# Patient Record
Sex: FEMALE | Race: WHITE | NOT HISPANIC OR LATINO | Employment: PART TIME | URBAN - METROPOLITAN AREA
[De-identification: names, ages, dates, MRNs, and addresses within clinical notes are randomized per-mention and may not be internally consistent; named-entity substitution may affect disease eponyms.]

---

## 2017-02-17 ENCOUNTER — OFFICE VISIT (OUTPATIENT)
Dept: FAMILY MEDICINE | Facility: CLINIC | Age: 57
End: 2017-02-17
Payer: COMMERCIAL

## 2017-02-17 VITALS
BODY MASS INDEX: 25.15 KG/M2 | DIASTOLIC BLOOD PRESSURE: 70 MMHG | SYSTOLIC BLOOD PRESSURE: 100 MMHG | TEMPERATURE: 98 F | WEIGHT: 136.69 LBS | HEART RATE: 92 BPM | HEIGHT: 62 IN

## 2017-02-17 DIAGNOSIS — K29.70 GASTRITIS, PRESENCE OF BLEEDING UNSPECIFIED, UNSPECIFIED CHRONICITY, UNSPECIFIED GASTRITIS TYPE: ICD-10-CM

## 2017-02-17 DIAGNOSIS — R09.81 NASAL CONGESTION: Primary | ICD-10-CM

## 2017-02-17 PROCEDURE — 99999 PR PBB SHADOW E&M-EST. PATIENT-LVL III: CPT | Mod: PBBFAC,,, | Performed by: INTERNAL MEDICINE

## 2017-02-17 PROCEDURE — 99214 OFFICE O/P EST MOD 30 MIN: CPT | Mod: S$GLB,,, | Performed by: INTERNAL MEDICINE

## 2017-02-17 RX ORDER — ONDANSETRON 4 MG/1
4 TABLET, ORALLY DISINTEGRATING ORAL EVERY 8 HOURS PRN
Qty: 30 TABLET | Refills: 6 | Status: SHIPPED | OUTPATIENT
Start: 2017-02-17 | End: 2018-03-28 | Stop reason: SDUPTHER

## 2017-02-17 NOTE — MR AVS SNAPSHOT
[facility]  [address] [facility], [address]  [facility] [address]  Phone: [phone]  Fax: [phone]                  Cristal Gonzalez   2017 10:20 AM   Office Visit    Description:  Female : 1960   Provider:  Ashanti Scruggs MD   Department:  [facility]           Reason for Visit     GI Problem     Fatigue     Chest Congestion     Nasal Congestion           Diagnoses this Visit        Comments    Nasal congestion    -  Primary     Gastritis, presence of bleeding unspecified, unspecified chronicity, unspecified gastritis type                To Do List           Goals (5 Years of Data)     None       These Medications        Disp Refills Start End    ondansetron (ZOFRAN-ODT) 4 MG TbDL 30 tablet 6 2017     Take 1 tablet (4 mg total) by mouth every 8 (eight) hours as needed (nausea). - Oral    Pharmacy: [facility] [address] [place] [address] [address] [facility] AT [facility] [address] Ph #: [phone]         [facility] On Call     [facility] On Call Nurse Care Line -  Assistance  Registered nurses in the [facility] On Call Center provide clinical advisement, health education, appointment booking, and other advisory services.  Call for this free service at [phone].             Medications           Message regarding Medications     Verify the changes and/or additions to your medication regime listed below are the same as discussed with your clinician today.  If any of these changes or additions are incorrect, please notify your healthcare provider.        CHANGE how you are taking these medications     Start Taking Instead of    ondansetron (ZOFRAN-ODT) 4 MG TbDL ondansetron (ZOFRAN-ODT) 4 MG TbDL    Dosage:  Take 1 tablet (4 mg total) by mouth every 8 (eight) hours as needed (nausea). Dosage:  Take 2 tablets (8 mg total) by mouth every 8 (eight) hours as needed.    Reason for Change:  Reorder            Verify that  "the below list of medications is an accurate representation of the medications you are currently taking.  If none reported, the list may be blank. If incorrect, please contact your healthcare provider. Carry this list with you in case of emergency.           Current Medications     acetaminophen (TYLENOL) 500 MG tablet Take 500 mg by mouth every 6 (six) hours as needed for Pain.    cetirizine (ZYRTEC) 10 MG tablet Take 1 tablet (10 mg total) by mouth once daily.    clotrimazole-betamethasone 1-0.05% (LOTRISONE) cream Apply topically 2 (two) times daily.    cyclobenzaprine (FLEXERIL) 5 MG tablet Take 1 tablet (5 mg total) by mouth 2 (two) times daily as needed for Muscle spasms.    docusate sodium (COLACE) 100 MG capsule Take 1 capsule (100 mg total) by mouth 2 (two) times daily as needed for Constipation.    fluticasone (FLONASE) 50 mcg/actuation nasal spray 1 spray by Each Nare route once daily.    GLUCOSAM SUL NA/CHONDR THURSTON A NA (GLUCOSAMINE & CHONDROIT SUL.NA ORAL) Take by mouth.    MV, MIN #36/IRON,CARBONYL/FA (GERITOL COMPLETE ORAL) Take by mouth.    naproxen (NAPROSYN) 500 MG tablet Take 1 tablet (500 mg total) by mouth 2 (two) times daily.    ondansetron (ZOFRAN-ODT) 4 MG TbDL Take 1 tablet (4 mg total) by mouth every 8 (eight) hours as needed (nausea).    SALVAX 6 % Foam            Clinical Reference Information           Your Vitals Were     BP Pulse Temp Height Weight BMI    100/70 (BP Location: Right arm) 92 98.3 °F (36.8 °C) 5' 2" (1.575 m) 62 kg (136 lb 11 oz) 25 kg/m2      Blood Pressure          Most Recent Value    BP  100/70      Allergies as of 2/17/2017     Sulfa (Sulfonamide Antibiotics)    Bactrim [Sulfamethoxazole-trimethoprim]      Immunizations Administered on Date of Encounter - 2/17/2017     None      Instructions    Vitamins: B complex, Vitamin C  "USP approved" ([place] Pharmacopeia)       Smoking Cessation     If you would like to quit smoking:   You may be eligible for free " services if you are a [place] resident and started smoking cigarettes before September 1, 1988.  Call the Smoking [employer]essation [employer] (SCT) toll free at (539) 744-0916 or (425) 687-4754.   Call 1-800-QUIT-NOW if you do not meet the above criteria.            Language Assistance Services     ATTENTION: Language assistance services are available, free of charge. Please call [phone].      ATENCIÓN: Si habla samantaañol, tiene a early disposición servicios gratuitos de asistencia lingüística. Llame al [phone].     CHÚ Ý: N?u b?n nói Ti?ng Vi?t, có các d?ch v? h? tr? ngôn ng? mi?n phí dành cho b?n. G?i s? [phone].         [facility] complies with applicable Federal civil rights laws and does not discriminate on the basis of race, color, national origin, age, disability, or sex.

## 2017-02-17 NOTE — PROGRESS NOTES
Subjective:        Patient ID: Cristal Gonzalez is a 56 y.o. female.    Chief Complaint: GI Problem (stomach swollen); Fatigue; Chest Congestion (yellow mucus with blood); and Nasal Congestion    HPI   Cristal Gonzalez presents for the followin. Fatigue and congestion: Pt reports feeling really tired for the past 3 weeks.  She has head and chest congestion.  No fever, SOB.  Pt's elderly parents live in [place] and pt helps care for them.  They also have sitters but pt takes care of paperwork, finances, etc.  Both parents are WC-bound; father has Alzheimer's x 12 years and Parkinson's.  Pt has visitors coming from out of town and she would like something to boost her energy.  She has been prescribed Medrol packs in the past by  and says these have made her feel better.    2. Bloating: Pt reports bloating during the daytime, associated with unsettled feeling/discomfort in the upper abdomen.  She also reports low back pain that gradually resolves after a BM.  No vomiting, blood in stool, melena, abdominal pain.  Pt usually eats fruit and drinks coffee in the AM, doesn't eat lunch until ~2pm.  She had a c-scope for screening and was told to eat more fiber.  She has been trying to increase fiber intake by eating cereals in the morning, salad for lunch.  Stools are soft and formed.  Pt drinks ~2 EtOH drinks/night, usually vodka with carbonated water.  She has not taken anything for the sx.    Review of Systems  as per HPI      Objective:        Vitals:    17 1026   BP: 100/70   Pulse: 92   Temp: 98.3 °F (36.8 °C)     Physical Exam   Constitutional: She is oriented to person, place, and time. She appears well-developed and well-nourished. No distress.   HENT:   Head: Normocephalic and atraumatic.   Right Ear: External ear normal.   Left Ear: External ear normal.   Nose: Nose normal.   Mouth/Throat: Oropharynx is clear and moist. No oropharyngeal exudate.   - bilateral ear canals clear, tympanic  "membranes visualized - normal color and light reflex  - no middle ear effusions   Eyes: Conjunctivae and EOM are normal.   Neck: Normal range of motion. Neck supple.   Cardiovascular: Normal rate.    Pulmonary/Chest: Effort normal. No respiratory distress.   Abdominal: Soft. Bowel sounds are normal. She exhibits no distension and no mass. There is no tenderness. There is no guarding.   Neurological: She is alert and oriented to person, place, and time.   Skin: Skin is warm and dry.   Vitals reviewed.          Assessment:         1. Nasal congestion    2. Gastritis, presence of bleeding unspecified, unspecified chronicity, unspecified gastritis type              Plan:         Cristal was seen today for gi problem, fatigue, chest congestion and nasal congestion.    Diagnoses and all orders for this visit:    Nasal congestion: No signs of acute infection on exam.  Recommend OTC sx tx with antihistamines, nasal sprays, decongestants, Tylenol/NSAIDs for pain.  - Counseled pt I do not recommend systemic steroids given risks of adverse effects outweigh short term benefits of energy boost.  - Also do not recommend abx at this time as there are no signs of acute infection and most sinus and respiratory infections are viral.  - Can try OTC B complex and C vitamins to see if this helps boost energy, continue daily MVI.    Gastritis, presence of bleeding unspecified, unspecified chronicity, unspecified gastritis type: Reviewed common foods and drinks that trigger acid reflux, gastritis sx, like citrus and acidic foods, alcohol and carbonated drinks.  Try to decrease intake of these foods.  Try Tums or OTC H2 blocker daily in the AM for 2 weeks.  Zofran PRN nausea.  -     ondansetron (ZOFRAN-ODT) 4 MG TbDL; Take 1 tablet (4 mg total) by mouth every 8 (eight) hours as needed (nausea).        Follow up if above sx worse or not gradually improving.    Patient Instructions   Vitamins: B complex, Vitamin C  "USP approved" ([place] " Pharmacopeia)

## 2017-05-23 DIAGNOSIS — M62.838 MUSCLE SPASM: ICD-10-CM

## 2017-05-23 RX ORDER — NAPROXEN 500 MG/1
500 TABLET ORAL 2 TIMES DAILY
Qty: 30 TABLET | Refills: 0 | OUTPATIENT
Start: 2017-05-23

## 2017-05-23 RX ORDER — CYCLOBENZAPRINE HCL 5 MG
5 TABLET ORAL 2 TIMES DAILY PRN
Qty: 30 TABLET | Refills: 0 | OUTPATIENT
Start: 2017-05-23

## 2017-05-23 NOTE — TELEPHONE ENCOUNTER
----- Message from Lilian Joan sent at 5/23/2017  8:32 AM CDT -----  Contact: call [phone]  RX request - refill or new RX.  Is this a refill or new RX:  refill  RX name and strength: cyclobenzaprine (FLEXERIL) 5 MG tablet  Directions:   Is this a 30 day or 90 day RX:  30 day   Pharmacy name and phone #: [facility] [employer] [address] [place] [address] [address] AT [facility] & [address] [phone] (Phone)   Comments:        RX request - refill or new RX.  Is this a refill or new RX:  refill  RX name and strength: naproxen (NAPROSYN) 500 MG tablet  Directions:   Is this a 30 day or 90 day RX:  30 day   Pharmacy name and phone #: [facility] [employer] [address] [place] [address], [place] - [facility] [address] AT [facility] & [address] [phone] (Phone)   Comments:

## 2017-05-24 ENCOUNTER — OFFICE VISIT (OUTPATIENT)
Dept: FAMILY MEDICINE | Facility: CLINIC | Age: 57
End: 2017-05-24
Payer: COMMERCIAL

## 2017-05-24 VITALS
HEIGHT: 62 IN | TEMPERATURE: 98 F | SYSTOLIC BLOOD PRESSURE: 118 MMHG | DIASTOLIC BLOOD PRESSURE: 78 MMHG | BODY MASS INDEX: 24.75 KG/M2 | WEIGHT: 134.5 LBS | HEART RATE: 78 BPM

## 2017-05-24 DIAGNOSIS — M54.9 BILATERAL BACK PAIN, UNSPECIFIED BACK LOCATION, UNSPECIFIED CHRONICITY: ICD-10-CM

## 2017-05-24 DIAGNOSIS — R53.83 FATIGUE, UNSPECIFIED TYPE: Primary | ICD-10-CM

## 2017-05-24 LAB
BILIRUB SERPL-MCNC: NORMAL MG/DL
BILIRUB UR QL STRIP: NEGATIVE
BLOOD URINE, POC: NORMAL
CLARITY UR REFRACT.AUTO: CLEAR
COLOR UR AUTO: YELLOW
COLOR, POC UA: YELLOW
GLUCOSE UR QL STRIP: NEGATIVE
GLUCOSE UR QL STRIP: NORMAL
HGB UR QL STRIP: NEGATIVE
KETONES UR QL STRIP: ABNORMAL
KETONES UR QL STRIP: NORMAL
LEUKOCYTE ESTERASE UR QL STRIP: NEGATIVE
LEUKOCYTE ESTERASE URINE, POC: NORMAL
MICROSCOPIC COMMENT: NORMAL
NITRITE UR QL STRIP: NEGATIVE
NITRITE, POC UA: NORMAL
PH UR STRIP: 6 [PH] (ref 5–8)
PH, POC UA: 6
PROT UR QL STRIP: NEGATIVE
PROTEIN, POC: NORMAL
RBC #/AREA URNS AUTO: 2 /HPF (ref 0–4)
SP GR UR STRIP: 1.02 (ref 1–1.03)
SPECIFIC GRAVITY, POC UA: 1.01
SQUAMOUS #/AREA URNS AUTO: 0 /HPF
URN SPEC COLLECT METH UR: ABNORMAL
UROBILINOGEN UR STRIP-ACNC: NEGATIVE EU/DL
UROBILINOGEN, POC UA: NORMAL
WBC #/AREA URNS AUTO: 0 /HPF (ref 0–5)

## 2017-05-24 PROCEDURE — 99999 PR PBB SHADOW E&M-EST. PATIENT-LVL V: CPT | Mod: PBBFAC,,, | Performed by: NURSE PRACTITIONER

## 2017-05-24 PROCEDURE — 81002 URINALYSIS NONAUTO W/O SCOPE: CPT | Mod: S$GLB,,, | Performed by: NURSE PRACTITIONER

## 2017-05-24 PROCEDURE — 1160F RVW MEDS BY RX/DR IN RCRD: CPT | Mod: S$GLB,,, | Performed by: NURSE PRACTITIONER

## 2017-05-24 PROCEDURE — 87086 URINE CULTURE/COLONY COUNT: CPT

## 2017-05-24 PROCEDURE — 99214 OFFICE O/P EST MOD 30 MIN: CPT | Mod: 25,S$GLB,, | Performed by: NURSE PRACTITIONER

## 2017-05-24 PROCEDURE — 81001 URINALYSIS AUTO W/SCOPE: CPT

## 2017-05-24 RX ORDER — NAPROXEN 500 MG/1
500 TABLET ORAL 2 TIMES DAILY
Qty: 60 TABLET | Refills: 1 | Status: SHIPPED | OUTPATIENT
Start: 2017-05-24 | End: 2018-06-19 | Stop reason: SDUPTHER

## 2017-05-24 RX ORDER — CYCLOBENZAPRINE HCL 5 MG
5 TABLET ORAL 3 TIMES DAILY PRN
Qty: 30 TABLET | Refills: 1 | Status: SHIPPED | OUTPATIENT
Start: 2017-05-24 | End: 2017-06-03

## 2017-05-24 NOTE — PROGRESS NOTES
"Subjective:       Patient ID: Cristal Gonzalez is a 56 y.o. female.    Chief Complaint: Medication Refill; Back Pain (lower back); and Fatigue    Pt here c/o low back pain, has a hx of LBP usually takes Naproxen and Flexeril 5 mg and it usually takes care of her pain but she is out of Flexeril and her original script is over a year old so she cannot use any of the remaining refills.    Pt also c/o fatigue for the past few weeks.  States that she has been taking care of her parents and not sure if that is why she is tired.  No n/v/d.  Wakes up some mornings and does not feel refreshed        Past Medical History:   Diagnosis Date    Hot flash, menopausal      Past Surgical History:   Procedure Laterality Date    APPENDECTOMY  2011    BREAST BIOPSY      benign     Social History     Social History Narrative        Helps care for elderly parents who live in [place] (father in , has alzheimer and Parkinson; mother in )     Family History   Problem Relation Age of Onset    Diabetes Mother     Alzheimer's disease Father     Cancer Maternal Aunt      breast cancer     Vitals:    05/24/17 1046   BP: 118/78   Pulse: 78   Temp: 98.1 °F (36.7 °C)   Weight: 61 kg (134 lb 7.7 oz)   Height: 5' 1.5" (1.562 m)   PainSc:   3     Outpatient Encounter Prescriptions as of 5/24/2017   Medication Sig Dispense Refill    GLUCOSAM SUL NA/CHONDR THURSTON A NA (GLUCOSAMINE & CHONDROIT SUL.NA ORAL) Take by mouth.      MV, MIN #36/IRON,CARBONYL/FA (GERITOL COMPLETE ORAL) Take by mouth.      ondansetron (ZOFRAN-ODT) 4 MG TbDL Take 1 tablet (4 mg total) by mouth every 8 (eight) hours as needed (nausea). 30 tablet 6    acetaminophen (TYLENOL) 500 MG tablet Take 500 mg by mouth every 6 (six) hours as needed for Pain.      cetirizine (ZYRTEC) 10 MG tablet Take 1 tablet (10 mg total) by mouth once daily. 30 tablet 5    cyclobenzaprine (FLEXERIL) 5 MG tablet Take 1 tablet (5 mg total) by mouth 2 (two) times daily as needed for " "Muscle spasms. 30 tablet 9    cyclobenzaprine (FLEXERIL) 5 MG tablet Take 1 tablet (5 mg total) by mouth 3 (three) times daily as needed for Muscle spasms. 30 tablet 1    docusate sodium (COLACE) 100 MG capsule Take 1 capsule (100 mg total) by mouth 2 (two) times daily as needed for Constipation. 60 capsule 0    fluticasone (FLONASE) 50 mcg/actuation nasal spray 1 spray by Each Nare route once daily. 16 g 2    naproxen (EC NAPROSYN) 500 MG EC tablet Take 1 tablet (500 mg total) by mouth 2 (two) times daily. If needed For back pain 60 tablet 1    naproxen (NAPROSYN) 500 MG tablet Take 1 tablet (500 mg total) by mouth 2 (two) times daily. 30 tablet 6    SALVAX 6 % Foam   3    [DISCONTINUED] clotrimazole-betamethasone 1-0.05% (LOTRISONE) cream Apply topically 2 (two) times daily. 45 g 1     No facility-administered encounter medications on file as of 5/24/2017.      Wt Readings from Last 3 Encounters:   05/24/17 61 kg (134 lb 7.7 oz)   02/17/17 62 kg (136 lb 11 oz)   10/07/16 62.8 kg (138 lb 7.2 oz)     Last 3 sets of Vitals    Vitals - 1 value per visit 10/7/2016 2/17/2017 5/24/2017   SYSTOLIC 128 100 118   DIASTOLIC 89 70 78   PULSE 64 92 78   TEMPERATURE 98 98.3 98.1   RESPIRATIONS - - -   Weight (lb) 138.45 136.69 134.48   Weight (kg) 62.8 62 61   HEIGHT 5' 2" 5' 2" 5' 1.5"   BODY MASS INDEX 25.32 25 25   VISIT REPORT - - -   Pain Score  1 1 3     No results found for: CBC  Review of Systems   Constitutional: Positive for fatigue. Negative for chills, diaphoresis, fever and unexpected weight change.   HENT: Negative for congestion and trouble swallowing.    Respiratory: Negative for cough and shortness of breath.    Cardiovascular: Negative for chest pain.   Gastrointestinal: Negative for abdominal pain, diarrhea, nausea and vomiting.   Genitourinary: Negative for dysuria.   Musculoskeletal: Positive for back pain.   Skin: Negative for rash.   Psychiatric/Behavioral: Negative for sleep disturbance.     "   Objective:      Physical Exam   Constitutional: She is oriented to person, place, and time. She appears well-developed and well-nourished. No distress.   Non toxic appearing      HENT:   Head: Normocephalic and atraumatic.   Right Ear: External ear normal.   Left Ear: External ear normal.   Nose: Nose normal.   Mouth/Throat: Oropharynx is clear and moist.   Eyes: Conjunctivae are normal. Pupils are equal, round, and reactive to light.   Neck: Normal range of motion. Neck supple.   Cardiovascular: Normal rate, regular rhythm and normal heart sounds.    Pulmonary/Chest: Effort normal and breath sounds normal. No stridor. No respiratory distress.   Abdominal: Soft.   Musculoskeletal:        Lumbar back: She exhibits normal range of motion, no tenderness, no pain and no spasm.   Lymphadenopathy:     She has no cervical adenopathy.   Neurological: She is alert and oriented to person, place, and time. She has normal reflexes.   Skin: Skin is warm and dry. Capillary refill takes less than 2 seconds. No rash noted. She is not diaphoretic. No erythema. No pallor.   Psychiatric: She has a normal mood and affect. Her behavior is normal. Judgment and thought content normal.   Nursing note and vitals reviewed.      Assessment:       1. Fatigue, unspecified type    2. Bilateral back pain, unspecified back location, unspecified chronicity        Plan:       Cristal was seen today for medication refill, back pain and fatigue.    Diagnoses and all orders for this visit:    Fatigue, unspecified type  -     CBC auto differential; Future  -     Comprehensive metabolic panel; Future  -     TSH; Future  -     Urinalysis  -     Urine culture  -     Ferritin; Future  -     Iron and TIBC; Future  -     DAVEY; Future    Bilateral back pain, unspecified back location, unspecified chronicity  -     naproxen (EC NAPROSYN) 500 MG EC tablet; Take 1 tablet (500 mg total) by mouth 2 (two) times daily. If needed For back pain  -     cyclobenzaprine  (FLEXERIL) 5 MG tablet; Take 1 tablet (5 mg total) by mouth 3 (three) times daily as needed for Muscle spasms.  -     Urinalysis  -     Urine culture  -     POCT URINE DIPSTICK WITHOUT MICROSCOPE

## 2017-05-24 NOTE — PATIENT INSTRUCTIONS
YOUR SYMPTOMS MAY BE DUE TO DEHYRATION or INADEQUATE HYDRATION    Your body systems need EIGHT more glasses of water daily to adequately flush out your Kidneys & Liver & Intestinal tract of its daily toxins.     EIGHT GLASSES OF WATER DAILY - add one extra cup per cup of caffeine (coffee, tea, coke, soda).     (for example - If you drink 2 cups of coffee & 1 diet coke daily, then you need 3 +8 = ELEVEN glasses of water DAILY)    See how you feel after doing this for a week & give me an update by email    Labs today    Will call or send you a message with results

## 2017-05-25 ENCOUNTER — LAB VISIT (OUTPATIENT)
Dept: LAB | Facility: HOSPITAL | Age: 57
End: 2017-05-25
Attending: NURSE PRACTITIONER
Payer: COMMERCIAL

## 2017-05-25 DIAGNOSIS — R53.83 FATIGUE, UNSPECIFIED TYPE: ICD-10-CM

## 2017-05-25 LAB
ALBUMIN SERPL BCP-MCNC: 4 G/DL
ALP SERPL-CCNC: 51 U/L
ALT SERPL W/O P-5'-P-CCNC: 20 U/L
ANION GAP SERPL CALC-SCNC: 9 MMOL/L
AST SERPL-CCNC: 23 U/L
BACTERIA UR CULT: NORMAL
BASOPHILS # BLD AUTO: 0.06 K/UL
BASOPHILS NFR BLD: 0.9 %
BILIRUB SERPL-MCNC: 0.4 MG/DL
BUN SERPL-MCNC: 22 MG/DL
CALCIUM SERPL-MCNC: 9.5 MG/DL
CHLORIDE SERPL-SCNC: 105 MMOL/L
CO2 SERPL-SCNC: 25 MMOL/L
CREAT SERPL-MCNC: 0.8 MG/DL
DIFFERENTIAL METHOD: ABNORMAL
EOSINOPHIL # BLD AUTO: 0.3 K/UL
EOSINOPHIL NFR BLD: 3.8 %
ERYTHROCYTE [DISTWIDTH] IN BLOOD BY AUTOMATED COUNT: 13.3 %
EST. GFR  (AFRICAN AMERICAN): >60 ML/MIN/1.73 M^2
EST. GFR  (NON AFRICAN AMERICAN): >60 ML/MIN/1.73 M^2
FERRITIN SERPL-MCNC: 125 NG/ML
GLUCOSE SERPL-MCNC: 85 MG/DL
HCT VFR BLD AUTO: 43.7 %
HGB BLD-MCNC: 14.4 G/DL
IRON SERPL-MCNC: 64 UG/DL
LYMPHOCYTES # BLD AUTO: 2.7 K/UL
LYMPHOCYTES NFR BLD: 38.2 %
MCH RBC QN AUTO: 32.2 PG
MCHC RBC AUTO-ENTMCNC: 33 %
MCV RBC AUTO: 98 FL
MONOCYTES # BLD AUTO: 0.6 K/UL
MONOCYTES NFR BLD: 7.9 %
NEUTROPHILS # BLD AUTO: 3.4 K/UL
NEUTROPHILS NFR BLD: 49.1 %
PLATELET # BLD AUTO: 410 K/UL
PMV BLD AUTO: 10.1 FL
POTASSIUM SERPL-SCNC: 4.7 MMOL/L
PROT SERPL-MCNC: 7.3 G/DL
RBC # BLD AUTO: 4.47 M/UL
SATURATED IRON: 18 %
SODIUM SERPL-SCNC: 139 MMOL/L
TOTAL IRON BINDING CAPACITY: 346 UG/DL
TRANSFERRIN SERPL-MCNC: 234 MG/DL
TSH SERPL DL<=0.005 MIU/L-ACNC: 1.48 UIU/ML
WBC # BLD AUTO: 6.93 K/UL

## 2017-05-25 PROCEDURE — 86038 ANTINUCLEAR ANTIBODIES: CPT

## 2017-05-25 PROCEDURE — 82728 ASSAY OF FERRITIN: CPT

## 2017-05-25 PROCEDURE — 84466 ASSAY OF TRANSFERRIN: CPT

## 2017-05-25 PROCEDURE — 36415 COLL VENOUS BLD VENIPUNCTURE: CPT | Mod: PO

## 2017-05-25 PROCEDURE — 80053 COMPREHEN METABOLIC PANEL: CPT

## 2017-05-25 PROCEDURE — 85025 COMPLETE CBC W/AUTO DIFF WBC: CPT

## 2017-05-25 PROCEDURE — 83540 ASSAY OF IRON: CPT

## 2017-05-25 PROCEDURE — 84443 ASSAY THYROID STIM HORMONE: CPT

## 2017-05-26 LAB — ANA SER QL IF: NORMAL

## 2017-08-22 ENCOUNTER — OFFICE VISIT (OUTPATIENT)
Dept: URGENT CARE | Facility: CLINIC | Age: 57
End: 2017-08-22
Payer: COMMERCIAL

## 2017-08-22 VITALS
DIASTOLIC BLOOD PRESSURE: 84 MMHG | TEMPERATURE: 98 F | HEART RATE: 77 BPM | BODY MASS INDEX: 25.28 KG/M2 | OXYGEN SATURATION: 99 % | SYSTOLIC BLOOD PRESSURE: 122 MMHG | WEIGHT: 136 LBS | RESPIRATION RATE: 18 BRPM

## 2017-08-22 DIAGNOSIS — R11.10 VOMITING, INTRACTABILITY OF VOMITING NOT SPECIFIED, PRESENCE OF NAUSEA NOT SPECIFIED, UNSPECIFIED VOMITING TYPE: ICD-10-CM

## 2017-08-22 LAB
BILIRUB UR QL STRIP: NEGATIVE
GLUCOSE UR QL STRIP: NEGATIVE
KETONES UR QL STRIP: NEGATIVE
LEUKOCYTE ESTERASE UR QL STRIP: POSITIVE
PH, POC UA: 6
POC BLOOD, URINE: NEGATIVE
POC NITRATES, URINE: NEGATIVE
PROT UR QL STRIP: POSITIVE
SP GR UR STRIP: 1.02 (ref 1–1.03)
UROBILINOGEN UR STRIP-ACNC: NORMAL (ref 0.1–1.1)

## 2017-08-22 PROCEDURE — 99214 OFFICE O/P EST MOD 30 MIN: CPT | Mod: 25,S$GLB,, | Performed by: FAMILY MEDICINE

## 2017-08-22 PROCEDURE — 3008F BODY MASS INDEX DOCD: CPT | Mod: S$GLB,,, | Performed by: FAMILY MEDICINE

## 2017-08-22 PROCEDURE — 81003 URINALYSIS AUTO W/O SCOPE: CPT | Mod: QW,S$GLB,, | Performed by: FAMILY MEDICINE

## 2017-08-22 RX ORDER — ONDANSETRON 2 MG/ML
4 INJECTION INTRAMUSCULAR; INTRAVENOUS
Status: DISCONTINUED | OUTPATIENT
Start: 2017-08-22 | End: 2017-08-22

## 2017-08-22 RX ORDER — ONDANSETRON 4 MG/1
4 TABLET, FILM COATED ORAL DAILY PRN
Qty: 15 TABLET | Refills: 1 | Status: SHIPPED | OUTPATIENT
Start: 2017-08-22 | End: 2018-08-22

## 2017-08-22 RX ORDER — ONDANSETRON 2 MG/ML
4 INJECTION INTRAMUSCULAR; INTRAVENOUS
Status: COMPLETED | OUTPATIENT
Start: 2017-08-22 | End: 2017-08-22

## 2017-08-22 RX ADMIN — ONDANSETRON 4 MG: 2 INJECTION INTRAMUSCULAR; INTRAVENOUS at 05:08

## 2017-08-22 NOTE — PROGRESS NOTES
Subjective:       Patient ID: Cristal Gonzalez is a 57 y.o. female.    Chief Complaint: Emesis (3 hours ) and Diarrhea (3 hours )    PT STARTED AFTER EATING LUNCH TODAY 3 HOURS AGO SHE STARTED TO FEEL SICK. Patient reports approx. 6 episodes of vomiting in the last 2 hrs and has noted the onset of diarrhea. Reports was well prior to lunch without symptoms. Reports dry heaves. No hematemesis. No known ill exposures.       Emesis    This is a new (6 times today ) problem. The current episode started today. The problem occurs 2 to 4 times per day. The problem has been gradually worsening. There has been no fever. Associated symptoms include chills and diarrhea. Pertinent negatives include no headaches, sweats or weight loss. Treatments tried: zofran 4mg  The treatment provided no relief.   Diarrhea    Associated symptoms include chills and vomiting. Pertinent negatives include no headaches, sweats or weight loss.     Review of Systems   Constitution: Positive for chills. Negative for weight loss.   HENT: Negative for headaches.    Gastrointestinal: Positive for diarrhea and vomiting.       Objective:      Physical Exam   Constitutional: She appears well-developed and well-nourished.   Cardiovascular: Normal rate, regular rhythm and normal heart sounds.    Pulmonary/Chest: Effort normal and breath sounds normal.   Abdominal: Bowel sounds are normal. She exhibits no distension and no mass. There is tenderness (mild generalized tenderness, no rebound or guarding. ). There is no rebound and no guarding. No hernia.   Nursing note reviewed.      Assessment:       1. Food poisoning, accidental or unintentional, initial encounter    2. Vomiting, intractability of vomiting not specified, presence of nausea not specified, unspecified vomiting type        Plan:       Cristal was seen today for emesis and diarrhea.    Diagnoses and all orders for this visit:    Food poisoning, accidental or unintentional, initial encounter  -      ondansetron (ZOFRAN, AS HYDROCHLORIDE,) 4 MG tablet; Take 1 tablet (4 mg total) by mouth daily as needed for Nausea.    Vomiting, intractability of vomiting not specified, presence of nausea not specified, unspecified vomiting type  -     POCT Urinalysis, Dipstick, Automated, W/O Scope  -     Discontinue: ondansetron injection 4 mg; Inject 4 mg into the vein one time.  -     ondansetron injection 4 mg; Inject 4 mg into the muscle one time.  -     ondansetron (ZOFRAN, AS HYDROCHLORIDE,) 4 MG tablet; Take 1 tablet (4 mg total) by mouth daily as needed for Nausea.    patient reports feeling better after Zofran IM in office and is now able to tolerate liquids. Discussed natural course of illness and advised to go the ER for intractable vomiting or other concerning symptoms.

## 2017-08-22 NOTE — PATIENT INSTRUCTIONS
Food Poisoning or Viral Gastroenteritis (Adult)  You have a stomach illness that is likely either food poisoning or viral gastroenteritis.  Food poisoning is illness that is passed along in food and affects the stomach and intestinal tract. It usually occurs from 1 to 24 hours after eating food that has spoiled. When it happens within a few hours of eating, it is often caused by toxins from bacteria in food that has not been cooked or refrigerated properly.  Viral gastroenteritis is also an illness from a virus that affects the stomach and intestinal tract. Many people call it the stomach flu, but it has nothing to do with influenza. In fact, it can happen from food poisoning, but it can also happen when germs are passed from person-to-person or contaminated surface (toothbrush, cutting board, toilet) to a person.  Either illness can cause these symptoms:  · Abdominal pain and cramping  · Nausea  · Vomiting  · Diarrhea  · Fever and chills  · Loss of bowel control  The symptoms of food poisoning usually last 1 to 2 days. The symptoms of viral gastroenteritis can sometimes last up to 7 days but usually end sooner.  Antibiotics are not effective for either illness.  Other causes of gastroenteritis include bacteria and parasites which are not discussed here.  Home care  Follow all instructions given by your healthcare provider. Rest at home for the next 24 hours, or until you feel better. Avoid caffeine, tobacco, and alcohol. These can make diarrhea, cramping, and pain worse.  If taking medicines:  · Dont take over-the-counter diarrhea or nausea medicines unless your healthcare provider tells you to.  · You may use acetaminophen or NSAID medicines like ibuprofen or naproxen to reduce pain and fever. Dont use these if you have chronic liver or kidney disease, or ever had a stomach ulcer or GI bleeding. Talk with your healthcare provider first. Don't use NSAID medicines if you are already taking one for another  condition (like arthritis) or are on daily aspirin therapy (such as for heart disease or after a stroke).  To prevent the spread of illness:  · Remember that washing with soap and water is the best way to prevent the spread of infection. Wash your hands before and after caring for a sick person.  · Clean the toilet after each use.  · Wash your hands or use alcohol-based hand  before eating.  · Wash your hands or use alcohol-based hand  before and after preparing food. Keep in mind that people with diarrhea or vomiting should not prepare food for others.  · Wash your hands or use alcohol-based hand  after using cutting boards, counter-tops, and knives (and other utensils) that have been in contact with raw foods.  · Wash and then peel fruits and vegetables.  · Keep uncooked meats away from cooked and ready-to-eat foods.  · Use a food thermometer when cooking. Cook poultry to at least 165°F (74°C). Cook ground meat (beef, veal, pork, lamb) to at least 160°F (71°C). Cook fresh beef, veal, lamb, and pork to at least 145°F (63°C).  · Dont eat raw or undercooked eggs (poached or suresh side up), poultry, meat or unpasteurized milk and juices.  Food and drinks  The main goal while treating vomiting or diarrhea is to prevent dehydration. This is done by taking small amounts of liquids often.  · Keep in mind that liquids are more important than food right now.  · Drink only small amounts of liquids at a time.  · Dont force yourself to eat, especially if you are having cramping, vomiting, or diarrhea. Dont eat large amounts at a time, even if you are hungry.  · If you eat, avoid fatty, greasy, spicy, or fried foods.  · Dont eat dairy foods or drink milk if you have diarrhea. These can make diarrhea worse.  The first 24 hours you can try:  · Oral rehydration solutions, available at grocery stores and pharmacies. Sports drinks are not a good choice if you are very dehydrated. They have too much  sugar and not enough electrolytes.  · Soft drinks without caffeine  · Ginger ale  · Water (plain or flavored)  · Decaf tea or coffee  · Clear broth, consommé, or bouillon  · Gelatin, popsicles, or frozen fruit juice bars   The second 24 hours, if you are feeling better, you can add:  · Hot cereal, plain toast, bread, rolls, or crackers  · Plain noodles, rice, mashed potatoes, chicken noodle soup, or rice soup  · Unsweetened canned fruit (no pineapple)  · Bananas  As you recover:  · Limit fat intake to less than 15 grams per day. Dont eat margarine, butter, oils, mayonnaise, sauces, gravies, fried foods, peanut butter, meat, poultry, or fish.  · Limit fiber. Dont eat raw or cooked vegetables, fresh fruits except bananas, and bran cereals.  · Limit caffeine and chocolate.  · Dont use spices or seasonings except salt.  · Resume a normal diet over time, as you feel better and your symptoms improve.  · If the symptoms come back, go back to a simple diet or clear liquids.  Follow-up care  Follow up with your healthcare provider, or as advised. If a stool sample was taken or cultures were done, call the healthcare provider for the results as instructed.  Call 911  Call 911 if you have any of these symptoms:  · Trouble breathing  · Confusion  · Extreme drowsiness or trouble walking  · Loss of consciousness  · Rapid heart rate  · Chest pain  · Stiff neck  · Seizure  When to seek medical advice  Call your healthcare provider right away if any of these occur:  · Abdominal pain that gets worse  · Constant lower right abdominal pain  · Continued vomiting and inability to keep liquids down  · Diarrhea more than 5 times a day  · Blood in vomit or stool  · Dark urine or no urine for 8 hours, dry mouth and tongue, tiredness, weakness, or dizziness  · New rash  · You dont get better in 2 to 3 days  · Fever of 100.4°F (38°C) or higher that doesnt get lower with medicine  · You have new symptoms of arthritis  Date Last Reviewed:  1/3/2016  © 6365-9076 [employer]. [address] [place] [address], [place] [address]. All rights reserved. This information is not intended as a substitute for professional medical care. Always follow your healthcare professional's instructions.

## 2017-08-25 DIAGNOSIS — Z12.11 COLON CANCER SCREENING: ICD-10-CM

## 2017-10-09 ENCOUNTER — OFFICE VISIT (OUTPATIENT)
Dept: URGENT CARE | Facility: CLINIC | Age: 57
End: 2017-10-09
Payer: COMMERCIAL

## 2017-10-09 VITALS
DIASTOLIC BLOOD PRESSURE: 89 MMHG | WEIGHT: 140 LBS | HEART RATE: 87 BPM | SYSTOLIC BLOOD PRESSURE: 118 MMHG | HEIGHT: 62 IN | RESPIRATION RATE: 16 BRPM | TEMPERATURE: 97 F | BODY MASS INDEX: 25.76 KG/M2 | OXYGEN SATURATION: 98 %

## 2017-10-09 DIAGNOSIS — J40 BRONCHITIS: ICD-10-CM

## 2017-10-09 DIAGNOSIS — J01.20 ACUTE ETHMOIDAL SINUSITIS, RECURRENCE NOT SPECIFIED: Primary | ICD-10-CM

## 2017-10-09 PROCEDURE — 99214 OFFICE O/P EST MOD 30 MIN: CPT | Mod: S$GLB,,, | Performed by: EMERGENCY MEDICINE

## 2017-10-09 RX ORDER — AMOXICILLIN 875 MG/1
875 TABLET, FILM COATED ORAL 2 TIMES DAILY
Qty: 20 TABLET | Refills: 0 | Status: SHIPPED | OUTPATIENT
Start: 2017-10-09 | End: 2017-10-19

## 2017-10-09 RX ORDER — METHYLPREDNISOLONE 4 MG/1
4 TABLET ORAL DAILY
Qty: 1 PACKAGE | Refills: 0 | Status: SHIPPED | OUTPATIENT
Start: 2017-10-09 | End: 2017-10-15

## 2017-10-09 RX ORDER — CODEINE PHOSPHATE AND GUAIFENESIN 10; 100 MG/5ML; MG/5ML
5 SOLUTION ORAL EVERY 6 HOURS PRN
Qty: 150 ML | Refills: 0 | Status: SHIPPED | OUTPATIENT
Start: 2017-10-09 | End: 2017-10-19

## 2017-10-09 NOTE — PROGRESS NOTES
Subjective:       Patient ID: Cristal Gonzalez is a 57 y.o. female.    Chief Complaint: Cough    Patient states she has had non productive cough,watery eyes, nasal congestion, and scratchy throat that started today.      Cough   This is a new problem. The current episode started today. The problem has been gradually worsening. The problem occurs constantly. The cough is non-productive. Associated symptoms include headaches, nasal congestion and a sore throat (scratchy). Pertinent negatives include no chest pain, chills, ear pain, eye redness, fever, myalgias, postnasal drip, shortness of breath or wheezing. Associated symptoms comments: Watery eyes. The symptoms are aggravated by lying down. Treatments tried: tea. The treatment provided no relief. There is no history of asthma or bronchitis.     Review of Systems   Constitution: Positive for malaise/fatigue. Negative for chills and fever.   HENT: Positive for congestion (nasal) and sore throat (scratchy). Negative for ear pain, hoarse voice and postnasal drip.    Eyes: Negative for discharge and redness.   Cardiovascular: Negative for chest pain, dyspnea on exertion and leg swelling.   Respiratory: Positive for cough. Negative for shortness of breath, sputum production and wheezing.    Musculoskeletal: Negative for myalgias.   Gastrointestinal: Negative for abdominal pain and nausea.   Neurological: Positive for headaches.       Objective:      Physical Exam   HENT:   Nose: Mucosal edema and rhinorrhea present.   Pulmonary/Chest:   Occasional cough       Assessment:       1. Acute ethmoidal sinusitis, recurrence not specified    2. Bronchitis        Plan:       Cristal was seen today for cough.    Diagnoses and all orders for this visit:    Acute ethmoidal sinusitis, recurrence not specified    Bronchitis    Other orders  -     amoxicillin (AMOXIL) 875 MG tablet; Take 1 tablet (875 mg total) by mouth 2 (two) times daily.  -     guaifenesin-codeine 100-10 mg/5 ml  (GUAIFENESIN AC)  mg/5 mL syrup; Take 5 mLs by mouth every 6 (six) hours as needed for Cough.  -     methylPREDNISolone (MEDROL DOSEPACK) 4 mg tablet; Take 1 tablet (4 mg total) by mouth once daily. use as directed    Please return here or go to the Emergency Department for any concerns or worsening of condition      Start taking antibiotics if not improved in 6 days    Zyrtec, Claritin, or Allegra OTC as directed for the next 7 days  Flonase OTC as directed for the next 7 days  Salt Water Nasal Spray OTC 3x/day for the next 7 days      Follow up with Primary Care or ENT if not improved in 7-10 Days:  [id number]      Sinusitis (No Antibiotics)    The sinuses are air-filled spaces within the bones of the face. They connect to the inside of the nose. Sinusitis is an inflammation of the tissue lining the sinus cavity. Sinus inflammation can occur during a cold. It can also be due to allergies to pollens and other particles in the air. It can cause symptoms such as sinus congestion, headache, sore throat, facial swelling and fullness. It may also cause a low-grade fever. No infection is present, and no antibiotic treatment is needed.  Home care  · Drink plenty of water, hot tea, and other liquids. This may help thin mucus. It also may promote sinus drainage.  · Heat may help soothe painful areas of the face. Use a towel soaked in hot water. Or,  the shower and direct the hot spray onto your face. Using a vaporizer along with a menthol rub at night may also help.   · An expectorant containing guaifenesin may help thin the mucus and promote drainage from the sinuses.  · Over-the-counter decongestants may be used unless a similar medicine was prescribed. Nasal sprays work the fastest. Use one that contains phenylephrine or oxymetazoline. First blow the nose gently. Then use the spray. Do not use these medicines more often than directed on the label or symptoms may get worse. You may also use tablets containing  pseudoephedrine. Avoid products that combine ingredients, because side effects may be increased. Read labels. You can also ask the pharmacist for help. (NOTE: Persons with high blood pressure should not use decongestants. They can raise blood pressure.)  · Over-the-counter antihistamines may help if allergies contributed to your sinusitis.    · Use acetaminophen or ibuprofen to control pain, unless another pain medicine was prescribed. (If you have chronic liver or kidney disease or ever had a stomach ulcer, talk with your doctor before using these medicines. Aspirin should never be used in anyone under 18 years of age who is ill with a fever. It may cause severe liver damage.)  · Use nasal rinses or irrigation as instructed by your health care provider.  · Don't smoke. This can worsen symptoms.  Follow-up care  Follow up with your healthcare provider or our staff if you are not improving within the next week.  When to seek medical advice  Call your healthcare provider if any of these occur:  · Green or yellow discharge from the nose or into the throat  · Facial pain or headache becoming more severe  · Stiff neck  · Unusual drowsiness or confusion  · Swelling of the forehead or eyelids  · Vision problems, including blurred or double vision  · Fever of 100.4ºF (38ºC) or higher, or as directed by your healthcare provider  · Seizure  · Breathing problems  · Symptoms not resolving within 10 days  Date Last Reviewed: 4/13/2015  © 8830-4598 "[employer]. [address] [place] [address]. All rights reserved. This information is not intended as a substitute for professional medical care. Always follow your healthcare professional's instructions.      When to Use Antibiotics   Antibiotics are medicines used to treat infections caused by bacteria. They dont work for illnesses caused by viruses or an allergic reaction. In fact, taking antibiotics for reasons other than a bacterial infection can cause  problems. For example, you may have side effects from the medicine. And if you really need an antibiotic, it may not work well.                                                                                                                                              When antibiotics wont help  Your healthcare provider wont usually prescribe antibiotics for the following conditions. You can help by not asking for them if you have:   · A cold. This type of illness is caused by a virus. It can cause a runny nose, stuffed-up nose, sneezing, coughing, headache, mild body aches, and low fever. A cold gets better on its own in a few days to a week.  · The flu (influenza). This is a respiratory illness caused by a virus. The flu usually goes away on its own in a week or so. It can cause fever, body aches, sore throat, and fatigue.  · Bronchitis. This is an infection in the lungs most often caused by a virus. You may have coughing, phlegm, body aches, and a low fever. A common type of bronchitis is known as a chest cold (acute bronchitis). This often happens after you have a respiratory infection like a common cold. Bronchitis can take weeks to go away, but antibiotics usually dont help.  · Most sore throats. Sore throats are most often caused by viruses. Your throat may feel scratchy or achy, and it may hurt to swallow. You may also have a low fever and body aches. A sore throat usually gets better in a few days.  · Most ear infections. An ear infection may be caused by a virus or bacteria. It causes pain in the ear. Antibiotics usually dont help, and the infection goes away on its own.  · Most sinus infections (sinusitis). This kind of infection causes sinus pain and swelling, and a runny nose. In most cases, sinusitis goes away on its own, and antibiotics dont make recovery quicker.  · Allergic rhinitis. This is a set of symptoms caused by an allergic reaction. You may have sneezing, a runny nose, itchy or watery  eyes, or a sore throat. Allergies are not treated with antibiotics.  · Low fever. A mild fever thats less than 100.4°F (38°C) most likely doesnt need treatment with antibiotics.   When antibiotics can help   Antibiotics can be used to treat:                                                     · Strep throat. This is a throat infectioncaused by a certain type of bacteria. Symptoms of strep throat include a sore throat, white patches on the tonsils, red spots on the roof of the mouth, fever, body aches, and nausea and vomiting.  · Urinary tract infection (UTI). This is a bacterial infection of the bladder and the tube that takes urine out of the body. It can cause burning pain and urine thats cloudy or tinted with blood. UTIs are very common. Antibiotics usually help treat these infections.  · Some ear infections. In some cases, a healthcare provider may prescribe antibiotics for an ear infection. You may need a test to show whats causing the ear infection.  · Some sinus infections. In some cases, yourhealthcare provider may give you antibiotics. He or she may first need to make sure your symptoms arent caused by a virus, fungus, allergies, or air pollutants such as smoke.   Your doctor may also recommend antibiotics if you have a condition that can affect your immune system, such as diabetes or cancer.   Self-care at home   If your infection cant be treated with antibiotics, you can take other steps to feel better. Try the remedies below. In general:   · Rest and sleep as much as needed.  · Drink water and other clear fluids.  · Dont smoke, and avoid smoke from other people.  · Use over-the-counter medicine such as acetaminophen to ease pain or fever, as directed by your healthcare provider.   To treat sinus pain or nasal congestion:   · Put a warm, moist washcloth on your face where you feel sinus pain or pressure.  · Use a nasal spray with medicine or saline, as directed by your healthcare  "provider.  · Breathe in steam from a hot shower.  · Use a humidifier or cool mist vaporizer.   To quiet a cough:   · Use a humidifier or cool mist vaporizer.  · Breathe in steam from a hot shower.  · Use cough lozenges.   To sooth a sore throat:   · Suck on ice chips, popsicles, or lozenges.  · Use a sore throat spray.  · Use a humidifier or cool mist vaporizer.  · Gargle with saltwater.  · Drink warm liquids.   To ease ear pain:   · Hold a warm, moist washcloth on the ear for 10 minutes at a time.  Date Last Reviewed: 9/1/2016 © 2000-2016 [employer]. [address] [place] [address], [place] [address]. All rights reserved. This information is not intended as a substitute for professional medical care. Always follow your healthcare professional's instructions.           Understanding Nasal Anatomy: Inside View  A lot happens under the surface of the nose. The bone and cartilage under the skin give the nose most of its size and shape. Other structures inside and behind the nose help you breathe. Learning the anatomy of the nose can help you better understand how the nose works.       Bone supports the upper third (bridge) of the nose. The upper cartilage supports the side of the nose. The lower cartilage adds support, width, and height. It helps shape the nostrils and the tip of the nose. Skin also helps shape the nose.          The nasal cavity is a hollow space behind the nose that air flows through. The septum is a thin "wall" made of cartilage and bone. It divides the inside of the nose into two chambers. The mucous membrane is thin tissue that lines the nose, sinuses, and throat. It warms and moistens the air you breathe in. It also makes the sticky mucus that helps clean the air of dust and other small particles. The turbinates on each side of the nose are curved, bony ridges lined with mucous membrane. They warm and moisten the air you breathe in. The sinuses are hollow, air-filled chambers in the bone " around your nose. Mucus from the sinuses drains into the nasal cavity.  Date Last Reviewed: 11/1/2016 © 2000-2016 [employer]. [address] [place] [address], [place] [address]. All rights reserved. This information is not intended as a substitute for professional medical care. Always follow your healthcare professional's instructions.       Please return here or go to the Emergency Department for any concerns or worsening of condition        Start taking antibiotics if not improved in 3 days      Follow up with Primary Care if not improved in 7-10 Days:  [id number]      Bronchitis, Viral (Adult)    You have a viral bronchitis. Bronchitis is inflammation and swelling of the lining of the lungs. This is often caused by an infection. Symptoms include a dry, hacking cough that is worse at night. The cough may bring up yellow-green mucus. You may also feel short of breath or wheeze. Other symptoms may include tiredness, chest discomfort, and chills.  Bronchitis that is caused by a virus is not treated with antibiotics. Instead, medicines may be given to help relieve symptoms. Symptoms can last up to 2 weeks, although the cough may last much longer.  This illness is contagious during the first few days and is spread through the air by coughing and sneezing, or by direct contact (touching the sick person and then touching your own eyes, nose, or mouth).  Most viral illnesses resolve within 10 to 14 days with rest and simple home remedies, although they may sometimes last for several weeks.  Home care  · If symptoms are severe, rest at home for the first 2 to 3 days. When you go back to your usual activities, don't let yourself get too tired.  · Do not smoke. Also avoid being exposed to secondhand smoke.  · You may use over-the-counter medicine to control fever or pain, unless another pain medicine was prescribed. (Note: If you have chronic liver or kidney disease or have ever had a stomach ulcer or gastrointestinal  bleeding, talk with your healthcare provider before using these medicines. Also talk to your provider if you are taking medicine to prevent blood clots.) Aspirin should never be given to anyone younger than 18 years of age who is ill with a viral infection or fever. It may cause severe liver or brain damage.  · Your appetite may be poor, so a light diet is fine. Avoid dehydration by drinking 6 to 8 glasses of fluids per day (such as water, soft drinks, sports drinks, juices, tea, or soup). Extra fluids will help loosen secretions in the nose and lungs.  · Over-the-counter cough, cold, and sore-throat medicines will not shorten the length of the illness, but they may help to reduce symptoms. (Note: Do not use decongestants if you have high blood pressure.)  Follow-up care  Follow up with your healthcare provider, or as advised. If you had an X-ray or ECG (electrocardiogram), a specialist will review it. You will be notified of any new findings that may affect your care.  Note: If you are age 65 or older, or if you have a chronic lung disease or condition that affects your immune system, or you smoke, talk to your healthcare provider about having pneumococcal vaccinations and a yearly influenza vaccination (flu shot).  When to seek medical advice  Call your healthcare provider right away if any of these occur:  · Fever of 100.4°F (38°C) or higher  · Coughing up increased amounts of colored sputum  · Weakness, drowsiness, headache, facial pain, ear pain, or a stiff neck  Call 911, or get immediate medical care  Contact emergency services right away if any of these occur:  · Coughing up blood  · Worsening weakness, drowsiness, headache, or stiff neck  · Trouble breathing, wheezing, or pain with breathing  Date Last Reviewed: 9/13/2015 © 2000-2016 [employer]. [address] [place] [address], [place] [address]. All rights reserved. This information is not intended as a substitute for professional medical care.  Always follow your healthcare professional's instructions.        When to Use Antibiotics   Antibiotics are medicines used to treat infections caused by bacteria. They dont work for illnesses caused by viruses or an allergic reaction. In fact, taking antibiotics for reasons other than a bacterial infection can cause problems. For example, you may have side effects from the medicine. And if you really need an antibiotic, it may not work well.                                                                                                                                              When antibiotics wont help  Your healthcare provider wont usually prescribe antibiotics for the following conditions. You can help by not asking for them if you have:   · A cold. This type of illness is caused by a virus. It can cause a runny nose, stuffed-up nose, sneezing, coughing, headache, mild body aches, and low fever. A cold gets better on its own in a few days to a week.  · The flu (influenza). This is a respiratory illness caused by a virus. The flu usually goes away on its own in a week or so. It can cause fever, body aches, sore throat, and fatigue.  · Bronchitis. This is an infection in the lungs most often caused by a virus. You may have coughing, phlegm, body aches, and a low fever. A common type of bronchitis is known as a chest cold (acute bronchitis). This often happens after you have a respiratory infection like a common cold. Bronchitis can take weeks to go away, but antibiotics usually dont help.  · Most sore throats. Sore throats are most often caused by viruses. Your throat may feel scratchy or achy, and it may hurt to swallow. You may also have a low fever and body aches. A sore throat usually gets better in a few days.  · Most ear infections. An ear infection may be caused by a virus or bacteria. It causes pain in the ear. Antibiotics usually dont help, and the infection goes away on its own.  · Most sinus  infections (sinusitis). This kind of infection causes sinus pain and swelling, and a runny nose. In most cases, sinusitis goes away on its own, and antibiotics dont make recovery quicker.  · Allergic rhinitis. This is a set of symptoms caused by an allergic reaction. You may have sneezing, a runny nose, itchy or watery eyes, or a sore throat. Allergies are not treated with antibiotics.  · Low fever. A mild fever thats less than 100.4°F (38°C) most likely doesnt need treatment with antibiotics.   When antibiotics can help   Antibiotics can be used to treat:                                                     · Strep throat. This is a throat infectioncaused by a certain type of bacteria. Symptoms of strep throat include a sore throat, white patches on the tonsils, red spots on the roof of the mouth, fever, body aches, and nausea and vomiting.  · Urinary tract infection (UTI). This is a bacterial infection of the bladder and the tube that takes urine out of the body. It can cause burning pain and urine thats cloudy or tinted with blood. UTIs are very common. Antibiotics usually help treat these infections.  · Some ear infections. In some cases, a healthcare provider may prescribe antibiotics for an ear infection. You may need a test to show whats causing the ear infection.  · Some sinus infections. In some cases, yourhealthcare provider may give you antibiotics. He or she may first need to make sure your symptoms arent caused by a virus, fungus, allergies, or air pollutants such as smoke.   Your doctor may also recommend antibiotics if you have a condition that can affect your immune system, such as diabetes or cancer.   Self-care at home   If your infection cant be treated with antibiotics, you can take other steps to feel better. Try the remedies below. In general:   · Rest and sleep as much as needed.  · Drink water and other clear fluids.  · Dont smoke, and avoid smoke from other people.  · Use  over-the-counter medicine such as acetaminophen to ease pain or fever, as directed by your healthcare provider.   To treat sinus pain or nasal congestion:   · Put a warm, moist washcloth on your face where you feel sinus pain or pressure.  · Use a nasal spray with medicine or saline, as directed by your healthcare provider.  · Breathe in steam from a hot shower.  · Use a humidifier or cool mist vaporizer.   To quiet a cough:   · Use a humidifier or cool mist vaporizer.  · Breathe in steam from a hot shower.  · Use cough lozenges.   To sooth a sore throat:   · Suck on ice chips, popsicles, or lozenges.  · Use a sore throat spray.  · Use a humidifier or cool mist vaporizer.  · Gargle with saltwater.  · Drink warm liquids.   To ease ear pain:   · Hold a warm, moist washcloth on the ear for 10 minutes at a time.  Date Last Reviewed: 9/1/2016  © 7280-8742 [employer]. [address] [place] [address], [place] [address]. All rights reserved. This information is not intended as a substitute for professional medical care. Always follow your healthcare professional's instructions.

## 2017-10-10 NOTE — PATIENT INSTRUCTIONS
Please return here or go to the Emergency Department for any concerns or worsening of condition      Start taking antibiotics if not improved in 6 days    Zyrtec, Claritin, or Allegra OTC as directed for the next 7 days  Flonase OTC as directed for the next 7 days  Salt Water Nasal Spray OTC 3x/day for the next 7 days      Follow up with Primary Care or ENT if not improved in 7-10 Days:  [phone]      Sinusitis (No Antibiotics)    The sinuses are air-filled spaces within the bones of the face. They connect to the inside of the nose. Sinusitis is an inflammation of the tissue lining the sinus cavity. Sinus inflammation can occur during a cold. It can also be due to allergies to pollens and other particles in the air. It can cause symptoms such as sinus congestion, headache, sore throat, facial swelling and fullness. It may also cause a low-grade fever. No infection is present, and no antibiotic treatment is needed.  Home care  · Drink plenty of water, hot tea, and other liquids. This may help thin mucus. It also may promote sinus drainage.  · Heat may help soothe painful areas of the face. Use a towel soaked in hot water. Or,  the shower and direct the hot spray onto your face. Using a vaporizer along with a menthol rub at night may also help.   · An expectorant containing guaifenesin may help thin the mucus and promote drainage from the sinuses.  · Over-the-counter decongestants may be used unless a similar medicine was prescribed. Nasal sprays work the fastest. Use one that contains phenylephrine or oxymetazoline. First blow the nose gently. Then use the spray. Do not use these medicines more often than directed on the label or symptoms may get worse. You may also use tablets containing pseudoephedrine. Avoid products that combine ingredients, because side effects may be increased. Read labels. You can also ask the pharmacist for help. (NOTE: Persons with high blood pressure should not use decongestants.  They can raise blood pressure.)  · Over-the-counter antihistamines may help if allergies contributed to your sinusitis.    · Use acetaminophen or ibuprofen to control pain, unless another pain medicine was prescribed. (If you have chronic liver or kidney disease or ever had a stomach ulcer, talk with your doctor before using these medicines. Aspirin should never be used in anyone under 18 years of age who is ill with a fever. It may cause severe liver damage.)  · Use nasal rinses or irrigation as instructed by your health care provider.  · Don't smoke. This can worsen symptoms.  Follow-up care  Follow up with your healthcare provider or our staff if you are not improving within the next week.  When to seek medical advice  Call your healthcare provider if any of these occur:  · Green or yellow discharge from the nose or into the throat  · Facial pain or headache becoming more severe  · Stiff neck  · Unusual drowsiness or confusion  · Swelling of the forehead or eyelids  · Vision problems, including blurred or double vision  · Fever of 100.4ºF (38ºC) or higher, or as directed by your healthcare provider  · Seizure  · Breathing problems  · Symptoms not resolving within 10 days  Date Last Reviewed: 4/13/2015  © 2291-4150 [employer]. [address] [place] [address], [place] [address]. All rights reserved. This information is not intended as a substitute for professional medical care. Always follow your healthcare professional's instructions.      When to Use Antibiotics   Antibiotics are medicines used to treat infections caused by bacteria. They dont work for illnesses caused by viruses or an allergic reaction. In fact, taking antibiotics for reasons other than a bacterial infection can cause problems. For example, you may have side effects from the medicine. And if you really need an antibiotic, it may not work  well.                                                                                                                                              When antibiotics wont help  Your healthcare provider wont usually prescribe antibiotics for the following conditions. You can help by not asking for them if you have:   · A cold. This type of illness is caused by a virus. It can cause a runny nose, stuffed-up nose, sneezing, coughing, headache, mild body aches, and low fever. A cold gets better on its own in a few days to a week.  · The flu (influenza). This is a respiratory illness caused by a virus. The flu usually goes away on its own in a week or so. It can cause fever, body aches, sore throat, and fatigue.  · Bronchitis. This is an infection in the lungs most often caused by a virus. You may have coughing, phlegm, body aches, and a low fever. A common type of bronchitis is known as a chest cold (acute bronchitis). This often happens after you have a respiratory infection like a common cold. Bronchitis can take weeks to go away, but antibiotics usually dont help.  · Most sore throats. Sore throats are most often caused by viruses. Your throat may feel scratchy or achy, and it may hurt to swallow. You may also have a low fever and body aches. A sore throat usually gets better in a few days.  · Most ear infections. An ear infection may be caused by a virus or bacteria. It causes pain in the ear. Antibiotics usually dont help, and the infection goes away on its own.  · Most sinus infections (sinusitis). This kind of infection causes sinus pain and swelling, and a runny nose. In most cases, sinusitis goes away on its own, and antibiotics dont make recovery quicker.  · Allergic rhinitis. This is a set of symptoms caused by an allergic reaction. You may have sneezing, a runny nose, itchy or watery eyes, or a sore throat. Allergies are not treated with antibiotics.  · Low fever. A mild fever thats less than 100.4°F  (38°C) most likely doesnt need treatment with antibiotics.   When antibiotics can help   Antibiotics can be used to treat:                                                     · Strep throat. This is a throat infectioncaused by a certain type of bacteria. Symptoms of strep throat include a sore throat, white patches on the tonsils, red spots on the roof of the mouth, fever, body aches, and nausea and vomiting.  · Urinary tract infection (UTI). This is a bacterial infection of the bladder and the tube that takes urine out of the body. It can cause burning pain and urine thats cloudy or tinted with blood. UTIs are very common. Antibiotics usually help treat these infections.  · Some ear infections. In some cases, a healthcare provider may prescribe antibiotics for an ear infection. You may need a test to show whats causing the ear infection.  · Some sinus infections. In some cases, yourhealthcare provider may give you antibiotics. He or she may first need to make sure your symptoms arent caused by a virus, fungus, allergies, or air pollutants such as smoke.   Your doctor may also recommend antibiotics if you have a condition that can affect your immune system, such as diabetes or cancer.   Self-care at home   If your infection cant be treated with antibiotics, you can take other steps to feel better. Try the remedies below. In general:   · Rest and sleep as much as needed.  · Drink water and other clear fluids.  · Dont smoke, and avoid smoke from other people.  · Use over-the-counter medicine such as acetaminophen to ease pain or fever, as directed by your healthcare provider.   To treat sinus pain or nasal congestion:   · Put a warm, moist washcloth on your face where you feel sinus pain or pressure.  · Use a nasal spray with medicine or saline, as directed by your healthcare provider.  · Breathe in steam from a hot shower.  · Use a humidifier or cool mist vaporizer.   To quiet a cough:   · Use a humidifier or  "cool mist vaporizer.  · Breathe in steam from a hot shower.  · Use cough lozenges.   To sooth a sore throat:   · Suck on ice chips, popsicles, or lozenges.  · Use a sore throat spray.  · Use a humidifier or cool mist vaporizer.  · Gargle with saltwater.  · Drink warm liquids.   To ease ear pain:   · Hold a warm, moist washcloth on the ear for 10 minutes at a time.  Date Last Reviewed: 9/1/2016  © 6387-5853 [employer]. [address] [place] [address]. All rights reserved. This information is not intended as a substitute for professional medical care. Always follow your healthcare professional's instructions.           Understanding Nasal Anatomy: Inside View  A lot happens under the surface of the nose. The bone and cartilage under the skin give the nose most of its size and shape. Other structures inside and behind the nose help you breathe. Learning the anatomy of the nose can help you better understand how the nose works.       Bone supports the upper third (bridge) of the nose. The upper cartilage supports the side of the nose. The lower cartilage adds support, width, and height. It helps shape the nostrils and the tip of the nose. Skin also helps shape the nose.          The nasal cavity is a hollow space behind the nose that air flows through. The septum is a thin "wall" made of cartilage and bone. It divides the inside of the nose into two chambers. The mucous membrane is thin tissue that lines the nose, sinuses, and throat. It warms and moistens the air you breathe in. It also makes the sticky mucus that helps clean the air of dust and other small particles. The turbinates on each side of the nose are curved, bony ridges lined with mucous membrane. They warm and moisten the air you breathe in. The sinuses are hollow, air-filled chambers in the bone around your nose. Mucus from the sinuses drains into the nasal cavity.  Date Last Reviewed: 11/1/2016  © 7627-0906 [employer], " [employer]. [address] [place] [address]. All rights reserved. This information is not intended as a substitute for professional medical care. Always follow your healthcare professional's instructions.       Please return here or go to the Emergency Department for any concerns or worsening of condition        Start taking antibiotics if not improved in 3 days      Follow up with Primary Care if not improved in 7-10 Days:  [phone]      Bronchitis, Viral (Adult)    You have a viral bronchitis. Bronchitis is inflammation and swelling of the lining of the lungs. This is often caused by an infection. Symptoms include a dry, hacking cough that is worse at night. The cough may bring up yellow-green mucus. You may also feel short of breath or wheeze. Other symptoms may include tiredness, chest discomfort, and chills.  Bronchitis that is caused by a virus is not treated with antibiotics. Instead, medicines may be given to help relieve symptoms. Symptoms can last up to 2 weeks, although the cough may last much longer.  This illness is contagious during the first few days and is spread through the air by coughing and sneezing, or by direct contact (touching the sick person and then touching your own eyes, nose, or mouth).  Most viral illnesses resolve within 10 to 14 days with rest and simple home remedies, although they may sometimes last for several weeks.  Home care  · If symptoms are severe, rest at home for the first 2 to 3 days. When you go back to your usual activities, don't let yourself get too tired.  · Do not smoke. Also avoid being exposed to secondhand smoke.  · You may use over-the-counter medicine to control fever or pain, unless another pain medicine was prescribed. (Note: If you have chronic liver or kidney disease or have ever had a stomach ulcer or gastrointestinal bleeding, talk with your healthcare provider before using these medicines. Also talk to your provider if you are taking medicine to  prevent blood clots.) Aspirin should never be given to anyone younger than 18 years of age who is ill with a viral infection or fever. It may cause severe liver or brain damage.  · Your appetite may be poor, so a light diet is fine. Avoid dehydration by drinking 6 to 8 glasses of fluids per day (such as water, soft drinks, sports drinks, juices, tea, or soup). Extra fluids will help loosen secretions in the nose and lungs.  · Over-the-counter cough, cold, and sore-throat medicines will not shorten the length of the illness, but they may help to reduce symptoms. (Note: Do not use decongestants if you have high blood pressure.)  Follow-up care  Follow up with your healthcare provider, or as advised. If you had an X-ray or ECG (electrocardiogram), a specialist will review it. You will be notified of any new findings that may affect your care.  Note: If you are age 65 or older, or if you have a chronic lung disease or condition that affects your immune system, or you smoke, talk to your healthcare provider about having pneumococcal vaccinations and a yearly influenza vaccination (flu shot).  When to seek medical advice  Call your healthcare provider right away if any of these occur:  · Fever of 100.4°F (38°C) or higher  · Coughing up increased amounts of colored sputum  · Weakness, drowsiness, headache, facial pain, ear pain, or a stiff neck  Call 911, or get immediate medical care  Contact emergency services right away if any of these occur:  · Coughing up blood  · Worsening weakness, drowsiness, headache, or stiff neck  · Trouble breathing, wheezing, or pain with breathing  Date Last Reviewed: 9/13/2015 © 2000-2016 [employer]. [address] [place] [address]. All rights reserved. This information is not intended as a substitute for professional medical care. Always follow your healthcare professional's instructions.        When to Use Antibiotics   Antibiotics are medicines used to treat  infections caused by bacteria. They dont work for illnesses caused by viruses or an allergic reaction. In fact, taking antibiotics for reasons other than a bacterial infection can cause problems. For example, you may have side effects from the medicine. And if you really need an antibiotic, it may not work well.                                                                                                                                              When antibiotics wont help  Your healthcare provider wont usually prescribe antibiotics for the following conditions. You can help by not asking for them if you have:   · A cold. This type of illness is caused by a virus. It can cause a runny nose, stuffed-up nose, sneezing, coughing, headache, mild body aches, and low fever. A cold gets better on its own in a few days to a week.  · The flu (influenza). This is a respiratory illness caused by a virus. The flu usually goes away on its own in a week or so. It can cause fever, body aches, sore throat, and fatigue.  · Bronchitis. This is an infection in the lungs most often caused by a virus. You may have coughing, phlegm, body aches, and a low fever. A common type of bronchitis is known as a chest cold (acute bronchitis). This often happens after you have a respiratory infection like a common cold. Bronchitis can take weeks to go away, but antibiotics usually dont help.  · Most sore throats. Sore throats are most often caused by viruses. Your throat may feel scratchy or achy, and it may hurt to swallow. You may also have a low fever and body aches. A sore throat usually gets better in a few days.  · Most ear infections. An ear infection may be caused by a virus or bacteria. It causes pain in the ear. Antibiotics usually dont help, and the infection goes away on its own.  · Most sinus infections (sinusitis). This kind of infection causes sinus pain and swelling, and a runny nose. In most cases, sinusitis goes away on  its own, and antibiotics dont make recovery quicker.  · Allergic rhinitis. This is a set of symptoms caused by an allergic reaction. You may have sneezing, a runny nose, itchy or watery eyes, or a sore throat. Allergies are not treated with antibiotics.  · Low fever. A mild fever thats less than 100.4°F (38°C) most likely doesnt need treatment with antibiotics.   When antibiotics can help   Antibiotics can be used to treat:                                                     · Strep throat. This is a throat infectioncaused by a certain type of bacteria. Symptoms of strep throat include a sore throat, white patches on the tonsils, red spots on the roof of the mouth, fever, body aches, and nausea and vomiting.  · Urinary tract infection (UTI). This is a bacterial infection of the bladder and the tube that takes urine out of the body. It can cause burning pain and urine thats cloudy or tinted with blood. UTIs are very common. Antibiotics usually help treat these infections.  · Some ear infections. In some cases, a healthcare provider may prescribe antibiotics for an ear infection. You may need a test to show whats causing the ear infection.  · Some sinus infections. In some cases, yourhealthcare provider may give you antibiotics. He or she may first need to make sure your symptoms arent caused by a virus, fungus, allergies, or air pollutants such as smoke.   Your doctor may also recommend antibiotics if you have a condition that can affect your immune system, such as diabetes or cancer.   Self-care at home   If your infection cant be treated with antibiotics, you can take other steps to feel better. Try the remedies below. In general:   · Rest and sleep as much as needed.  · Drink water and other clear fluids.  · Dont smoke, and avoid smoke from other people.  · Use over-the-counter medicine such as acetaminophen to ease pain or fever, as directed by your healthcare provider.   To treat sinus pain or nasal  congestion:   · Put a warm, moist washcloth on your face where you feel sinus pain or pressure.  · Use a nasal spray with medicine or saline, as directed by your healthcare provider.  · Breathe in steam from a hot shower.  · Use a humidifier or cool mist vaporizer.   To quiet a cough:   · Use a humidifier or cool mist vaporizer.  · Breathe in steam from a hot shower.  · Use cough lozenges.   To sooth a sore throat:   · Suck on ice chips, popsicles, or lozenges.  · Use a sore throat spray.  · Use a humidifier or cool mist vaporizer.  · Gargle with saltwater.  · Drink warm liquids.   To ease ear pain:   · Hold a warm, moist washcloth on the ear for 10 minutes at a time.  Date Last Reviewed: 9/1/2016  © 2867-0408 [employer]. [address] [place] [address], [place] [address]. All rights reserved. This information is not intended as a substitute for professional medical care. Always follow your healthcare professional's instructions.

## 2017-11-16 ENCOUNTER — OFFICE VISIT (OUTPATIENT)
Dept: URGENT CARE | Facility: CLINIC | Age: 57
End: 2017-11-16
Payer: COMMERCIAL

## 2017-11-16 VITALS
WEIGHT: 136 LBS | RESPIRATION RATE: 18 BRPM | OXYGEN SATURATION: 98 % | HEART RATE: 81 BPM | BODY MASS INDEX: 25.03 KG/M2 | DIASTOLIC BLOOD PRESSURE: 72 MMHG | TEMPERATURE: 100 F | HEIGHT: 62 IN | SYSTOLIC BLOOD PRESSURE: 110 MMHG

## 2017-11-16 DIAGNOSIS — J40 BRONCHITIS: Primary | ICD-10-CM

## 2017-11-16 DIAGNOSIS — R50.9 FEVER, UNSPECIFIED FEVER CAUSE: ICD-10-CM

## 2017-11-16 LAB
CTP QC/QA: YES
FLUAV AG NPH QL: NEGATIVE
FLUBV AG NPH QL: NEGATIVE

## 2017-11-16 PROCEDURE — 87804 INFLUENZA ASSAY W/OPTIC: CPT | Mod: 59,QW,S$GLB, | Performed by: EMERGENCY MEDICINE

## 2017-11-16 PROCEDURE — 99214 OFFICE O/P EST MOD 30 MIN: CPT | Mod: S$GLB,,, | Performed by: EMERGENCY MEDICINE

## 2017-11-16 RX ORDER — METHYLPREDNISOLONE 4 MG/1
4 TABLET ORAL DAILY
Qty: 1 PACKAGE | Refills: 0 | Status: SHIPPED | OUTPATIENT
Start: 2017-11-16 | End: 2017-11-22

## 2017-11-16 RX ORDER — AMOXICILLIN 875 MG/1
875 TABLET, FILM COATED ORAL 2 TIMES DAILY
Qty: 20 TABLET | Refills: 0 | Status: SHIPPED | OUTPATIENT
Start: 2017-11-16 | End: 2017-11-26

## 2017-11-16 NOTE — PROGRESS NOTES
Subjective:       Patient ID: Cristal Gonzalez is a 57 y.o. female.        Chief Complaint: Nasal Congestion (3 days )    Cough   This is a new problem. The current episode started in the past 7 days (3 days ). The problem has been gradually worsening. The cough is productive of sputum (yellow ). Associated symptoms include chills, nasal congestion and postnasal drip. Pertinent negatives include no chest pain, ear pain, eye redness, fever, headaches, myalgias, sore throat, shortness of breath or wheezing. The symptoms are aggravated by lying down and fumes. Treatments tried: allergy meds  The treatment provided mild relief. There is no history of asthma, bronchitis, COPD or pneumonia.     Review of Systems   Constitution: Positive for chills, weakness (body aches) and malaise/fatigue. Negative for fever.   HENT: Positive for congestion and postnasal drip. Negative for ear pain, hoarse voice and sore throat.    Eyes: Negative for discharge and redness.   Cardiovascular: Negative for chest pain, dyspnea on exertion and leg swelling.   Respiratory: Positive for cough and sputum production (yellow ). Negative for shortness of breath and wheezing.    Musculoskeletal: Negative for myalgias.   Gastrointestinal: Negative for abdominal pain and nausea.   Neurological: Negative for headaches.       Objective:      Physical Exam   Constitutional: She is oriented to person, place, and time. She appears well-developed and well-nourished. She is cooperative.  Non-toxic appearance. She does not appear ill. No distress.   HENT:   Head: Normocephalic and atraumatic.   Right Ear: Hearing, tympanic membrane, external ear and ear canal normal.   Left Ear: Hearing, tympanic membrane, external ear and ear canal normal.   Nose: Mucosal edema and rhinorrhea present. No nasal deformity. No epistaxis. Right sinus exhibits frontal sinus tenderness. Right sinus exhibits no maxillary sinus tenderness. Left sinus exhibits frontal sinus  tenderness. Left sinus exhibits no maxillary sinus tenderness.   Mouth/Throat: Uvula is midline and mucous membranes are normal. No trismus in the jaw. Normal dentition. No uvula swelling. Posterior oropharyngeal erythema present.   Eyes: Conjunctivae and lids are normal. No scleral icterus.   Sclera clear bilat   Neck: Trachea normal, full passive range of motion without pain and phonation normal. Neck supple.   Cardiovascular: Normal rate, regular rhythm, normal heart sounds, intact distal pulses and normal pulses.    Pulmonary/Chest: Effort normal and breath sounds normal. No respiratory distress.   Abdominal: Soft. Normal appearance and bowel sounds are normal. She exhibits no distension. There is no tenderness.   Musculoskeletal: Normal range of motion. She exhibits no edema or deformity.   Neurological: She is alert and oriented to person, place, and time. She exhibits normal muscle tone. Coordination normal.   Skin: Skin is warm, dry and intact. She is not diaphoretic. No pallor.   Psychiatric: She has a normal mood and affect. Her speech is normal and behavior is normal. Judgment and thought content normal. Cognition and memory are normal.   Nursing note and vitals reviewed.      Assessment:       1. Fever, unspecified fever cause        Plan:       Cristal was seen today for nasal congestion.    Diagnoses and all orders for this visit:    Fever, unspecified fever cause  -     POCT Influenza A/B    Other orders  -     methylPREDNISolone (MEDROL DOSEPACK) 4 mg tablet; Take 1 tablet (4 mg total) by mouth once daily. use as directed  -     amoxicillin (AMOXIL) 875 MG tablet; Take 1 tablet (875 mg total) by mouth 2 (two) times daily.      Patient Instructions   Please return here or go to the Emergency Department for any concerns or worsening of condition        Start taking antibiotics if not improved in 3 days      Follow up with Primary Care if not improved in 7-10 Days:  [phone]      Bronchitis, Viral  (Adult)    You have a viral bronchitis. Bronchitis is inflammation and swelling of the lining of the lungs. This is often caused by an infection. Symptoms include a dry, hacking cough that is worse at night. The cough may bring up yellow-green mucus. You may also feel short of breath or wheeze. Other symptoms may include tiredness, chest discomfort, and chills.  Bronchitis that is caused by a virus is not treated with antibiotics. Instead, medicines may be given to help relieve symptoms. Symptoms can last up to 2 weeks, although the cough may last much longer.  This illness is contagious during the first few days and is spread through the air by coughing and sneezing, or by direct contact (touching the sick person and then touching your own eyes, nose, or mouth).  Most viral illnesses resolve within 10 to 14 days with rest and simple home remedies, although they may sometimes last for several weeks.  Home care  · If symptoms are severe, rest at home for the first 2 to 3 days. When you go back to your usual activities, don't let yourself get too tired.  · Do not smoke. Also avoid being exposed to secondhand smoke.  · You may use over-the-counter medicine to control fever or pain, unless another pain medicine was prescribed. (Note: If you have chronic liver or kidney disease or have ever had a stomach ulcer or gastrointestinal bleeding, talk with your healthcare provider before using these medicines. Also talk to your provider if you are taking medicine to prevent blood clots.) Aspirin should never be given to anyone younger than 18 years of age who is ill with a viral infection or fever. It may cause severe liver or brain damage.  · Your appetite may be poor, so a light diet is fine. Avoid dehydration by drinking 6 to 8 glasses of fluids per day (such as water, soft drinks, sports drinks, juices, tea, or soup). Extra fluids will help loosen secretions in the nose and lungs.  · Over-the-counter cough, cold, and  sore-throat medicines will not shorten the length of the illness, but they may help to reduce symptoms. (Note: Do not use decongestants if you have high blood pressure.)  Follow-up care  Follow up with your healthcare provider, or as advised. If you had an X-ray or ECG (electrocardiogram), a specialist will review it. You will be notified of any new findings that may affect your care.  Note: If you are age 65 or older, or if you have a chronic lung disease or condition that affects your immune system, or you smoke, talk to your healthcare provider about having pneumococcal vaccinations and a yearly influenza vaccination (flu shot).  When to seek medical advice  Call your healthcare provider right away if any of these occur:  · Fever of 100.4°F (38°C) or higher  · Coughing up increased amounts of colored sputum  · Weakness, drowsiness, headache, facial pain, ear pain, or a stiff neck  Call 911, or get immediate medical care  Contact emergency services right away if any of these occur:  · Coughing up blood  · Worsening weakness, drowsiness, headache, or stiff neck  · Trouble breathing, wheezing, or pain with breathing  Date Last Reviewed: 9/13/2015  © 2837-3780 [employer]. [address] [place] [address], [place] [address]. All rights reserved. This information is not intended as a substitute for professional medical care. Always follow your healthcare professional's instructions.        When to Use Antibiotics   Antibiotics are medicines used to treat infections caused by bacteria. They dont work for illnesses caused by viruses or an allergic reaction. In fact, taking antibiotics for reasons other than a bacterial infection can cause problems. For example, you may have side effects from the medicine. And if you really need an antibiotic, it may not work well.                                                                                                                                              When  antibiotics wont help  Your healthcare provider wont usually prescribe antibiotics for the following conditions. You can help by not asking for them if you have:   · A cold. This type of illness is caused by a virus. It can cause a runny nose, stuffed-up nose, sneezing, coughing, headache, mild body aches, and low fever. A cold gets better on its own in a few days to a week.  · The flu (influenza). This is a respiratory illness caused by a virus. The flu usually goes away on its own in a week or so. It can cause fever, body aches, sore throat, and fatigue.  · Bronchitis. This is an infection in the lungs most often caused by a virus. You may have coughing, phlegm, body aches, and a low fever. A common type of bronchitis is known as a chest cold (acute bronchitis). This often happens after you have a respiratory infection like a common cold. Bronchitis can take weeks to go away, but antibiotics usually dont help.  · Most sore throats. Sore throats are most often caused by viruses. Your throat may feel scratchy or achy, and it may hurt to swallow. You may also have a low fever and body aches. A sore throat usually gets better in a few days.  · Most ear infections. An ear infection may be caused by a virus or bacteria. It causes pain in the ear. Antibiotics usually dont help, and the infection goes away on its own.  · Most sinus infections (sinusitis). This kind of infection causes sinus pain and swelling, and a runny nose. In most cases, sinusitis goes away on its own, and antibiotics dont make recovery quicker.  · Allergic rhinitis. This is a set of symptoms caused by an allergic reaction. You may have sneezing, a runny nose, itchy or watery eyes, or a sore throat. Allergies are not treated with antibiotics.  · Low fever. A mild fever thats less than 100.4°F (38°C) most likely doesnt need treatment with antibiotics.   When antibiotics can help   Antibiotics can be used to  treat:                                                     · Strep throat. This is a throat infectioncaused by a certain type of bacteria. Symptoms of strep throat include a sore throat, white patches on the tonsils, red spots on the roof of the mouth, fever, body aches, and nausea and vomiting.  · Urinary tract infection (UTI). This is a bacterial infection of the bladder and the tube that takes urine out of the body. It can cause burning pain and urine thats cloudy or tinted with blood. UTIs are very common. Antibiotics usually help treat these infections.  · Some ear infections. In some cases, a healthcare provider may prescribe antibiotics for an ear infection. You may need a test to show whats causing the ear infection.  · Some sinus infections. In some cases, yourhealthcare provider may give you antibiotics. He or she may first need to make sure your symptoms arent caused by a virus, fungus, allergies, or air pollutants such as smoke.   Your doctor may also recommend antibiotics if you have a condition that can affect your immune system, such as diabetes or cancer.   Self-care at home   If your infection cant be treated with antibiotics, you can take other steps to feel better. Try the remedies below. In general:   · Rest and sleep as much as needed.  · Drink water and other clear fluids.  · Dont smoke, and avoid smoke from other people.  · Use over-the-counter medicine such as acetaminophen to ease pain or fever, as directed by your healthcare provider.   To treat sinus pain or nasal congestion:   · Put a warm, moist washcloth on your face where you feel sinus pain or pressure.  · Use a nasal spray with medicine or saline, as directed by your healthcare provider.  · Breathe in steam from a hot shower.  · Use a humidifier or cool mist vaporizer.   To quiet a cough:   · Use a humidifier or cool mist vaporizer.  · Breathe in steam from a hot shower.  · Use cough lozenges.   To sooth a sore throat:   · Suck  on ice chips, popsicles, or lozenges.  · Use a sore throat spray.  · Use a humidifier or cool mist vaporizer.  · Gargle with saltwater.  · Drink warm liquids.   To ease ear pain:   · Hold a warm, moist washcloth on the ear for 10 minutes at a time.  Date Last Reviewed: 9/1/2016 © 2000-2016 [employer]. [address] [place] [address], [place] [address]. All rights reserved. This information is not intended as a substitute for professional medical care. Always follow your healthcare professional's instructions.    Please return here or go to the Emergency Department for any concerns or worsening of condition      Start taking antibiotics if not improved in 3 days    Zyrtec, Claritin, or Allegra OTC as directed for the next 7 days  Flonase OTC as directed for the next 7 days  Salt Water Nasal Spray OTC 3x/day for the next 7 days      Follow up with Primary Care or ENT if not improved in 7-10 Days:  [phone]      Sinusitis (No Antibiotics)    The sinuses are air-filled spaces within the bones of the face. They connect to the inside of the nose. Sinusitis is an inflammation of the tissue lining the sinus cavity. Sinus inflammation can occur during a cold. It can also be due to allergies to pollens and other particles in the air. It can cause symptoms such as sinus congestion, headache, sore throat, facial swelling and fullness. It may also cause a low-grade fever. No infection is present, and no antibiotic treatment is needed.  Home care  · Drink plenty of water, hot tea, and other liquids. This may help thin mucus. It also may promote sinus drainage.  · Heat may help soothe painful areas of the face. Use a towel soaked in hot water. Or,  the shower and direct the hot spray onto your face. Using a vaporizer along with a menthol rub at night may also help.   · An expectorant containing guaifenesin may help thin the mucus and promote drainage from the sinuses.  · Over-the-counter decongestants may be used  unless a similar medicine was prescribed. Nasal sprays work the fastest. Use one that contains phenylephrine or oxymetazoline. First blow the nose gently. Then use the spray. Do not use these medicines more often than directed on the label or symptoms may get worse. You may also use tablets containing pseudoephedrine. Avoid products that combine ingredients, because side effects may be increased. Read labels. You can also ask the pharmacist for help. (NOTE: Persons with high blood pressure should not use decongestants. They can raise blood pressure.)  · Over-the-counter antihistamines may help if allergies contributed to your sinusitis.    · Use acetaminophen or ibuprofen to control pain, unless another pain medicine was prescribed. (If you have chronic liver or kidney disease or ever had a stomach ulcer, talk with your doctor before using these medicines. Aspirin should never be used in anyone under 18 years of age who is ill with a fever. It may cause severe liver damage.)  · Use nasal rinses or irrigation as instructed by your health care provider.  · Don't smoke. This can worsen symptoms.  Follow-up care  Follow up with your healthcare provider or our staff if you are not improving within the next week.  When to seek medical advice  Call your healthcare provider if any of these occur:  · Green or yellow discharge from the nose or into the throat  · Facial pain or headache becoming more severe  · Stiff neck  · Unusual drowsiness or confusion  · Swelling of the forehead or eyelids  · Vision problems, including blurred or double vision  · Fever of 100.4ºF (38ºC) or higher, or as directed by your healthcare provider  · Seizure  · Breathing problems  · Symptoms not resolving within 10 days  Date Last Reviewed: 4/13/2015 © 2000-2016 [employer]. [address] [place] [address]. All rights reserved. This information is not intended as a substitute for professional medical care. Always follow  your healthcare professional's instructions.      When to Use Antibiotics   Antibiotics are medicines used to treat infections caused by bacteria. They dont work for illnesses caused by viruses or an allergic reaction. In fact, taking antibiotics for reasons other than a bacterial infection can cause problems. For example, you may have side effects from the medicine. And if you really need an antibiotic, it may not work well.                                                                                                                                              When antibiotics wont help  Your healthcare provider wont usually prescribe antibiotics for the following conditions. You can help by not asking for them if you have:   · A cold. This type of illness is caused by a virus. It can cause a runny nose, stuffed-up nose, sneezing, coughing, headache, mild body aches, and low fever. A cold gets better on its own in a few days to a week.  · The flu (influenza). This is a respiratory illness caused by a virus. The flu usually goes away on its own in a week or so. It can cause fever, body aches, sore throat, and fatigue.  · Bronchitis. This is an infection in the lungs most often caused by a virus. You may have coughing, phlegm, body aches, and a low fever. A common type of bronchitis is known as a chest cold (acute bronchitis). This often happens after you have a respiratory infection like a common cold. Bronchitis can take weeks to go away, but antibiotics usually dont help.  · Most sore throats. Sore throats are most often caused by viruses. Your throat may feel scratchy or achy, and it may hurt to swallow. You may also have a low fever and body aches. A sore throat usually gets better in a few days.  · Most ear infections. An ear infection may be caused by a virus or bacteria. It causes pain in the ear. Antibiotics usually dont help, and the infection goes away on its own.  · Most sinus infections  (sinusitis). This kind of infection causes sinus pain and swelling, and a runny nose. In most cases, sinusitis goes away on its own, and antibiotics dont make recovery quicker.  · Allergic rhinitis. This is a set of symptoms caused by an allergic reaction. You may have sneezing, a runny nose, itchy or watery eyes, or a sore throat. Allergies are not treated with antibiotics.  · Low fever. A mild fever thats less than 100.4°F (38°C) most likely doesnt need treatment with antibiotics.   When antibiotics can help   Antibiotics can be used to treat:                                                     · Strep throat. This is a throat infectioncaused by a certain type of bacteria. Symptoms of strep throat include a sore throat, white patches on the tonsils, red spots on the roof of the mouth, fever, body aches, and nausea and vomiting.  · Urinary tract infection (UTI). This is a bacterial infection of the bladder and the tube that takes urine out of the body. It can cause burning pain and urine thats cloudy or tinted with blood. UTIs are very common. Antibiotics usually help treat these infections.  · Some ear infections. In some cases, a healthcare provider may prescribe antibiotics for an ear infection. You may need a test to show whats causing the ear infection.  · Some sinus infections. In some cases, yourhealthcare provider may give you antibiotics. He or she may first need to make sure your symptoms arent caused by a virus, fungus, allergies, or air pollutants such as smoke.   Your doctor may also recommend antibiotics if you have a condition that can affect your immune system, such as diabetes or cancer.   Self-care at home   If your infection cant be treated with antibiotics, you can take other steps to feel better. Try the remedies below. In general:   · Rest and sleep as much as needed.  · Drink water and other clear fluids.  · Dont smoke, and avoid smoke from other people.  · Use over-the-counter  "medicine such as acetaminophen to ease pain or fever, as directed by your healthcare provider.   To treat sinus pain or nasal congestion:   · Put a warm, moist washcloth on your face where you feel sinus pain or pressure.  · Use a nasal spray with medicine or saline, as directed by your healthcare provider.  · Breathe in steam from a hot shower.  · Use a humidifier or cool mist vaporizer.   To quiet a cough:   · Use a humidifier or cool mist vaporizer.  · Breathe in steam from a hot shower.  · Use cough lozenges.   To sooth a sore throat:   · Suck on ice chips, popsicles, or lozenges.  · Use a sore throat spray.  · Use a humidifier or cool mist vaporizer.  · Gargle with saltwater.  · Drink warm liquids.   To ease ear pain:   · Hold a warm, moist washcloth on the ear for 10 minutes at a time.  Date Last Reviewed: 9/1/2016  © 5363-2987 [employer]. [address] [place] [address]. All rights reserved. This information is not intended as a substitute for professional medical care. Always follow your healthcare professional's instructions.           Understanding Nasal Anatomy: Inside View  A lot happens under the surface of the nose. The bone and cartilage under the skin give the nose most of its size and shape. Other structures inside and behind the nose help you breathe. Learning the anatomy of the nose can help you better understand how the nose works.       Bone supports the upper third (bridge) of the nose. The upper cartilage supports the side of the nose. The lower cartilage adds support, width, and height. It helps shape the nostrils and the tip of the nose. Skin also helps shape the nose.          The nasal cavity is a hollow space behind the nose that air flows through. The septum is a thin "wall" made of cartilage and bone. It divides the inside of the nose into two chambers. The mucous membrane is thin tissue that lines the nose, sinuses, and throat. It warms and moistens the air you " breathe in. It also makes the sticky mucus that helps clean the air of dust and other small particles. The turbinates on each side of the nose are curved, bony ridges lined with mucous membrane. They warm and moisten the air you breathe in. The sinuses are hollow, air-filled chambers in the bone around your nose. Mucus from the sinuses drains into the nasal cavity.  Date Last Reviewed: 11/1/2016 © 2000-2016 [employer]. [address] [place] [address]. All rights reserved. This information is not intended as a substitute for professional medical care. Always follow your healthcare professional's instructions.

## 2017-11-16 NOTE — PATIENT INSTRUCTIONS
Please return here or go to the Emergency Department for any concerns or worsening of condition        Start taking antibiotics if not improved in 3 days      Follow up with Primary Care if not improved in 7-10 Days:  [phone]      Bronchitis, Viral (Adult)    You have a viral bronchitis. Bronchitis is inflammation and swelling of the lining of the lungs. This is often caused by an infection. Symptoms include a dry, hacking cough that is worse at night. The cough may bring up yellow-green mucus. You may also feel short of breath or wheeze. Other symptoms may include tiredness, chest discomfort, and chills.  Bronchitis that is caused by a virus is not treated with antibiotics. Instead, medicines may be given to help relieve symptoms. Symptoms can last up to 2 weeks, although the cough may last much longer.  This illness is contagious during the first few days and is spread through the air by coughing and sneezing, or by direct contact (touching the sick person and then touching your own eyes, nose, or mouth).  Most viral illnesses resolve within 10 to 14 days with rest and simple home remedies, although they may sometimes last for several weeks.  Home care  · If symptoms are severe, rest at home for the first 2 to 3 days. When you go back to your usual activities, don't let yourself get too tired.  · Do not smoke. Also avoid being exposed to secondhand smoke.  · You may use over-the-counter medicine to control fever or pain, unless another pain medicine was prescribed. (Note: If you have chronic liver or kidney disease or have ever had a stomach ulcer or gastrointestinal bleeding, talk with your healthcare provider before using these medicines. Also talk to your provider if you are taking medicine to prevent blood clots.) Aspirin should never be given to anyone younger than 18 years of age who is ill with a viral infection or fever. It may cause severe liver or brain damage.  · Your appetite may be poor, so a light  diet is fine. Avoid dehydration by drinking 6 to 8 glasses of fluids per day (such as water, soft drinks, sports drinks, juices, tea, or soup). Extra fluids will help loosen secretions in the nose and lungs.  · Over-the-counter cough, cold, and sore-throat medicines will not shorten the length of the illness, but they may help to reduce symptoms. (Note: Do not use decongestants if you have high blood pressure.)  Follow-up care  Follow up with your healthcare provider, or as advised. If you had an X-ray or ECG (electrocardiogram), a specialist will review it. You will be notified of any new findings that may affect your care.  Note: If you are age 65 or older, or if you have a chronic lung disease or condition that affects your immune system, or you smoke, talk to your healthcare provider about having pneumococcal vaccinations and a yearly influenza vaccination (flu shot).  When to seek medical advice  Call your healthcare provider right away if any of these occur:  · Fever of 100.4°F (38°C) or higher  · Coughing up increased amounts of colored sputum  · Weakness, drowsiness, headache, facial pain, ear pain, or a stiff neck  Call 911, or get immediate medical care  Contact emergency services right away if any of these occur:  · Coughing up blood  · Worsening weakness, drowsiness, headache, or stiff neck  · Trouble breathing, wheezing, or pain with breathing  Date Last Reviewed: 9/13/2015  © 2353-9814 [employer]. [address] [place] [address]. All rights reserved. This information is not intended as a substitute for professional medical care. Always follow your healthcare professional's instructions.        When to Use Antibiotics   Antibiotics are medicines used to treat infections caused by bacteria. They dont work for illnesses caused by viruses or an allergic reaction. In fact, taking antibiotics for reasons other than a bacterial infection can cause problems. For example, you may have  side effects from the medicine. And if you really need an antibiotic, it may not work well.                                                                                                                                              When antibiotics wont help  Your healthcare provider wont usually prescribe antibiotics for the following conditions. You can help by not asking for them if you have:   · A cold. This type of illness is caused by a virus. It can cause a runny nose, stuffed-up nose, sneezing, coughing, headache, mild body aches, and low fever. A cold gets better on its own in a few days to a week.  · The flu (influenza). This is a respiratory illness caused by a virus. The flu usually goes away on its own in a week or so. It can cause fever, body aches, sore throat, and fatigue.  · Bronchitis. This is an infection in the lungs most often caused by a virus. You may have coughing, phlegm, body aches, and a low fever. A common type of bronchitis is known as a chest cold (acute bronchitis). This often happens after you have a respiratory infection like a common cold. Bronchitis can take weeks to go away, but antibiotics usually dont help.  · Most sore throats. Sore throats are most often caused by viruses. Your throat may feel scratchy or achy, and it may hurt to swallow. You may also have a low fever and body aches. A sore throat usually gets better in a few days.  · Most ear infections. An ear infection may be caused by a virus or bacteria. It causes pain in the ear. Antibiotics usually dont help, and the infection goes away on its own.  · Most sinus infections (sinusitis). This kind of infection causes sinus pain and swelling, and a runny nose. In most cases, sinusitis goes away on its own, and antibiotics dont make recovery quicker.  · Allergic rhinitis. This is a set of symptoms caused by an allergic reaction. You may have sneezing, a runny nose, itchy or watery eyes, or a sore throat. Allergies are  not treated with antibiotics.  · Low fever. A mild fever thats less than 100.4°F (38°C) most likely doesnt need treatment with antibiotics.   When antibiotics can help   Antibiotics can be used to treat:                                                     · Strep throat. This is a throat infectioncaused by a certain type of bacteria. Symptoms of strep throat include a sore throat, white patches on the tonsils, red spots on the roof of the mouth, fever, body aches, and nausea and vomiting.  · Urinary tract infection (UTI). This is a bacterial infection of the bladder and the tube that takes urine out of the body. It can cause burning pain and urine thats cloudy or tinted with blood. UTIs are very common. Antibiotics usually help treat these infections.  · Some ear infections. In some cases, a healthcare provider may prescribe antibiotics for an ear infection. You may need a test to show whats causing the ear infection.  · Some sinus infections. In some cases, yourhealthcare provider may give you antibiotics. He or she may first need to make sure your symptoms arent caused by a virus, fungus, allergies, or air pollutants such as smoke.   Your doctor may also recommend antibiotics if you have a condition that can affect your immune system, such as diabetes or cancer.   Self-care at home   If your infection cant be treated with antibiotics, you can take other steps to feel better. Try the remedies below. In general:   · Rest and sleep as much as needed.  · Drink water and other clear fluids.  · Dont smoke, and avoid smoke from other people.  · Use over-the-counter medicine such as acetaminophen to ease pain or fever, as directed by your healthcare provider.   To treat sinus pain or nasal congestion:   · Put a warm, moist washcloth on your face where you feel sinus pain or pressure.  · Use a nasal spray with medicine or saline, as directed by your healthcare provider.  · Breathe in steam from a hot shower.  · Use  a humidifier or cool mist vaporizer.   To quiet a cough:   · Use a humidifier or cool mist vaporizer.  · Breathe in steam from a hot shower.  · Use cough lozenges.   To sooth a sore throat:   · Suck on ice chips, popsicles, or lozenges.  · Use a sore throat spray.  · Use a humidifier or cool mist vaporizer.  · Gargle with saltwater.  · Drink warm liquids.   To ease ear pain:   · Hold a warm, moist washcloth on the ear for 10 minutes at a time.  Date Last Reviewed: 9/1/2016 © 2000-2016 [employer]. [address] [place] [address], [place] [address]. All rights reserved. This information is not intended as a substitute for professional medical care. Always follow your healthcare professional's instructions.    Please return here or go to the Emergency Department for any concerns or worsening of condition      Start taking antibiotics if not improved in 3 days    Zyrtec, Claritin, or Allegra OTC as directed for the next 7 days  Flonase OTC as directed for the next 7 days  Salt Water Nasal Spray OTC 3x/day for the next 7 days      Follow up with Primary Care or ENT if not improved in 7-10 Days:  [phone]      Sinusitis (No Antibiotics)    The sinuses are air-filled spaces within the bones of the face. They connect to the inside of the nose. Sinusitis is an inflammation of the tissue lining the sinus cavity. Sinus inflammation can occur during a cold. It can also be due to allergies to pollens and other particles in the air. It can cause symptoms such as sinus congestion, headache, sore throat, facial swelling and fullness. It may also cause a low-grade fever. No infection is present, and no antibiotic treatment is needed.  Home care  · Drink plenty of water, hot tea, and other liquids. This may help thin mucus. It also may promote sinus drainage.  · Heat may help soothe painful areas of the face. Use a towel soaked in hot water. Or,  the shower and direct the hot spray onto your face. Using a vaporizer  along with a menthol rub at night may also help.   · An expectorant containing guaifenesin may help thin the mucus and promote drainage from the sinuses.  · Over-the-counter decongestants may be used unless a similar medicine was prescribed. Nasal sprays work the fastest. Use one that contains phenylephrine or oxymetazoline. First blow the nose gently. Then use the spray. Do not use these medicines more often than directed on the label or symptoms may get worse. You may also use tablets containing pseudoephedrine. Avoid products that combine ingredients, because side effects may be increased. Read labels. You can also ask the pharmacist for help. (NOTE: Persons with high blood pressure should not use decongestants. They can raise blood pressure.)  · Over-the-counter antihistamines may help if allergies contributed to your sinusitis.    · Use acetaminophen or ibuprofen to control pain, unless another pain medicine was prescribed. (If you have chronic liver or kidney disease or ever had a stomach ulcer, talk with your doctor before using these medicines. Aspirin should never be used in anyone under 18 years of age who is ill with a fever. It may cause severe liver damage.)  · Use nasal rinses or irrigation as instructed by your health care provider.  · Don't smoke. This can worsen symptoms.  Follow-up care  Follow up with your healthcare provider or our staff if you are not improving within the next week.  When to seek medical advice  Call your healthcare provider if any of these occur:  · Green or yellow discharge from the nose or into the throat  · Facial pain or headache becoming more severe  · Stiff neck  · Unusual drowsiness or confusion  · Swelling of the forehead or eyelids  · Vision problems, including blurred or double vision  · Fever of 100.4ºF (38ºC) or higher, or as directed by your healthcare provider  · Seizure  · Breathing problems  · Symptoms not resolving within 10 days  Date Last Reviewed:  4/13/2015 © 2000-2016 [employer]. [address] [place] [address], [place] [address]. All rights reserved. This information is not intended as a substitute for professional medical care. Always follow your healthcare professional's instructions.      When to Use Antibiotics   Antibiotics are medicines used to treat infections caused by bacteria. They dont work for illnesses caused by viruses or an allergic reaction. In fact, taking antibiotics for reasons other than a bacterial infection can cause problems. For example, you may have side effects from the medicine. And if you really need an antibiotic, it may not work well.                                                                                                                                              When antibiotics wont help  Your healthcare provider wont usually prescribe antibiotics for the following conditions. You can help by not asking for them if you have:   · A cold. This type of illness is caused by a virus. It can cause a runny nose, stuffed-up nose, sneezing, coughing, headache, mild body aches, and low fever. A cold gets better on its own in a few days to a week.  · The flu (influenza). This is a respiratory illness caused by a virus. The flu usually goes away on its own in a week or so. It can cause fever, body aches, sore throat, and fatigue.  · Bronchitis. This is an infection in the lungs most often caused by a virus. You may have coughing, phlegm, body aches, and a low fever. A common type of bronchitis is known as a chest cold (acute bronchitis). This often happens after you have a respiratory infection like a common cold. Bronchitis can take weeks to go away, but antibiotics usually dont help.  · Most sore throats. Sore throats are most often caused by viruses. Your throat may feel scratchy or achy, and it may hurt to swallow. You may also have a low fever and body aches. A sore throat usually gets better in a few  days.  · Most ear infections. An ear infection may be caused by a virus or bacteria. It causes pain in the ear. Antibiotics usually dont help, and the infection goes away on its own.  · Most sinus infections (sinusitis). This kind of infection causes sinus pain and swelling, and a runny nose. In most cases, sinusitis goes away on its own, and antibiotics dont make recovery quicker.  · Allergic rhinitis. This is a set of symptoms caused by an allergic reaction. You may have sneezing, a runny nose, itchy or watery eyes, or a sore throat. Allergies are not treated with antibiotics.  · Low fever. A mild fever thats less than 100.4°F (38°C) most likely doesnt need treatment with antibiotics.   When antibiotics can help   Antibiotics can be used to treat:                                                     · Strep throat. This is a throat infectioncaused by a certain type of bacteria. Symptoms of strep throat include a sore throat, white patches on the tonsils, red spots on the roof of the mouth, fever, body aches, and nausea and vomiting.  · Urinary tract infection (UTI). This is a bacterial infection of the bladder and the tube that takes urine out of the body. It can cause burning pain and urine thats cloudy or tinted with blood. UTIs are very common. Antibiotics usually help treat these infections.  · Some ear infections. In some cases, a healthcare provider may prescribe antibiotics for an ear infection. You may need a test to show whats causing the ear infection.  · Some sinus infections. In some cases, yourhealthcare provider may give you antibiotics. He or she may first need to make sure your symptoms arent caused by a virus, fungus, allergies, or air pollutants such as smoke.   Your doctor may also recommend antibiotics if you have a condition that can affect your immune system, such as diabetes or cancer.   Self-care at home   If your infection cant be treated with antibiotics, you can take other steps  to feel better. Try the remedies below. In general:   · Rest and sleep as much as needed.  · Drink water and other clear fluids.  · Dont smoke, and avoid smoke from other people.  · Use over-the-counter medicine such as acetaminophen to ease pain or fever, as directed by your healthcare provider.   To treat sinus pain or nasal congestion:   · Put a warm, moist washcloth on your face where you feel sinus pain or pressure.  · Use a nasal spray with medicine or saline, as directed by your healthcare provider.  · Breathe in steam from a hot shower.  · Use a humidifier or cool mist vaporizer.   To quiet a cough:   · Use a humidifier or cool mist vaporizer.  · Breathe in steam from a hot shower.  · Use cough lozenges.   To sooth a sore throat:   · Suck on ice chips, popsicles, or lozenges.  · Use a sore throat spray.  · Use a humidifier or cool mist vaporizer.  · Gargle with saltwater.  · Drink warm liquids.   To ease ear pain:   · Hold a warm, moist washcloth on the ear for 10 minutes at a time.  Date Last Reviewed: 9/1/2016  © 5144-5400 [employer]. [address] [place] [address]. All rights reserved. This information is not intended as a substitute for professional medical care. Always follow your healthcare professional's instructions.           Understanding Nasal Anatomy: Inside View  A lot happens under the surface of the nose. The bone and cartilage under the skin give the nose most of its size and shape. Other structures inside and behind the nose help you breathe. Learning the anatomy of the nose can help you better understand how the nose works.       Bone supports the upper third (bridge) of the nose. The upper cartilage supports the side of the nose. The lower cartilage adds support, width, and height. It helps shape the nostrils and the tip of the nose. Skin also helps shape the nose.          The nasal cavity is a hollow space behind the nose that air flows through. The septum is a  "thin "wall" made of cartilage and bone. It divides the inside of the nose into two chambers. The mucous membrane is thin tissue that lines the nose, sinuses, and throat. It warms and moistens the air you breathe in. It also makes the sticky mucus that helps clean the air of dust and other small particles. The turbinates on each side of the nose are curved, bony ridges lined with mucous membrane. They warm and moisten the air you breathe in. The sinuses are hollow, air-filled chambers in the bone around your nose. Mucus from the sinuses drains into the nasal cavity.  Date Last Reviewed: 11/1/2016  © 7169-0748 [employer]. [address] [place] [address], [place] [address]. All rights reserved. This information is not intended as a substitute for professional medical care. Always follow your healthcare professional's instructions.           "

## 2018-01-30 ENCOUNTER — OFFICE VISIT (OUTPATIENT)
Dept: URGENT CARE | Facility: CLINIC | Age: 58
End: 2018-01-30
Payer: COMMERCIAL

## 2018-01-30 VITALS
TEMPERATURE: 98 F | DIASTOLIC BLOOD PRESSURE: 74 MMHG | HEART RATE: 71 BPM | SYSTOLIC BLOOD PRESSURE: 114 MMHG | RESPIRATION RATE: 18 BRPM | BODY MASS INDEX: 25.03 KG/M2 | HEIGHT: 62 IN | OXYGEN SATURATION: 96 % | WEIGHT: 136 LBS

## 2018-01-30 DIAGNOSIS — N39.0 URINARY TRACT INFECTION WITHOUT HEMATURIA, SITE UNSPECIFIED: Primary | ICD-10-CM

## 2018-01-30 LAB
BILIRUB UR QL STRIP: NEGATIVE
GLUCOSE UR QL STRIP: NEGATIVE
KETONES UR QL STRIP: NEGATIVE
LEUKOCYTE ESTERASE UR QL STRIP: NEGATIVE
PH, POC UA: 6 (ref 5–8)
POC BLOOD, URINE: NEGATIVE
POC NITRATES, URINE: NEGATIVE
PROT UR QL STRIP: NEGATIVE
SP GR UR STRIP: 1.02 (ref 1–1.03)
UROBILINOGEN UR STRIP-ACNC: NORMAL (ref 0.1–1.1)

## 2018-01-30 PROCEDURE — 81003 URINALYSIS AUTO W/O SCOPE: CPT | Mod: QW,S$GLB,, | Performed by: FAMILY MEDICINE

## 2018-01-30 PROCEDURE — 99214 OFFICE O/P EST MOD 30 MIN: CPT | Mod: 25,S$GLB,, | Performed by: FAMILY MEDICINE

## 2018-01-30 PROCEDURE — 3008F BODY MASS INDEX DOCD: CPT | Mod: S$GLB,,, | Performed by: FAMILY MEDICINE

## 2018-01-30 RX ORDER — AMOXICILLIN 875 MG/1
TABLET, FILM COATED ORAL
Refills: 0 | COMMUNITY
Start: 2017-11-18 | End: 2018-06-19 | Stop reason: ALTCHOICE

## 2018-01-30 RX ORDER — METHYLPREDNISOLONE 4 MG/1
TABLET ORAL
Refills: 0 | COMMUNITY
Start: 2017-11-16 | End: 2018-06-19 | Stop reason: ALTCHOICE

## 2018-01-30 RX ORDER — CIPROFLOXACIN 500 MG/1
500 TABLET ORAL 2 TIMES DAILY
Qty: 20 TABLET | Refills: 0 | Status: SHIPPED | OUTPATIENT
Start: 2018-01-30 | End: 2018-02-09

## 2018-01-30 RX ORDER — PHENAZOPYRIDINE HYDROCHLORIDE 200 MG/1
200 TABLET, FILM COATED ORAL 3 TIMES DAILY PRN
Qty: 9 TABLET | Refills: 0 | Status: SHIPPED | OUTPATIENT
Start: 2018-01-30 | End: 2018-06-19 | Stop reason: ALTCHOICE

## 2018-01-31 NOTE — PATIENT INSTRUCTIONS

## 2018-01-31 NOTE — PROGRESS NOTES
"Subjective:       Patient ID: Cristal Gonzalez is a 57 y.o. female.    Vitals:  height is 5' 2" (1.575 m) and weight is 61.7 kg (136 lb). Her oral temperature is 97.8 °F (36.6 °C). Her blood pressure is 114/74 and her pulse is 71. Her respiration is 18 and oxygen saturation is 96%.     Chief Complaint: Urinary Tract Infection    Pt noticed she's had burning and frequency for 3 days. Pt also c/o lower back pain. Pt was given nitrofuradantin last time and it made her nauseated and was given cipro and that seemed to work better.      Other   This is a new problem. The current episode started in the past 7 days. The problem occurs constantly. The problem has been unchanged. Pertinent negatives include no abdominal pain, chills, fever, nausea or vomiting. Nothing aggravates the symptoms. She has tried nothing for the symptoms. The treatment provided no relief.     Review of Systems   Constitution: Negative for chills and fever.   Skin: Negative for itching.   Musculoskeletal: Positive for back pain.   Gastrointestinal: Negative for abdominal pain, nausea and vomiting.   Genitourinary: Positive for dysuria, frequency, pelvic pain and urgency. Negative for genital sores, hematuria, missed menses and non-menstrual bleeding.        Pelvic pain.       Objective:      Physical Exam   Constitutional: She appears well-developed and well-nourished.   HENT:   Head: Normocephalic and atraumatic.   Eyes: EOM are normal. Pupils are equal, round, and reactive to light.   Neck: Normal range of motion. Neck supple.   Cardiovascular: Normal rate and regular rhythm.    Pulmonary/Chest: Effort normal and breath sounds normal.   Abdominal: Soft. There is no tenderness (No CVAT).   Nursing note and vitals reviewed.      Assessment:       1. Urinary tract infection without hematuria, site unspecified        Plan:         Urinary tract infection without hematuria, site unspecified  -     POCT Urinalysis, Dipstick, Automated, W/O Scope  -  "    ciprofloxacin HCl (CIPRO) 500 MG tablet; Take 1 tablet (500 mg total) by mouth 2 (two) times daily.  Dispense: 20 tablet; Refill: 0  -     phenazopyridine (PYRIDIUM) 200 MG tablet; Take 1 tablet (200 mg total) by mouth 3 (three) times daily as needed for Pain.  Dispense: 9 tablet; Refill: 0

## 2018-03-28 DIAGNOSIS — K29.70 GASTRITIS, PRESENCE OF BLEEDING UNSPECIFIED, UNSPECIFIED CHRONICITY, UNSPECIFIED GASTRITIS TYPE: ICD-10-CM

## 2018-03-29 RX ORDER — ONDANSETRON 4 MG/1
TABLET, ORALLY DISINTEGRATING ORAL
Qty: 30 TABLET | Refills: 0 | Status: SHIPPED | OUTPATIENT
Start: 2018-03-29 | End: 2018-06-19

## 2018-04-02 ENCOUNTER — TELEPHONE (OUTPATIENT)
Dept: FAMILY MEDICINE | Facility: CLINIC | Age: 58
End: 2018-04-02

## 2018-06-19 ENCOUNTER — LAB VISIT (OUTPATIENT)
Dept: LAB | Facility: HOSPITAL | Age: 58
End: 2018-06-19
Attending: FAMILY MEDICINE
Payer: COMMERCIAL

## 2018-06-19 ENCOUNTER — OFFICE VISIT (OUTPATIENT)
Dept: FAMILY MEDICINE | Facility: CLINIC | Age: 58
End: 2018-06-19
Payer: COMMERCIAL

## 2018-06-19 VITALS
BODY MASS INDEX: 25.68 KG/M2 | DIASTOLIC BLOOD PRESSURE: 65 MMHG | HEIGHT: 62 IN | RESPIRATION RATE: 20 BRPM | SYSTOLIC BLOOD PRESSURE: 110 MMHG | TEMPERATURE: 98 F | WEIGHT: 139.56 LBS

## 2018-06-19 DIAGNOSIS — Z83.3 FAMILY HISTORY OF DIABETES MELLITUS (DM): ICD-10-CM

## 2018-06-19 DIAGNOSIS — K64.9 HEMORRHOIDS, UNSPECIFIED HEMORRHOID TYPE: ICD-10-CM

## 2018-06-19 DIAGNOSIS — R53.83 FATIGUE, UNSPECIFIED TYPE: Primary | ICD-10-CM

## 2018-06-19 DIAGNOSIS — K29.70 GASTRITIS, PRESENCE OF BLEEDING UNSPECIFIED, UNSPECIFIED CHRONICITY, UNSPECIFIED GASTRITIS TYPE: ICD-10-CM

## 2018-06-19 DIAGNOSIS — R11.0 NAUSEA: ICD-10-CM

## 2018-06-19 DIAGNOSIS — R53.83 FATIGUE, UNSPECIFIED TYPE: ICD-10-CM

## 2018-06-19 LAB
ALBUMIN SERPL BCP-MCNC: 3.9 G/DL
ALP SERPL-CCNC: 46 U/L
ALT SERPL W/O P-5'-P-CCNC: 16 U/L
ANION GAP SERPL CALC-SCNC: 10 MMOL/L
AST SERPL-CCNC: 19 U/L
BASOPHILS # BLD AUTO: 0.04 K/UL
BASOPHILS NFR BLD: 0.6 %
BILIRUB SERPL-MCNC: 0.6 MG/DL
BUN SERPL-MCNC: 16 MG/DL
CALCIUM SERPL-MCNC: 9.4 MG/DL
CHLORIDE SERPL-SCNC: 106 MMOL/L
CHOLEST SERPL-MCNC: 148 MG/DL
CHOLEST/HDLC SERPL: 1.6 {RATIO}
CO2 SERPL-SCNC: 21 MMOL/L
CREAT SERPL-MCNC: 0.8 MG/DL
DIFFERENTIAL METHOD: ABNORMAL
EOSINOPHIL # BLD AUTO: 0.2 K/UL
EOSINOPHIL NFR BLD: 2.3 %
ERYTHROCYTE [DISTWIDTH] IN BLOOD BY AUTOMATED COUNT: 12.8 %
EST. GFR  (AFRICAN AMERICAN): >60 ML/MIN/1.73 M^2
EST. GFR  (NON AFRICAN AMERICAN): >60 ML/MIN/1.73 M^2
ESTIMATED AVG GLUCOSE: 111 MG/DL
GLUCOSE SERPL-MCNC: 105 MG/DL
HBA1C MFR BLD HPLC: 5.5 %
HCT VFR BLD AUTO: 42.5 %
HDLC SERPL-MCNC: 95 MG/DL
HDLC SERPL: 64.2 %
HGB BLD-MCNC: 14 G/DL
IMM GRANULOCYTES # BLD AUTO: 0.02 K/UL
IMM GRANULOCYTES NFR BLD AUTO: 0.3 %
LDLC SERPL CALC-MCNC: 49.2 MG/DL
LYMPHOCYTES # BLD AUTO: 2.1 K/UL
LYMPHOCYTES NFR BLD: 30.1 %
MCH RBC QN AUTO: 32.7 PG
MCHC RBC AUTO-ENTMCNC: 32.9 G/DL
MCV RBC AUTO: 99 FL
MONOCYTES # BLD AUTO: 0.6 K/UL
MONOCYTES NFR BLD: 8.5 %
NEUTROPHILS # BLD AUTO: 4 K/UL
NEUTROPHILS NFR BLD: 58.2 %
NONHDLC SERPL-MCNC: 53 MG/DL
NRBC BLD-RTO: 0 /100 WBC
PLATELET # BLD AUTO: 390 K/UL
PMV BLD AUTO: 10.4 FL
POTASSIUM SERPL-SCNC: 4.4 MMOL/L
PROT SERPL-MCNC: 6.6 G/DL
RBC # BLD AUTO: 4.28 M/UL
SODIUM SERPL-SCNC: 137 MMOL/L
TRIGL SERPL-MCNC: 19 MG/DL
TSH SERPL DL<=0.005 MIU/L-ACNC: 1.12 UIU/ML
WBC # BLD AUTO: 6.82 K/UL

## 2018-06-19 PROCEDURE — 84443 ASSAY THYROID STIM HORMONE: CPT

## 2018-06-19 PROCEDURE — 99214 OFFICE O/P EST MOD 30 MIN: CPT | Mod: S$GLB,,, | Performed by: FAMILY MEDICINE

## 2018-06-19 PROCEDURE — 82652 VIT D 1 25-DIHYDROXY: CPT

## 2018-06-19 PROCEDURE — 36415 COLL VENOUS BLD VENIPUNCTURE: CPT | Mod: PO

## 2018-06-19 PROCEDURE — 85025 COMPLETE CBC W/AUTO DIFF WBC: CPT

## 2018-06-19 PROCEDURE — 3008F BODY MASS INDEX DOCD: CPT | Mod: CPTII,S$GLB,, | Performed by: FAMILY MEDICINE

## 2018-06-19 PROCEDURE — 80053 COMPREHEN METABOLIC PANEL: CPT

## 2018-06-19 PROCEDURE — 80061 LIPID PANEL: CPT

## 2018-06-19 PROCEDURE — 99999 PR PBB SHADOW E&M-EST. PATIENT-LVL III: CPT | Mod: PBBFAC,,, | Performed by: FAMILY MEDICINE

## 2018-06-19 PROCEDURE — 83036 HEMOGLOBIN GLYCOSYLATED A1C: CPT

## 2018-06-19 RX ORDER — OMEPRAZOLE 40 MG/1
40 CAPSULE, DELAYED RELEASE ORAL DAILY
Qty: 30 CAPSULE | Refills: 11 | Status: SHIPPED | OUTPATIENT
Start: 2018-06-19 | End: 2019-04-24 | Stop reason: SDUPTHER

## 2018-06-19 RX ORDER — HYDROCORTISONE ACETATE PRAMOXINE HCL 2.5; 1 G/100G; G/100G
CREAM TOPICAL 3 TIMES DAILY
Qty: 28.35 G | Refills: 3 | Status: SHIPPED | OUTPATIENT
Start: 2018-06-19 | End: 2019-10-31 | Stop reason: SDUPTHER

## 2018-06-19 RX ORDER — ONDANSETRON HYDROCHLORIDE 8 MG/1
8 TABLET, FILM COATED ORAL EVERY 12 HOURS PRN
Qty: 12 TABLET | Refills: 2 | Status: SHIPPED | OUTPATIENT
Start: 2018-06-19 | End: 2019-04-24 | Stop reason: SDUPTHER

## 2018-06-19 NOTE — PATIENT INSTRUCTIONS
Hemorrhoids    Hemorrhoids are swollen and inflamed veins inside the rectum and near the anus. The rectum is the last several inches of the colon. The anus is the passage between the rectum and the outside of the body.  Causes  The veins can become swollen due to increased pressure in them. This is most often caused by:  · Chronic constipation or diarrhea  · Straining when having a bowel movement  · Sitting too long on the toilet  · A low-fiber diet  · Pregnancy  Symptoms  · Bleeding from the rectum (this may be noticeable after bowel movements)  · Lump near the anus  · Itching around the anus  · Pain around the anus  There are different types of hemorrhoids. Depending on the type you have and the severity, you may be able to treat yourself at home. In some cases, a procedure may be the best treatment option. Your healthcare provider can tell you more about this, if needed.  Home care  General care  · To get relief from pain or itching, try:  ¨ Topical products. Your healthcare provider may prescribe or recommend creams, ointments, or pads that can be applied to the hemorrhoid. Use these exactly as directed.  ¨ Medicines. Your healthcare provider may recommend stool softeners, suppositories, or laxatives to help manage constipation. Use these exactly as directed.  ¨ Sitz baths. A sitz bath involves sitting in a few inches of warm bath water. Be careful not to make the water so hot that you burn yourself--test it before sitting in it. Soak for about 10 to 15 minutes a few times a day. This may help relieve pain.  Tips to help prevent hemorrhoids  · Eat more fiber. Fiber adds bulk to stool and absorbs water as it moves through your colon. This makes stool softer and easier to pass.  ¨ Increase the fiber in your diet with more fiber-rich foods. These include fresh fruit, vegetables, and whole grains.  ¨ Take a fiber supplement or bulking agent, if advised to by your provider. These include products such as psyllium  or methylcellulose.  · Drink plenty of water, if directed to by your provider. This can help keep stool soft.  · Be more active. Frequent exercise aids digestion and helps prevent constipation. It may also help make bowel movements more regular.  · Dont strain during bowel movements. This can make hemorrhoids more likely. Also, dont sit on the toilet for long periods of time.  Follow-up care  Follow up with your healthcare provider, or as advised. If a culture or imaging tests were done, you will be notified of the results when they are ready. This may take a few days or longer.  When to seek medical advice  Call your healthcare provider right away if any of these occur:  · Increased bleeding from the rectum  · Increased pain around the rectum or anus  · Weakness or dizziness  Call 911  Call 911 or return to the emergency department right away if any of these occur:  · Trouble breathing or swallowing  · Fainting or loss of consciousness  · Unusually fast heart rate  · Vomiting blood  · Large amounts of blood in stool  Date Last Reviewed: 6/22/2015 © 2000-2017 [employer]. [address] [place] [address], [place] [address]. All rights reserved. This information is not intended as a substitute for professional medical care. Always follow your healthcare professional's instructions.

## 2018-06-20 NOTE — PROGRESS NOTES
Subjective:       Patient ID: Cristal Gonzalez is a 58 y.o. female.    Chief Complaint: GI Problem (stomach discomfort); Low-back Pain; Fatigue; and Nausea   Patient having lower abdominal burning and discomfort occasional blood in stools fatigue and nausea  Patient denies weight loss.  He has not had blood work or a physical in over a year with .  Patient has been feeling fatigued over the last couple of weeks.  Has not tried any medications over-the-counter  HPI see above  Review of Systems   Constitutional: Positive for fatigue.   HENT: Negative.    Eyes: Negative.    Respiratory: Negative.    Cardiovascular: Negative.    Gastrointestinal: Positive for abdominal pain, blood in stool, nausea and rectal pain.   Endocrine: Negative.    Genitourinary: Negative.    Musculoskeletal: Negative.    Skin: Negative.    Allergic/Immunologic: Negative.    Neurological: Negative.    Hematological: Negative.    Psychiatric/Behavioral: Negative.        Objective:      Physical Exam   Constitutional: She is oriented to person, place, and time. She appears well-developed and well-nourished. No distress.   HENT:   Head: Normocephalic and atraumatic.   Right Ear: External ear normal.   Left Ear: External ear normal.   Nose: Nose normal.   Mouth/Throat: Oropharynx is clear and moist.   Eyes: Conjunctivae and EOM are normal. Pupils are equal, round, and reactive to light.   Neck: Normal range of motion. Neck supple. No JVD present.   Cardiovascular: Normal rate, regular rhythm, normal heart sounds and intact distal pulses.    No murmur heard.  Pulmonary/Chest: Effort normal and breath sounds normal. No respiratory distress. She has no wheezes. She has no rales.   Abdominal: Soft. Bowel sounds are normal. She exhibits distension. She exhibits no mass. There is tenderness. There is no rebound and no guarding. No hernia.   Genitourinary:       Pelvic exam was performed with patient in the knee-chest position.   Genitourinary  Comments: Fissure with recent bleeding noted at 6 o'clock  Nonbleeding hemorrhoids noted just inside the anal verge.   Neurological: She is alert and oriented to person, place, and time. She displays normal reflexes. No cranial nerve deficit or sensory deficit. She exhibits normal muscle tone. Coordination normal.   Skin: Skin is warm and dry. No rash noted. She is not diaphoretic. No erythema. No pallor.   Psychiatric: She has a normal mood and affect. Her behavior is normal. Judgment and thought content normal.   Nursing note and vitals reviewed.      Assessment:       1. Fatigue, unspecified type    2. Hemorrhoids, unspecified hemorrhoid type    3. Family history of diabetes mellitus (DM)    4. Nausea    5. Gastritis, presence of bleeding unspecified, unspecified chronicity, unspecified gastritis type        Plan:     will contact patient results of these tests are available.  Calcitriol          CBC auto differential         Comprehensive metabolic panel         Hemoglobin A1c         Lipid panel         TSH          Refer to the med card dated 2018   acetaminophen (TYLENOL) 500 MG tablet 500 mg, Every 6 hours PRN        cetirizine (ZYRTEC) 10 MG tablet () 10 mg, Daily        cyclobenzaprine (FLEXERIL) 5 MG tablet 5 mg, 2 times daily PRN        docusate sodium (COLACE) 100 MG capsule 100 mg, 2 times daily PRN        fluticasone (FLONASE) 50 mcg/actuation nasal spray 1 spray, Daily        GLUCOSAM SUL NA/CHONDR THURSTON A NA (GLUCOSAMINE & CHONDROIT SUL.NA ORAL)         hydrocortisone-pramoxine (ANALPRAM-HC) 2.5-1 % Crea 3 times daily        MV, MIN #36/IRON,CARBONYL/FA (GERITOL COMPLETE ORAL)         naproxen (NAPROSYN) 500 MG tablet 500 mg, 2 times daily        omeprazole (PRILOSEC) 40 MG capsule 40 mg, Daily        ondansetron (ZOFRAN) 8 MG tablet 8 mg, Every 12 hours PRN        ondansetron (ZOFRAN, AS HYDROCHLORIDE,) 4 MG tablet 4 mg, Daily PRN

## 2018-06-21 LAB — 1,25(OH)2D3 SERPL-MCNC: 58 PG/ML

## 2018-08-28 ENCOUNTER — OFFICE VISIT (OUTPATIENT)
Dept: URGENT CARE | Facility: CLINIC | Age: 58
End: 2018-08-28
Payer: COMMERCIAL

## 2018-08-28 VITALS
OXYGEN SATURATION: 99 % | WEIGHT: 139 LBS | SYSTOLIC BLOOD PRESSURE: 117 MMHG | HEART RATE: 75 BPM | RESPIRATION RATE: 16 BRPM | DIASTOLIC BLOOD PRESSURE: 75 MMHG | HEIGHT: 62 IN | BODY MASS INDEX: 25.58 KG/M2 | TEMPERATURE: 97 F

## 2018-08-28 DIAGNOSIS — N30.00 ACUTE CYSTITIS WITHOUT HEMATURIA: Primary | ICD-10-CM

## 2018-08-28 LAB
BILIRUB UR QL STRIP: NEGATIVE
GLUCOSE UR QL STRIP: POSITIVE
KETONES UR QL STRIP: NEGATIVE
LEUKOCYTE ESTERASE UR QL STRIP: NEGATIVE
PH, POC UA: 5 (ref 5–8)
POC BLOOD, URINE: POSITIVE
POC NITRATES, URINE: POSITIVE
PROT UR QL STRIP: POSITIVE
SP GR UR STRIP: 1.01 (ref 1–1.03)
UROBILINOGEN UR STRIP-ACNC: NORMAL (ref 0.1–1.1)

## 2018-08-28 PROCEDURE — 99000 SPECIMEN HANDLING OFFICE-LAB: CPT | Mod: S$GLB,,, | Performed by: NURSE PRACTITIONER

## 2018-08-28 PROCEDURE — 81003 URINALYSIS AUTO W/O SCOPE: CPT | Mod: QW,S$GLB,, | Performed by: NURSE PRACTITIONER

## 2018-08-28 PROCEDURE — 99214 OFFICE O/P EST MOD 30 MIN: CPT | Mod: 25,S$GLB,, | Performed by: NURSE PRACTITIONER

## 2018-08-28 RX ORDER — HYDROCORTISONE ACETATE, PRAMOXINE HCL 2.5; 1 G/100G; G/100G
CREAM TOPICAL
Refills: 3 | COMMUNITY
Start: 2018-06-19 | End: 2019-04-24

## 2018-08-28 RX ORDER — CIPROFLOXACIN 500 MG/1
500 TABLET ORAL 2 TIMES DAILY
Qty: 14 TABLET | Refills: 0 | Status: SHIPPED | OUTPATIENT
Start: 2018-08-28 | End: 2018-09-03

## 2018-08-28 NOTE — PROGRESS NOTES
"Subjective:       Patient ID: Cristal Gonzalez is a 58 y.o. female.    Vitals:    08/28/18 1809   BP: 117/75   Pulse: 75   Resp: 16   Temp: 97 °F (36.1 °C)   TempSrc: Oral   SpO2: 99%   Weight: 63 kg (139 lb)   Height: 5' 2" (1.575 m)       Chief Complaint: Urinary Tract Infection    Pt c/o dysuria and took pyridium this afternoon. Pt states this started this morning.  Denies fever, chills, N/V, pelvic pain, vaginal discharge, STI exposure, back/flank pain.   This is her 2nd UTI this year.       Urinary Tract Infection    This is a new problem. The current episode started acute onset. The problem occurs every urination. The problem has been waxing and waning. The quality of the pain is described as burning. The pain is at a severity of 4/10. The pain is mild. There has been no fever. The fever has been present for less than 1 day. She is sexually active. There is no history of pyelonephritis. Associated symptoms include a discharge, frequency and urgency. Pertinent negatives include no chills, hematuria, nausea, vomiting or bubble bath use. Treatments tried: pyridium. The treatment provided mild relief. Her past medical history is significant for kidney stones and recurrent UTIs. There is no history of catheterization, diabetes insipidus, diabetes mellitus, genitourinary reflux, hypertension, a single kidney, STD, urinary stasis or a urological procedure. 1 kidney stone, 3 uti's a year     Review of Systems   Constitution: Positive for malaise/fatigue. Negative for chills and fever.   Skin: Negative for itching.   Musculoskeletal: Negative for back pain.   Gastrointestinal: Negative for abdominal pain, nausea and vomiting.   Genitourinary: Positive for dysuria, frequency and urgency. Negative for bladder incontinence, genital sores, hematuria, incomplete emptying, missed menses, nocturia and non-menstrual bleeding.       Objective:      Physical Exam   Constitutional: She is oriented to person, place, and time. " She appears well-developed and well-nourished.  Non-toxic appearance. She does not have a sickly appearance. She does not appear ill. No distress.   HENT:   Head: Normocephalic and atraumatic.   Right Ear: External ear normal.   Left Ear: External ear normal.   Nose: Nose normal. No nasal deformity. No epistaxis.   Mouth/Throat: Oropharynx is clear and moist and mucous membranes are normal.   Eyes: Conjunctivae, EOM and lids are normal. Pupils are equal, round, and reactive to light.   Neck: Trachea normal, normal range of motion and phonation normal. Neck supple.   Cardiovascular: Normal rate, regular rhythm, normal heart sounds and normal pulses.   Pulmonary/Chest: Effort normal and breath sounds normal.   Abdominal: Soft. Normal appearance and bowel sounds are normal. She exhibits no distension and no mass. There is tenderness (pressure) in the suprapubic area. There is no rigidity, no rebound, no guarding, no CVA tenderness, no tenderness at McBurney's point and negative Tai's sign. No hernia.   Neurological: She is alert and oriented to person, place, and time.   Skin: Skin is warm, dry and intact. She is not diaphoretic.   Psychiatric: She has a normal mood and affect. Her speech is normal and behavior is normal. Cognition and memory are normal.   Nursing note and vitals reviewed.        Results for orders placed or performed in visit on 08/28/18   POCT Urinalysis, Dipstick, Automated, W/O Scope   Result Value Ref Range    POC Blood, Urine Positive (A) Negative    POC Bilirubin, Urine Negative Negative    POC Urobilinogen, Urine normal 0.1 - 1.1    POC Ketones, Urine Negative Negative    POC Protein, Urine Positive (A) Negative    POC Nitrates, Urine Positive (A) Negative    POC Glucose, Urine Positive (A) Negative    pH, UA 5.0 5 - 8    POC Specific Gravity, Urine 1.015 1.003 - 1.029    POC Leukocytes, Urine Negative Negative       Assessment:       1. Acute cystitis without hematuria        Plan:        Cristal was seen today for urinary tract infection.    Diagnoses and all orders for this visit:    Acute cystitis without hematuria  -     POCT Urinalysis, Dipstick, Automated, W/O Scope  -     Urine culture  -     ciprofloxacin HCl (CIPRO) 500 MG tablet; Take 1 tablet (500 mg total) by mouth 2 (two) times daily. for 7 days    will treat for a presumed UTI. She reports Cipro works well for her.   She already took Azo today- skews the UA results.         Patient Instructions   Urine culture sent .  If you've had labs sent out, it will generally take 3-7 days for results to return. We will call you with the results.    Please return here or go to the Emergency Department for any concerns or worsening of condition.  If you were prescribed antibiotics, please take them to completion.  If you were prescribed a narcotic medication, do not drive or operate heavy equipment or machinery while taking these medications.  Please follow up with your primary care doctor or specialist as needed.  Please drink plenty of fluids.  Please get plenty of rest.  If you were prescribed Pyridium (phenazopyridine), please be aware that if you wear contact lens that this medication may stain your contacts.  While taking this medication it is recommended that you do not wear your contacts until 24 hours after your last dose.  Please follow up with your primary care doctor or specialist as needed.    If you  smoke, please stop smoking.      Bladder Infection, Female (Adult)    Urine is normally doesn't have any bacteria in it. But bacteria can get into the urinary tract from the skin around the rectum. Or they can travel in the blood from elsewhere in the body. Once they are in your urinary tract, they can cause infection in the urethra (urethritis), the bladder (cystitis), or the kidneys (pyelonephritis).  The most common place for an infection is in the bladder. This is called a bladder infection. This is one of the most common infections in  "women. Most bladder infections are easily treated. They are not serious unless the infection spreads to the kidney.  The phrases "bladder infection," "UTI," and "cystitis" are often used to describe the same thing. But they are not always the same. Cystitis is an inflammation of the bladder. The most common cause of cystitis is an infection.  Symptoms  The infection causes inflammation in the urethra and bladder. This causes many of the symptoms. The most common symptoms of a bladder infection are:  · Pain or burning when urinating  · Having to urinate more often than usual  · Urgent need to urinate  · Only a small amount of urine comes out  · Blood in urine  · Abdominal discomfort. This is usually in the lower abdomen above the pubic bone.  · Cloudy urine  · Strong- or bad-smelling urine  · Unable to urinate (urinary retention)  · Unable to hold urine in (urinary incontinence)  · Fever  · Loss of appetite  · Confusion (in older adults)  Causes  Bladder infections are not contagious. You can't get one from someone else, from a toilet seat, or from sharing a bath.  The most common cause of bladder infections is bacteria from the bowels. The bacteria get onto the skin around the opening of the urethra. From there, they can get into the urine and travel up to the bladder, causing inflammation and infection. This usually happens because of:  · Wiping improperly after urinating. Always wipe from front to back.  · Bowel incontinence  · Pregnancy  · Procedures such as having a catheter inserted  · Older age  · Not emptying your bladder. This can allow bacteria a chance to grow in your urine.  · Dehydration  · Constipation  · Sex  · Use of a diaphragm for birth control   Treatment  Bladder infections are diagnosed by a urine test. They are treated with antibiotics and usually clear up quickly without complications. Treatment helps prevent a more serious kidney infection.  Medicines  Medicines can help in the treatment of a " bladder infection:  · Take antibiotics until they are used up, even if you feel better. It is important to finish them to make sure the infection has cleared.  · You can use acetaminophen or ibuprofen for pain, fever, or discomfort, unless another medicine was prescribed. If you have chronic liver or kidney disease, talk with your healthcare provider before using these medicines. Also talk with your provider if you've ever had a stomach ulcer or gastrointestinal bleeding, or are taking blood-thinner medicines.  · If you are given phenazopydridine to reduce burning with urination, it will cause your urine to become a bright orange color. This can stain clothing.  Care and prevention  These self-care steps can help prevent future infections:  · Drink plenty of fluids to prevent dehydration and flush out your bladder. Do this unless you must restrict fluids for other health reasons, or your doctor told you not to.  · Proper cleaning after going to the bathroom is important. Wipe from front to back after using the toilet to prevent the spread of bacteria.  · Urinate more often. Don't try to hold urine in for a long time.  · Wear loose-fitting clothes and cotton underwear. Avoid tight-fitting pants.  · Improve your diet and prevent constipation. Eat more fresh fruit and vegetables, and fiber, and less junk and fatty foods.  · Avoid sex until your symptoms are gone.  · Avoid caffeine, alcohol, and spicy foods. These can irritate your bladder.  · Urinate right after intercourse to flush out your bladder.  · If you use birth control pills and have frequent bladder infections, discuss it with your doctor.  Follow-up care  Call your healthcare provider if all symptoms are not gone after 3 days of treatment. This is especially important if you have repeat infections.  If a culture was done, you will be told if your treatment needs to be changed. If directed, you can call to find out the results.  If X-rays were done, you will  be told if the results will affect your treatment.  Call 911  Call 911 if any of the following occur:  · Trouble breathing  · Hard to wake up or confusion  · Fainting or loss of consciousness  · Rapid heart rate  When to seek medical advice  Call your healthcare provider right away if any of these occur:  · Fever of 100.4ºF (38.0ºC) or higher, or as directed by your healthcare provider  · Symptoms are not better by the third day of treatment  · Back or belly (abdominal) pain that gets worse  · Repeated vomiting, or unable to keep medicine down  · Weakness or dizziness  · Vaginal discharge  · Pain, redness, or swelling in the outer vaginal area (labia)  Date Last Reviewed: 10/1/2016  © 4477-1307 [employer]. [address] [place] [address], [place] [address]. All rights reserved. This information is not intended as a substitute for professional medical care. Always follow your healthcare professional's instructions.

## 2018-08-28 NOTE — PATIENT INSTRUCTIONS
"Urine culture sent .  If you've had labs sent out, it will generally take 3-7 days for results to return. We will call you with the results.    Please return here or go to the Emergency Department for any concerns or worsening of condition.  If you were prescribed antibiotics, please take them to completion.  If you were prescribed a narcotic medication, do not drive or operate heavy equipment or machinery while taking these medications.  Please follow up with your primary care doctor or specialist as needed.  Please drink plenty of fluids.  Please get plenty of rest.  If you were prescribed Pyridium (phenazopyridine), please be aware that if you wear contact lens that this medication may stain your contacts.  While taking this medication it is recommended that you do not wear your contacts until 24 hours after your last dose.  Please follow up with your primary care doctor or specialist as needed.    If you  smoke, please stop smoking.      Bladder Infection, Female (Adult)    Urine is normally doesn't have any bacteria in it. But bacteria can get into the urinary tract from the skin around the rectum. Or they can travel in the blood from elsewhere in the body. Once they are in your urinary tract, they can cause infection in the urethra (urethritis), the bladder (cystitis), or the kidneys (pyelonephritis).  The most common place for an infection is in the bladder. This is called a bladder infection. This is one of the most common infections in women. Most bladder infections are easily treated. They are not serious unless the infection spreads to the kidney.  The phrases "bladder infection," "UTI," and "cystitis" are often used to describe the same thing. But they are not always the same. Cystitis is an inflammation of the bladder. The most common cause of cystitis is an infection.  Symptoms  The infection causes inflammation in the urethra and bladder. This causes many of the symptoms. The most common symptoms of a " bladder infection are:  · Pain or burning when urinating  · Having to urinate more often than usual  · Urgent need to urinate  · Only a small amount of urine comes out  · Blood in urine  · Abdominal discomfort. This is usually in the lower abdomen above the pubic bone.  · Cloudy urine  · Strong- or bad-smelling urine  · Unable to urinate (urinary retention)  · Unable to hold urine in (urinary incontinence)  · Fever  · Loss of appetite  · Confusion (in older adults)  Causes  Bladder infections are not contagious. You can't get one from someone else, from a toilet seat, or from sharing a bath.  The most common cause of bladder infections is bacteria from the bowels. The bacteria get onto the skin around the opening of the urethra. From there, they can get into the urine and travel up to the bladder, causing inflammation and infection. This usually happens because of:  · Wiping improperly after urinating. Always wipe from front to back.  · Bowel incontinence  · Pregnancy  · Procedures such as having a catheter inserted  · Older age  · Not emptying your bladder. This can allow bacteria a chance to grow in your urine.  · Dehydration  · Constipation  · Sex  · Use of a diaphragm for birth control   Treatment  Bladder infections are diagnosed by a urine test. They are treated with antibiotics and usually clear up quickly without complications. Treatment helps prevent a more serious kidney infection.  Medicines  Medicines can help in the treatment of a bladder infection:  · Take antibiotics until they are used up, even if you feel better. It is important to finish them to make sure the infection has cleared.  · You can use acetaminophen or ibuprofen for pain, fever, or discomfort, unless another medicine was prescribed. If you have chronic liver or kidney disease, talk with your healthcare provider before using these medicines. Also talk with your provider if you've ever had a stomach ulcer or gastrointestinal bleeding, or  are taking blood-thinner medicines.  · If you are given phenazopydridine to reduce burning with urination, it will cause your urine to become a bright orange color. This can stain clothing.  Care and prevention  These self-care steps can help prevent future infections:  · Drink plenty of fluids to prevent dehydration and flush out your bladder. Do this unless you must restrict fluids for other health reasons, or your doctor told you not to.  · Proper cleaning after going to the bathroom is important. Wipe from front to back after using the toilet to prevent the spread of bacteria.  · Urinate more often. Don't try to hold urine in for a long time.  · Wear loose-fitting clothes and cotton underwear. Avoid tight-fitting pants.  · Improve your diet and prevent constipation. Eat more fresh fruit and vegetables, and fiber, and less junk and fatty foods.  · Avoid sex until your symptoms are gone.  · Avoid caffeine, alcohol, and spicy foods. These can irritate your bladder.  · Urinate right after intercourse to flush out your bladder.  · If you use birth control pills and have frequent bladder infections, discuss it with your doctor.  Follow-up care  Call your healthcare provider if all symptoms are not gone after 3 days of treatment. This is especially important if you have repeat infections.  If a culture was done, you will be told if your treatment needs to be changed. If directed, you can call to find out the results.  If X-rays were done, you will be told if the results will affect your treatment.  Call 911  Call 911 if any of the following occur:  · Trouble breathing  · Hard to wake up or confusion  · Fainting or loss of consciousness  · Rapid heart rate  When to seek medical advice  Call your healthcare provider right away if any of these occur:  · Fever of 100.4ºF (38.0ºC) or higher, or as directed by your healthcare provider  · Symptoms are not better by the third day of treatment  · Back or belly (abdominal) pain  that gets worse  · Repeated vomiting, or unable to keep medicine down  · Weakness or dizziness  · Vaginal discharge  · Pain, redness, or swelling in the outer vaginal area (labia)  Date Last Reviewed: 10/1/2016  © 8241-3785 "[employer]. [address] [place] [address]. All rights reserved. This information is not intended as a substitute for professional medical care. Always follow your healthcare professional's instructions.

## 2018-09-02 LAB
BACTERIA UR CULT: ABNORMAL
BACTERIA UR CULT: ABNORMAL
OTHER ANTIBIOTIC SUSC ISLT: ABNORMAL

## 2018-09-03 ENCOUNTER — TELEPHONE (OUTPATIENT)
Dept: URGENT CARE | Facility: CLINIC | Age: 58
End: 2018-09-03

## 2018-09-03 RX ORDER — NITROFURANTOIN 25; 75 MG/1; MG/1
100 CAPSULE ORAL 2 TIMES DAILY
Qty: 10 CAPSULE | Refills: 0 | Status: SHIPPED | OUTPATIENT
Start: 2018-09-03 | End: 2018-09-08

## 2018-09-03 NOTE — TELEPHONE ENCOUNTER
Called patient. Still having burning sensation. Explained resistance to cipro. Will change to macrobid.

## 2018-10-16 ENCOUNTER — OFFICE VISIT (OUTPATIENT)
Dept: URGENT CARE | Facility: CLINIC | Age: 58
End: 2018-10-16
Payer: COMMERCIAL

## 2018-10-16 VITALS
RESPIRATION RATE: 16 BRPM | OXYGEN SATURATION: 98 % | DIASTOLIC BLOOD PRESSURE: 81 MMHG | SYSTOLIC BLOOD PRESSURE: 124 MMHG | HEART RATE: 70 BPM | WEIGHT: 139 LBS | HEIGHT: 62 IN | TEMPERATURE: 98 F | BODY MASS INDEX: 25.58 KG/M2

## 2018-10-16 DIAGNOSIS — J01.00 ACUTE MAXILLARY SINUSITIS, RECURRENCE NOT SPECIFIED: Primary | ICD-10-CM

## 2018-10-16 PROCEDURE — 3008F BODY MASS INDEX DOCD: CPT | Mod: CPTII,S$GLB,, | Performed by: EMERGENCY MEDICINE

## 2018-10-16 PROCEDURE — 99214 OFFICE O/P EST MOD 30 MIN: CPT | Mod: S$GLB,,, | Performed by: EMERGENCY MEDICINE

## 2018-10-16 RX ORDER — METHYLPREDNISOLONE 4 MG/1
4 TABLET ORAL DAILY
Qty: 1 PACKAGE | Refills: 0 | Status: SHIPPED | OUTPATIENT
Start: 2018-10-16 | End: 2018-10-22

## 2018-10-16 NOTE — PROGRESS NOTES
"Subjective:       Patient ID: Cristal Gonzalez is a 58 y.o. female.    Vitals:    10/16/18 1846   BP: 124/81   Pulse: 70   Resp: 16   Temp: 97.9 °F (36.6 °C)   TempSrc: Oral   SpO2: 98%   Weight: 63 kg (139 lb)   Height: 5' 2" (1.575 m)       Chief Complaint: Cough and Sinus Problem    Patient reports productive cough for 5 days      Cough   Episode onset: 5 days. The problem has been gradually worsening. The cough is productive of sputum. Associated symptoms include nasal congestion, postnasal drip and rhinorrhea. Pertinent negatives include no chest pain, chills, ear pain, eye redness, fever, headaches, myalgias, sore throat, shortness of breath or wheezing. Nothing aggravates the symptoms. Treatments tried: Tylenol last dose this morning. The treatment provided no relief. Her past medical history is significant for bronchitis. There is no history of asthma.   Sinus Problem   This is a new problem. Episode onset: 5 days. The problem has been gradually worsening since onset. There has been no fever. Her pain is at a severity of 4/10. The pain is mild. Associated symptoms include congestion (nasal), coughing, sinus pressure and sneezing. Pertinent negatives include no chills, ear pain, headaches, hoarse voice, shortness of breath or sore throat. Treatments tried: Zyrtec. The treatment provided no relief.     Review of Systems   Constitution: Positive for malaise/fatigue. Negative for chills and fever.        Patient felt warm but didn't take temp at home   HENT: Positive for congestion (nasal), postnasal drip, rhinorrhea, sinus pressure and sneezing. Negative for ear pain, hoarse voice and sore throat.         Sinus drip   Sinus pressure  Runny nose   Eyes: Negative for discharge and redness.        Watery eyes   Cardiovascular: Negative for chest pain, dyspnea on exertion and leg swelling.   Respiratory: Positive for cough and sputum production (yellow). Negative for shortness of breath and wheezing.  "   Musculoskeletal: Negative for myalgias.   Gastrointestinal: Negative for abdominal pain and nausea.   Neurological: Negative for headaches.       Objective:      Physical Exam   Constitutional: She is oriented to person, place, and time. She appears well-developed and well-nourished. She is cooperative.  Non-toxic appearance. She does not appear ill. No distress.   HENT:   Head: Normocephalic and atraumatic.   Right Ear: Hearing, tympanic membrane, external ear and ear canal normal.   Left Ear: Hearing, tympanic membrane, external ear and ear canal normal.   Nose: Mucosal edema and rhinorrhea present. No nasal deformity. No epistaxis. Right sinus exhibits maxillary sinus tenderness. Right sinus exhibits no frontal sinus tenderness. Left sinus exhibits maxillary sinus tenderness. Left sinus exhibits no frontal sinus tenderness.   Mouth/Throat: Uvula is midline, oropharynx is clear and moist and mucous membranes are normal. No trismus in the jaw. Normal dentition. No uvula swelling. No posterior oropharyngeal erythema.   Eyes: Conjunctivae and lids are normal. No scleral icterus.   Sclera clear bilat   Neck: Trachea normal, full passive range of motion without pain and phonation normal. Neck supple.   Cardiovascular: Normal rate, regular rhythm, normal heart sounds, intact distal pulses and normal pulses.   Pulmonary/Chest: Effort normal and breath sounds normal. No respiratory distress.   Abdominal: Soft. Normal appearance and bowel sounds are normal. She exhibits no distension. There is no tenderness.   Musculoskeletal: Normal range of motion. She exhibits no edema or deformity.   Neurological: She is alert and oriented to person, place, and time. She exhibits normal muscle tone. Coordination normal.   Skin: Skin is warm, dry and intact. She is not diaphoretic. No pallor.   Psychiatric: She has a normal mood and affect. Her speech is normal and behavior is normal. Judgment and thought content normal. Cognition and  memory are normal.   Nursing note and vitals reviewed.      Assessment:       1. Acute maxillary sinusitis, recurrence not specified        Plan:       Cristal was seen today for cough and sinus problem.    Diagnoses and all orders for this visit:    Acute maxillary sinusitis, recurrence not specified    Other orders  -     methylPREDNISolone (MEDROL DOSEPACK) 4 mg tablet; Take 1 tablet (4 mg total) by mouth once daily. use as directed for 6 days          Patient Instructions   Please return here or go to the Emergency Department for any concerns or worsening of condition      Start taking antibiotics if not improved in 3 days    Zyrtec, Claritin, or Allegra OTC as directed for the next 7 days    Flonase OTC as directed for the next 7 days    Salt Water Nasal Spray OTC 3x/day for the next 7 days      Follow up with Primary Care or ENT if not improved in 7-10 Days:  [id number]      Sinusitis (No Antibiotics)    The sinuses are air-filled spaces within the bones of the face. They connect to the inside of the nose. Sinusitis is an inflammation of the tissue lining the sinus cavity. Sinus inflammation can occur during a cold. It can also be due to allergies to pollens and other particles in the air. It can cause symptoms such as sinus congestion, headache, sore throat, facial swelling and fullness. It may also cause a low-grade fever. No infection is present, and no antibiotic treatment is needed.  Home care  · Drink plenty of water, hot tea, and other liquids. This may help thin mucus. It also may promote sinus drainage.  · Heat may help soothe painful areas of the face. Use a towel soaked in hot water. Or,  the shower and direct the hot spray onto your face. Using a vaporizer along with a menthol rub at night may also help.   · An expectorant containing guaifenesin may help thin the mucus and promote drainage from the sinuses.  · Over-the-counter decongestants may be used unless a similar medicine was prescribed.  Nasal sprays work the fastest. Use one that contains phenylephrine or oxymetazoline. First blow the nose gently. Then use the spray. Do not use these medicines more often than directed on the label or symptoms may get worse. You may also use tablets containing pseudoephedrine. Avoid products that combine ingredients, because side effects may be increased. Read labels. You can also ask the pharmacist for help. (NOTE: Persons with high blood pressure should not use decongestants. They can raise blood pressure.)  · Over-the-counter antihistamines may help if allergies contributed to your sinusitis.    · Use acetaminophen or ibuprofen to control pain, unless another pain medicine was prescribed. (If you have chronic liver or kidney disease or ever had a stomach ulcer, talk with your doctor before using these medicines. Aspirin should never be used in anyone under 18 years of age who is ill with a fever. It may cause severe liver damage.)  · Use nasal rinses or irrigation as instructed by your health care provider.  · Don't smoke. This can worsen symptoms.  Follow-up care  Follow up with your healthcare provider or our staff if you are not improving within the next week.  When to seek medical advice  Call your healthcare provider if any of these occur:  · Green or yellow discharge from the nose or into the throat  · Facial pain or headache becoming more severe  · Stiff neck  · Unusual drowsiness or confusion  · Swelling of the forehead or eyelids  · Vision problems, including blurred or double vision  · Fever of 100.4ºF (38ºC) or higher, or as directed by your healthcare provider  · Seizure  · Breathing problems  · Symptoms not resolving within 10 days  Date Last Reviewed: 4/13/2015 © 2000-2016 [employer]. [address] [place] [address], [place] [address]. All rights reserved. This information is not intended as a substitute for professional medical care. Always follow your healthcare professional's  instructions.      When to Use Antibiotics   Antibiotics are medicines used to treat infections caused by bacteria. They dont work for illnesses caused by viruses or an allergic reaction. In fact, taking antibiotics for reasons other than a bacterial infection can cause problems. For example, you may have side effects from the medicine. And if you really need an antibiotic, it may not work well.                                                                                                                                              When antibiotics wont help  Your healthcare provider wont usually prescribe antibiotics for the following conditions. You can help by not asking for them if you have:   · A cold. This type of illness is caused by a virus. It can cause a runny nose, stuffed-up nose, sneezing, coughing, headache, mild body aches, and low fever. A cold gets better on its own in a few days to a week.  · The flu (influenza). This is a respiratory illness caused by a virus. The flu usually goes away on its own in a week or so. It can cause fever, body aches, sore throat, and fatigue.  · Bronchitis. This is an infection in the lungs most often caused by a virus. You may have coughing, phlegm, body aches, and a low fever. A common type of bronchitis is known as a chest cold (acute bronchitis). This often happens after you have a respiratory infection like a common cold. Bronchitis can take weeks to go away, but antibiotics usually dont help.  · Most sore throats. Sore throats are most often caused by viruses. Your throat may feel scratchy or achy, and it may hurt to swallow. You may also have a low fever and body aches. A sore throat usually gets better in a few days.  · Most ear infections. An ear infection may be caused by a virus or bacteria. It causes pain in the ear. Antibiotics usually dont help, and the infection goes away on its own.  · Most sinus infections (sinusitis). This kind of infection  causes sinus pain and swelling, and a runny nose. In most cases, sinusitis goes away on its own, and antibiotics dont make recovery quicker.  · Allergic rhinitis. This is a set of symptoms caused by an allergic reaction. You may have sneezing, a runny nose, itchy or watery eyes, or a sore throat. Allergies are not treated with antibiotics.  · Low fever. A mild fever thats less than 100.4°F (38°C) most likely doesnt need treatment with antibiotics.   When antibiotics can help   Antibiotics can be used to treat:                                                     · Strep throat. This is a throat infectioncaused by a certain type of bacteria. Symptoms of strep throat include a sore throat, white patches on the tonsils, red spots on the roof of the mouth, fever, body aches, and nausea and vomiting.  · Urinary tract infection (UTI). This is a bacterial infection of the bladder and the tube that takes urine out of the body. It can cause burning pain and urine thats cloudy or tinted with blood. UTIs are very common. Antibiotics usually help treat these infections.  · Some ear infections. In some cases, a healthcare provider may prescribe antibiotics for an ear infection. You may need a test to show whats causing the ear infection.  · Some sinus infections. In some cases, yourhealthcare provider may give you antibiotics. He or she may first need to make sure your symptoms arent caused by a virus, fungus, allergies, or air pollutants such as smoke.   Your doctor may also recommend antibiotics if you have a condition that can affect your immune system, such as diabetes or cancer.   Self-care at home   If your infection cant be treated with antibiotics, you can take other steps to feel better. Try the remedies below. In general:   · Rest and sleep as much as needed.  · Drink water and other clear fluids.  · Dont smoke, and avoid smoke from other people.  · Use over-the-counter medicine such as acetaminophen to ease  "pain or fever, as directed by your healthcare provider.   To treat sinus pain or nasal congestion:   · Put a warm, moist washcloth on your face where you feel sinus pain or pressure.  · Use a nasal spray with medicine or saline, as directed by your healthcare provider.  · Breathe in steam from a hot shower.  · Use a humidifier or cool mist vaporizer.   To quiet a cough:   · Use a humidifier or cool mist vaporizer.  · Breathe in steam from a hot shower.  · Use cough lozenges.   To sooth a sore throat:   · Suck on ice chips, popsicles, or lozenges.  · Use a sore throat spray.  · Use a humidifier or cool mist vaporizer.  · Gargle with saltwater.  · Drink warm liquids.   To ease ear pain:   · Hold a warm, moist washcloth on the ear for 10 minutes at a time.  Date Last Reviewed: 9/1/2016  © 0677-6673 [employer]. [address] [place] [address], [place] [address]. All rights reserved. This information is not intended as a substitute for professional medical care. Always follow your healthcare professional's instructions.           Understanding Nasal Anatomy: Inside View  A lot happens under the surface of the nose. The bone and cartilage under the skin give the nose most of its size and shape. Other structures inside and behind the nose help you breathe. Learning the anatomy of the nose can help you better understand how the nose works.       Bone supports the upper third (bridge) of the nose. The upper cartilage supports the side of the nose. The lower cartilage adds support, width, and height. It helps shape the nostrils and the tip of the nose. Skin also helps shape the nose.          The nasal cavity is a hollow space behind the nose that air flows through. The septum is a thin "wall" made of cartilage and bone. It divides the inside of the nose into two chambers. The mucous membrane is thin tissue that lines the nose, sinuses, and throat. It warms and moistens the air you breathe in. It also makes the sticky " mucus that helps clean the air of dust and other small particles. The turbinates on each side of the nose are curved, bony ridges lined with mucous membrane. They warm and moisten the air you breathe in. The sinuses are hollow, air-filled chambers in the bone around your nose. Mucus from the sinuses drains into the nasal cavity.  Date Last Reviewed: 11/1/2016  © 7841-0944 [employer]. [address] [place] [address], [place] [id number]. All rights reserved. This information is not intended as a substitute for professional medical care. Always follow your healthcare professional's instructions.

## 2018-10-17 NOTE — PATIENT INSTRUCTIONS
Please return here or go to the Emergency Department for any concerns or worsening of condition      Start taking antibiotics if not improved in 3 days    Zyrtec, Claritin, or Allegra OTC as directed for the next 7 days    Flonase OTC as directed for the next 7 days    Salt Water Nasal Spray OTC 3x/day for the next 7 days      Follow up with Primary Care or ENT if not improved in 7-10 Days:  [id number]      Sinusitis (No Antibiotics)    The sinuses are air-filled spaces within the bones of the face. They connect to the inside of the nose. Sinusitis is an inflammation of the tissue lining the sinus cavity. Sinus inflammation can occur during a cold. It can also be due to allergies to pollens and other particles in the air. It can cause symptoms such as sinus congestion, headache, sore throat, facial swelling and fullness. It may also cause a low-grade fever. No infection is present, and no antibiotic treatment is needed.  Home care  · Drink plenty of water, hot tea, and other liquids. This may help thin mucus. It also may promote sinus drainage.  · Heat may help soothe painful areas of the face. Use a towel soaked in hot water. Or,  the shower and direct the hot spray onto your face. Using a vaporizer along with a menthol rub at night may also help.   · An expectorant containing guaifenesin may help thin the mucus and promote drainage from the sinuses.  · Over-the-counter decongestants may be used unless a similar medicine was prescribed. Nasal sprays work the fastest. Use one that contains phenylephrine or oxymetazoline. First blow the nose gently. Then use the spray. Do not use these medicines more often than directed on the label or symptoms may get worse. You may also use tablets containing pseudoephedrine. Avoid products that combine ingredients, because side effects may be increased. Read labels. You can also ask the pharmacist for help. (NOTE: Persons with high blood pressure should not use  decongestants. They can raise blood pressure.)  · Over-the-counter antihistamines may help if allergies contributed to your sinusitis.    · Use acetaminophen or ibuprofen to control pain, unless another pain medicine was prescribed. (If you have chronic liver or kidney disease or ever had a stomach ulcer, talk with your doctor before using these medicines. Aspirin should never be used in anyone under 18 years of age who is ill with a fever. It may cause severe liver damage.)  · Use nasal rinses or irrigation as instructed by your health care provider.  · Don't smoke. This can worsen symptoms.  Follow-up care  Follow up with your healthcare provider or our staff if you are not improving within the next week.  When to seek medical advice  Call your healthcare provider if any of these occur:  · Green or yellow discharge from the nose or into the throat  · Facial pain or headache becoming more severe  · Stiff neck  · Unusual drowsiness or confusion  · Swelling of the forehead or eyelids  · Vision problems, including blurred or double vision  · Fever of 100.4ºF (38ºC) or higher, or as directed by your healthcare provider  · Seizure  · Breathing problems  · Symptoms not resolving within 10 days  Date Last Reviewed: 4/13/2015  © 7086-5914 [employer]. [address] [place] [address], [place] [address]. All rights reserved. This information is not intended as a substitute for professional medical care. Always follow your healthcare professional's instructions.      When to Use Antibiotics   Antibiotics are medicines used to treat infections caused by bacteria. They dont work for illnesses caused by viruses or an allergic reaction. In fact, taking antibiotics for reasons other than a bacterial infection can cause problems. For example, you may have side effects from the medicine. And if you really need an antibiotic, it may not work  well.                                                                                                                                              When antibiotics wont help  Your healthcare provider wont usually prescribe antibiotics for the following conditions. You can help by not asking for them if you have:   · A cold. This type of illness is caused by a virus. It can cause a runny nose, stuffed-up nose, sneezing, coughing, headache, mild body aches, and low fever. A cold gets better on its own in a few days to a week.  · The flu (influenza). This is a respiratory illness caused by a virus. The flu usually goes away on its own in a week or so. It can cause fever, body aches, sore throat, and fatigue.  · Bronchitis. This is an infection in the lungs most often caused by a virus. You may have coughing, phlegm, body aches, and a low fever. A common type of bronchitis is known as a chest cold (acute bronchitis). This often happens after you have a respiratory infection like a common cold. Bronchitis can take weeks to go away, but antibiotics usually dont help.  · Most sore throats. Sore throats are most often caused by viruses. Your throat may feel scratchy or achy, and it may hurt to swallow. You may also have a low fever and body aches. A sore throat usually gets better in a few days.  · Most ear infections. An ear infection may be caused by a virus or bacteria. It causes pain in the ear. Antibiotics usually dont help, and the infection goes away on its own.  · Most sinus infections (sinusitis). This kind of infection causes sinus pain and swelling, and a runny nose. In most cases, sinusitis goes away on its own, and antibiotics dont make recovery quicker.  · Allergic rhinitis. This is a set of symptoms caused by an allergic reaction. You may have sneezing, a runny nose, itchy or watery eyes, or a sore throat. Allergies are not treated with antibiotics.  · Low fever. A mild fever thats less than 100.4°F  (38°C) most likely doesnt need treatment with antibiotics.   When antibiotics can help   Antibiotics can be used to treat:                                                     · Strep throat. This is a throat infectioncaused by a certain type of bacteria. Symptoms of strep throat include a sore throat, white patches on the tonsils, red spots on the roof of the mouth, fever, body aches, and nausea and vomiting.  · Urinary tract infection (UTI). This is a bacterial infection of the bladder and the tube that takes urine out of the body. It can cause burning pain and urine thats cloudy or tinted with blood. UTIs are very common. Antibiotics usually help treat these infections.  · Some ear infections. In some cases, a healthcare provider may prescribe antibiotics for an ear infection. You may need a test to show whats causing the ear infection.  · Some sinus infections. In some cases, yourhealthcare provider may give you antibiotics. He or she may first need to make sure your symptoms arent caused by a virus, fungus, allergies, or air pollutants such as smoke.   Your doctor may also recommend antibiotics if you have a condition that can affect your immune system, such as diabetes or cancer.   Self-care at home   If your infection cant be treated with antibiotics, you can take other steps to feel better. Try the remedies below. In general:   · Rest and sleep as much as needed.  · Drink water and other clear fluids.  · Dont smoke, and avoid smoke from other people.  · Use over-the-counter medicine such as acetaminophen to ease pain or fever, as directed by your healthcare provider.   To treat sinus pain or nasal congestion:   · Put a warm, moist washcloth on your face where you feel sinus pain or pressure.  · Use a nasal spray with medicine or saline, as directed by your healthcare provider.  · Breathe in steam from a hot shower.  · Use a humidifier or cool mist vaporizer.   To quiet a cough:   · Use a humidifier or  "cool mist vaporizer.  · Breathe in steam from a hot shower.  · Use cough lozenges.   To sooth a sore throat:   · Suck on ice chips, popsicles, or lozenges.  · Use a sore throat spray.  · Use a humidifier or cool mist vaporizer.  · Gargle with saltwater.  · Drink warm liquids.   To ease ear pain:   · Hold a warm, moist washcloth on the ear for 10 minutes at a time.  Date Last Reviewed: 9/1/2016  © 0653-5470 [employer]. [address] [place] [id number]. All rights reserved. This information is not intended as a substitute for professional medical care. Always follow your healthcare professional's instructions.           Understanding Nasal Anatomy: Inside View  A lot happens under the surface of the nose. The bone and cartilage under the skin give the nose most of its size and shape. Other structures inside and behind the nose help you breathe. Learning the anatomy of the nose can help you better understand how the nose works.       Bone supports the upper third (bridge) of the nose. The upper cartilage supports the side of the nose. The lower cartilage adds support, width, and height. It helps shape the nostrils and the tip of the nose. Skin also helps shape the nose.          The nasal cavity is a hollow space behind the nose that air flows through. The septum is a thin "wall" made of cartilage and bone. It divides the inside of the nose into two chambers. The mucous membrane is thin tissue that lines the nose, sinuses, and throat. It warms and moistens the air you breathe in. It also makes the sticky mucus that helps clean the air of dust and other small particles. The turbinates on each side of the nose are curved, bony ridges lined with mucous membrane. They warm and moisten the air you breathe in. The sinuses are hollow, air-filled chambers in the bone around your nose. Mucus from the sinuses drains into the nasal cavity.  Date Last Reviewed: 11/1/2016  © 5102-7508 [employer]. [address] [place] [id number]. All rights reserved. This information is not intended as a substitute for professional medical care. Always follow your healthcare professional's instructions.

## 2018-10-26 DIAGNOSIS — Z12.11 COLON CANCER SCREENING: ICD-10-CM

## 2019-03-11 ENCOUNTER — OFFICE VISIT (OUTPATIENT)
Dept: URGENT CARE | Facility: CLINIC | Age: 59
End: 2019-03-11
Payer: COMMERCIAL

## 2019-03-11 VITALS
RESPIRATION RATE: 18 BRPM | OXYGEN SATURATION: 98 % | BODY MASS INDEX: 25.58 KG/M2 | DIASTOLIC BLOOD PRESSURE: 75 MMHG | SYSTOLIC BLOOD PRESSURE: 114 MMHG | HEIGHT: 62 IN | WEIGHT: 139 LBS | HEART RATE: 70 BPM | TEMPERATURE: 97 F

## 2019-03-11 DIAGNOSIS — J06.9 ACUTE URI: ICD-10-CM

## 2019-03-11 DIAGNOSIS — J45.901 BRONCHITIS, ALLERGIC, UNSPECIFIED ASTHMA SEVERITY, WITH ACUTE EXACERBATION: Primary | ICD-10-CM

## 2019-03-11 PROCEDURE — 99214 PR OFFICE/OUTPT VISIT, EST, LEVL IV, 30-39 MIN: ICD-10-PCS | Mod: S$GLB,,, | Performed by: EMERGENCY MEDICINE

## 2019-03-11 PROCEDURE — 99214 OFFICE O/P EST MOD 30 MIN: CPT | Mod: S$GLB,,, | Performed by: EMERGENCY MEDICINE

## 2019-03-11 PROCEDURE — 3008F PR BODY MASS INDEX (BMI) DOCUMENTED: ICD-10-PCS | Mod: CPTII,S$GLB,, | Performed by: EMERGENCY MEDICINE

## 2019-03-11 PROCEDURE — 3008F BODY MASS INDEX DOCD: CPT | Mod: CPTII,S$GLB,, | Performed by: EMERGENCY MEDICINE

## 2019-03-11 RX ORDER — METHYLPREDNISOLONE 4 MG/1
TABLET ORAL
Qty: 1 PACKAGE | Refills: 0 | Status: SHIPPED | OUTPATIENT
Start: 2019-03-11 | End: 2019-04-24

## 2019-03-11 NOTE — PROGRESS NOTES
"Subjective:       Patient ID: Cristal Gonzalez is a 58 y.o. female.    Vitals:  height is 5' 2" (1.575 m) and weight is 63 kg (139 lb). Her temperature is 97 °F (36.1 °C). Her blood pressure is 114/75 and her pulse is 70. Her respiration is 18 and oxygen saturation is 98%.     Chief Complaint: Cough and Fatigue    Patient states that every now and then this happens maybe once per year and Medrol Dosepak is very effective for her.  She states she has had a cough productive of clear phlegm and feeling dry in her sinuses and throat area.  No fevers no chills no body aches.  Cough is persistent.  She denies nasal congestion      Cough   This is a new problem. The current episode started in the past 7 days. The problem has been gradually worsening. The problem occurs every few minutes. The cough is productive of sputum. Pertinent negatives include no chills, ear pain, eye redness, fever, hemoptysis, myalgias, rash, sore throat, shortness of breath or wheezing. Associated symptoms comments: Mild chills  . Nothing aggravates the symptoms. Treatments tried: cough drops.   Fatigue   Associated symptoms include coughing and fatigue. Pertinent negatives include no chills, congestion, diaphoresis, fever, myalgias, nausea, rash, sore throat or vomiting.       Constitution: Positive for fatigue. Negative for chills, sweating and fever.   HENT: Negative for ear pain, congestion, sinus pain, sinus pressure, sore throat and voice change.    Neck: Negative for painful lymph nodes.   Eyes: Negative for eye redness.   Respiratory: Positive for cough and sputum production. Negative for chest tightness, bloody sputum, COPD, shortness of breath, stridor, wheezing and asthma.    Gastrointestinal: Negative for nausea and vomiting.   Musculoskeletal: Negative for muscle ache.   Skin: Negative for rash.   Allergic/Immunologic: Negative for seasonal allergies and asthma.   Hematologic/Lymphatic: Negative for swollen lymph nodes.     "   Objective:      Physical Exam   Constitutional: She is oriented to person, place, and time. She appears well-developed and well-nourished. She is cooperative.  Non-toxic appearance. She does not appear ill. No distress.   HENT:   Head: Normocephalic and atraumatic.   Right Ear: Hearing, tympanic membrane, external ear and ear canal normal.   Left Ear: Hearing, tympanic membrane and ear canal normal.   Nose: No mucosal edema, rhinorrhea or nasal deformity. No epistaxis. Right sinus exhibits no maxillary sinus tenderness and no frontal sinus tenderness. Left sinus exhibits no maxillary sinus tenderness and no frontal sinus tenderness.   Mouth/Throat: Uvula is midline and mucous membranes are normal. No trismus in the jaw. Normal dentition. No uvula swelling. No posterior oropharyngeal erythema.   Eyes: Conjunctivae and lids are normal. No scleral icterus.   Sclera clear bilat   Neck: Trachea normal, full passive range of motion without pain and phonation normal. Neck supple.   Cardiovascular: Normal rate, regular rhythm, normal heart sounds, intact distal pulses and normal pulses.   Pulmonary/Chest: Effort normal and breath sounds normal. No respiratory distress.   Intermittent coughing dry nonproductive   Abdominal: Soft. Normal appearance and bowel sounds are normal. There is no tenderness.   Musculoskeletal: Normal range of motion. She exhibits no edema or deformity.   Neurological: She is alert and oriented to person, place, and time. She exhibits normal muscle tone. Coordination normal.   Skin: Skin is warm, dry and intact. She is not diaphoretic. No pallor.   Psychiatric: She has a normal mood and affect. Her speech is normal and behavior is normal. Cognition and memory are normal.   Nursing note and vitals reviewed.      Assessment:       1. Bronchitis, allergic, unspecified asthma severity, with acute exacerbation    2. Acute URI        Plan:         Bronchitis, allergic, unspecified asthma severity, with  acute exacerbation    Acute URI          Patient Instructions     mucinex dm twice daily  Medrol dose pack rx  Rest and hydrate  Only fill antibiotic rx if not much improved with above regimen in 3-4 days      Viral Upper Respiratory Illness (Adult)  You have a viral upper respiratory illness (URI), which is another term for the common cold. This illness is contagious during the first few days. It is spread through the air by coughing and sneezing. It may also be spread by direct contact (touching the sick person and then touching your own eyes, nose, or mouth). Frequent handwashing will decrease risk of spread. Most viral illnesses go away within 7 to 10 days with rest and simple home remedies. Sometimes the illness may last for several weeks. Antibiotics will not kill a virus, and they are generally not prescribed for this condition.    Home care  · If symptoms are severe, rest at home for the first 2 to 3 days. When you resume activity, don't let yourself get too tired.  · Avoid being exposed to cigarette smoke (yours or others).  · You may use acetaminophen or ibuprofen to control pain and fever, unless another medicine was prescribed. (Note: If you have chronic liver or kidney disease, have ever had a stomach ulcer or gastrointestinal bleeding, or are taking blood-thinning medicines, talk with your healthcare provider before using these medicines.) Aspirin should never be given to anyone under 18 years of age who is ill with a viral infection or fever. It may cause severe liver or brain damage.  · Your appetite may be poor, so a light diet is fine. Avoid dehydration by drinking 6 to 8 glasses of fluids per day (water, soft drinks, juices, tea, or soup). Extra fluids will help loosen secretions in the nose and lungs.  · Over-the-counter cold medicines will not shorten the length of time youre sick, but they may be helpful for the following symptoms: cough, sore throat, and nasal and sinus congestion. (Note: Do  not use decongestants if you have high blood pressure.)  Follow-up care  Follow up with your healthcare provider, or as advised.  When to seek medical advice  Call your healthcare provider right away if any of these occur:  · Cough with lots of colored sputum (mucus)  · Severe headache; face, neck, or ear pain  · Difficulty swallowing due to throat pain  · Fever of 100.4°F (38°C)  Call 911, or get immediate medical care  Call emergency services right away if any of these occur:  · Chest pain, shortness of breath, wheezing, or difficulty breathing  · Coughing up blood  · Inability to swallow due to throat pain  Date Last Reviewed: 9/13/2015  © 8100-9259 [employer]. [address] [place] [address], [place] [address]. All rights reserved. This information is not intended as a substitute for professional medical care. Always follow your healthcare professional's instructions.        Acute Bronchitis  Your healthcare provider has told you that you have acute bronchitis. Bronchitis is infection or inflammation of the bronchial tubes (airways in the lungs). Normally, air moves easily in and out of the airways. Bronchitis narrows the airways, making it harder for air to flow in and out of the lungs. This causes symptoms such as shortness of breath, coughing up yellow or green mucus, and wheezing. Bronchitis can be acute or chronic. Acute means the condition comes on quickly and goes away in a short time, usually within 3 to 10 days. Chronic means a condition lasts a long time and often comes back.    What causes acute bronchitis?  Acute bronchitis almost always starts as a viral respiratory infection, such as a cold or the flu. Certain factors make it more likely for a cold or flu to turn into bronchitis. These include being very young, being elderly, having a heart or lung problem, or having a weak immune system. Cigarette smoking also makes bronchitis more likely.  When bronchitis develops, the airways become  swollen. The airways may also become infected with bacteria. This is known as a secondary infection.  Diagnosing acute bronchitis  Your healthcare provider will examine you and ask about your symptoms and health history. You may also have a sputum culture to test the fluid in your lungs. Chest X-rays may be done to look for infection in the lungs.  Treating acute bronchitis  Bronchitis usually clears up as the cold or flu goes away. You can help feel better faster by doing the following:  · Take medicine as directed. You may be told to take ibuprofen or other over-the-counter medicines. These help relieve inflammation in your bronchial tubes. Your healthcare provider may prescribe an inhaler to help open up the bronchial tubes. Most of the time, acute bronchitis is caused by a viral infection. Antibiotics are usually not prescribed for viral infections.  · Drink plenty of fluids, such as water, juice, or warm soup. Fluids loosen mucus so that you can cough it up. This helps you breathe more easily. Fluids also prevent dehydration.  · Make sure you get plenty of rest.  · Do not smoke. Do not allow anyone else to smoke in your home.  Recovery and follow-up  Follow up with your doctor as you are told. You will likely feel better in a week or two. But a dry cough can linger beyond that time. Let your doctor know if you still have symptoms (other than a dry cough) after 2 weeks, or if youre prone to getting bronchial infections. Take steps to protect yourself from future infections. These steps include stopping smoking and avoiding tobacco smoke, washing your hands often, and getting a yearly flu shot.  When to call your healthcare provider  Call the healthcare provider if you have any of the following:  · Fever of 100.4°F (38.0°C) or higher, or as advised  · Symptoms that get worse, or new symptoms  · Trouble breathing  · Symptoms that dont start to improve within a week, or within 3 days of taking antibiotics   Date  Last Reviewed: 12/1/2016  © 8097-4629 [employer]. [address] [place] [address], [place] [address]. All rights reserved. This information is not intended as a substitute for professional medical care. Always follow your healthcare professional's instructions.

## 2019-03-12 NOTE — PATIENT INSTRUCTIONS
mucinex dm twice daily  Medrol dose pack rx  Rest and hydrate  Only fill antibiotic rx if not much improved with above regimen in 3-4 days      Viral Upper Respiratory Illness (Adult)  You have a viral upper respiratory illness (URI), which is another term for the common cold. This illness is contagious during the first few days. It is spread through the air by coughing and sneezing. It may also be spread by direct contact (touching the sick person and then touching your own eyes, nose, or mouth). Frequent handwashing will decrease risk of spread. Most viral illnesses go away within 7 to 10 days with rest and simple home remedies. Sometimes the illness may last for several weeks. Antibiotics will not kill a virus, and they are generally not prescribed for this condition.    Home care  · If symptoms are severe, rest at home for the first 2 to 3 days. When you resume activity, don't let yourself get too tired.  · Avoid being exposed to cigarette smoke (yours or others).  · You may use acetaminophen or ibuprofen to control pain and fever, unless another medicine was prescribed. (Note: If you have chronic liver or kidney disease, have ever had a stomach ulcer or gastrointestinal bleeding, or are taking blood-thinning medicines, talk with your healthcare provider before using these medicines.) Aspirin should never be given to anyone under 18 years of age who is ill with a viral infection or fever. It may cause severe liver or brain damage.  · Your appetite may be poor, so a light diet is fine. Avoid dehydration by drinking 6 to 8 glasses of fluids per day (water, soft drinks, juices, tea, or soup). Extra fluids will help loosen secretions in the nose and lungs.  · Over-the-counter cold medicines will not shorten the length of time youre sick, but they may be helpful for the following symptoms: cough, sore throat, and nasal and sinus congestion. (Note: Do not use decongestants if you have high blood  pressure.)  Follow-up care  Follow up with your healthcare provider, or as advised.  When to seek medical advice  Call your healthcare provider right away if any of these occur:  · Cough with lots of colored sputum (mucus)  · Severe headache; face, neck, or ear pain  · Difficulty swallowing due to throat pain  · Fever of 100.4°F (38°C)  Call 911, or get immediate medical care  Call emergency services right away if any of these occur:  · Chest pain, shortness of breath, wheezing, or difficulty breathing  · Coughing up blood  · Inability to swallow due to throat pain  Date Last Reviewed: 9/13/2015 © 2000-2017 [facility]. [address] [place] [address], [place] [address]. All rights reserved. This information is not intended as a substitute for professional medical care. Always follow your healthcare professional's instructions.        Acute Bronchitis  Your healthcare provider has told you that you have acute bronchitis. Bronchitis is infection or inflammation of the bronchial tubes (airways in the lungs). Normally, air moves easily in and out of the airways. Bronchitis narrows the airways, making it harder for air to flow in and out of the lungs. This causes symptoms such as shortness of breath, coughing up yellow or green mucus, and wheezing. Bronchitis can be acute or chronic. Acute means the condition comes on quickly and goes away in a short time, usually within 3 to 10 days. Chronic means a condition lasts a long time and often comes back.    What causes acute bronchitis?  Acute bronchitis almost always starts as a viral respiratory infection, such as a cold or the flu. Certain factors make it more likely for a cold or flu to turn into bronchitis. These include being very young, being elderly, having a heart or lung problem, or having a weak immune system. Cigarette smoking also makes bronchitis more likely.  When bronchitis develops, the airways become swollen. The airways may also become infected with  bacteria. This is known as a secondary infection.  Diagnosing acute bronchitis  Your healthcare provider will examine you and ask about your symptoms and health history. You may also have a sputum culture to test the fluid in your lungs. Chest X-rays may be done to look for infection in the lungs.  Treating acute bronchitis  Bronchitis usually clears up as the cold or flu goes away. You can help feel better faster by doing the following:  · Take medicine as directed. You may be told to take ibuprofen or other over-the-counter medicines. These help relieve inflammation in your bronchial tubes. Your healthcare provider may prescribe an inhaler to help open up the bronchial tubes. Most of the time, acute bronchitis is caused by a viral infection. Antibiotics are usually not prescribed for viral infections.  · Drink plenty of fluids, such as water, juice, or warm soup. Fluids loosen mucus so that you can cough it up. This helps you breathe more easily. Fluids also prevent dehydration.  · Make sure you get plenty of rest.  · Do not smoke. Do not allow anyone else to smoke in your home.  Recovery and follow-up  Follow up with your doctor as you are told. You will likely feel better in a week or two. But a dry cough can linger beyond that time. Let your doctor know if you still have symptoms (other than a dry cough) after 2 weeks, or if youre prone to getting bronchial infections. Take steps to protect yourself from future infections. These steps include stopping smoking and avoiding tobacco smoke, washing your hands often, and getting a yearly flu shot.  When to call your healthcare provider  Call the healthcare provider if you have any of the following:  · Fever of 100.4°F (38.0°C) or higher, or as advised  · Symptoms that get worse, or new symptoms  · Trouble breathing  · Symptoms that dont start to improve within a week, or within 3 days of taking antibiotics   Date Last Reviewed: 12/1/2016  © 2171-8718 [employer]. [address] [place] [address], [place] [address]. All rights reserved. This information is not intended as a substitute for professional medical care. Always follow your healthcare professional's instructions.

## 2019-04-24 ENCOUNTER — OFFICE VISIT (OUTPATIENT)
Dept: FAMILY MEDICINE | Facility: CLINIC | Age: 59
End: 2019-04-24
Payer: COMMERCIAL

## 2019-04-24 VITALS
RESPIRATION RATE: 16 BRPM | BODY MASS INDEX: 26.57 KG/M2 | WEIGHT: 144.38 LBS | TEMPERATURE: 98 F | HEIGHT: 62 IN | SYSTOLIC BLOOD PRESSURE: 118 MMHG | DIASTOLIC BLOOD PRESSURE: 70 MMHG

## 2019-04-24 DIAGNOSIS — R11.0 NAUSEA: ICD-10-CM

## 2019-04-24 DIAGNOSIS — K29.70 GASTRITIS, PRESENCE OF BLEEDING UNSPECIFIED, UNSPECIFIED CHRONICITY, UNSPECIFIED GASTRITIS TYPE: ICD-10-CM

## 2019-04-24 DIAGNOSIS — R23.8 SKIN IRRITATION: Primary | ICD-10-CM

## 2019-04-24 DIAGNOSIS — Z00.00 ANNUAL PHYSICAL EXAM: ICD-10-CM

## 2019-04-24 DIAGNOSIS — M62.838 MUSCLE SPASM: ICD-10-CM

## 2019-04-24 PROCEDURE — 99214 PR OFFICE/OUTPT VISIT, EST, LEVL IV, 30-39 MIN: ICD-10-PCS | Mod: S$GLB,,, | Performed by: FAMILY MEDICINE

## 2019-04-24 PROCEDURE — 3008F BODY MASS INDEX DOCD: CPT | Mod: CPTII,S$GLB,, | Performed by: FAMILY MEDICINE

## 2019-04-24 PROCEDURE — 99214 OFFICE O/P EST MOD 30 MIN: CPT | Mod: S$GLB,,, | Performed by: FAMILY MEDICINE

## 2019-04-24 PROCEDURE — 99999 PR PBB SHADOW E&M-EST. PATIENT-LVL IV: CPT | Mod: PBBFAC,,, | Performed by: FAMILY MEDICINE

## 2019-04-24 PROCEDURE — 3008F PR BODY MASS INDEX (BMI) DOCUMENTED: ICD-10-PCS | Mod: CPTII,S$GLB,, | Performed by: FAMILY MEDICINE

## 2019-04-24 PROCEDURE — 99999 PR PBB SHADOW E&M-EST. PATIENT-LVL IV: ICD-10-PCS | Mod: PBBFAC,,, | Performed by: FAMILY MEDICINE

## 2019-04-24 RX ORDER — NAPROXEN 500 MG/1
500 TABLET ORAL 2 TIMES DAILY
Qty: 30 TABLET | Refills: 6 | Status: SHIPPED | OUTPATIENT
Start: 2019-04-24 | End: 2019-08-07

## 2019-04-24 RX ORDER — TRIAMCINOLONE ACETONIDE 1 MG/G
CREAM TOPICAL 2 TIMES DAILY
Qty: 15 G | Refills: 1 | Status: SHIPPED | OUTPATIENT
Start: 2019-04-24 | End: 2019-08-07

## 2019-04-24 RX ORDER — ONDANSETRON HYDROCHLORIDE 8 MG/1
8 TABLET, FILM COATED ORAL EVERY 12 HOURS PRN
Qty: 12 TABLET | Refills: 2 | Status: SHIPPED | OUTPATIENT
Start: 2019-04-24 | End: 2019-10-31 | Stop reason: SDUPTHER

## 2019-04-24 RX ORDER — OMEPRAZOLE 40 MG/1
40 CAPSULE, DELAYED RELEASE ORAL DAILY
Qty: 30 CAPSULE | Refills: 11 | Status: SHIPPED | OUTPATIENT
Start: 2019-04-24 | End: 2020-05-09

## 2019-04-24 NOTE — PROGRESS NOTES
Subjective:       Patient ID: Cristal Gonzalez is a 58 y.o. female.    Chief Complaint: Mole (neck region) and Medication Refill  pt has not had epp and labs in quite awhile. Would like to get  Refills and return at a later date for the above   HPIsee above  Review of Systems   Constitutional: Negative.    HENT: Negative.    Eyes: Negative.    Respiratory: Negative.    Cardiovascular: Negative.    Gastrointestinal: Positive for abdominal pain and nausea.   Endocrine: Negative.    Genitourinary: Negative.    Musculoskeletal: Negative.    Skin: Positive for color change and rash.   Allergic/Immunologic: Positive for environmental allergies.   Neurological: Negative.    Hematological: Negative.    Psychiatric/Behavioral: Negative.        Objective:      Physical Exam   Constitutional: She is oriented to person, place, and time. She appears well-developed and well-nourished. No distress.   HENT:   Head: Normocephalic and atraumatic.   Right Ear: External ear normal.   Left Ear: External ear normal.   Nose: Nose normal.   Mouth/Throat: Oropharynx is clear and moist.   Eyes: Pupils are equal, round, and reactive to light. Conjunctivae and EOM are normal.   Neck: Normal range of motion. Neck supple. No JVD present. No thyromegaly present.   Cardiovascular: Normal rate, regular rhythm and normal heart sounds.   No murmur heard.  Pulmonary/Chest: Effort normal and breath sounds normal. No respiratory distress.   Abdominal: Soft. Bowel sounds are normal. She exhibits no distension and no mass. There is no tenderness. There is no rebound and no guarding. No hernia.   Neurological: She is alert and oriented to person, place, and time. She displays normal reflexes. No cranial nerve deficit or sensory deficit. She exhibits normal muscle tone. Coordination normal.   Skin: Skin is warm, dry and intact. Capillary refill takes less than 2 seconds. Lesion and rash noted. Rash is maculopapular. She is not diaphoretic.         Nursing note and vitals reviewed.      Assessment:       1. Skin irritation    2. Muscle spasm    3. Nausea    4. Gastritis, unspecified gastritis type        Plan:     see orders dated 19   CBC auto differential         Comprehensive metabolic panel         Hepatitis C antibody         Lipid panel         TSH        Will contact pt with results when available.    See med card dated 19  GLUCOSAM SUL NA/CHONDR THURSTON A NA (GLUCOSAMINE & CHONDROIT SUL.NA ORAL)         Analgesic or Antipyretic Non-Opioid    acetaminophen (TYLENOL) 500 MG tablet 500 mg, Every 6 hours PRN       Anorectal - Hemorrhoidal Rectal Glucocorticoid-Local Anesthetic Comb    hydrocortisone-pramoxine (ANALPRAM-HC) 2.5-1 % Crea 3 times daily       Antiemetic - Selective Serotonin 5-HT3 Antagonists    ondansetron (ZOFRAN) 8 MG tablet 8 mg, Every 12 hours PRN       Antihistamines - 2nd Generation, Antihistamines - 2nd Generation - Piperazines    cetirizine (ZYRTEC) 10 MG tablet () 10 mg, Daily       Dermatological - Glucocorticoid    triamcinolone acetonide 0.1% (KENALOG) 0.1 % cream 2 times daily       Dermatological - Glucocorticoid, Dermatological - Glucocorticoid-Local Anesthetic Combinations        Gastric Acid Secretion Reducing Agents - Proton Pump Inhibitors (PPIs)    omeprazole (PRILOSEC) 40 MG capsule 40 mg, Daily       Glucocorticoids        Laxative - Surfactant    docusate sodium (COLACE) 100 MG capsule 100 mg, 2 times daily PRN       Multivitamin and Mineral Combinations    MV, MIN #36/IRON,CARBONYL/FA (GERITOL COMPLETE ORAL)        Nasal Corticosteroids    fluticasone (FLONASE) 50 mcg/actuation nasal spray 1 spray, Daily       NSAID Analgesics (CHAWLA Non-Specific) - Propionic Acid Derivatives    naproxen (NAPROSYN) 500 MG tablet 500 mg, 2 times daily       Skeletal Muscle Relaxant - Central Muscle Relaxants    cyclobenzaprine (FLEXERIL) 5 MG tablet 5 mg, 2 times daily PRN

## 2019-04-24 NOTE — PATIENT INSTRUCTIONS
Understanding Seborrheic Keratosis  Seborrheic keratoses are wart-like growths on the skin. These growths are not cancer. Many people get them, especially after age 30.  How to say it  [id number]   What causes seborrheic keratoses?  Doctors do not know what causes seborrheic keratoses. They may run in families. In addition, they become more common as people get older.  What are the symptoms of seborrheic keratoses?  Here is what seborrheic keratoses often look like:  · They tend to be round or oval in shape. They can be very small or about as big across as a quarter.  · They can appear singly or in clusters.  · They tend to be tan, brown, or black in color.  · The edges may be scalloped or uneven-looking.  · They can have a waxy or scaly look.  · They can be a bit raised, appearing to be stuck on the skin.  · They may become red and irritated if scratched or rubbed by clothing  Sebhorreic keratoses are not painful, but they may be itchy. They can become irritated if they are continually rubbed by skin or clothing. Seborrheic keratoses most often appear on the face, arms, chest, back, or belly.  How are seborrheic keratoses treated?  Seborrheic keratoses dont need to be treated unless they bother you. You may choose to have them removed because you find them unattractive. You may also want them removed because they get irritated and uncomfortable. A healthcare provider can remove them in an office visit. Ways that seborrheic keratoses can be removed include:  · Freezing them off with liquid nitrogen  · Cutting them off with a scalpel  · Burning them off with electricity  What are the complications of seborrheic keratoses?  Seborrheic keratoses are not cancer, but they can look like some types of skin cancer. So its a good idea to ask your healthcare provider to check any new skin growths. Ask your healthcare provider about any skin problem that concerns you.  When should I call my healthcare  provider?  Call your healthcare provider right away if any of these occur:  · You develop a lot of seborrheic keratoses very quickly  · You have a sore that does not heal within a few weeks, or heals and then comes back  · You have a mole or skin growth that is changing in size, shape, or color  · You have a mole or skin growth that looks different on one side from the other  · You have a mole or skin growth that is not the same color throughout   Date Last Reviewed: 5/1/2016  © 9600-1222 [employer]. [address] [place] [address]. All rights reserved. This information is not intended as a substitute for professional medical care. Always follow your healthcare professional's instructions.

## 2019-06-20 ENCOUNTER — OFFICE VISIT (OUTPATIENT)
Dept: PRIMARY CARE CLINIC | Facility: CLINIC | Age: 59
End: 2019-06-20
Payer: COMMERCIAL

## 2019-06-20 VITALS
WEIGHT: 144.81 LBS | DIASTOLIC BLOOD PRESSURE: 62 MMHG | BODY MASS INDEX: 26.65 KG/M2 | HEIGHT: 62 IN | SYSTOLIC BLOOD PRESSURE: 108 MMHG | TEMPERATURE: 100 F | OXYGEN SATURATION: 98 % | HEART RATE: 66 BPM

## 2019-06-20 DIAGNOSIS — B07.9 VIRAL WARTS, UNSPECIFIED TYPE: Primary | ICD-10-CM

## 2019-06-20 DIAGNOSIS — I10 HYPERTENSION, UNSPECIFIED TYPE: ICD-10-CM

## 2019-06-20 PROCEDURE — 3008F BODY MASS INDEX DOCD: CPT | Mod: CPTII,S$GLB,, | Performed by: FAMILY MEDICINE

## 2019-06-20 PROCEDURE — 99213 OFFICE O/P EST LOW 20 MIN: CPT | Mod: 25,S$GLB,, | Performed by: FAMILY MEDICINE

## 2019-06-20 PROCEDURE — 3074F PR MOST RECENT SYSTOLIC BLOOD PRESSURE < 130 MM HG: ICD-10-PCS | Mod: CPTII,S$GLB,, | Performed by: FAMILY MEDICINE

## 2019-06-20 PROCEDURE — 3074F SYST BP LT 130 MM HG: CPT | Mod: CPTII,S$GLB,, | Performed by: FAMILY MEDICINE

## 2019-06-20 PROCEDURE — 3008F PR BODY MASS INDEX (BMI) DOCUMENTED: ICD-10-PCS | Mod: CPTII,S$GLB,, | Performed by: FAMILY MEDICINE

## 2019-06-20 PROCEDURE — 3078F PR MOST RECENT DIASTOLIC BLOOD PRESSURE < 80 MM HG: ICD-10-PCS | Mod: CPTII,S$GLB,, | Performed by: FAMILY MEDICINE

## 2019-06-20 PROCEDURE — 17000 PR DESTRUCTION(LASER SURGERY,CRYOSURGERY,CHEMOSURGERY),PREMALIGNANT LESIONS,FIRST LESION: ICD-10-PCS | Mod: S$GLB,,, | Performed by: FAMILY MEDICINE

## 2019-06-20 PROCEDURE — 99999 PR PBB SHADOW E&M-EST. PATIENT-LVL III: ICD-10-PCS | Mod: PBBFAC,,, | Performed by: FAMILY MEDICINE

## 2019-06-20 PROCEDURE — 99213 PR OFFICE/OUTPT VISIT, EST, LEVL III, 20-29 MIN: ICD-10-PCS | Mod: 25,S$GLB,, | Performed by: FAMILY MEDICINE

## 2019-06-20 PROCEDURE — 3078F DIAST BP <80 MM HG: CPT | Mod: CPTII,S$GLB,, | Performed by: FAMILY MEDICINE

## 2019-06-20 PROCEDURE — 17000 DESTRUCT PREMALG LESION: CPT | Mod: S$GLB,,, | Performed by: FAMILY MEDICINE

## 2019-06-20 PROCEDURE — 99999 PR PBB SHADOW E&M-EST. PATIENT-LVL III: CPT | Mod: PBBFAC,,, | Performed by: FAMILY MEDICINE

## 2019-06-20 NOTE — PATIENT INSTRUCTIONS
Understanding Seborrheic Keratosis  Seborrheic keratoses are wart-like growths on the skin. These growths are not cancer. Many people get them, especially after age 30.  How to say it  [id number]   What causes seborrheic keratoses?  Doctors do not know what causes seborrheic keratoses. They may run in families. In addition, they become more common as people get older.  What are the symptoms of seborrheic keratoses?  Here is what seborrheic keratoses often look like:  · They tend to be round or oval in shape. They can be very small or about as big across as a quarter.  · They can appear singly or in clusters.  · They tend to be tan, brown, or black in color.  · The edges may be scalloped or uneven-looking.  · They can have a waxy or scaly look.  · They can be a bit raised, appearing to be stuck on the skin.  · They may become red and irritated if scratched or rubbed by clothing  Sebhorreic keratoses are not painful, but they may be itchy. They can become irritated if they are continually rubbed by skin or clothing. Seborrheic keratoses most often appear on the face, arms, chest, back, or belly.  H  What are the complications of seborrheic keratoses?  Seborrheic keratoses are not cancer, but they can look like some types of skin cancer. So its a good idea to ask your healthcare provider to check any new skin growths. Ask your healthcare provider about any skin problem that concerns you.  When should I call my healthcare provider?  Call your healthcare provider right away if any of these occur:  · You develop a lot of seborrheic keratoses very quickly  · You have a sore that does not heal within a few weeks, or heals and then comes back  · You have a mole or skin growth that is changing in size, shape, or color  · You have a mole or skin growth that looks different on one side from the other  · You have a mole or skin growth that is not the same color throughout   Date Last Reviewed: 5/1/2016  ©  3672-6196 [employer]. [address] [place] [address], [place] [address]. All rights reserved. This information is not intended as a substitute for professional medical care. Always follow your healthcare professional's instructions.
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
## 2019-07-10 ENCOUNTER — OFFICE VISIT (OUTPATIENT)
Dept: URGENT CARE | Facility: CLINIC | Age: 59
End: 2019-07-10
Payer: COMMERCIAL

## 2019-07-10 VITALS
RESPIRATION RATE: 16 BRPM | HEIGHT: 62 IN | WEIGHT: 144 LBS | TEMPERATURE: 99 F | BODY MASS INDEX: 26.5 KG/M2 | DIASTOLIC BLOOD PRESSURE: 75 MMHG | OXYGEN SATURATION: 97 % | SYSTOLIC BLOOD PRESSURE: 107 MMHG | HEART RATE: 80 BPM

## 2019-07-10 DIAGNOSIS — J01.90 ACUTE BACTERIAL SINUSITIS: Primary | ICD-10-CM

## 2019-07-10 DIAGNOSIS — B96.89 ACUTE BACTERIAL SINUSITIS: Primary | ICD-10-CM

## 2019-07-10 PROCEDURE — 99214 OFFICE O/P EST MOD 30 MIN: CPT | Mod: S$GLB,,, | Performed by: EMERGENCY MEDICINE

## 2019-07-10 PROCEDURE — 3008F BODY MASS INDEX DOCD: CPT | Mod: CPTII,S$GLB,, | Performed by: EMERGENCY MEDICINE

## 2019-07-10 PROCEDURE — 3078F DIAST BP <80 MM HG: CPT | Mod: CPTII,S$GLB,, | Performed by: EMERGENCY MEDICINE

## 2019-07-10 PROCEDURE — 99214 PR OFFICE/OUTPT VISIT, EST, LEVL IV, 30-39 MIN: ICD-10-PCS | Mod: S$GLB,,, | Performed by: EMERGENCY MEDICINE

## 2019-07-10 PROCEDURE — 3074F SYST BP LT 130 MM HG: CPT | Mod: CPTII,S$GLB,, | Performed by: EMERGENCY MEDICINE

## 2019-07-10 PROCEDURE — 3008F PR BODY MASS INDEX (BMI) DOCUMENTED: ICD-10-PCS | Mod: CPTII,S$GLB,, | Performed by: EMERGENCY MEDICINE

## 2019-07-10 PROCEDURE — 3078F PR MOST RECENT DIASTOLIC BLOOD PRESSURE < 80 MM HG: ICD-10-PCS | Mod: CPTII,S$GLB,, | Performed by: EMERGENCY MEDICINE

## 2019-07-10 PROCEDURE — 3074F PR MOST RECENT SYSTOLIC BLOOD PRESSURE < 130 MM HG: ICD-10-PCS | Mod: CPTII,S$GLB,, | Performed by: EMERGENCY MEDICINE

## 2019-07-10 RX ORDER — PRASTERONE 6.5 MG/1
INSERT VAGINAL
Refills: 12 | COMMUNITY
Start: 2019-05-16 | End: 2023-08-29

## 2019-07-10 RX ORDER — CEFDINIR 300 MG/1
300 CAPSULE ORAL 2 TIMES DAILY
Qty: 20 CAPSULE | Refills: 0 | Status: SHIPPED | OUTPATIENT
Start: 2019-07-10 | End: 2019-07-20

## 2019-07-10 RX ORDER — METHYLPREDNISOLONE 4 MG/1
TABLET ORAL
Qty: 1 PACKAGE | Refills: 0 | Status: SHIPPED | OUTPATIENT
Start: 2019-07-10 | End: 2019-08-07

## 2019-07-10 NOTE — PATIENT INSTRUCTIONS
And hydrate with plenty of fluids  Over-the-counter ibuprofen or Tylenol for sinus pain or low-grade temperature  Medrol Dosepak prescription steroid pack  Cefdinir antibiotic prescription  Over-the-counter Zyrtec or Claritin or Allegra.  Claritin is nondrowsy.  Take the Zyrtec or Allegra in the evening or night if it makes she sleepy  Over-the-counter Flonase or Nasacort nasal spray  Return for any concerns or problems.    Review sinusitis sheet.      Acute Bacterial Rhinosinusitis (ABRS)    Acute bacterial rhinosinusitis (ABRS) is an infection of your nasal cavity and sinuses. Its caused by bacteria. Acute means that youve had symptoms for less than 12 weeks.  Understanding your sinuses  The nasal cavity is the large air-filled space behind your nose. The sinuses are a group of spaces formed by the bones of your face. They connect with your nasal cavity. ABRS causes the tissue lining these spaces to become inflamed. Mucus may not drain normally. This leads to facial pain and other symptoms.  What causes ABRS?  ABRS most often follows an upper respiratory infection caused by a virus. Bacteria then infect the lining of your nasal cavity and sinuses. But you can also get ABRS if you have:  · Nasal allergies  · Long-term nasal swelling and congestion not caused by allergies  · Blockage in the nose  Symptoms of ABRS  The symptoms of ABRS may be different for each person, and can include:  · Nasal congestion  · Runny nose  · Fluid draining from the nose down the throat (postnasal drip)  · Headache  · Cough  · Pain in the sinuses  · Thick, colored fluid from the nose (mucus)  · Fever  Diagnosing ABRS  ABRS may be diagnosed if youve had an upper respiratory infection like a cold and cough for longer than 10 to 14 days. Your health care provider will ask about your symptoms and your medical history. The provider will check your vital signs, including your temperature. Youll have a physical exam. The health care  provider will check your ears, nose, and throat. You likely wont need any tests. If ABRS comes back, you may have a culture or other tests.  Treatment for ABRS  Treatment may include:  · Antibiotic medicine. This is for symptoms that last for at least 10 to 14 days.  · Nasal corticosteroid medicine. Drops or spray used in the nose can lessen swelling and congestion.  · Over-the-counter pain medicine. This is to lessen sinus pain and pressure.  · Nasal decongestant medicine. Spray or drops may help to lessen congestion. Do not use them for more than a few days.  · Salt wash (saline irrigation). This can help to loosen mucus.  Possible complications of ABRS  ABRS may come back or become long-term (chronic).  In rare cases, ABRS may cause complications such as:   · Inflamed tissue around the brain and spinal cord (meningitis)  · Inflamed tissue around the eyes (orbital cellulitis)  · Inflamed bones around the sinuses (osteitis)  These problems may need to be treated in a hospital with intravenous (IV) antibiotic medicine or surgery.  When to call the health care provider  Call your health care provider if you have any of the following:  · Symptoms that dont get better, or get worse  · Symptoms that dont get better after 3 to 5 days on antibiotics  · Trouble seeing  · Swelling around your eyes  · Confusion or trouble staying awake   Date Last Reviewed: 3/3/2015  © 3682-4615 [employer]. [address] [place] [address], [place] [address]. All rights reserved. This information is not intended as a substitute for professional medical care. Always follow your healthcare professional's instructions.

## 2019-07-10 NOTE — PROGRESS NOTES
"Subjective:       Patient ID: Cristal Gonzalez is a 59 y.o. female.    Vitals:    07/10/19 1742   BP: 107/75   Pulse: 80   Resp: 16   Temp: 98.5 °F (36.9 °C)   TempSrc: Oral   SpO2: 97%   Weight: 65.3 kg (144 lb)   Height: 5' 2" (1.575 m)       Chief Complaint: Sinus Problem    Patient reports 3 days of sinus symptoms. Feels fatigued, pressure maxillary and paranasal sinuses. Post nasal drip present    Sinus Problem   This is a new problem. Episode onset: 3 days. The problem has been gradually worsening since onset. There has been no fever. Her pain is at a severity of 7/10. Associated symptoms include congestion, headaches, sinus pressure and sneezing. Pertinent negatives include no chills, coughing, ear pain, hoarse voice, shortness of breath or sore throat. Past treatments include acetaminophen (last dose  at 10:00am today). The treatment provided no relief.     Review of Systems   Constitution: Positive for malaise/fatigue. Negative for chills and fever.        Patient felt warm but didn't take temp   HENT: Positive for congestion, sinus pressure and sneezing. Negative for ear pain, hoarse voice and sore throat.         Runny nose   Eyes: Negative for discharge and redness.        Eyes burning   Cardiovascular: Negative for chest pain, dyspnea on exertion and leg swelling.   Respiratory: Negative for cough, shortness of breath, sputum production and wheezing.    Musculoskeletal: Negative for myalgias.   Gastrointestinal: Negative for abdominal pain and nausea.   Neurological: Positive for headaches.       Objective:      Physical Exam   Constitutional: She is oriented to person, place, and time. She appears well-developed and well-nourished. She is cooperative.  Non-toxic appearance. She does not appear ill. No distress.   HENT:   Head: Normocephalic and atraumatic.   Right Ear: Hearing, tympanic membrane, external ear and ear canal normal.   Left Ear: Hearing, tympanic membrane, external ear and ear canal " normal.   Nose: No mucosal edema, rhinorrhea or nasal deformity. No epistaxis. Right sinus exhibits no maxillary sinus tenderness and no frontal sinus tenderness. Left sinus exhibits no maxillary sinus tenderness and no frontal sinus tenderness.   Mouth/Throat: Uvula is midline, oropharynx is clear and moist and mucous membranes are normal. No trismus in the jaw. Normal dentition. No uvula swelling. No posterior oropharyngeal erythema.   Nasal congestion, posterior op cobblestoning  ttp maxillary sinsues   Tm clear bilaterally   Eyes: Conjunctivae and lids are normal. No scleral icterus.   Sclera clear bilat   Neck: Trachea normal, full passive range of motion without pain and phonation normal. Neck supple.   Cardiovascular: Normal rate, regular rhythm, normal heart sounds, intact distal pulses and normal pulses.   Pulmonary/Chest: Effort normal and breath sounds normal. No respiratory distress.   Abdominal: Soft. Normal appearance and bowel sounds are normal. There is no tenderness.   Musculoskeletal: Normal range of motion. She exhibits no edema or deformity.   Neurological: She is alert and oriented to person, place, and time. She exhibits normal muscle tone. Coordination normal.   Skin: Skin is warm, dry and intact. She is not diaphoretic. No pallor.   Psychiatric: She has a normal mood and affect. Her speech is normal. Cognition and memory are normal.   Nursing note and vitals reviewed.      Assessment:       1. Acute bacterial sinusitis        Plan:       Cristal was seen today for sinus problem.    Diagnoses and all orders for this visit:    Acute bacterial sinusitis    Other orders  -     cefdinir (OMNICEF) 300 MG capsule; Take 1 capsule (300 mg total) by mouth 2 (two) times daily. for 10 days  -     methylPREDNISolone (MEDROL DOSEPACK) 4 mg tablet; use as directed on package until gone          Patient Instructions     And hydrate with plenty of fluids  Over-the-counter ibuprofen or Tylenol for sinus pain or  low-grade temperature  Medrol Dosepak prescription steroid pack  Cefdinir antibiotic prescription  Over-the-counter Zyrtec or Claritin or Allegra.  Claritin is nondrowsy.  Take the Zyrtec or Allegra in the evening or night if it makes she sleepy  Over-the-counter Flonase or Nasacort nasal spray  Return for any concerns or problems.    Review sinusitis sheet.      Acute Bacterial Rhinosinusitis (ABRS)    Acute bacterial rhinosinusitis (ABRS) is an infection of your nasal cavity and sinuses. Its caused by bacteria. Acute means that youve had symptoms for less than 12 weeks.  Understanding your sinuses  The nasal cavity is the large air-filled space behind your nose. The sinuses are a group of spaces formed by the bones of your face. They connect with your nasal cavity. ABRS causes the tissue lining these spaces to become inflamed. Mucus may not drain normally. This leads to facial pain and other symptoms.  What causes ABRS?  ABRS most often follows an upper respiratory infection caused by a virus. Bacteria then infect the lining of your nasal cavity and sinuses. But you can also get ABRS if you have:  · Nasal allergies  · Long-term nasal swelling and congestion not caused by allergies  · Blockage in the nose  Symptoms of ABRS  The symptoms of ABRS may be different for each person, and can include:  · Nasal congestion  · Runny nose  · Fluid draining from the nose down the throat (postnasal drip)  · Headache  · Cough  · Pain in the sinuses  · Thick, colored fluid from the nose (mucus)  · Fever  Diagnosing ABRS  ABRS may be diagnosed if youve had an upper respiratory infection like a cold and cough for longer than 10 to 14 days. Your health care provider will ask about your symptoms and your medical history. The provider will check your vital signs, including your temperature. Youll have a physical exam. The health care provider will check your ears, nose, and throat. You likely wont need any tests. If ABRS comes  back, you may have a culture or other tests.  Treatment for ABRS  Treatment may include:  · Antibiotic medicine. This is for symptoms that last for at least 10 to 14 days.  · Nasal corticosteroid medicine. Drops or spray used in the nose can lessen swelling and congestion.  · Over-the-counter pain medicine. This is to lessen sinus pain and pressure.  · Nasal decongestant medicine. Spray or drops may help to lessen congestion. Do not use them for more than a few days.  · Salt wash (saline irrigation). This can help to loosen mucus.  Possible complications of ABRS  ABRS may come back or become long-term (chronic).  In rare cases, ABRS may cause complications such as:   · Inflamed tissue around the brain and spinal cord (meningitis)  · Inflamed tissue around the eyes (orbital cellulitis)  · Inflamed bones around the sinuses (osteitis)  These problems may need to be treated in a hospital with intravenous (IV) antibiotic medicine or surgery.  When to call the health care provider  Call your health care provider if you have any of the following:  · Symptoms that dont get better, or get worse  · Symptoms that dont get better after 3 to 5 days on antibiotics  · Trouble seeing  · Swelling around your eyes  · Confusion or trouble staying awake   Date Last Reviewed: 3/3/2015  © 4910-5865 [employer]. [address] [place] [address], [place] [address]. All rights reserved. This information is not intended as a substitute for professional medical care. Always follow your healthcare professional's instructions.

## 2019-08-07 ENCOUNTER — OFFICE VISIT (OUTPATIENT)
Dept: ORTHOPEDICS | Facility: CLINIC | Age: 59
End: 2019-08-07
Payer: COMMERCIAL

## 2019-08-07 ENCOUNTER — HOSPITAL ENCOUNTER (OUTPATIENT)
Dept: RADIOLOGY | Facility: HOSPITAL | Age: 59
Discharge: HOME | End: 2019-08-07
Attending: PHYSICIAN ASSISTANT
Payer: COMMERCIAL

## 2019-08-07 ENCOUNTER — TELEPHONE (OUTPATIENT)
Dept: ORTHOPEDICS | Facility: CLINIC | Age: 59
End: 2019-08-07

## 2019-08-07 VITALS
HEART RATE: 62 BPM | HEIGHT: 62 IN | BODY MASS INDEX: 25.76 KG/M2 | TEMPERATURE: 98 F | WEIGHT: 140 LBS | SYSTOLIC BLOOD PRESSURE: 103 MMHG | RESPIRATION RATE: 18 BRPM | DIASTOLIC BLOOD PRESSURE: 65 MMHG

## 2019-08-07 DIAGNOSIS — S82.851D CLOSED TRIMALLEOLAR FRACTURE OF RIGHT ANKLE WITH ROUTINE HEALING, SUBSEQUENT ENCOUNTER: Primary | ICD-10-CM

## 2019-08-07 DIAGNOSIS — S82.851D CLOSED TRIMALLEOLAR FRACTURE OF RIGHT ANKLE WITH ROUTINE HEALING, SUBSEQUENT ENCOUNTER: ICD-10-CM

## 2019-08-07 PROCEDURE — 3078F PR MOST RECENT DIASTOLIC BLOOD PRESSURE < 80 MM HG: ICD-10-PCS | Mod: CPTII,S$GLB,, | Performed by: PHYSICIAN ASSISTANT

## 2019-08-07 PROCEDURE — 73610 X-RAY EXAM OF ANKLE: CPT | Mod: 26,RT,, | Performed by: RADIOLOGY

## 2019-08-07 PROCEDURE — 99203 OFFICE O/P NEW LOW 30 MIN: CPT | Mod: S$GLB,,, | Performed by: PHYSICIAN ASSISTANT

## 2019-08-07 PROCEDURE — 3078F DIAST BP <80 MM HG: CPT | Mod: CPTII,S$GLB,, | Performed by: PHYSICIAN ASSISTANT

## 2019-08-07 PROCEDURE — 3074F PR MOST RECENT SYSTOLIC BLOOD PRESSURE < 130 MM HG: ICD-10-PCS | Mod: CPTII,S$GLB,, | Performed by: PHYSICIAN ASSISTANT

## 2019-08-07 PROCEDURE — 3074F SYST BP LT 130 MM HG: CPT | Mod: CPTII,S$GLB,, | Performed by: PHYSICIAN ASSISTANT

## 2019-08-07 PROCEDURE — 73610 XR ANKLE COMPLETE 3 VIEW RIGHT: ICD-10-PCS | Mod: 26,RT,, | Performed by: RADIOLOGY

## 2019-08-07 PROCEDURE — 73610 X-RAY EXAM OF ANKLE: CPT | Mod: TC,RT

## 2019-08-07 PROCEDURE — 3008F PR BODY MASS INDEX (BMI) DOCUMENTED: ICD-10-PCS | Mod: CPTII,S$GLB,, | Performed by: PHYSICIAN ASSISTANT

## 2019-08-07 PROCEDURE — 99203 PR OFFICE/OUTPT VISIT, NEW, LEVL III, 30-44 MIN: ICD-10-PCS | Mod: S$GLB,,, | Performed by: PHYSICIAN ASSISTANT

## 2019-08-07 PROCEDURE — 3008F BODY MASS INDEX DOCD: CPT | Mod: CPTII,S$GLB,, | Performed by: PHYSICIAN ASSISTANT

## 2019-08-07 PROCEDURE — 99999 PR PBB SHADOW E&M-EST. PATIENT-LVL IV: ICD-10-PCS | Mod: PBBFAC,,, | Performed by: PHYSICIAN ASSISTANT

## 2019-08-07 PROCEDURE — 99999 PR PBB SHADOW E&M-EST. PATIENT-LVL IV: CPT | Mod: PBBFAC,,, | Performed by: PHYSICIAN ASSISTANT

## 2019-08-07 RX ORDER — NAPROXEN 500 MG/1
500 TABLET ORAL 2 TIMES DAILY WITH MEALS
Qty: 60 TABLET | Refills: 0 | Status: SHIPPED | OUTPATIENT
Start: 2019-08-07 | End: 2019-09-06

## 2019-08-07 NOTE — TELEPHONE ENCOUNTER
Spoke with pt.     Scheduled an appointment with Ema Michel PA-C today for right ankle pain.  Pt wants to be seen today to be evaluated for acute pain

## 2019-08-07 NOTE — TELEPHONE ENCOUNTER
----- Message from Faviola Ayala sent at 8/7/2019 12:02 PM CDT -----  Contact: self [phone]  .Needs Advice    Reason for call:        Communication Preference:phone     Additional Information:pt states she has pain under her ankle states she would like to make an apt states she is in pain she thinks one of her rods are loose please call back to discuss

## 2019-08-09 NOTE — PROGRESS NOTES
Subjective:      Patient ID: Cristal Gonzalez is a 59 y.o. female.    Chief Complaint: Follow-up (right ankle)    HPI    Patient is a 59 year old female who presents to clinic with chief complaint of a-traumatic right ankle medial ankle pain since this morning. Patient is s/p ORIF right trimalleolar ankle fracture with fixation med/lateral malleoli and syndesmosis on 6/17/15 by . She had an uncomplicated recovery and has done well. Patient reports that she usually has morning stiffness, but this time feels different. She report a soreness with palpation as well with ambulation. She denied fever chills increased warmth or recent trauma.     Review of Systems   Constitution: Negative for chills and fever.   Cardiovascular: Negative for chest pain.   Respiratory: Negative for cough and shortness of breath.    Skin: Negative for color change, dry skin, itching, nail changes, poor wound healing and rash.   Musculoskeletal:        Right ankle pain   Neurological: Negative for dizziness.   Psychiatric/Behavioral: Negative for altered mental status. The patient is not nervous/anxious.    All other systems reviewed and are negative.        Objective:            General    Constitutional: She is oriented to person, place, and time. She appears well-developed and well-nourished. No distress.   HENT:   Head: Atraumatic.   Eyes: Conjunctivae are normal.   Cardiovascular: Normal rate.    Pulmonary/Chest: Effort normal.   Neurological: She is alert and oriented to person, place, and time.   Psychiatric: She has a normal mood and affect. Her behavior is normal.         Right Ankle/Foot Exam     Tenderness   The patient is tender to palpation of the medial malleolus.    Range of Motion   The patient has normal right ankle ROM.    Muscle Strength   The patient has normal right ankle strength.    Other   Sensation: normal      RADS: The patient is status post plate and screw repair of a fibular fracture and cannulated  screw repair of a distal tibial fracture.  There is no evidence lucency surrounding the hardware.  The adjacent soft tissues are unremarkable      Assessment:       Encounter Diagnosis   Name Primary?    Closed trimalleolar fracture of right ankle with routine healing, subsequent encounter Yes          Plan:       Discussed plan with patient. At this time she is to  Rest ice and elevate. NSAID x 2 weeks will call of continued or worsening of symptoms.

## 2019-08-23 RX ORDER — DOCUSATE SODIUM 100 MG/1
100 CAPSULE, LIQUID FILLED ORAL 2 TIMES DAILY PRN
Qty: 60 CAPSULE | Refills: 0 | Status: SHIPPED | OUTPATIENT
Start: 2019-08-23 | End: 2019-10-31 | Stop reason: SDUPTHER

## 2019-08-23 NOTE — TELEPHONE ENCOUNTER
Pt is requesting refill     ----- Message from Janel Rashid sent at 8/23/2019  9:14 AM CDT -----  Contact: Pt self Mobile/Home [phone]   Patient is calling for an RX refill or new RX.  Is this a refill or new RX:  Refill  RX name and strength: docusate sodium (COLACE) 100 MG capsule  Directions (copy/paste from chart):  N/A  Is this a 30 day or 90 day RX:  30  Local pharmacy or mail order pharmacy:  [id number] [place] [address] [address] AT [facility] [address]  Pharmacy name and phone # [facility] Phone# [phone], fax# [phone]

## 2019-09-10 ENCOUNTER — OFFICE VISIT (OUTPATIENT)
Dept: URGENT CARE | Facility: CLINIC | Age: 59
End: 2019-09-10
Payer: COMMERCIAL

## 2019-09-10 VITALS
HEART RATE: 88 BPM | DIASTOLIC BLOOD PRESSURE: 77 MMHG | SYSTOLIC BLOOD PRESSURE: 112 MMHG | TEMPERATURE: 98 F | OXYGEN SATURATION: 99 % | WEIGHT: 142 LBS | RESPIRATION RATE: 18 BRPM | HEIGHT: 62 IN | BODY MASS INDEX: 26.13 KG/M2

## 2019-09-10 DIAGNOSIS — N30.00 ACUTE CYSTITIS WITHOUT HEMATURIA: Primary | ICD-10-CM

## 2019-09-10 LAB
BILIRUB UR QL STRIP: NEGATIVE
GLUCOSE UR QL STRIP: NEGATIVE
KETONES UR QL STRIP: NEGATIVE
LEUKOCYTE ESTERASE UR QL STRIP: POSITIVE
PH, POC UA: 5 (ref 5–8)
POC BLOOD, URINE: NEGATIVE
POC NITRATES, URINE: POSITIVE
PROT UR QL STRIP: NEGATIVE
SP GR UR STRIP: 1.03 (ref 1–1.03)
UROBILINOGEN UR STRIP-ACNC: ABNORMAL (ref 0.1–1.1)

## 2019-09-10 PROCEDURE — 99214 OFFICE O/P EST MOD 30 MIN: CPT | Mod: S$GLB,,, | Performed by: NURSE PRACTITIONER

## 2019-09-10 PROCEDURE — 3078F DIAST BP <80 MM HG: CPT | Mod: CPTII,S$GLB,, | Performed by: NURSE PRACTITIONER

## 2019-09-10 PROCEDURE — 81003 POCT URINALYSIS, DIPSTICK, MANUAL, W/O SCOPE: ICD-10-PCS | Mod: QW,S$GLB,, | Performed by: NURSE PRACTITIONER

## 2019-09-10 PROCEDURE — 3008F BODY MASS INDEX DOCD: CPT | Mod: CPTII,S$GLB,, | Performed by: NURSE PRACTITIONER

## 2019-09-10 PROCEDURE — 87086 URINE CULTURE/COLONY COUNT: CPT

## 2019-09-10 PROCEDURE — 99214 PR OFFICE/OUTPT VISIT, EST, LEVL IV, 30-39 MIN: ICD-10-PCS | Mod: S$GLB,,, | Performed by: NURSE PRACTITIONER

## 2019-09-10 PROCEDURE — 99000 SPECIMEN HANDLING OFFICE-LAB: CPT | Mod: S$GLB,,, | Performed by: NURSE PRACTITIONER

## 2019-09-10 PROCEDURE — 87186 SC STD MICRODIL/AGAR DIL: CPT

## 2019-09-10 PROCEDURE — 3008F PR BODY MASS INDEX (BMI) DOCUMENTED: ICD-10-PCS | Mod: CPTII,S$GLB,, | Performed by: NURSE PRACTITIONER

## 2019-09-10 PROCEDURE — 99000 PR SPECIMEN HANDLING,DR OFF->LAB: ICD-10-PCS | Mod: S$GLB,,, | Performed by: NURSE PRACTITIONER

## 2019-09-10 PROCEDURE — 87088 URINE BACTERIA CULTURE: CPT

## 2019-09-10 PROCEDURE — 81003 URINALYSIS AUTO W/O SCOPE: CPT | Mod: QW,S$GLB,, | Performed by: NURSE PRACTITIONER

## 2019-09-10 PROCEDURE — 3074F PR MOST RECENT SYSTOLIC BLOOD PRESSURE < 130 MM HG: ICD-10-PCS | Mod: CPTII,S$GLB,, | Performed by: NURSE PRACTITIONER

## 2019-09-10 PROCEDURE — 87077 CULTURE AEROBIC IDENTIFY: CPT

## 2019-09-10 PROCEDURE — 3074F SYST BP LT 130 MM HG: CPT | Mod: CPTII,S$GLB,, | Performed by: NURSE PRACTITIONER

## 2019-09-10 PROCEDURE — 3078F PR MOST RECENT DIASTOLIC BLOOD PRESSURE < 80 MM HG: ICD-10-PCS | Mod: CPTII,S$GLB,, | Performed by: NURSE PRACTITIONER

## 2019-09-10 RX ORDER — CIPROFLOXACIN 500 MG/1
500 TABLET ORAL 2 TIMES DAILY
Qty: 14 TABLET | Refills: 0 | Status: SHIPPED | OUTPATIENT
Start: 2019-09-10 | End: 2019-09-17

## 2019-09-10 NOTE — PATIENT INSTRUCTIONS
"Bladder Infection, Female (Adult)    Urine is normally doesn't have any bacteria in it. But bacteria can get into the urinary tract from the skin around the rectum. Or they can travel in the blood from elsewhere in the body. Once they are in your urinary tract, they can cause infection in the urethra (urethritis), the bladder (cystitis), or the kidneys (pyelonephritis).  The most common place for an infection is in the bladder. This is called a bladder infection. This is one of the most common infections in women. Most bladder infections are easily treated. They are not serious unless the infection spreads to the kidney.  The phrases "bladder infection," "UTI," and "cystitis" are often used to describe the same thing. But they are not always the same. Cystitis is an inflammation of the bladder. The most common cause of cystitis is an infection.  Symptoms  The infection causes inflammation in the urethra and bladder. This causes many of the symptoms. The most common symptoms of a bladder infection are:  · Pain or burning when urinating  · Having to urinate more often than usual  · Urgent need to urinate  · Only a small amount of urine comes out  · Blood in urine  · Abdominal discomfort. This is usually in the lower abdomen above the pubic bone.  · Cloudy urine  · Strong- or bad-smelling urine  · Unable to urinate (urinary retention)  · Unable to hold urine in (urinary incontinence)  · Fever  · Loss of appetite  · Confusion (in older adults)  Causes  Bladder infections are not contagious. You can't get one from someone else, from a toilet seat, or from sharing a bath.  The most common cause of bladder infections is bacteria from the bowels. The bacteria get onto the skin around the opening of the urethra. From there, they can get into the urine and travel up to the bladder, causing inflammation and infection. This usually happens because of:  · Wiping improperly after urinating. Always wipe from front to " back.  · Bowel incontinence  · Pregnancy  · Procedures such as having a catheter inserted  · Older age  · Not emptying your bladder. This can allow bacteria a chance to grow in your urine.  · Dehydration  · Constipation  · Sex  · Use of a diaphragm for birth control   Treatment  Bladder infections are diagnosed by a urine test. They are treated with antibiotics and usually clear up quickly without complications. Treatment helps prevent a more serious kidney infection.  Medicines  Medicines can help in the treatment of a bladder infection:  · Take antibiotics until they are used up, even if you feel better. It is important to finish them to make sure the infection has cleared.  · You can use acetaminophen or ibuprofen for pain, fever, or discomfort, unless another medicine was prescribed. If you have chronic liver or kidney disease, talk with your healthcare provider before using these medicines. Also talk with your provider if you've ever had a stomach ulcer or gastrointestinal bleeding, or are taking blood-thinner medicines.  · If you are given phenazopydridine to reduce burning with urination, it will cause your urine to become a bright orange color. This can stain clothing.  Care and prevention  These self-care steps can help prevent future infections:  · Drink plenty of fluids to prevent dehydration and flush out your bladder. Do this unless you must restrict fluids for other health reasons, or your doctor told you not to.  · Proper cleaning after going to the bathroom is important. Wipe from front to back after using the toilet to prevent the spread of bacteria.  · Urinate more often. Don't try to hold urine in for a long time.  · Wear loose-fitting clothes and cotton underwear. Avoid tight-fitting pants.  · Improve your diet and prevent constipation. Eat more fresh fruit and vegetables, and fiber, and less junk and fatty foods.  · Avoid sex until your symptoms are gone.  · Avoid caffeine, alcohol, and spicy  foods. These can irritate your bladder.  · Urinate right after intercourse to flush out your bladder.  · If you use birth control pills and have frequent bladder infections, discuss it with your doctor.  Follow-up care  Call your healthcare provider if all symptoms are not gone after 3 days of treatment. This is especially important if you have repeat infections.  If a culture was done, you will be told if your treatment needs to be changed. If directed, you can call to find out the results.  If X-rays were done, you will be told if the results will affect your treatment.  Call 911  Call 911 if any of the following occur:  · Trouble breathing  · Hard to wake up or confusion  · Fainting or loss of consciousness  · Rapid heart rate  When to seek medical advice  Call your healthcare provider right away if any of these occur:  · Fever of 100.4ºF (38.0ºC) or higher, or as directed by your healthcare provider  · Symptoms are not better by the third day of treatment  · Back or belly (abdominal) pain that gets worse  · Repeated vomiting, or unable to keep medicine down  · Weakness or dizziness  · Vaginal discharge  · Pain, redness, or swelling in the outer vaginal area (labia)  Date Last Reviewed: 10/1/2016  © 3888-2416 [employer]. [address] [place] [address], [place] [address]. All rights reserved. This information is not intended as a substitute for professional medical care. Always follow your healthcare professional's instructions.                 UTI   If your condition worsens or fails to improve we recommend that you receive another evaluation at the ER immediately or contact your PCP to discuss your concerns or return here. You must understand that you've received an urgent care treatment only and that you may be released before all your medical problems are known or treated. You the patient will arrange for followup care as instructed.   If you had cultures done it will take 3-5 days to result. We will  call you with the result.   If you are are female and on BCP use additional methods to prevent pregnancy while on the antibiotics and for one cycle after.   Cranberry juice may help. Get the 100% cranberry juice and mix 4 oz of juice with 4 oz of water and drink this 8 oz glass of liquid once a day    -urine culture since the lab be contacted to 3 days with results.  -7 days of antibiotics.  -Azo as needed for symptoms.  -Please follow up with your Primary care provider within 2-5 days if your signs and symptoms have not resolved or worsen.     If your condition worsens or fails to improve we recommend that you receive another evaluation at the emergency room immediately or contact your primary medical clinic to discuss your concerns.   You must understand that you have received an Urgent Care treatment only and that you may be released before all of your medical problems are known or treated. You, the patient, will arrange for follow up care as instructed.

## 2019-09-12 DIAGNOSIS — Z12.39 BREAST CANCER SCREENING: ICD-10-CM

## 2019-09-12 LAB — BACTERIA UR CULT: ABNORMAL

## 2019-09-13 ENCOUNTER — TELEPHONE (OUTPATIENT)
Dept: URGENT CARE | Facility: CLINIC | Age: 59
End: 2019-09-13

## 2019-10-02 ENCOUNTER — PATIENT OUTREACH (OUTPATIENT)
Dept: ADMINISTRATIVE | Facility: HOSPITAL | Age: 59
End: 2019-10-02

## 2019-10-02 ENCOUNTER — DOCUMENTATION ONLY (OUTPATIENT)
Dept: ADMINISTRATIVE | Facility: HOSPITAL | Age: 59
End: 2019-10-02

## 2019-10-02 ENCOUNTER — TELEPHONE (OUTPATIENT)
Dept: PRIMARY CARE CLINIC | Facility: CLINIC | Age: 59
End: 2019-10-02

## 2019-10-02 DIAGNOSIS — Z12.39 SCREENING BREAST EXAMINATION: Primary | ICD-10-CM

## 2019-10-02 DIAGNOSIS — Z00.00 ROUTINE ADULT HEALTH MAINTENANCE: Primary | ICD-10-CM

## 2019-10-02 DIAGNOSIS — Z11.59 NEED FOR HEPATITIS C SCREENING TEST: Primary | ICD-10-CM

## 2019-10-02 NOTE — LETTER
AUTHORIZATION FOR RELEASE OF   CONFIDENTIAL INFORMATION    TO: DIS    We are seeing Cristal Gonzalez, date of birth 1960, in the clinic at [facility]. Barbara Rowe MD is the patient's PCP. Cristal Gonzalez has an outstanding lab/procedure at the time we reviewed her chart. In order to help keep her health information updated, she has authorized us to request the following medical record(s):        ( X )  MAMMOGRAM                                    (  )  COLONOSCOPY      (  )  PAP SMEAR                                          (  )  OUTSIDE LAB RESULTS     (  )  DEXA SCAN                                          (  )  EYE EXAM            (  )  FOOT EXAM                                          (  )  ENTIRE RECORD     (  )  OUTSIDE IMMUNIZATIONS                 (  )  _______________         Please fax records to [facility], Sherise R Olivier-Wittmann, MD, [phone]     If you have any questions, please contact Bhavya at [phone].           Patient Name: Cristal Gonzalez  : 1960  Patient Phone #: [phone]

## 2019-10-02 NOTE — PROGRESS NOTES
Pre-visit chart review completed. Pt eligible/ due for   Hepatitis C Screening     Fecal Occult Blood Test (FOBT)/[employer]     TETANUS VACCINE     Shingles Vaccine (1 of 2)    Influenza Vaccine (1)    Mammogram      Hep C ordered.

## 2019-10-10 ENCOUNTER — LAB VISIT (OUTPATIENT)
Dept: LAB | Facility: HOSPITAL | Age: 59
End: 2019-10-10
Attending: FAMILY MEDICINE
Payer: COMMERCIAL

## 2019-10-10 DIAGNOSIS — Z11.59 NEED FOR HEPATITIS C SCREENING TEST: ICD-10-CM

## 2019-10-10 DIAGNOSIS — Z00.00 ROUTINE ADULT HEALTH MAINTENANCE: ICD-10-CM

## 2019-10-10 LAB
ALBUMIN SERPL BCP-MCNC: 4.1 G/DL (ref 3.5–5.2)
ALP SERPL-CCNC: 49 U/L (ref 55–135)
ALT SERPL W/O P-5'-P-CCNC: 19 U/L (ref 10–44)
ANION GAP SERPL CALC-SCNC: 9 MMOL/L (ref 8–16)
AST SERPL-CCNC: 21 U/L (ref 10–40)
BASOPHILS # BLD AUTO: 0.05 K/UL (ref 0–0.2)
BASOPHILS NFR BLD: 0.8 % (ref 0–1.9)
BILIRUB SERPL-MCNC: 0.5 MG/DL (ref 0.1–1)
BUN SERPL-MCNC: 14 MG/DL (ref 6–20)
CALCIUM SERPL-MCNC: 9.2 MG/DL (ref 8.7–10.5)
CHLORIDE SERPL-SCNC: 106 MMOL/L (ref 95–110)
CHOLEST SERPL-MCNC: 153 MG/DL (ref 120–199)
CHOLEST/HDLC SERPL: 1.6 {RATIO} (ref 2–5)
CO2 SERPL-SCNC: 24 MMOL/L (ref 23–29)
CREAT SERPL-MCNC: 0.7 MG/DL (ref 0.5–1.4)
DIFFERENTIAL METHOD: ABNORMAL
EOSINOPHIL # BLD AUTO: 0.2 K/UL (ref 0–0.5)
EOSINOPHIL NFR BLD: 3.7 % (ref 0–8)
ERYTHROCYTE [DISTWIDTH] IN BLOOD BY AUTOMATED COUNT: 12.9 % (ref 11.5–14.5)
EST. GFR  (AFRICAN AMERICAN): >60 ML/MIN/1.73 M^2
EST. GFR  (NON AFRICAN AMERICAN): >60 ML/MIN/1.73 M^2
GLUCOSE SERPL-MCNC: 99 MG/DL (ref 70–110)
HCT VFR BLD AUTO: 41.2 % (ref 37–48.5)
HCV AB SERPL QL IA: NEGATIVE
HDLC SERPL-MCNC: 93 MG/DL (ref 40–75)
HDLC SERPL: 60.8 % (ref 20–50)
HGB BLD-MCNC: 13.3 G/DL (ref 12–16)
IMM GRANULOCYTES # BLD AUTO: 0.01 K/UL (ref 0–0.04)
IMM GRANULOCYTES NFR BLD AUTO: 0.2 % (ref 0–0.5)
LDLC SERPL CALC-MCNC: 55.2 MG/DL (ref 63–159)
LYMPHOCYTES # BLD AUTO: 2 K/UL (ref 1–4.8)
LYMPHOCYTES NFR BLD: 32.7 % (ref 18–48)
MCH RBC QN AUTO: 32 PG (ref 27–31)
MCHC RBC AUTO-ENTMCNC: 32.3 G/DL (ref 32–36)
MCV RBC AUTO: 99 FL (ref 82–98)
MONOCYTES # BLD AUTO: 0.6 K/UL (ref 0.3–1)
MONOCYTES NFR BLD: 9.3 % (ref 4–15)
NEUTROPHILS # BLD AUTO: 3.2 K/UL (ref 1.8–7.7)
NEUTROPHILS NFR BLD: 53.3 % (ref 38–73)
NONHDLC SERPL-MCNC: 60 MG/DL
NRBC BLD-RTO: 0 /100 WBC
PLATELET # BLD AUTO: 389 K/UL (ref 150–350)
PMV BLD AUTO: 10.2 FL (ref 9.2–12.9)
POTASSIUM SERPL-SCNC: 4.5 MMOL/L (ref 3.5–5.1)
PROT SERPL-MCNC: 7.1 G/DL (ref 6–8.4)
RBC # BLD AUTO: 4.16 M/UL (ref 4–5.4)
SODIUM SERPL-SCNC: 139 MMOL/L (ref 136–145)
TRIGL SERPL-MCNC: 24 MG/DL (ref 30–150)
TSH SERPL DL<=0.005 MIU/L-ACNC: 0.89 UIU/ML (ref 0.4–4)
WBC # BLD AUTO: 6 K/UL (ref 3.9–12.7)

## 2019-10-10 PROCEDURE — 85025 COMPLETE CBC W/AUTO DIFF WBC: CPT

## 2019-10-10 PROCEDURE — 80061 LIPID PANEL: CPT

## 2019-10-10 PROCEDURE — 86803 HEPATITIS C AB TEST: CPT

## 2019-10-10 PROCEDURE — 80053 COMPREHEN METABOLIC PANEL: CPT

## 2019-10-10 PROCEDURE — 36415 COLL VENOUS BLD VENIPUNCTURE: CPT | Mod: PN

## 2019-10-10 PROCEDURE — 84443 ASSAY THYROID STIM HORMONE: CPT

## 2019-10-11 ENCOUNTER — PATIENT MESSAGE (OUTPATIENT)
Dept: ADMINISTRATIVE | Facility: HOSPITAL | Age: 59
End: 2019-10-11

## 2019-10-22 ENCOUNTER — PATIENT OUTREACH (OUTPATIENT)
Dept: ADMINISTRATIVE | Facility: HOSPITAL | Age: 59
End: 2019-10-22

## 2019-10-22 NOTE — LETTER
AUTHORIZATION FOR RELEASE OF   CONFIDENTIAL INFORMATION    TO: DIS    We are seeing Cristal Gonzalez, date of birth 1960, in the clinic at [facility]. Barbara Rowe MD is the patient's PCP. Cristal Gonzalez has an outstanding lab/procedure at the time we reviewed her chart. In order to help keep her health information updated, she has authorized us to request the following medical record(s):        ( X )  MAMMOGRAM                                      (  )  COLONOSCOPY      (  )  PAP SMEAR                                          (  )  OUTSIDE LAB RESULTS     (  )  DEXA SCAN                                          (  )  EYE EXAM            (  )  FOOT EXAM                                          (  )  ENTIRE RECORD     (  )  OUTSIDE IMMUNIZATIONS                 (  )  _______________         Please fax records to [facility], Sherise R Olivier-Wittmann, MD, [phone]     If you have any questions, please contact Bhavya at [phone].           Patient Name: Cristal Gonzalez  : 1960  Patient Phone #: [phone]

## 2019-10-23 ENCOUNTER — PATIENT MESSAGE (OUTPATIENT)
Dept: ADMINISTRATIVE | Facility: HOSPITAL | Age: 59
End: 2019-10-23

## 2019-10-23 NOTE — LETTER
AUTHORIZATION FOR RELEASE OF   CONFIDENTIAL INFORMATION    TO: [facility]    We are seeing Cristal Gonzalez, date of birth 1960, in the clinic at [facility]. Barbara Rowe MD is the patient's PCP. Cristal Gonzalez has an outstanding lab/procedure at the time we reviewed her chart. In order to help keep her health information updated, she has authorized us to request the following medical record(s):        (  )  MAMMOGRAM                                      ( X )  COLONOSCOPY      (  )  PAP SMEAR                                          (  )  OUTSIDE LAB RESULTS     (  )  DEXA SCAN                                          (  )  EYE EXAM            (  )  FOOT EXAM                                          (  )  ENTIRE RECORD     (  )  OUTSIDE IMMUNIZATIONS                 (  )  _______________         Please fax records to [facility], Sherise R Olivier-Wittmann, MD, [phone]     If you have any questions, please contact Bhavya at [phone].           Patient Name: Cristal Gonzalez  : 1960  Patient Phone #: [phone]

## 2019-10-29 ENCOUNTER — LAB VISIT (OUTPATIENT)
Dept: LAB | Facility: HOSPITAL | Age: 59
End: 2019-10-29
Attending: FAMILY MEDICINE
Payer: COMMERCIAL

## 2019-10-29 DIAGNOSIS — Z12.11 SCREENING FOR COLON CANCER: ICD-10-CM

## 2019-10-29 PROCEDURE — 82274 ASSAY TEST FOR BLOOD FECAL: CPT

## 2019-10-31 ENCOUNTER — OFFICE VISIT (OUTPATIENT)
Dept: PRIMARY CARE CLINIC | Facility: CLINIC | Age: 59
End: 2019-10-31
Payer: COMMERCIAL

## 2019-10-31 VITALS
HEART RATE: 72 BPM | DIASTOLIC BLOOD PRESSURE: 68 MMHG | OXYGEN SATURATION: 96 % | WEIGHT: 144.19 LBS | SYSTOLIC BLOOD PRESSURE: 108 MMHG | BODY MASS INDEX: 26.53 KG/M2 | TEMPERATURE: 99 F | HEIGHT: 62 IN

## 2019-10-31 DIAGNOSIS — K59.00 CONSTIPATION, UNSPECIFIED CONSTIPATION TYPE: ICD-10-CM

## 2019-10-31 DIAGNOSIS — R11.0 NAUSEA: ICD-10-CM

## 2019-10-31 DIAGNOSIS — Z12.11 COLON CANCER SCREENING: Primary | ICD-10-CM

## 2019-10-31 DIAGNOSIS — K64.9 HEMORRHOIDS, UNSPECIFIED HEMORRHOID TYPE: ICD-10-CM

## 2019-10-31 PROCEDURE — 99999 PR PBB SHADOW E&M-EST. PATIENT-LVL III: ICD-10-PCS | Mod: PBBFAC,,, | Performed by: FAMILY MEDICINE

## 2019-10-31 PROCEDURE — 3078F PR MOST RECENT DIASTOLIC BLOOD PRESSURE < 80 MM HG: ICD-10-PCS | Mod: CPTII,S$GLB,, | Performed by: FAMILY MEDICINE

## 2019-10-31 PROCEDURE — 3074F PR MOST RECENT SYSTOLIC BLOOD PRESSURE < 130 MM HG: ICD-10-PCS | Mod: CPTII,S$GLB,, | Performed by: FAMILY MEDICINE

## 2019-10-31 PROCEDURE — 3074F SYST BP LT 130 MM HG: CPT | Mod: CPTII,S$GLB,, | Performed by: FAMILY MEDICINE

## 2019-10-31 PROCEDURE — 3078F DIAST BP <80 MM HG: CPT | Mod: CPTII,S$GLB,, | Performed by: FAMILY MEDICINE

## 2019-10-31 PROCEDURE — 99396 PREV VISIT EST AGE 40-64: CPT | Mod: S$GLB,,, | Performed by: FAMILY MEDICINE

## 2019-10-31 PROCEDURE — 99999 PR PBB SHADOW E&M-EST. PATIENT-LVL III: CPT | Mod: PBBFAC,,, | Performed by: FAMILY MEDICINE

## 2019-10-31 PROCEDURE — 99396 PR PREVENTIVE VISIT,EST,40-64: ICD-10-PCS | Mod: S$GLB,,, | Performed by: FAMILY MEDICINE

## 2019-10-31 RX ORDER — DOCUSATE SODIUM 100 MG/1
100 CAPSULE, LIQUID FILLED ORAL 2 TIMES DAILY PRN
Qty: 60 CAPSULE | Refills: 6 | Status: SHIPPED | OUTPATIENT
Start: 2019-10-31 | End: 2019-10-31 | Stop reason: SDUPTHER

## 2019-10-31 RX ORDER — ONDANSETRON HYDROCHLORIDE 8 MG/1
8 TABLET, FILM COATED ORAL EVERY 12 HOURS PRN
Qty: 12 TABLET | Refills: 2 | Status: SHIPPED | OUTPATIENT
Start: 2019-10-31 | End: 2021-03-29

## 2019-10-31 RX ORDER — DOCUSATE SODIUM 100 MG/1
100 CAPSULE, LIQUID FILLED ORAL 2 TIMES DAILY PRN
Qty: 60 CAPSULE | Refills: 6 | Status: SHIPPED | OUTPATIENT
Start: 2019-10-31 | End: 2021-03-29

## 2019-10-31 RX ORDER — HYDROCORTISONE ACETATE PRAMOXINE HCL 2.5; 1 G/100G; G/100G
CREAM TOPICAL 3 TIMES DAILY
Qty: 28.35 G | Refills: 3 | Status: SHIPPED | OUTPATIENT
Start: 2019-10-31 | End: 2021-03-29

## 2019-10-31 NOTE — PROGRESS NOTES
"Cristal Gonzalez is a 59 y.o. female   Routine physical  Source of history: Patient  Past Medical History:   Diagnosis Date    Hot flash, menopausal      Patient  reports that she quit smoking about 7 months ago. Her smoking use included cigarettes. She smoked 0.20 packs per day. She has never used smokeless tobacco. She reports that she drinks about 6.0 - 7.0 standard drinks of alcohol per week. She reports that she does not use drugs.  Family History   Problem Relation Age of Onset    Diabetes Mother     Alzheimer's disease Father     Cancer Maternal Aunt         breast cancer     ROS:  GENERAL: No fever, chills, fatigability or weight loss.  SKIN: No rashes, itching or changes in color or texture of skin.  HEAD: No headaches or recent head trauma.  EYES: Visual acuity fine. No photophobia, ocular pain or diplopia.  EARS: Denies ear pain, discharge or vertigo.  NOSE: No loss of smell, no epistaxis or postnasal drip.  MOUTH & THROAT: No hoarseness or change in voice. No excessive gum bleeding.  NODES: Denies swollen glands.  CHEST: Denies TSE, cyanosis, wheezing, cough and sputum production.  CARDIOVASCULAR: Denies chest pain, PND, orthopnea or reduced exercise tolerance.  ABDOMEN: Appetite fine. No weight loss. Denies diarrhea, abdominal pain, hematemesis or blood in stool.  URINARY: No flank pain, dysuria or hematuria.  PERIPHERAL VASCULAR: No claudication or cyanosis.  MUSCULOSKELETAL: No joint stiffness or swelling. Denies back pain.  NEUROLOGIC: No history of seizures, paralysis, alteration of gait or coordination.    OBJECTIVE:  APPEARANCE: normal appearance  SESSMENT/PLAN:   BP  108/68      Pulse  72     Temp  99.1 F (37.3 C)     Temp src  Oral     SpO2  96%     Weight   65.4 kg (144 lb 2.9 oz)     Height  5' 1.5" (1.562 m)      SKIN: Normal skin turgor, no lesions.  HEENT: Both external auditory canals clear. Both tympanic membranes intact. PERRL. EOMI. Disk margins sharp.   No tonsillar " enlargement. No pharyngeal erythema or exudate. No stridor.  NECK: No bruits. No cervical spine tenderness. No cervical lymphadenopathy. No thyromegaly.  NODES: No cervical, axillary or inguinal lymph node enlargement.  CHEST: Breath sounds clear bilaterally. Lungs clear to auscultation & percussion. Good air movement. No rales. No retractions. No rhonchi. No stridor. No wheezes.  CARDIOVASCULAR: Normal S1, S2. No murmurs. No edema.  ABDOMEN: Bowel sounds normal. No palpable aortic enlargement. No CVA tenderness. No pulsatile mass. No rebound tenderness.  PERIPHERAL VASCULAR: Femoral pulses present and symmetrical. No edema.  MUSCULOSKELETAL: Degenerative changes of both ankles, foot, knee, wrist and hand.  BACK: No CVA tenderness. There is no spasm, tenderness or radiculopathy noted with palpation and there is full range of motion.   NEUROLOGIC:   Cranial Nerves: II-XII grossly intact.  Motor: 5/5 strength major flexors/extensors. No tremor.  DTR's: Knees, Ankles 2+ and equal bilaterally; downgoing toes.  Sensory: Intact to light touch distally.  Gait & Posture: Normal gait and fine motion. No cerebellar signs.  MENTAL STATUS: Alert. Oriented x 3. Language skills normal. Memory intact. No suicidal ideation.   Normal affect. Normal cognitive functions. Well kept appearance.    ASSESSMENT/PLAN:   Cristal was seen today for annual exam and medication refill.    Diagnoses and all orders for this visit:    Colon cancer screening  -     Cancel: Fecal Immunochemical Test (iFOBT); Future    Nausea  -     ondansetron (ZOFRAN) 8 MG tablet; Take 1 tablet (8 mg total) by mouth every 12 (twelve) hours as needed for Nausea.    Hemorrhoids, unspecified hemorrhoid type  -     hydrocortisone-pramoxine (ANALPRAM-HC) 2.5-1 % Crea; Place rectally 3 (three) times daily.    Constipation, unspecified constipation type  -     Discontinue: docusate sodium (COLACE) 100 MG capsule; Take 1 capsule (100 mg total) by mouth 2 (two) times daily as  needed for Constipation.  -     docusate sodium (COLACE) 100 MG capsule; Take 1 capsule (100 mg total) by mouth 2 (two) times daily as needed for Constipation.     labs reviewed no areas of concern.  Pt will have gyn records and mammogram report sent to our office  Recommend pt f/u with her GI MD for EGD to address epigastric pain   normal... Well appearing, well nourished, awake, alert, oriented to person, place, time/situation and in no apparent distress.

## 2019-11-06 DIAGNOSIS — Z12.11 SCREENING FOR COLON CANCER: Primary | ICD-10-CM

## 2019-11-12 LAB — HEMOCCULT STL QL IA: NEGATIVE

## 2019-12-13 ENCOUNTER — OFFICE VISIT (OUTPATIENT)
Dept: URGENT CARE | Facility: CLINIC | Age: 59
End: 2019-12-13
Payer: COMMERCIAL

## 2019-12-13 VITALS
WEIGHT: 144 LBS | OXYGEN SATURATION: 99 % | HEIGHT: 62 IN | SYSTOLIC BLOOD PRESSURE: 126 MMHG | DIASTOLIC BLOOD PRESSURE: 74 MMHG | RESPIRATION RATE: 16 BRPM | BODY MASS INDEX: 26.5 KG/M2 | HEART RATE: 72 BPM | TEMPERATURE: 98 F

## 2019-12-13 DIAGNOSIS — N39.0 BACTERIAL UTI: Primary | ICD-10-CM

## 2019-12-13 DIAGNOSIS — A49.9 BACTERIAL UTI: Primary | ICD-10-CM

## 2019-12-13 LAB
BILIRUB UR QL STRIP: NEGATIVE
GLUCOSE UR QL STRIP: NEGATIVE
KETONES UR QL STRIP: NEGATIVE
LEUKOCYTE ESTERASE UR QL STRIP: POSITIVE
PH, POC UA: 6 (ref 5–8)
POC BLOOD, URINE: NEGATIVE
POC NITRATES, URINE: NEGATIVE
PROT UR QL STRIP: NEGATIVE
SP GR UR STRIP: 1.02 (ref 1–1.03)
UROBILINOGEN UR STRIP-ACNC: ABNORMAL (ref 0.1–1.1)

## 2019-12-13 PROCEDURE — 99000 PR SPECIMEN HANDLING,DR OFF->LAB: ICD-10-PCS | Mod: S$GLB,,, | Performed by: PHYSICIAN ASSISTANT

## 2019-12-13 PROCEDURE — 87088 URINE BACTERIA CULTURE: CPT

## 2019-12-13 PROCEDURE — 81003 URINALYSIS AUTO W/O SCOPE: CPT | Mod: QW,S$GLB,, | Performed by: PHYSICIAN ASSISTANT

## 2019-12-13 PROCEDURE — 87086 URINE CULTURE/COLONY COUNT: CPT

## 2019-12-13 PROCEDURE — 81003 POCT URINALYSIS, DIPSTICK, AUTOMATED, W/O SCOPE: ICD-10-PCS | Mod: QW,S$GLB,, | Performed by: PHYSICIAN ASSISTANT

## 2019-12-13 PROCEDURE — 87186 SC STD MICRODIL/AGAR DIL: CPT

## 2019-12-13 PROCEDURE — 99214 PR OFFICE/OUTPT VISIT, EST, LEVL IV, 30-39 MIN: ICD-10-PCS | Mod: S$GLB,,, | Performed by: PHYSICIAN ASSISTANT

## 2019-12-13 PROCEDURE — 87077 CULTURE AEROBIC IDENTIFY: CPT

## 2019-12-13 PROCEDURE — 99214 OFFICE O/P EST MOD 30 MIN: CPT | Mod: S$GLB,,, | Performed by: PHYSICIAN ASSISTANT

## 2019-12-13 PROCEDURE — 99000 SPECIMEN HANDLING OFFICE-LAB: CPT | Mod: S$GLB,,, | Performed by: PHYSICIAN ASSISTANT

## 2019-12-13 RX ORDER — CIPROFLOXACIN 500 MG/1
500 TABLET ORAL EVERY 12 HOURS
Qty: 10 TABLET | Refills: 0 | Status: SHIPPED | OUTPATIENT
Start: 2019-12-13 | End: 2019-12-18

## 2019-12-13 NOTE — PATIENT INSTRUCTIONS
"-Please take antibiotic to completion.  -Rest and stay hydrated.  -We will call with culture results in the next few days.    Please follow up with your primary care provider within 2-5 days if your signs and symptoms have not resolved or worsen.     If your condition worsens or fails to improve we recommend that you receive another evaluation at the emergency room immediately or contact your primary medical clinic to discuss your concerns.   You must understand that you have received an Urgent Care treatment only and that you may be released before all of your medical problems are known or treated. You, the patient, will arrange for follow up care as instructed.         Bladder Infection, Female (Adult)    Urine is normally doesn't have any bacteria in it. But bacteria can get into the urinary tract from the skin around the rectum. Or they can travel in the blood from elsewhere in the body. Once they are in your urinary tract, they can cause infection in the urethra (urethritis), the bladder (cystitis), or the kidneys (pyelonephritis).  The most common place for an infection is in the bladder. This is called a bladder infection. This is one of the most common infections in women. Most bladder infections are easily treated. They are not serious unless the infection spreads to the kidney.  The phrases "bladder infection," "UTI," and "cystitis" are often used to describe the same thing. But they are not always the same. Cystitis is an inflammation of the bladder. The most common cause of cystitis is an infection.  Symptoms  The infection causes inflammation in the urethra and bladder. This causes many of the symptoms. The most common symptoms of a bladder infection are:  · Pain or burning when urinating  · Having to urinate more often than usual  · Urgent need to urinate  · Only a small amount of urine comes out  · Blood in urine  · Abdominal discomfort. This is usually in the lower abdomen above the pubic " bone.  · Cloudy urine  · Strong- or bad-smelling urine  · Unable to urinate (urinary retention)  · Unable to hold urine in (urinary incontinence)  · Fever  · Loss of appetite  · Confusion (in older adults)  Causes  Bladder infections are not contagious. You can't get one from someone else, from a toilet seat, or from sharing a bath.  The most common cause of bladder infections is bacteria from the bowels. The bacteria get onto the skin around the opening of the urethra. From there, they can get into the urine and travel up to the bladder, causing inflammation and infection. This usually happens because of:  · Wiping improperly after urinating. Always wipe from front to back.  · Bowel incontinence  · Pregnancy  · Procedures such as having a catheter inserted  · Older age  · Not emptying your bladder. This can allow bacteria a chance to grow in your urine.  · Dehydration  · Constipation  · Sex  · Use of a diaphragm for birth control   Treatment  Bladder infections are diagnosed by a urine test. They are treated with antibiotics and usually clear up quickly without complications. Treatment helps prevent a more serious kidney infection.  Medicines  Medicines can help in the treatment of a bladder infection:  · Take antibiotics until they are used up, even if you feel better. It is important to finish them to make sure the infection has cleared.  · You can use acetaminophen or ibuprofen for pain, fever, or discomfort, unless another medicine was prescribed. If you have chronic liver or kidney disease, talk with your healthcare provider before using these medicines. Also talk with your provider if you've ever had a stomach ulcer or gastrointestinal bleeding, or are taking blood-thinner medicines.  · If you are given phenazopydridine to reduce burning with urination, it will cause your urine to become a bright orange color. This can stain clothing.  Care and prevention  These self-care steps can help prevent future  infections:  · Drink plenty of fluids to prevent dehydration and flush out your bladder. Do this unless you must restrict fluids for other health reasons, or your doctor told you not to.  · Proper cleaning after going to the bathroom is important. Wipe from front to back after using the toilet to prevent the spread of bacteria.  · Urinate more often. Don't try to hold urine in for a long time.  · Wear loose-fitting clothes and cotton underwear. Avoid tight-fitting pants.  · Improve your diet and prevent constipation. Eat more fresh fruit and vegetables, and fiber, and less junk and fatty foods.  · Avoid sex until your symptoms are gone.  · Avoid caffeine, alcohol, and spicy foods. These can irritate your bladder.  · Urinate right after intercourse to flush out your bladder.  · If you use birth control pills and have frequent bladder infections, discuss it with your doctor.  Follow-up care  Call your healthcare provider if all symptoms are not gone after 3 days of treatment. This is especially important if you have repeat infections.  If a culture was done, you will be told if your treatment needs to be changed. If directed, you can call to find out the results.  If X-rays were done, you will be told if the results will affect your treatment.  Call 911  Call 911 if any of the following occur:  · Trouble breathing  · Hard to wake up or confusion  · Fainting or loss of consciousness  · Rapid heart rate  When to seek medical advice  Call your healthcare provider right away if any of these occur:  · Fever of 100.4ºF (38.0ºC) or higher, or as directed by your healthcare provider  · Symptoms are not better by the third day of treatment  · Back or belly (abdominal) pain that gets worse  · Repeated vomiting, or unable to keep medicine down  · Weakness or dizziness  · Vaginal discharge  · Pain, redness, or swelling in the outer vaginal area (labia)  Date Last Reviewed: 10/1/2016  © 1940-5656 [employer] [address]  [place] [address]. All rights reserved. This information is not intended as a substitute for professional medical care. Always follow your healthcare professional's instructions.

## 2019-12-15 LAB — BACTERIA UR CULT: ABNORMAL

## 2020-02-02 ENCOUNTER — OFFICE VISIT (OUTPATIENT)
Dept: URGENT CARE | Facility: CLINIC | Age: 60
End: 2020-02-02
Payer: COMMERCIAL

## 2020-02-02 VITALS
OXYGEN SATURATION: 97 % | DIASTOLIC BLOOD PRESSURE: 72 MMHG | RESPIRATION RATE: 16 BRPM | TEMPERATURE: 98 F | WEIGHT: 144 LBS | SYSTOLIC BLOOD PRESSURE: 108 MMHG | BODY MASS INDEX: 26.5 KG/M2 | HEIGHT: 62 IN | HEART RATE: 78 BPM

## 2020-02-02 DIAGNOSIS — L03.211 CELLULITIS, FACE: Primary | ICD-10-CM

## 2020-02-02 PROCEDURE — 99214 OFFICE O/P EST MOD 30 MIN: CPT | Mod: S$GLB,,, | Performed by: EMERGENCY MEDICINE

## 2020-02-02 PROCEDURE — 99214 PR OFFICE/OUTPT VISIT, EST, LEVL IV, 30-39 MIN: ICD-10-PCS | Mod: S$GLB,,, | Performed by: EMERGENCY MEDICINE

## 2020-02-02 RX ORDER — MUPIROCIN 20 MG/G
OINTMENT TOPICAL
Qty: 22 G | Refills: 1 | Status: SHIPPED | OUTPATIENT
Start: 2020-02-02 | End: 2021-03-29

## 2020-02-02 RX ORDER — CLINDAMYCIN HYDROCHLORIDE 150 MG/1
300 CAPSULE ORAL EVERY 6 HOURS
Qty: 56 CAPSULE | Refills: 0 | Status: SHIPPED | OUTPATIENT
Start: 2020-02-02 | End: 2020-02-09

## 2020-02-02 NOTE — PROGRESS NOTES
"Subjective:       Patient ID: Cristal Gonzalez is a 59 y.o. female.    Vitals:  height is 5' 1.5" (1.562 m) and weight is 65.3 kg (144 lb). Her oral temperature is 98.1 °F (36.7 °C). Her blood pressure is 108/72 and her pulse is 78. Her respiration is 16 and oxygen saturation is 97%.     Chief Complaint: Rash (on right cheek)    Patient reports being exposed to staph by moms nurse.Patient reports hard red spot on right cheek.    Rash   This is a new problem. Episode onset: 3 days. The problem has been gradually worsening since onset. The affected locations include the face (right cheek). The rash is characterized by redness (hard). Associated with: staph. Pertinent negatives include no cough, fever or sore throat. Treatments tried: neosporin. The treatment provided no relief.       Constitution: Negative for chills and fever.   HENT: Negative for facial swelling and sore throat.    Neck: Negative for painful lymph nodes.   Eyes: Negative for eye itching and eyelid swelling.   Respiratory: Negative for cough.    Musculoskeletal: Negative for joint pain and joint swelling.   Skin: Negative for color change, pale, rash, wound, abrasion, laceration, lesion, skin thickening/induration, puncture wound, erythema, bruising, abscess, avulsion and hives.        Hard, red spot on right cheek   Allergic/Immunologic: Negative for environmental allergies, immunocompromised state and hives.   Hematologic/Lymphatic: Negative for swollen lymph nodes.       Objective:      Physical Exam   Constitutional: She is oriented to person, place, and time. She appears well-developed and well-nourished.   HENT:   Head: Normocephalic and atraumatic. Head is without abrasion, without contusion and without laceration.       Right Ear: External ear normal.   Left Ear: External ear normal.   Nose: Nose normal.   Mouth/Throat: Oropharynx is clear and moist and mucous membranes are normal.   Erythema to right cheek  No abscess formation   Eyes: " Pupils are equal, round, and reactive to light. Conjunctivae, EOM and lids are normal.   Neck: Trachea normal, full passive range of motion without pain and phonation normal. Neck supple.   Cardiovascular: Normal rate, regular rhythm and normal heart sounds.   Pulmonary/Chest: Effort normal and breath sounds normal. No stridor. No respiratory distress.   Musculoskeletal: Normal range of motion.   Neurological: She is alert and oriented to person, place, and time.   Skin: Skin is warm, dry, intact and rash. Capillary refill takes less than 2 seconds. abrasion, burn, bruising, erythema and ecchymosis  Psychiatric: She has a normal mood and affect. Her speech is normal and behavior is normal. Judgment and thought content normal. Cognition and memory are normal.   Nursing note and vitals reviewed.        Assessment:       1. Cellulitis, face        Plan:         Cellulitis, face    Other orders  -     mupirocin (BACTROBAN) 2 % ointment; Apply to affected area 3 times daily  Dispense: 22 g; Refill: 1  -     clindamycin (CLEOCIN HCL) 150 MG capsule; Take 2 capsules (300 mg total) by mouth every 6 (six) hours. for 7 days  Dispense: 56 capsule; Refill: 0      Patient Instructions       Start taking antibiotics in 2 days if the redness is not improved    Return to Clinic if symptoms worsen      Cellulitis  Cellulitis is an infection of the deep layers of skin. A break in the skin, such as a cut or scratch, can let bacteria under the skin. If the bacteria get to deep layers of the skin, it can be serious. If not treated, cellulitis can get into the bloodstream and lymph nodes. The infection can then spread throughout the body. This causes serious illness.  Cellulitis causes the affected skin to become red, swollen, warm, and sore. The reddened areas have a visible border. An open sore may leak fluid (pus). You may have a fever, chills, and pain.  Cellulitis is treated with antibiotics taken for 7 to 10 days. An open sore may  be cleaned and covered with cool wet gauze. Symptoms should get better 1 to 2 days after treatment is started. Make sure to take all the antibiotics for the full number of days until they are gone. Keep taking the medicine even if your symptoms go away.  Home care   Follow these tips:  · Limit the use of the part of your body with cellulitis.   · If the infection is on your leg, keep your leg raised while sitting. This will help to reduce swelling.  · Take all of the antibiotic medicine exactly as directed until it is gone. Do not miss any doses, especially during the first 7 days. Dont stop taking the medicine when your symptoms get better.  · Keep the affected area clean and dry.  · Wash your hands with soap and warm water before and after touching your skin. Anyone else who touches your skin should also wash his or her hands. Don't share towels.  Follow-up care  Follow up with your healthcare provider, or as advised. If your infection does not go away on the first antibiotic, your healthcare provider will prescribe a different one.  When to seek medical advice  Call your healthcare provider right away if any of these occur:  · Red areas that spread  · Swelling or pain that gets worse  · Fluid leaking from the skin (pus)  · Fever higher of 100.4º F (38.0º C) or higher after 2 days on antibiotics  Date Last Reviewed: 9/1/2016  © 6671-7017 [employer]. [address] [place] [address], [place] [address]. All rights reserved. This information is not intended as a substitute for professional medical care. Always follow your healthcare professional's instructions.

## 2020-02-02 NOTE — PATIENT INSTRUCTIONS
Start taking antibiotics in 2 days if the redness is not improved    Return to Clinic if symptoms worsen      Cellulitis  Cellulitis is an infection of the deep layers of skin. A break in the skin, such as a cut or scratch, can let bacteria under the skin. If the bacteria get to deep layers of the skin, it can be serious. If not treated, cellulitis can get into the bloodstream and lymph nodes. The infection can then spread throughout the body. This causes serious illness.  Cellulitis causes the affected skin to become red, swollen, warm, and sore. The reddened areas have a visible border. An open sore may leak fluid (pus). You may have a fever, chills, and pain.  Cellulitis is treated with antibiotics taken for 7 to 10 days. An open sore may be cleaned and covered with cool wet gauze. Symptoms should get better 1 to 2 days after treatment is started. Make sure to take all the antibiotics for the full number of days until they are gone. Keep taking the medicine even if your symptoms go away.  Home care   Follow these tips:  · Limit the use of the part of your body with cellulitis.   · If the infection is on your leg, keep your leg raised while sitting. This will help to reduce swelling.  · Take all of the antibiotic medicine exactly as directed until it is gone. Do not miss any doses, especially during the first 7 days. Dont stop taking the medicine when your symptoms get better.  · Keep the affected area clean and dry.  · Wash your hands with soap and warm water before and after touching your skin. Anyone else who touches your skin should also wash his or her hands. Don't share towels.  Follow-up care  Follow up with your healthcare provider, or as advised. If your infection does not go away on the first antibiotic, your healthcare provider will prescribe a different one.  When to seek medical advice  Call your healthcare provider right away if any of these occur:  · Red areas that spread  · Swelling or pain  that gets worse  · Fluid leaking from the skin (pus)  · Fever higher of 100.4º F (38.0º C) or higher after 2 days on antibiotics  Date Last Reviewed: 9/1/2016  © 9246-2001 [employer]. [address] [place] [address], [place] [address]. All rights reserved. This information is not intended as a substitute for professional medical care. Always follow your healthcare professional's instructions.

## 2020-02-10 ENCOUNTER — OFFICE VISIT (OUTPATIENT)
Dept: URGENT CARE | Facility: CLINIC | Age: 60
End: 2020-02-10
Payer: COMMERCIAL

## 2020-02-10 VITALS
OXYGEN SATURATION: 98 % | DIASTOLIC BLOOD PRESSURE: 81 MMHG | RESPIRATION RATE: 16 BRPM | HEIGHT: 62 IN | WEIGHT: 144 LBS | HEART RATE: 59 BPM | SYSTOLIC BLOOD PRESSURE: 122 MMHG | BODY MASS INDEX: 26.5 KG/M2 | TEMPERATURE: 97 F

## 2020-02-10 DIAGNOSIS — R30.0 DYSURIA: Primary | ICD-10-CM

## 2020-02-10 DIAGNOSIS — N30.00 ACUTE CYSTITIS WITHOUT HEMATURIA: ICD-10-CM

## 2020-02-10 DIAGNOSIS — L01.00 IMPETIGO: ICD-10-CM

## 2020-02-10 LAB
BILIRUB UR QL STRIP: POSITIVE
GLUCOSE UR QL STRIP: POSITIVE
KETONES UR QL STRIP: NEGATIVE
LEUKOCYTE ESTERASE UR QL STRIP: NEGATIVE
PH, POC UA: 5.5 (ref 5–8)
POC BLOOD, URINE: NEGATIVE
POC NITRATES, URINE: POSITIVE
PROT UR QL STRIP: NEGATIVE
SP GR UR STRIP: 1.02 (ref 1–1.03)
UROBILINOGEN UR STRIP-ACNC: POSITIVE (ref 0.1–1.1)

## 2020-02-10 PROCEDURE — 87186 SC STD MICRODIL/AGAR DIL: CPT

## 2020-02-10 PROCEDURE — 99214 OFFICE O/P EST MOD 30 MIN: CPT | Mod: 25,S$GLB,, | Performed by: FAMILY MEDICINE

## 2020-02-10 PROCEDURE — 99214 PR OFFICE/OUTPT VISIT, EST, LEVL IV, 30-39 MIN: ICD-10-PCS | Mod: 25,S$GLB,, | Performed by: FAMILY MEDICINE

## 2020-02-10 PROCEDURE — 87088 URINE BACTERIA CULTURE: CPT

## 2020-02-10 PROCEDURE — 87086 URINE CULTURE/COLONY COUNT: CPT

## 2020-02-10 PROCEDURE — 87077 CULTURE AEROBIC IDENTIFY: CPT

## 2020-02-10 PROCEDURE — 81003 POCT URINALYSIS, DIPSTICK, AUTOMATED, W/O SCOPE: ICD-10-PCS | Mod: QW,S$GLB,, | Performed by: FAMILY MEDICINE

## 2020-02-10 PROCEDURE — 81003 URINALYSIS AUTO W/O SCOPE: CPT | Mod: QW,S$GLB,, | Performed by: FAMILY MEDICINE

## 2020-02-10 RX ORDER — CIPROFLOXACIN 500 MG/1
500 TABLET ORAL 2 TIMES DAILY
Qty: 14 TABLET | Refills: 0 | Status: SHIPPED | OUTPATIENT
Start: 2020-02-10 | End: 2020-02-16 | Stop reason: ALTCHOICE

## 2020-02-10 NOTE — PROGRESS NOTES
"Subjective:       Patient ID: Cristal Gonzalez is a 59 y.o. female.    Vitals:  height is 5' 1.5" (1.562 m) and weight is 65.3 kg (144 lb). Her oral temperature is 97.3 °F (36.3 °C). Her blood pressure is 122/81 and her pulse is 59 (abnormal). Her respiration is 16 and oxygen saturation is 98%.     Chief Complaint: Dysuria and Rash (Left shoulder)    Patient came with complaint of dysuria and increased frequency for 1 day.  Denies fever, chills, abdominal pain, back pain, nausea/vomiting, weakness/dizziness.  Also complain of rash over left shoulder for 3 days.  Denies itching.  Denies chest pain, shortness of breath, headache.    Dysuria    This is a new problem. The current episode started today. The problem has been gradually worsening. The quality of the pain is described as burning. The pain is at a severity of 2/10. There has been no fever. She is sexually active. There is no history of pyelonephritis. Associated symptoms include rash (left shoulder). Pertinent negatives include no chills, frequency, hematuria, nausea, urgency or vomiting. Treatments tried: I antibiotic she had at home and  Pyridium  The treatment provided no relief. There is no history of recurrent UTIs.   Rash   This is a new problem. Episode onset: 4 days. Location: Left shoulder. The rash is characterized by redness. She was exposed to nothing. Associated symptoms include fatigue. Pertinent negatives include no fever or vomiting. Treatments tried: cream she got last time she was here. The treatment provided no relief. There is no history of eczema.       Constitution: Positive for fatigue. Negative for chills and fever.   Neck: Negative for painful lymph nodes.   Gastrointestinal: Negative for abdominal pain, nausea and vomiting.   Genitourinary: Positive for dysuria. Negative for frequency, urgency, urine decreased, hematuria, history of kidney stones, painful menstruation, irregular menstruation, missed menses, heavy menstrual " bleeding, ovarian cysts, genital trauma, vaginal pain, vaginal discharge, vaginal bleeding, vaginal odor, painful intercourse, genital sore, painful ejaculation and pelvic pain.   Musculoskeletal: Negative for back pain.   Skin: Positive for rash (left shoulder). Negative for lesion.   Hematologic/Lymphatic: Negative for swollen lymph nodes.       Objective:      Physical Exam   Constitutional: She is oriented to person, place, and time. She appears well-developed and well-nourished.   HENT:   Head: Normocephalic and atraumatic.   Right Ear: External ear normal.   Left Ear: External ear normal.   Nose: Nose normal.   Mouth/Throat: Mucous membranes are normal.   Eyes: Conjunctivae and lids are normal.   Neck: Trachea normal and full passive range of motion without pain. Neck supple.   Cardiovascular: Normal rate, regular rhythm and normal heart sounds.   Pulmonary/Chest: Effort normal and breath sounds normal. No stridor. No respiratory distress. She has no wheezes. She has no rales.   Abdominal: Soft. Normal appearance and bowel sounds are normal. She exhibits no distension, no abdominal bruit, no pulsatile midline mass and no mass. There is no tenderness. There is no rebound and no guarding.   No suprapubic, flank or  CVA tenderness.   Musculoskeletal: Normal range of motion. She exhibits no edema.   Neurological: She is alert and oriented to person, place, and time. She has normal strength.   Skin: Skin is warm, dry, intact, not diaphoretic, not pale and rash ( Positive of 0.5 cm impetegnous lesion over left shoulder.  No vesicles or hives.  No tongue, throat or facial swelling.).   Psychiatric: She has a normal mood and affect. Her speech is normal and behavior is normal. Judgment and thought content normal. Cognition and memory are normal.   Nursing note and vitals reviewed.        Assessment:       1. Dysuria    2. Acute cystitis without hematuria    3. Impetigo        Plan:         Dysuria  -     POCT  Urinalysis, Dipstick, Automated, W/O Scope    Acute cystitis without hematuria  -     Urine culture    Impetigo    Other orders  -     ciprofloxacin HCl (CIPRO) 500 MG tablet; Take 1 tablet (500 mg total) by mouth 2 (two) times daily. for 7 days  Dispense: 14 tablet; Refill: 0        PLEASE READ YOUR DISCHARGE INSTRUCTIONS ENTIRELY AS IT CONTAINS IMPORTANT INFORMATION.    Please return here or go to the Emergency Department for any concerns or worsening of condition.    If you were prescribed antibiotics, please take them to completion.      If not allergic, please take over the counter Tylenol (Acetaminophen) and/or Motrin (Ibuprofen) as directed for control of pain and/or fever.  Please follow up with your primary care doctor or specialist as needed.  If you smoke, please stop smoking.    Please return or see your primary care doctor if you develop new or worsening symptoms.     Please arrange follow up with your primary medical clinic as soon as possible. You must understand that you've received an Urgent Care treatment only and that you may be released before all of your medical problems are known or treated. You, the patient, will arrange for follow up as instructed. If your symptoms worsen or fail to improve you should go to the Emergency Room.  Understanding Urinary Tract Infections (UTIs)  Most UTIs are caused by bacteria, although they may also be caused by viruses or fungi. Bacteria from the bowel are the most common source of infection. The infection may start because of any of the following:  · Sexual activity. During sex, bacteria can travel from the penis, vagina, or rectum into the urethra.   · Bacteria on the skin outside the rectum may travel into the urethra. This is more common in women since the rectum and urethra are closer to each other than in men. Wiping from front to back after using the toilet and keeping the area clean can help prevent germs from getting to the urethra.  · Blockage of  urine flow through the urinary tract. If urine sits too long, germs may start to grow out of control.      Parts of the urinary tract  The infection can occur in any part of the urinary tract.  · The kidneys collect and store urine.  · The ureters carry urine from the kidneys to the bladder.  · The bladder holds urine until you are ready to let it out.  · The urethra carries urine from the bladder out of the body. It is shorter in women, so bacteria can move through it more easily. The urethra is longer in men, so a UTI is less likely to reach the bladder or kidneys in men.  Date Last Reviewed: 1/1/2017  © 9630-9889 [employer]. [address] [place] [address]. All rights reserved. This information is not intended as a substitute for professional medical care. Always follow your healthcare professional's instructions.        Anatomy of the Female Urinary Tract  Your urinary tract helps get rid of urine (your bodys liquid waste). The kidneys collect chemicals and water your body doesnt need. This is turned into urine. Urine travels out of the kidneys through the ureters to the bladder. The bladder holds urine until youre ready to release it. The urethra carries urine from the bladder out of the body. The main sphincter muscle circles the mid-urethra.      Front view of female urinary tract.     Date Last Reviewed: 1/1/2017  © 7762-7902 [employer]. [address] [place] [address]. All rights reserved. This information is not intended as a substitute for professional medical care. Always follow your healthcare professional's instructions.

## 2020-02-11 NOTE — PATIENT INSTRUCTIONS
PLEASE READ YOUR DISCHARGE INSTRUCTIONS ENTIRELY AS IT CONTAINS IMPORTANT INFORMATION.    Please return here or go to the Emergency Department for any concerns or worsening of condition.    If you were prescribed antibiotics, please take them to completion.      If not allergic, please take over the counter Tylenol (Acetaminophen) and/or Motrin (Ibuprofen) as directed for control of pain and/or fever.  Please follow up with your primary care doctor or specialist as needed.  If you smoke, please stop smoking.    Please return or see your primary care doctor if you develop new or worsening symptoms.     Please arrange follow up with your primary medical clinic as soon as possible. You must understand that you've received an Urgent Care treatment only and that you may be released before all of your medical problems are known or treated. You, the patient, will arrange for follow up as instructed. If your symptoms worsen or fail to improve you should go to the Emergency Room.  Understanding Urinary Tract Infections (UTIs)  Most UTIs are caused by bacteria, although they may also be caused by viruses or fungi. Bacteria from the bowel are the most common source of infection. The infection may start because of any of the following:  · Sexual activity. During sex, bacteria can travel from the penis, vagina, or rectum into the urethra.   · Bacteria on the skin outside the rectum may travel into the urethra. This is more common in women since the rectum and urethra are closer to each other than in men. Wiping from front to back after using the toilet and keeping the area clean can help prevent germs from getting to the urethra.  · Blockage of urine flow through the urinary tract. If urine sits too long, germs may start to grow out of control.      Parts of the urinary tract  The infection can occur in any part of the urinary tract.  · The kidneys collect and store urine.  · The ureters carry urine from the kidneys to the  bladder.  · The bladder holds urine until you are ready to let it out.  · The urethra carries urine from the bladder out of the body. It is shorter in women, so bacteria can move through it more easily. The urethra is longer in men, so a UTI is less likely to reach the bladder or kidneys in men.  Date Last Reviewed: 1/1/2017  © 8819-7226 [employer]. [address] [place] [address]. All rights reserved. This information is not intended as a substitute for professional medical care. Always follow your healthcare professional's instructions.        Anatomy of the Female Urinary Tract  Your urinary tract helps get rid of urine (your bodys liquid waste). The kidneys collect chemicals and water your body doesnt need. This is turned into urine. Urine travels out of the kidneys through the ureters to the bladder. The bladder holds urine until youre ready to release it. The urethra carries urine from the bladder out of the body. The main sphincter muscle circles the mid-urethra.      Front view of female urinary tract.     Date Last Reviewed: 1/1/2017 © 2000-2017 [employer]. [address] [place] [id number]. All rights reserved. This information is not intended as a substitute for professional medical care. Always follow your healthcare professional's instructions.

## 2020-02-15 LAB — BACTERIA UR CULT: ABNORMAL

## 2020-02-16 ENCOUNTER — TELEPHONE (OUTPATIENT)
Dept: URGENT CARE | Facility: CLINIC | Age: 60
End: 2020-02-16

## 2020-02-16 DIAGNOSIS — A49.9 UTI (URINARY TRACT INFECTION), BACTERIAL: Primary | ICD-10-CM

## 2020-02-16 DIAGNOSIS — N39.0 UTI (URINARY TRACT INFECTION), BACTERIAL: Primary | ICD-10-CM

## 2020-02-16 RX ORDER — NITROFURANTOIN 25; 75 MG/1; MG/1
100 CAPSULE ORAL 2 TIMES DAILY
Qty: 14 CAPSULE | Refills: 0 | Status: SHIPPED | OUTPATIENT
Start: 2020-02-16 | End: 2020-02-23

## 2020-02-16 NOTE — TELEPHONE ENCOUNTER
I called the lab to see culture report on Cipro as it is not shown, it is resistant per lab.    I called the patient to discuss this and switch her antibiotic.  She did not answer.  I left  to return call to clinic.

## 2020-02-16 NOTE — TELEPHONE ENCOUNTER
I spoke with the patient and explained culture and new antibiotic in detail.  All questions answered.  Macrobid sent to pharmacy of choice.

## 2020-02-19 DIAGNOSIS — Z12.31 ENCOUNTER FOR SCREENING MAMMOGRAM FOR BREAST CANCER: Primary | ICD-10-CM

## 2020-02-27 ENCOUNTER — HOSPITAL ENCOUNTER (OUTPATIENT)
Dept: RADIOLOGY | Facility: HOSPITAL | Age: 60
Discharge: HOME | End: 2020-02-27
Attending: OBSTETRICS & GYNECOLOGY
Payer: COMMERCIAL

## 2020-02-27 VITALS — WEIGHT: 143.94 LBS | BODY MASS INDEX: 26.49 KG/M2 | HEIGHT: 62 IN

## 2020-02-27 DIAGNOSIS — Z12.31 ENCOUNTER FOR SCREENING MAMMOGRAM FOR BREAST CANCER: ICD-10-CM

## 2020-02-27 PROCEDURE — 77067 SCR MAMMO BI INCL CAD: CPT | Mod: TC,PO

## 2020-03-04 ENCOUNTER — OFFICE VISIT (OUTPATIENT)
Dept: URGENT CARE | Facility: CLINIC | Age: 60
End: 2020-03-04
Payer: COMMERCIAL

## 2020-03-04 VITALS
SYSTOLIC BLOOD PRESSURE: 122 MMHG | TEMPERATURE: 97 F | HEIGHT: 62 IN | OXYGEN SATURATION: 98 % | WEIGHT: 143 LBS | BODY MASS INDEX: 26.31 KG/M2 | DIASTOLIC BLOOD PRESSURE: 82 MMHG | RESPIRATION RATE: 18 BRPM | HEART RATE: 77 BPM

## 2020-03-04 DIAGNOSIS — J06.9 URI WITH COUGH AND CONGESTION: Primary | ICD-10-CM

## 2020-03-04 PROCEDURE — 99214 PR OFFICE/OUTPT VISIT, EST, LEVL IV, 30-39 MIN: ICD-10-PCS | Mod: S$GLB,,, | Performed by: NURSE PRACTITIONER

## 2020-03-04 PROCEDURE — 99214 OFFICE O/P EST MOD 30 MIN: CPT | Mod: S$GLB,,, | Performed by: NURSE PRACTITIONER

## 2020-03-04 RX ORDER — METHYLPREDNISOLONE 4 MG/1
TABLET ORAL
Qty: 1 PACKAGE | Refills: 0 | Status: SHIPPED | OUTPATIENT
Start: 2020-03-04 | End: 2021-03-29

## 2020-03-04 RX ORDER — FLUTICASONE PROPIONATE 50 MCG
2 SPRAY, SUSPENSION (ML) NASAL DAILY
Qty: 16 ML | Refills: 0 | Status: SHIPPED | OUTPATIENT
Start: 2020-03-04 | End: 2021-03-29

## 2020-03-05 NOTE — PROGRESS NOTES
"Subjective:       Patient ID: Cristal Gonzalez is a 59 y.o. female.    Vitals:  height is 5' 1.5" (1.562 m) and weight is 64.9 kg (143 lb). Her temperature is 97.4 °F (36.3 °C). Her blood pressure is 122/82 and her pulse is 77. Her respiration is 18 and oxygen saturation is 98%.     Chief Complaint: congestion    Patient presents with c/o sinus congestion that now she feels like is in her chest x5 days.  Denies fever, chills, or body aches.  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  States over the years she's taken a Medrol jocelin with relief.  She took Zyrtec once but thinks this actually made it worse.    Other   Chronicity:  chest congestion. The current episode started in the past 7 days (over the weekenf). The problem has been gradually worsening. Associated symptoms include congestion, fatigue and a sore throat (a few days ago, resolved). Pertinent negatives include no abdominal pain, arthralgias, chest pain, chills, coughing, diaphoresis, fever, headaches, myalgias, nausea, neck pain, rash, urinary symptoms or vomiting. Nothing aggravates the symptoms. Treatments tried: otc sinus meds  The treatment provided no relief.       Constitution: Positive for fatigue. Negative for chills, sweating and fever.   HENT: Positive for congestion, postnasal drip and sore throat (a few days ago, resolved). Negative for ear pain, sinus pain, sinus pressure and voice change.    Neck: Negative for neck pain and painful lymph nodes.   Cardiovascular: Negative for chest pain.   Eyes: Negative for eye redness.   Respiratory: Negative for chest tightness, cough, sputum production, bloody sputum, COPD, shortness of breath, stridor, wheezing and asthma.    Gastrointestinal: Negative for abdominal pain, nausea and vomiting.   Musculoskeletal: Negative for joint pain and muscle ache.   Skin: Negative for rash.   Allergic/Immunologic: Negative for seasonal allergies and " asthma.   Neurological: Negative for headaches.   Hematologic/Lymphatic: Negative for swollen lymph nodes.       Objective:      Physical Exam   Constitutional: She is oriented to person, place, and time. She appears well-developed and well-nourished. She is cooperative.  Non-toxic appearance. She does not have a sickly appearance. She does not appear ill. No distress.   HENT:   Head: Normocephalic and atraumatic.   Right Ear: Hearing, tympanic membrane, external ear and ear canal normal.   Left Ear: Hearing, tympanic membrane, external ear and ear canal normal.   Nose: Mucosal edema and rhinorrhea present. No purulent discharge or nasal deformity. No epistaxis. Right sinus exhibits no maxillary sinus tenderness and no frontal sinus tenderness. Left sinus exhibits no maxillary sinus tenderness and no frontal sinus tenderness.   Mouth/Throat: Uvula is midline, oropharynx is clear and moist and mucous membranes are normal. No trismus in the jaw. Normal dentition. No uvula swelling. Cobblestoning present. No oropharyngeal exudate, posterior oropharyngeal edema or posterior oropharyngeal erythema.   Eyes: Conjunctivae and lids are normal. No scleral icterus.   Neck: Trachea normal, full passive range of motion without pain and phonation normal. Neck supple. No neck rigidity. No edema and no erythema present.   Cardiovascular: Normal rate, regular rhythm, normal heart sounds, intact distal pulses and normal pulses.   Pulmonary/Chest: Effort normal and breath sounds normal. No respiratory distress. She has no decreased breath sounds. She has no wheezes. She has no rhonchi.   Abdominal: Normal appearance.   Musculoskeletal: Normal range of motion. She exhibits no edema or deformity.   Lymphadenopathy:     She has no cervical adenopathy.   Neurological: She is alert and oriented to person, place, and time. She exhibits normal muscle tone. Coordination normal.   Skin: Skin is warm, dry, intact, not diaphoretic and not pale.    Psychiatric: She has a normal mood and affect. Her speech is normal and behavior is normal. Judgment and thought content normal. Cognition and memory are normal.   Nursing note and vitals reviewed.        Assessment:       1. URI with cough and congestion        Plan:         URI with cough and congestion  -     methylPREDNISolone (MEDROL DOSEPACK) 4 mg tablet; Take all pills on one row at one time. 24 hours later take the second row etc till all meds taken  Dispense: 1 Package; Refill: 0  -     fluticasone propionate (FLONASE) 50 mcg/actuation nasal spray; 2 sprays (100 mcg total) by Each Nostril route once daily.  Dispense: 16 mL; Refill: 0      Patient Instructions   Please drink plenty of fluids.  Please get plenty of rest.  Please return here or go to the Emergency Department for any concerns or worsening of condition.  If you were given a steroid shot in the clinic and have also been given a prescription for a steroid such as Prednisone or a Medrol Dose Pack, please begin taking them tomorrow.  If you do not have Hypertension or any history of palpitations, it is ok to take over the counter Sudafed or Mucinex D or Allegra-D or Claritin-D or Zyrtec-D.  If you do take one of the above, it is ok to combine that with plain over the counter Mucinex or Allegra or Claritin or Zyrtec.  If for example you are taking Zyrtec -D, you can combine that with Mucinex, but not Mucinex-D.  If you are taking Mucinex-D, you can combine that with plain Allegra or Claritin or Zyrtec.   If you do have Hypertension or palpitations, it is safe to take Coricidin HBP for relief of sinus symptoms.  If not allergic, please take over the counter Tylenol (Acetaminophen) and/or Motrin (Ibuprofen) as directed for control of pain and/or fever.  Please follow up with your primary care doctor or specialist as needed.    If you  smoke, please stop smoking.  Viral Upper Respiratory Illness (Adult)  You have a viral upper respiratory illness  (URI), which is another term for the common cold. This illness is contagious during the first few days. It is spread through the air by coughing and sneezing. It may also be spread by direct contact (touching the sick person and then touching your own eyes, nose, or mouth). Frequent handwashing will decrease risk of spread. Most viral illnesses go away within 7 to 10 days with rest and simple home remedies. Sometimes the illness may last for several weeks. Antibiotics will not kill a virus, and they are generally not prescribed for this condition.    Home care  · If symptoms are severe, rest at home for the first 2 to 3 days. When you resume activity, don't let yourself get too tired.  · Avoid being exposed to cigarette smoke (yours or others).  · You may use acetaminophen or ibuprofen to control pain and fever, unless another medicine was prescribed. (Note: If you have chronic liver or kidney disease, have ever had a stomach ulcer or gastrointestinal bleeding, or are taking blood-thinning medicines, talk with your healthcare provider before using these medicines.) Aspirin should never be given to anyone under 18 years of age who is ill with a viral infection or fever. It may cause severe liver or brain damage.  · Your appetite may be poor, so a light diet is fine. Avoid dehydration by drinking 6 to 8 glasses of fluids per day (water, soft drinks, juices, tea, or soup). Extra fluids will help loosen secretions in the nose and lungs.  · Over-the-counter cold medicines will not shorten the length of time youre sick, but they may be helpful for the following symptoms: cough, sore throat, and nasal and sinus congestion. (Note: Do not use decongestants if you have high blood pressure.)  Follow-up care  Follow up with your healthcare provider, or as advised.  When to seek medical advice  Call your healthcare provider right away if any of these occur:  · Cough with lots of colored sputum (mucus)  · Severe headache; face,  neck, or ear pain  · Difficulty swallowing due to throat pain  · Fever of 100.4°F (38°C)  Call 911, or get immediate medical care  Call emergency services right away if any of these occur:  · Chest pain, shortness of breath, wheezing, or difficulty breathing  · Coughing up blood  · Inability to swallow due to throat pain  Date Last Reviewed: 9/13/2015  © 9737-5453 [employer]. [address] [place] [address], [place] [address]. All rights reserved. This information is not intended as a substitute for professional medical care. Always follow your healthcare professional's instructions.

## 2020-03-05 NOTE — PATIENT INSTRUCTIONS
Please drink plenty of fluids.  Please get plenty of rest.  Please return here or go to the Emergency Department for any concerns or worsening of condition.  If you were given a steroid shot in the clinic and have also been given a prescription for a steroid such as Prednisone or a Medrol Dose Pack, please begin taking them tomorrow.  If you do not have Hypertension or any history of palpitations, it is ok to take over the counter Sudafed or Mucinex D or Allegra-D or Claritin-D or Zyrtec-D.  If you do take one of the above, it is ok to combine that with plain over the counter Mucinex or Allegra or Claritin or Zyrtec.  If for example you are taking Zyrtec -D, you can combine that with Mucinex, but not Mucinex-D.  If you are taking Mucinex-D, you can combine that with plain Allegra or Claritin or Zyrtec.   If you do have Hypertension or palpitations, it is safe to take Coricidin HBP for relief of sinus symptoms.  If not allergic, please take over the counter Tylenol (Acetaminophen) and/or Motrin (Ibuprofen) as directed for control of pain and/or fever.  Please follow up with your primary care doctor or specialist as needed.    If you  smoke, please stop smoking.  Viral Upper Respiratory Illness (Adult)  You have a viral upper respiratory illness (URI), which is another term for the common cold. This illness is contagious during the first few days. It is spread through the air by coughing and sneezing. It may also be spread by direct contact (touching the sick person and then touching your own eyes, nose, or mouth). Frequent handwashing will decrease risk of spread. Most viral illnesses go away within 7 to 10 days with rest and simple home remedies. Sometimes the illness may last for several weeks. Antibiotics will not kill a virus, and they are generally not prescribed for this condition.    Home care  · If symptoms are severe, rest at home for the first 2 to 3 days. When you resume activity, don't let yourself get  too tired.  · Avoid being exposed to cigarette smoke (yours or others).  · You may use acetaminophen or ibuprofen to control pain and fever, unless another medicine was prescribed. (Note: If you have chronic liver or kidney disease, have ever had a stomach ulcer or gastrointestinal bleeding, or are taking blood-thinning medicines, talk with your healthcare provider before using these medicines.) Aspirin should never be given to anyone under 18 years of age who is ill with a viral infection or fever. It may cause severe liver or brain damage.  · Your appetite may be poor, so a light diet is fine. Avoid dehydration by drinking 6 to 8 glasses of fluids per day (water, soft drinks, juices, tea, or soup). Extra fluids will help loosen secretions in the nose and lungs.  · Over-the-counter cold medicines will not shorten the length of time youre sick, but they may be helpful for the following symptoms: cough, sore throat, and nasal and sinus congestion. (Note: Do not use decongestants if you have high blood pressure.)  Follow-up care  Follow up with your healthcare provider, or as advised.  When to seek medical advice  Call your healthcare provider right away if any of these occur:  · Cough with lots of colored sputum (mucus)  · Severe headache; face, neck, or ear pain  · Difficulty swallowing due to throat pain  · Fever of 100.4°F (38°C)  Call 911, or get immediate medical care  Call emergency services right away if any of these occur:  · Chest pain, shortness of breath, wheezing, or difficulty breathing  · Coughing up blood  · Inability to swallow due to throat pain  Date Last Reviewed: 9/13/2015 © 2000-2017 [employer]. [address] [place] [address], [place] [address]. All rights reserved. This information is not intended as a substitute for professional medical care. Always follow your healthcare professional's instructions.

## 2020-03-30 ENCOUNTER — TELEPHONE (OUTPATIENT)
Dept: PRIMARY CARE CLINIC | Facility: CLINIC | Age: 60
End: 2020-03-30

## 2020-03-30 NOTE — TELEPHONE ENCOUNTER
Please see message and advise     ----- Message from Kathy Edgar sent at 3/30/2020  8:19 AM CDT -----  Contact: [phone]  Patient only has a cough for 1 month and was told in [place] it was Allergies. She has not been in contact with anyone who was confirmed or suspected with the virus. She do no have a fever or Sob. She takes care of an elderly relative. She want to know if should can be tested for the virus.

## 2020-03-31 NOTE — TELEPHONE ENCOUNTER
Spoke with pt and advised per ;     Only if she has fever and shortness of breath.  Pt verbalized understanding.

## 2020-04-02 DIAGNOSIS — J06.9 URI WITH COUGH AND CONGESTION: ICD-10-CM

## 2020-04-04 RX ORDER — FLUTICASONE PROPIONATE 50 MCG
SPRAY, SUSPENSION (ML) NASAL
Qty: 16 G | OUTPATIENT
Start: 2020-04-04

## 2020-05-04 ENCOUNTER — TELEPHONE (OUTPATIENT)
Dept: ORTHOPEDICS | Facility: CLINIC | Age: 60
End: 2020-05-04

## 2020-05-04 ENCOUNTER — PATIENT MESSAGE (OUTPATIENT)
Dept: ORTHOPEDICS | Facility: CLINIC | Age: 60
End: 2020-05-04

## 2020-05-04 NOTE — TELEPHONE ENCOUNTER
----- Message from Ema Michel PA-C sent at 5/4/2020 11:01 AM CDT -----  Contact: Pt. [phone]  New problem, just needs appointment.     ----- Message -----  From: Amy Cloud MA  Sent: 5/4/2020   8:22 AM CDT  To: Ema Michel PA-C        ----- Message -----  From: Malathi Dixon  Sent: 5/4/2020   8:15 AM CDT  To: Yanira MERLOS Staff    The patient would like to speak to someone regarding the issues with her left knee. The patient would like to speak to someone regarding

## 2020-05-04 NOTE — TELEPHONE ENCOUNTER
Left message for patient to return call.  Regarding schedule appointment with AMARIS Michel PA-C on 5/5/20 at 2:00 pm.

## 2020-05-05 ENCOUNTER — HOSPITAL ENCOUNTER (OUTPATIENT)
Dept: RADIOLOGY | Facility: HOSPITAL | Age: 60
Discharge: HOME | End: 2020-05-05
Attending: PHYSICIAN ASSISTANT
Payer: COMMERCIAL

## 2020-05-05 ENCOUNTER — OFFICE VISIT (OUTPATIENT)
Dept: ORTHOPEDICS | Facility: CLINIC | Age: 60
End: 2020-05-05
Payer: COMMERCIAL

## 2020-05-05 VITALS
RESPIRATION RATE: 18 BRPM | TEMPERATURE: 98 F | SYSTOLIC BLOOD PRESSURE: 111 MMHG | HEIGHT: 62 IN | BODY MASS INDEX: 26.17 KG/M2 | DIASTOLIC BLOOD PRESSURE: 78 MMHG | WEIGHT: 142.19 LBS | HEART RATE: 67 BPM

## 2020-05-05 DIAGNOSIS — M22.2X2 PATELLOFEMORAL PAIN SYNDROME OF LEFT KNEE: ICD-10-CM

## 2020-05-05 DIAGNOSIS — M25.562 ACUTE PAIN OF LEFT KNEE: Primary | ICD-10-CM

## 2020-05-05 DIAGNOSIS — M25.562 ACUTE PAIN OF LEFT KNEE: ICD-10-CM

## 2020-05-05 PROCEDURE — 73562 X-RAY EXAM OF KNEE 3: CPT | Mod: 26,XS,RT, | Performed by: RADIOLOGY

## 2020-05-05 PROCEDURE — 3078F PR MOST RECENT DIASTOLIC BLOOD PRESSURE < 80 MM HG: ICD-10-PCS | Mod: CPTII,S$GLB,, | Performed by: PHYSICIAN ASSISTANT

## 2020-05-05 PROCEDURE — 73564 XR KNEE ORTHO LEFT WITH FLEXION: ICD-10-PCS | Mod: 26,LT,, | Performed by: RADIOLOGY

## 2020-05-05 PROCEDURE — 99213 OFFICE O/P EST LOW 20 MIN: CPT | Mod: S$GLB,,, | Performed by: PHYSICIAN ASSISTANT

## 2020-05-05 PROCEDURE — 99999 PR PBB SHADOW E&M-EST. PATIENT-LVL IV: CPT | Mod: PBBFAC,,, | Performed by: PHYSICIAN ASSISTANT

## 2020-05-05 PROCEDURE — 73562 XR KNEE ORTHO LEFT WITH FLEXION: ICD-10-PCS | Mod: 26,XS,RT, | Performed by: RADIOLOGY

## 2020-05-05 PROCEDURE — 99213 PR OFFICE/OUTPT VISIT, EST, LEVL III, 20-29 MIN: ICD-10-PCS | Mod: S$GLB,,, | Performed by: PHYSICIAN ASSISTANT

## 2020-05-05 PROCEDURE — 3074F SYST BP LT 130 MM HG: CPT | Mod: CPTII,S$GLB,, | Performed by: PHYSICIAN ASSISTANT

## 2020-05-05 PROCEDURE — 3008F PR BODY MASS INDEX (BMI) DOCUMENTED: ICD-10-PCS | Mod: CPTII,S$GLB,, | Performed by: PHYSICIAN ASSISTANT

## 2020-05-05 PROCEDURE — 73564 X-RAY EXAM KNEE 4 OR MORE: CPT | Mod: 26,LT,, | Performed by: RADIOLOGY

## 2020-05-05 PROCEDURE — 99999 PR PBB SHADOW E&M-EST. PATIENT-LVL IV: ICD-10-PCS | Mod: PBBFAC,,, | Performed by: PHYSICIAN ASSISTANT

## 2020-05-05 PROCEDURE — 3078F DIAST BP <80 MM HG: CPT | Mod: CPTII,S$GLB,, | Performed by: PHYSICIAN ASSISTANT

## 2020-05-05 PROCEDURE — 3074F PR MOST RECENT SYSTOLIC BLOOD PRESSURE < 130 MM HG: ICD-10-PCS | Mod: CPTII,S$GLB,, | Performed by: PHYSICIAN ASSISTANT

## 2020-05-05 PROCEDURE — 3008F BODY MASS INDEX DOCD: CPT | Mod: CPTII,S$GLB,, | Performed by: PHYSICIAN ASSISTANT

## 2020-05-05 PROCEDURE — 73562 X-RAY EXAM OF KNEE 3: CPT | Mod: TC,RT

## 2020-05-05 RX ORDER — IBUPROFEN 800 MG/1
800 TABLET ORAL EVERY 8 HOURS PRN
Qty: 90 TABLET | Refills: 0 | Status: SHIPPED | OUTPATIENT
Start: 2020-05-05 | End: 2020-06-01

## 2020-05-06 NOTE — PROGRESS NOTES
Subjective:      Patient ID: Cristal Gonzalez is a 59 y.o. female.    Chief Complaint: Pain of the Left Knee    HPI    Patient is a 59 year old female who presents to clinic with chief complaint of a-traumatic intermittent left knee pain. She is asymptomatic today. She was having some discomfort around the lateral side of her knee cap. Pain was increased with walking, stairs and uneven surfaces. She took some ibuprofen and iced which helped. She denied locking catching or feeling unstable.     Review of Systems   Constitution: Negative for chills and fever.   Cardiovascular: Negative for chest pain.   Respiratory: Negative for cough and shortness of breath.    Skin: Negative for color change, dry skin, itching, nail changes, poor wound healing and rash.   Musculoskeletal:        Left knee pain   Neurological: Negative for dizziness.   Psychiatric/Behavioral: Negative for altered mental status. The patient is not nervous/anxious.    All other systems reviewed and are negative.        Objective:            General    Constitutional: She is oriented to person, place, and time. She appears well-developed and well-nourished. No distress.   HENT:   Head: Atraumatic.   Eyes: Conjunctivae are normal.   Cardiovascular: Normal rate.    Pulmonary/Chest: Effort normal.   Neurological: She is alert and oriented to person, place, and time.   Psychiatric: She has a normal mood and affect. Her behavior is normal.             Left Knee Exam     Inspection   Swelling: absent  Bruising: absent    Tenderness   The patient is experiencing no tenderness.     Range of Motion   The patient has normal left knee ROM.    Tests   Stability Lachman: normal (-1 to 2mm) PCL-Posterior Drawer: normal (0 to 2mm)  MCL - Valgus: normal (0 to 2mm)  LCL - Varus: normal (0 to 2mm)    Other   Sensation: normal    Muscle Strength   Left Lower Extremity   Quadriceps:  5/5   Hamstrin/5         RADS: reviewed by myself:   No fracture or dislocation,  mild degenerative changes.       Assessment:       Encounter Diagnoses   Name Primary?    Acute pain of left knee Yes    Patellofemoral pain syndrome of left knee           Plan:       Dicussed plan with the patient. At this time she is a symptomatic. If symptoms return she will rest ice and use NSAID. She is to return to clinic as needed.

## 2020-05-09 DIAGNOSIS — K29.70 GASTRITIS, PRESENCE OF BLEEDING UNSPECIFIED, UNSPECIFIED CHRONICITY, UNSPECIFIED GASTRITIS TYPE: ICD-10-CM

## 2020-05-09 RX ORDER — OMEPRAZOLE 40 MG/1
CAPSULE, DELAYED RELEASE ORAL
Qty: 30 CAPSULE | Refills: 11 | Status: SHIPPED | OUTPATIENT
Start: 2020-05-09 | End: 2021-05-10

## 2020-06-01 RX ORDER — IBUPROFEN 800 MG/1
TABLET ORAL
Qty: 90 TABLET | Refills: 0 | Status: SHIPPED | OUTPATIENT
Start: 2020-06-01 | End: 2021-05-10

## 2020-06-11 ENCOUNTER — TELEPHONE (OUTPATIENT)
Dept: PRIMARY CARE CLINIC | Facility: CLINIC | Age: 60
End: 2020-06-11

## 2020-06-11 NOTE — TELEPHONE ENCOUNTER
----- Message from Mulu Patel sent at 6/11/2020 12:51 PM CDT -----  Contact: patient [phone]  Patient called to ask for advice about a rash that she has developed on her hand that comes and goes .It is little bumps that are red and itchy and then scabs over on the top of her hand. .  This has been happening over the last month. Patient has tried to eliminate different items that she uses but nothing helps. Hydrocortisone cream seems to make it worse and burns her hand.  Pt wondered if a steroid pack might help.Pt is willing to do a virtual visit if necessary but would not schedule because she doesn't have the rash today. Please call to let her know if you will be ordering [id number] [place], [place] - [facility] [address] AT [facility] [address] [phone] (Phone)

## 2020-06-11 NOTE — TELEPHONE ENCOUNTER
Message left for patient Dr. Cisneros has to see the rash before it can be evaluated please contact the office with further questions.

## 2020-06-12 ENCOUNTER — PATIENT OUTREACH (OUTPATIENT)
Dept: ADMINISTRATIVE | Facility: HOSPITAL | Age: 60
End: 2020-06-12

## 2020-06-22 ENCOUNTER — OFFICE VISIT (OUTPATIENT)
Dept: URGENT CARE | Facility: CLINIC | Age: 60
End: 2020-06-22
Payer: COMMERCIAL

## 2020-06-22 VITALS
BODY MASS INDEX: 25.76 KG/M2 | RESPIRATION RATE: 16 BRPM | SYSTOLIC BLOOD PRESSURE: 116 MMHG | HEIGHT: 62 IN | TEMPERATURE: 99 F | HEART RATE: 79 BPM | OXYGEN SATURATION: 97 % | WEIGHT: 140 LBS | DIASTOLIC BLOOD PRESSURE: 73 MMHG

## 2020-06-22 DIAGNOSIS — Z87.440 HISTORY OF RECURRENT UTI (URINARY TRACT INFECTION): Primary | ICD-10-CM

## 2020-06-22 DIAGNOSIS — R21 RASH OF HANDS: ICD-10-CM

## 2020-06-22 LAB
BILIRUB UR QL STRIP: NEGATIVE
GLUCOSE UR QL STRIP: NEGATIVE
KETONES UR QL STRIP: POSITIVE
LEUKOCYTE ESTERASE UR QL STRIP: NEGATIVE
PH, POC UA: 8 (ref 5–8)
POC BLOOD, URINE: NEGATIVE
POC NITRATES, URINE: NEGATIVE
PROT UR QL STRIP: NEGATIVE
SP GR UR STRIP: 1.01 (ref 1–1.03)
UROBILINOGEN UR STRIP-ACNC: ABNORMAL (ref 0.1–1.1)

## 2020-06-22 PROCEDURE — 99214 PR OFFICE/OUTPT VISIT, EST, LEVL IV, 30-39 MIN: ICD-10-PCS | Mod: 25,S$GLB,, | Performed by: NURSE PRACTITIONER

## 2020-06-22 PROCEDURE — 87086 URINE CULTURE/COLONY COUNT: CPT

## 2020-06-22 PROCEDURE — 81003 POCT URINALYSIS, DIPSTICK, AUTOMATED, W/O SCOPE: ICD-10-PCS | Mod: QW,S$GLB,, | Performed by: NURSE PRACTITIONER

## 2020-06-22 PROCEDURE — 81003 URINALYSIS AUTO W/O SCOPE: CPT | Mod: QW,S$GLB,, | Performed by: NURSE PRACTITIONER

## 2020-06-22 PROCEDURE — 99214 OFFICE O/P EST MOD 30 MIN: CPT | Mod: 25,S$GLB,, | Performed by: NURSE PRACTITIONER

## 2020-06-22 RX ORDER — TRIAMCINOLONE ACETONIDE 1 MG/G
CREAM TOPICAL 2 TIMES DAILY
Qty: 15 G | Refills: 0 | Status: SHIPPED | OUTPATIENT
Start: 2020-06-22 | End: 2020-07-02

## 2020-06-22 RX ORDER — PERMETHRIN 50 MG/G
CREAM TOPICAL ONCE
Qty: 60 G | Refills: 0 | Status: SHIPPED | OUTPATIENT
Start: 2020-06-22 | End: 2020-06-22

## 2020-06-22 RX ORDER — CIPROFLOXACIN 500 MG/1
500 TABLET ORAL 2 TIMES DAILY
Qty: 14 TABLET | Refills: 0 | Status: SHIPPED | OUTPATIENT
Start: 2020-06-22 | End: 2020-06-29

## 2020-06-22 NOTE — PATIENT INSTRUCTIONS
We will call in the next 3 days with your culture results  Take zyrtec, Claritin or benadryl for itching.      You must understand that you've received an Urgent Care treatment only and that you may be released before all your medical problems are known or treated. You, the patient, will arrange for follow up care as instructed.  If your condition worsens we recommend that you receive another evaluation at the emergency room immediately or contact your primary medical clinics after hours call service to discuss your concerns.  Please return here or go to the Emergency Department for any concerns or worsening of condition.              Scabies  Scabies is a skin infection. It is caused by a tiny parasitic insect, or mite, that is too small to see directly. It can be seen under a microscope, but it is usually recognized only by the rash and symptoms it causes. This can make it hard to diagnose since the signs and symptoms can be similar to other diseases.  The scabies mite tunnels under the skin. It creates a small burrow, where it leaves its eggs. These eggs salas and grow into adults. They then create new burrows over the next 1 to 2 weeks. The mites die in about 4 to 6 weeks. The rash and itching are caused by an allergic reaction to the scabies saliva or feces.  Scabies is highly contagious. It is spread by direct skin contact. It is easily spread by close personal contact, sexual contact, or by sharing bed linens or clothing used by an infected person.  It may take 4 to 6 weeks for symptoms to appear after being exposed. Everyone living in the house with you, as well as your sexual partners, should be treated at the same time. After the first treatment, you will no longer be contagious. You may return to work, school or .  Home care  · Machine wash in hot water all sheets, towels, pillowcases, underwear, pajamas, and any other clothing you have worn lately. Use the hot cycle of a dryer or use a hot iron to  sterilize.  · Seal anything that is hard to wash in a plastic trash bag for 4 days. This includes coats, jackets, blankets, and bedspreads. (The insects die after 3 days off the human body.)  Medicines  Scabicides  Medicines used to treat scabies are called scabicides. These are creams that kill the scabies mites. A prescription is needed. When using these medicines:  · Always follow instructions provided by your healthcare provider and pharmacist. Also follow the printed instructions that come with the medicine.  · Talk with your provider about precautions to take when using these medicines.  · Use the cream on your body when your skin is cool and dry. Dont use it after a hot shower or bath.  · Usually the cream is put on your whole body. This means from your chin all the way down to your toes. Scabies does not usually affect an adults head. So cream is not needed there. For children, discuss this with your childs provider.  · Leave the cream or lotion on for the recommended amount of time. This is usually 8 to 12 hours.  · Dont leave cream or lotion on your skin longer than directed. Dont use more than recommended.  · Clean clothes should be worn after the treatment.  · If you wash your hands after using the cream, you will need to reapply the cream to your hands.  · If you are breastfeeding, wash off your nipples before feeding. Then reapply the cream after breastfeeding.  · For babies or infants, put mittens on their hands. This will stop them from licking the cream or lotion. It will also stop them from scratching themselves because of the itching.  Other medicines  · An oral medicine called ivermectin may be prescribed for severe cases. It may also be used if you cant apply creams.  · Itching may cause the most discomfort. If large areas of your skin are affected, over-the-counter antihistamines may be used to reduce itching. Or you may be given a prescription antihistamine. Some of these medicines may  make you sleepy. They are best used at bedtime. Antihistamines that dont make you sleepy can be used during the day. Note: Dont use medicine that has diphenhydramine if you have glaucoma, or if you are a man who has trouble urinating due to an enlarged prostate.  · If you were given antibiotics due to a bacterial infection, take them until they are finished. It is important to finish the antibiotics even if the wound looks better. This is to make sure the infection has cleared.  Follow-up care  Follow up with your healthcare provider, or as advised. Call your provider if your symptoms dont improve after 1 week, or if new burrows or rashes appear.  When to seek medical advice  Call your healthcare provider right away if any of these occur:  · Yellow-brown crusts or drainage from the sores  · Other signs of infection, including increasing redness, swelling, pain, or pus  · Fever of 100.4°F (38ºC) or higher, or as directed by your provider  Date Last Reviewed: 8/1/2016 © 2000-2017 [employer]. [address] [place] [address]. All rights reserved. This information is not intended as a substitute for professional medical care. Always follow your healthcare professional's instructions.        Dysuria with Uncertain Cause (Adult)    The urethra is the tube that allows urine to pass out of the body. In a woman, the urethra is the opening above the vagina. In men, the urethra is the opening on the tip of the penis. Dysuria is the feeling of pain or burning in the urethra when passing urine.  Dysuria can be caused by anything that irritates or inflames the urethra. An infection or chemical irritation can cause this reaction. A bladder infection is the most common cause of dysuria in adults. A urine test can diagnose this. A bladder infection needs antibiotic treatment.  Soaps, lotions, colognes and feminine hygiene products can cause dysuria. So can birth control jellies, creams, and foams. It will  go away 1 to 3 days after using these irritants.  Sexually transmitted diseases (STDs) such as chlamydia or gonorrhea can cause dysuria. Your healthcare provider may take a culture sample. Your provider may start you on antibiotic medicine before the culture test returns.  In women who have gone through menopause, dysuria can be from dryness in the lining of the urethra. This can be treated with hormones. Dysuria becomes long-term (chronic) when it lasts for weeks or months. You may need to see a specialist (urologist) to diagnose and treat chronic dysuria.  Home care  These home care tips may help:  · Don't use any chemicals or products that you think may be causing your symptoms.  · If you were given a prescription medicine, take as directed. Be sure to take it until it is all used up.  · If a culture was taken, don't have sex until you have been told that it is negative. This means you don't have an infection. Then follow your healthcare provider's advice to treat your condition.  If a culture was done and it is positive:  · Both you and your sexual partner may need to be treated. This is true even if your partner has no symptoms.  · Contact your healthcare provider or go to an urgent care clinic or the public health department to be looked at and treated.  · Don't have sex until both you and your partner(s) have finished all antibiotics and your healthcare provider says you are no longer contagious.  · Learn about and use safe sex practices. The safest sex is with a partner who has tested negative and only has sex with you. Condoms can prevent STDs from spreading, but they aren't a guarantee.  Follow-up care  Follow up with your healthcare provider, or as advised. If a culture was taken, you may call as directed for the results. If you have an STD, follow up with your provider or the public health department for a complete STD screening, including HIV testing. For more information, contact CDC-INFO at  319.887.4790.  When to seek medical advice  Call your healthcare provider right away if any of these occur:  · You aren't better after 3 days of treatment  · Fever of 100.4ºF (38ºC) or higher, or as directed by your healthcare provider  · Back or belly pain that gets worse  · You can't urinate because of pain  · New discharge from the urethra, vagina, or penis  · Painful sores on the penis  · Rash or joint pain  · Painful lumps (lymph nodes) in the groin  · Testicle pain or swelling of the scrotum  Date Last Reviewed: 11/1/2016  © 4725-6543 [employer]. [address] [place] [address], [place] [address]. All rights reserved. This information is not intended as a substitute for professional medical care. Always follow your healthcare professional's instructions.

## 2020-06-22 NOTE — PROGRESS NOTES
"Subjective:       Patient ID: Cristal Gonzalez is a 60 y.o. female.    Vitals:  height is 5' 2" (1.575 m) and weight is 63.5 kg (140 lb). Her temperature is 99 °F (37.2 °C). Her blood pressure is 116/73 and her pulse is 79. Her respiration is 16 and oxygen saturation is 97%.     Chief Complaint: Dysuria    This is a 60 year old female who presents today with multiple chief complaints. She reports that she has a very itch rash of her hands that began three weeks ago. She states that she did use tylenol and shortly after her hands started burning and itching, she has taken tylenol in the past without any reactions. She states now the rash is in the webs of her fingers.  does not have rash at this time. States she checked her bed and did not notice anything unusual.   She also states she has odorous urine, suprapubic discomfort, and discomfort with urination that began 5 days ago. No discharge. No concerns for STD's. Reports hx of frequent UTI's.       Dysuria   This is a new problem. The current episode started in the past 7 days. The problem occurs every urination. The problem has been unchanged. The quality of the pain is described as burning. The patient is experiencing no pain. There has been no fever. Pertinent negatives include no chills, frequency, hematuria, nausea, urgency, vomiting or rash. She has tried nothing for the symptoms.       Constitution: Negative for chills and fever.   Neck: Negative for painful lymph nodes.   Gastrointestinal: Negative for abdominal pain, nausea and vomiting.   Genitourinary: Positive for dysuria, vaginal odor and pelvic pain. Negative for frequency, urgency, urine decreased, hematuria, history of kidney stones, painful menstruation, irregular menstruation, missed menses, heavy menstrual bleeding, ovarian cysts, genital trauma, vaginal pain, vaginal discharge, vaginal bleeding, painful intercourse, genital sore and painful ejaculation.   Musculoskeletal: Negative " for back pain.   Skin: Negative for rash and lesion.   Hematologic/Lymphatic: Negative for swollen lymph nodes.       Objective:      Physical Exam   Constitutional: She is oriented to person, place, and time. She appears well-developed. She is cooperative.  Non-toxic appearance. She does not appear ill. No distress.   HENT:   Head: Normocephalic and atraumatic.   Right Ear: Hearing, tympanic membrane, external ear and ear canal normal.   Left Ear: Hearing, tympanic membrane, external ear and ear canal normal.   Nose: Nose normal. No mucosal edema, rhinorrhea or nasal deformity. No epistaxis. Right sinus exhibits no maxillary sinus tenderness and no frontal sinus tenderness. Left sinus exhibits no maxillary sinus tenderness and no frontal sinus tenderness.   Mouth/Throat: Uvula is midline, oropharynx is clear and moist and mucous membranes are normal. No trismus in the jaw. Normal dentition. No uvula swelling. No posterior oropharyngeal erythema.   Eyes: Conjunctivae and lids are normal. Right eye exhibits no discharge. Left eye exhibits no discharge. No scleral icterus.   Neck: Trachea normal, normal range of motion, full passive range of motion without pain and phonation normal. Neck supple.   Cardiovascular: Normal rate, regular rhythm, normal heart sounds and normal pulses.   Pulmonary/Chest: Effort normal and breath sounds normal. No respiratory distress.   Abdominal: Soft. Normal appearance and bowel sounds are normal. She exhibits no distension, no pulsatile midline mass and no mass. There is no abdominal tenderness.   Musculoskeletal: Normal range of motion.         General: No deformity.   Neurological: She is alert and oriented to person, place, and time. She exhibits normal muscle tone. Coordination normal.   Skin: Skin is warm, dry, intact, not diaphoretic, not pale and rash.        Psychiatric: Her speech is normal and behavior is normal. Judgment and thought content normal.   Nursing note and vitals  "reviewed.        Results for orders placed or performed in visit on 06/22/20   POCT Urinalysis, Dipstick, Automated, W/O Scope   Result Value Ref Range    POC Blood, Urine Negative Negative    POC Bilirubin, Urine Negative Negative    POC Urobilinogen, Urine n 0.1 - 1.1    POC Ketones, Urine Positive (A) Negative    POC Protein, Urine Negative Negative    POC Nitrates, Urine Negative Negative    POC Glucose, Urine Negative Negative    pH, UA 8.0 5 - 8    POC Specific Gravity, Urine 1.015 1.003 - 1.029    POC Leukocytes, Urine Negative Negative       Assessment:       1. History of recurrent UTI (urinary tract infection)    2. Rash of hands        Plan:         Scabies versus allergic dermatitis from hand . Wait and see provided for UTI with hx of recurrence and complaints of back pain yesterday and feeling "feverish". No CVA tenderness on exam today, UA unremarkable. Culture sent.     History of recurrent UTI (urinary tract infection)  -     POCT Urinalysis, Dipstick, Automated, W/O Scope  -     Urine culture  -     ciprofloxacin HCl (CIPRO) 500 MG tablet; Take 1 tablet (500 mg total) by mouth 2 (two) times daily. for 7 days  Dispense: 14 tablet; Refill: 0    Rash of hands  -     triamcinolone acetonide 0.1% (KENALOG) 0.1 % cream; Apply topically 2 (two) times daily. for 10 days  Dispense: 15 g; Refill: 0  -     permethrin (ELIMITE) 5 % cream; Apply topically once. Apply throughout body and leave it on for 8 - 12  Hours then rinse off. for 1 dose  Dispense: 60 g; Refill: 0            Patient Instructions   We will call in the next 3 days with your culture results  Take zyrtec, Claritin or benadryl for itching.      You must understand that you've received an Urgent Care treatment only and that you may be released before all your medical problems are known or treated. You, the patient, will arrange for follow up care as instructed.  If your condition worsens we recommend that you receive another evaluation " at the emergency room immediately or contact your primary medical clinics after hours call service to discuss your concerns.  Please return here or go to the Emergency Department for any concerns or worsening of condition.              Scabies  Scabies is a skin infection. It is caused by a tiny parasitic insect, or mite, that is too small to see directly. It can be seen under a microscope, but it is usually recognized only by the rash and symptoms it causes. This can make it hard to diagnose since the signs and symptoms can be similar to other diseases.  The scabies mite tunnels under the skin. It creates a small burrow, where it leaves its eggs. These eggs salas and grow into adults. They then create new burrows over the next 1 to 2 weeks. The mites die in about 4 to 6 weeks. The rash and itching are caused by an allergic reaction to the scabies saliva or feces.  Scabies is highly contagious. It is spread by direct skin contact. It is easily spread by close personal contact, sexual contact, or by sharing bed linens or clothing used by an infected person.  It may take 4 to 6 weeks for symptoms to appear after being exposed. Everyone living in the house with you, as well as your sexual partners, should be treated at the same time. After the first treatment, you will no longer be contagious. You may return to work, school or .  Home care  · Machine wash in hot water all sheets, towels, pillowcases, underwear, pajamas, and any other clothing you have worn lately. Use the hot cycle of a dryer or use a hot iron to sterilize.  · Seal anything that is hard to wash in a plastic trash bag for 4 days. This includes coats, jackets, blankets, and bedspreads. (The insects die after 3 days off the human body.)  Medicines  Scabicides  Medicines used to treat scabies are called scabicides. These are creams that kill the scabies mites. A prescription is needed. When using these medicines:  · Always follow instructions  provided by your healthcare provider and pharmacist. Also follow the printed instructions that come with the medicine.  · Talk with your provider about precautions to take when using these medicines.  · Use the cream on your body when your skin is cool and dry. Dont use it after a hot shower or bath.  · Usually the cream is put on your whole body. This means from your chin all the way down to your toes. Scabies does not usually affect an adults head. So cream is not needed there. For children, discuss this with your childs provider.  · Leave the cream or lotion on for the recommended amount of time. This is usually 8 to 12 hours.  · Dont leave cream or lotion on your skin longer than directed. Dont use more than recommended.  · Clean clothes should be worn after the treatment.  · If you wash your hands after using the cream, you will need to reapply the cream to your hands.  · If you are breastfeeding, wash off your nipples before feeding. Then reapply the cream after breastfeeding.  · For babies or infants, put mittens on their hands. This will stop them from licking the cream or lotion. It will also stop them from scratching themselves because of the itching.  Other medicines  · An oral medicine called ivermectin may be prescribed for severe cases. It may also be used if you cant apply creams.  · Itching may cause the most discomfort. If large areas of your skin are affected, over-the-counter antihistamines may be used to reduce itching. Or you may be given a prescription antihistamine. Some of these medicines may make you sleepy. They are best used at bedtime. Antihistamines that dont make you sleepy can be used during the day. Note: Dont use medicine that has diphenhydramine if you have glaucoma, or if you are a man who has trouble urinating due to an enlarged prostate.  · If you were given antibiotics due to a bacterial infection, take them until they are finished. It is important to finish the  antibiotics even if the wound looks better. This is to make sure the infection has cleared.  Follow-up care  Follow up with your healthcare provider, or as advised. Call your provider if your symptoms dont improve after 1 week, or if new burrows or rashes appear.  When to seek medical advice  Call your healthcare provider right away if any of these occur:  · Yellow-brown crusts or drainage from the sores  · Other signs of infection, including increasing redness, swelling, pain, or pus  · Fever of 100.4°F (38ºC) or higher, or as directed by your provider  Date Last Reviewed: 8/1/2016 © 2000-2017 [employer] / ?????.??. [address] [place] [address], [place] [address]. All rights reserved. This information is not intended as a substitute for professional medical care. Always follow your healthcare professional's instructions.        Dysuria with Uncertain Cause (Adult)    The urethra is the tube that allows urine to pass out of the body. In a woman, the urethra is the opening above the vagina. In men, the urethra is the opening on the tip of the penis. Dysuria is the feeling of pain or burning in the urethra when passing urine.  Dysuria can be caused by anything that irritates or inflames the urethra. An infection or chemical irritation can cause this reaction. A bladder infection is the most common cause of dysuria in adults. A urine test can diagnose this. A bladder infection needs antibiotic treatment.  Soaps, lotions, colognes and feminine hygiene products can cause dysuria. So can birth control jellies, creams, and foams. It will go away 1 to 3 days after using these irritants.  Sexually transmitted diseases (STDs) such as chlamydia or gonorrhea can cause dysuria. Your healthcare provider may take a culture sample. Your provider may start you on antibiotic medicine before the culture test returns.  In women who have gone through menopause, dysuria can be from dryness in the lining of the urethra. This can be  treated with hormones. Dysuria becomes long-term (chronic) when it lasts for weeks or months. You may need to see a specialist (urologist) to diagnose and treat chronic dysuria.  Home care  These home care tips may help:  · Don't use any chemicals or products that you think may be causing your symptoms.  · If you were given a prescription medicine, take as directed. Be sure to take it until it is all used up.  · If a culture was taken, don't have sex until you have been told that it is negative. This means you don't have an infection. Then follow your healthcare provider's advice to treat your condition.  If a culture was done and it is positive:  · Both you and your sexual partner may need to be treated. This is true even if your partner has no symptoms.  · Contact your healthcare provider or go to an urgent care clinic or the public health department to be looked at and treated.  · Don't have sex until both you and your partner(s) have finished all antibiotics and your healthcare provider says you are no longer contagious.  · Learn about and use safe sex practices. The safest sex is with a partner who has tested negative and only has sex with you. Condoms can prevent STDs from spreading, but they aren't a guarantee.  Follow-up care  Follow up with your healthcare provider, or as advised. If a culture was taken, you may call as directed for the results. If you have an STD, follow up with your provider or the public health department for a complete STD screening, including HIV testing. For more information, contact CDC-INFO at 899-915-6087.  When to seek medical advice  Call your healthcare provider right away if any of these occur:  · You aren't better after 3 days of treatment  · Fever of 100.4ºF (38ºC) or higher, or as directed by your healthcare provider  · Back or belly pain that gets worse  · You can't urinate because of pain  · New discharge from the urethra, vagina, or penis  · Painful sores on the  penis  · Rash or joint pain  · Painful lumps (lymph nodes) in the groin  · Testicle pain or swelling of the scrotum  Date Last Reviewed: 11/1/2016  © 7195-6169 [employer]. [address] [place] [address], [place] [address]. All rights reserved. This information is not intended as a substitute for professional medical care. Always follow your healthcare professional's instructions.

## 2020-06-24 ENCOUNTER — TELEPHONE (OUTPATIENT)
Dept: URGENT CARE | Facility: CLINIC | Age: 60
End: 2020-06-24

## 2020-06-24 DIAGNOSIS — L30.9 CHRONIC DERMATITIS OF HANDS: Primary | ICD-10-CM

## 2020-06-24 LAB
BACTERIA UR CULT: NORMAL
BACTERIA UR CULT: NORMAL

## 2020-06-24 RX ORDER — TRIAMCINOLONE ACETONIDE 1 MG/G
CREAM TOPICAL 2 TIMES DAILY
Qty: 30 G | Refills: 0 | Status: SHIPPED | OUTPATIENT
Start: 2020-06-24 | End: 2021-03-08

## 2020-06-24 NOTE — TELEPHONE ENCOUNTER
Culture with no bacteria isolated. Patient did start to take cipro and reports feeling better. She states that she no longer has suprapubic discomfort. She will follow up with UROGYN if she continues to have symptoms. She reports that rash to hands resolved within a couple of hours after use of kenalog cream. All questions answered throughout call.

## 2020-08-17 LAB — HPV HIGH RISK INTERP: NEGATIVE

## 2020-09-18 ENCOUNTER — OFFICE VISIT (OUTPATIENT)
Dept: URGENT CARE | Facility: CLINIC | Age: 60
End: 2020-09-18
Payer: COMMERCIAL

## 2020-09-18 VITALS
DIASTOLIC BLOOD PRESSURE: 76 MMHG | SYSTOLIC BLOOD PRESSURE: 119 MMHG | OXYGEN SATURATION: 99 % | BODY MASS INDEX: 25.76 KG/M2 | TEMPERATURE: 97 F | HEART RATE: 67 BPM | WEIGHT: 140 LBS | HEIGHT: 62 IN | RESPIRATION RATE: 19 BRPM

## 2020-09-18 DIAGNOSIS — J06.9 VIRAL URI WITH COUGH: Primary | ICD-10-CM

## 2020-09-18 DIAGNOSIS — J02.9 SORE THROAT: ICD-10-CM

## 2020-09-18 LAB
CTP QC/QA: YES
SARS-COV-2 RDRP RESP QL NAA+PROBE: NEGATIVE

## 2020-09-18 PROCEDURE — 99214 PR OFFICE/OUTPT VISIT, EST, LEVL IV, 30-39 MIN: ICD-10-PCS | Mod: S$GLB,,, | Performed by: NURSE PRACTITIONER

## 2020-09-18 PROCEDURE — U0002: ICD-10-PCS | Mod: S$GLB,,, | Performed by: NURSE PRACTITIONER

## 2020-09-18 PROCEDURE — U0002 COVID-19 LAB TEST NON-CDC: HCPCS | Mod: S$GLB,,, | Performed by: NURSE PRACTITIONER

## 2020-09-18 PROCEDURE — 99214 OFFICE O/P EST MOD 30 MIN: CPT | Mod: S$GLB,,, | Performed by: NURSE PRACTITIONER

## 2020-09-18 RX ORDER — FLUTICASONE PROPIONATE 50 MCG
2 SPRAY, SUSPENSION (ML) NASAL DAILY
Qty: 16 ML | Refills: 0 | Status: SHIPPED | OUTPATIENT
Start: 2020-09-18 | End: 2021-03-29

## 2020-09-18 NOTE — PATIENT INSTRUCTIONS
"                                                         URI   If your condition worsens or fails to improve we recommend that you receive another evaluation at the ER immediately or contact your PCP to discuss your concerns or return here. You must understand that you've received an urgent care treatment only and that you may be released before all your medical problems are known or treated. You the patient will arrange for follouwp care as instructed.   If we discussed that I think your illness is viral, it will not respond to antibiotics and will last 10-14 days.   Flonase (fluticasone) is a nasal spray which is available over the counter and may help with your symptoms.   Zyrtec D, Claritin D or Allegra D can also help with symptoms of congestion and drainage.   If you have hypertension avoid using the "D" which is the decongestant   If you just have drainage you can take plain zyrtec, claritin or allegra   If you just have a congested feeling you can take pseudoephedrine (unless you have high blood pressure) which you have to sign for behind the counter. Do not buy the phenylephrine which is on the shelf as it is not effective   Rest and fluids are also important.   Tylenol or ibuprofen can also be used as directed for pain unless you have an allergy to them or medical condition such as stomach ulcers, kidney or liver disease or blood thinners etc for which you should not be taking these type of medications.       Viral Upper Respiratory Illness (Adult)  You have a viral upper respiratory illness (URI), which is another term for the common cold. This illness is contagious during the first few days. It is spread through the air by coughing and sneezing. It may also be spread by direct contact (touching the sick person and then touching your own eyes, nose, or mouth). Frequent handwashing will decrease risk of spread. Most viral illnesses go away within 7 to 10 days with rest and simple home remedies. Sometimes " the illness may last for several weeks. Antibiotics will not kill a virus, and they are generally not prescribed for this condition.    Home care  · If symptoms are severe, rest at home for the first 2 to 3 days. When you resume activity, don't let yourself get too tired.  · Avoid being exposed to cigarette smoke (yours or others).  · You may use acetaminophen or ibuprofen to control pain and fever, unless another medicine was prescribed. (Note: If you have chronic liver or kidney disease, have ever had a stomach ulcer or gastrointestinal bleeding, or are taking blood-thinning medicines, talk with your healthcare provider before using these medicines.) Aspirin should never be given to anyone under 18 years of age who is ill with a viral infection or fever. It may cause severe liver or brain damage.  · Your appetite may be poor, so a light diet is fine. Avoid dehydration by drinking 6 to 8 glasses of fluids per day (water, soft drinks, juices, tea, or soup). Extra fluids will help loosen secretions in the nose and lungs.  · Over-the-counter cold medicines will not shorten the length of time youre sick, but they may be helpful for the following symptoms: cough, sore throat, and nasal and sinus congestion. (Note: Do not use decongestants if you have high blood pressure.)  Follow-up care  Follow up with your healthcare provider, or as advised.  When to seek medical advice  Call your healthcare provider right away if any of these occur:  · Cough with lots of colored sputum (mucus)  · Severe headache; face, neck, or ear pain  · Difficulty swallowing due to throat pain  · Fever of 100.4°F (38°C)  Call 911, or get immediate medical care  Call emergency services right away if any of these occur:  · Chest pain, shortness of breath, wheezing, or difficulty breathing  · Coughing up blood  · Inability to swallow due to throat pain  Date Last Reviewed: 9/13/2015  © 7389-6151 [employer]. [address] [place] [address],  [place] [address]. All rights reserved. This information is not intended as a substitute for professional medical care. Always follow your healthcare professional's instructions.

## 2020-09-18 NOTE — PROGRESS NOTES
"Subjective:       Patient ID: Cristal Gonzalez is a 60 y.o. female.    Vitals:  height is 5' 2" (1.575 m) and weight is 63.5 kg (140 lb). Her temperature is 97.2 °F (36.2 °C). Her blood pressure is 119/76 and her pulse is 67. Her respiration is 19 and oxygen saturation is 99%.     Chief Complaint: Sinus Problem    Pt states sinus congestion and pressure with chills, cough, fatigue, slight sore throat x 1-5 days.  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    Sinus Problem  This is a new problem. The current episode started in the past 7 days. The problem is unchanged. There has been no fever. Associated symptoms include chills, congestion, coughing, headaches, sinus pressure and a sore throat. Pertinent negatives include no shortness of breath. Treatments tried: ibuprofen. The treatment provided moderate relief.       Constitution: Positive for chills and fatigue. Negative for fever.   HENT: Positive for congestion, sinus pressure and sore throat.    Neck: Negative for painful lymph nodes.   Cardiovascular: Negative for chest pain and leg swelling.   Eyes: Negative for double vision and blurred vision.   Respiratory: Positive for cough. Negative for shortness of breath.    Gastrointestinal: Negative for nausea, vomiting and diarrhea.   Genitourinary: Negative for dysuria, frequency, urgency and history of kidney stones.   Musculoskeletal: Negative for joint pain, joint swelling, muscle cramps and muscle ache.   Skin: Negative for color change, pale, rash and bruising.   Allergic/Immunologic: Negative for seasonal allergies.   Neurological: Positive for headaches. Negative for dizziness, history of vertigo, light-headedness and passing out.   Hematologic/Lymphatic: Negative for swollen lymph nodes.   Psychiatric/Behavioral: Negative for nervous/anxious, sleep disturbance and depression. The patient is not nervous/anxious.        Objective:      Physical Exam   Constitutional: She is oriented to person, place, and " time. She appears well-developed. She is cooperative.  Non-toxic appearance. She does not appear ill. No distress.   HENT:   Head: Normocephalic and atraumatic.   Ears:   Right Ear: Hearing, tympanic membrane, external ear and ear canal normal.   Left Ear: Hearing, tympanic membrane, external ear and ear canal normal.   Nose: Nose normal. No mucosal edema, rhinorrhea or nasal deformity. No epistaxis. Right sinus exhibits no maxillary sinus tenderness and no frontal sinus tenderness. Left sinus exhibits no maxillary sinus tenderness and no frontal sinus tenderness.   Mouth/Throat: Uvula is midline, oropharynx is clear and moist and mucous membranes are normal. No trismus in the jaw. Normal dentition. No uvula swelling. No oropharyngeal exudate, posterior oropharyngeal edema or posterior oropharyngeal erythema.   Eyes: Conjunctivae and lids are normal. No scleral icterus.   Neck: Trachea normal, full passive range of motion without pain and phonation normal. Neck supple. No neck rigidity. No edema and no erythema present.   Cardiovascular: Normal rate, regular rhythm, normal heart sounds and normal pulses.   Pulmonary/Chest: Effort normal and breath sounds normal. No tachypnea. No respiratory distress. She has no decreased breath sounds. She has no wheezes. She has no rhonchi.   Abdominal: Normal appearance.   Musculoskeletal: Normal range of motion.         General: No deformity.   Neurological: She is alert and oriented to person, place, and time. She exhibits normal muscle tone. Coordination normal.   Skin: Skin is warm, dry, intact, not diaphoretic and not pale. Psychiatric: Her speech is normal and behavior is normal. Judgment and thought content normal.   Nursing note and vitals reviewed.    Results for orders placed or performed in visit on 09/18/20   POCT COVID-19 Rapid Screening   Result Value Ref Range    POC Rapid COVID Negative Negative     Acceptable Yes          Assessment:       1. Viral  "URI with cough    2. Sore throat        Plan:       Covid 19 reviewed.  Viral URI with cough  -     fluticasone propionate (FLONASE) 50 mcg/actuation nasal spray; 2 sprays (100 mcg total) by Each Nostril route once daily.  Dispense: 16 mL; Refill: 0    Sore throat  -     POCT COVID-19 Rapid Screening      Patient Instructions                                                            URI   If your condition worsens or fails to improve we recommend that you receive another evaluation at the ER immediately or contact your PCP to discuss your concerns or return here. You must understand that you've received an urgent care treatment only and that you may be released before all your medical problems are known or treated. You the patient will arrange for [facility] care as instructed.   If we discussed that I think your illness is viral, it will not respond to antibiotics and will last 10-14 days.   Flonase (fluticasone) is a nasal spray which is available over the counter and may help with your symptoms.   Zyrtec D, Claritin D or Allegra D can also help with symptoms of congestion and drainage.   If you have hypertension avoid using the "D" which is the decongestant   If you just have drainage you can take plain zyrtec, claritin or allegra   If you just have a congested feeling you can take pseudoephedrine (unless you have high blood pressure) which you have to sign for behind the counter. Do not buy the phenylephrine which is on the shelf as it is not effective   Rest and fluids are also important.   Tylenol or ibuprofen can also be used as directed for pain unless you have an allergy to them or medical condition such as stomach ulcers, kidney or liver disease or blood thinners etc for which you should not be taking these type of medications.       Viral Upper Respiratory Illness (Adult)  You have a viral upper respiratory illness (URI), which is another term for the common cold. This illness is contagious during the " first few days. It is spread through the air by coughing and sneezing. It may also be spread by direct contact (touching the sick person and then touching your own eyes, nose, or mouth). Frequent handwashing will decrease risk of spread. Most viral illnesses go away within 7 to 10 days with rest and simple home remedies. Sometimes the illness may last for several weeks. Antibiotics will not kill a virus, and they are generally not prescribed for this condition.    Home care  · If symptoms are severe, rest at home for the first 2 to 3 days. When you resume activity, don't let yourself get too tired.  · Avoid being exposed to cigarette smoke (yours or others).  · You may use acetaminophen or ibuprofen to control pain and fever, unless another medicine was prescribed. (Note: If you have chronic liver or kidney disease, have ever had a stomach ulcer or gastrointestinal bleeding, or are taking blood-thinning medicines, talk with your healthcare provider before using these medicines.) Aspirin should never be given to anyone under 18 years of age who is ill with a viral infection or fever. It may cause severe liver or brain damage.  · Your appetite may be poor, so a light diet is fine. Avoid dehydration by drinking 6 to 8 glasses of fluids per day (water, soft drinks, juices, tea, or soup). Extra fluids will help loosen secretions in the nose and lungs.  · Over-the-counter cold medicines will not shorten the length of time youre sick, but they may be helpful for the following symptoms: cough, sore throat, and nasal and sinus congestion. (Note: Do not use decongestants if you have high blood pressure.)  Follow-up care  Follow up with your healthcare provider, or as advised.  When to seek medical advice  Call your healthcare provider right away if any of these occur:  · Cough with lots of colored sputum (mucus)  · Severe headache; face, neck, or ear pain  · Difficulty swallowing due to throat pain  · Fever of 100.4°F  (38°C)  Call 911, or get immediate medical care  Call emergency services right away if any of these occur:  · Chest pain, shortness of breath, wheezing, or difficulty breathing  · Coughing up blood  · Inability to swallow due to throat pain  Date Last Reviewed: 9/13/2015  © 1028-4281 [employer]. [address] [place] [address], [place] [address]. All rights reserved. This information is not intended as a substitute for professional medical care. Always follow your healthcare professional's instructions.

## 2020-10-08 ENCOUNTER — TELEPHONE (OUTPATIENT)
Dept: PRIMARY CARE CLINIC | Facility: CLINIC | Age: 60
End: 2020-10-08

## 2020-10-08 NOTE — TELEPHONE ENCOUNTER
----- Message from Corey Thurman sent at 10/8/2020  9:54 AM CDT -----  Regarding: Pt [phone]  Caller is requesting an earlier appt than we can schedule.  Caller declined first available appointment listed below. Caller will not accept being placed on the wait list and is requesting a message be sent to the provider.  When is the next available appointment:  11/11/20  Did you offer to schedule the next available appt and put the patient on the wait list?:   Yes, she declined  What visit type: EP  Symptoms:  Heart palpitations and lightheadeness in the evening after she eats  Patient preference of timeframe to be scheduled:  Monday 10/12/20 anytime or Thursday 10/15/20 anytime  What is the reason the patient is requesting a sooner appointment? (insurance terminating, changing jobs):  She prefers to see you  Would the patient rather a call back or a response via [employer]?:

## 2020-10-08 NOTE — TELEPHONE ENCOUNTER
Pt is wanting to get scheduled for heart palpitations and light headedness. She we schedule or advise to ER

## 2020-12-02 ENCOUNTER — PATIENT MESSAGE (OUTPATIENT)
Dept: ADMINISTRATIVE | Facility: HOSPITAL | Age: 60
End: 2020-12-02

## 2020-12-10 ENCOUNTER — LAB VISIT (OUTPATIENT)
Dept: PRIMARY CARE CLINIC | Facility: OTHER | Age: 60
End: 2020-12-10
Attending: INTERNAL MEDICINE
Payer: COMMERCIAL

## 2020-12-10 DIAGNOSIS — Z03.818 ENCOUNTER FOR OBSERVATION FOR SUSPECTED EXPOSURE TO OTHER BIOLOGICAL AGENTS RULED OUT: ICD-10-CM

## 2020-12-10 PROCEDURE — U0003 INFECTIOUS AGENT DETECTION BY NUCLEIC ACID (DNA OR RNA); SEVERE ACUTE RESPIRATORY SYNDROME CORONAVIRUS 2 (SARS-COV-2) (CORONAVIRUS DISEASE [COVID-19]), AMPLIFIED PROBE TECHNIQUE, MAKING USE OF HIGH THROUGHPUT TECHNOLOGIES AS DESCRIBED BY CMS-2020-01-R: HCPCS

## 2020-12-11 LAB — SARS-COV-2 RNA RESP QL NAA+PROBE: NOT DETECTED

## 2020-12-21 ENCOUNTER — LAB VISIT (OUTPATIENT)
Dept: PRIMARY CARE CLINIC | Facility: OTHER | Age: 60
End: 2020-12-21
Attending: INTERNAL MEDICINE
Payer: COMMERCIAL

## 2020-12-21 DIAGNOSIS — Z03.818 ENCOUNTER FOR OBSERVATION FOR SUSPECTED EXPOSURE TO OTHER BIOLOGICAL AGENTS RULED OUT: ICD-10-CM

## 2020-12-21 PROCEDURE — U0003 INFECTIOUS AGENT DETECTION BY NUCLEIC ACID (DNA OR RNA); SEVERE ACUTE RESPIRATORY SYNDROME CORONAVIRUS 2 (SARS-COV-2) (CORONAVIRUS DISEASE [COVID-19]), AMPLIFIED PROBE TECHNIQUE, MAKING USE OF HIGH THROUGHPUT TECHNOLOGIES AS DESCRIBED BY CMS-2020-01-R: HCPCS

## 2020-12-22 LAB — SARS-COV-2 RNA RESP QL NAA+PROBE: NOT DETECTED

## 2021-01-04 ENCOUNTER — PATIENT MESSAGE (OUTPATIENT)
Dept: ADMINISTRATIVE | Facility: HOSPITAL | Age: 61
End: 2021-01-04

## 2021-03-25 ENCOUNTER — TELEPHONE (OUTPATIENT)
Dept: PRIMARY CARE CLINIC | Facility: CLINIC | Age: 61
End: 2021-03-25

## 2021-03-25 DIAGNOSIS — Z00.00 ROUTINE GENERAL MEDICAL EXAMINATION AT A HEALTH CARE FACILITY: Primary | ICD-10-CM

## 2021-03-25 DIAGNOSIS — Z12.31 SCREENING MAMMOGRAM FOR HIGH-RISK PATIENT: Primary | ICD-10-CM

## 2021-03-29 ENCOUNTER — PATIENT OUTREACH (OUTPATIENT)
Dept: ADMINISTRATIVE | Facility: OTHER | Age: 61
End: 2021-03-29

## 2021-03-29 ENCOUNTER — OFFICE VISIT (OUTPATIENT)
Dept: ORTHOPEDICS | Facility: CLINIC | Age: 61
End: 2021-03-29
Payer: COMMERCIAL

## 2021-03-29 ENCOUNTER — HOSPITAL ENCOUNTER (OUTPATIENT)
Dept: RADIOLOGY | Facility: HOSPITAL | Age: 61
Discharge: HOME | End: 2021-03-29
Attending: PHYSICIAN ASSISTANT
Payer: COMMERCIAL

## 2021-03-29 ENCOUNTER — TELEPHONE (OUTPATIENT)
Dept: ORTHOPEDICS | Facility: CLINIC | Age: 61
End: 2021-03-29

## 2021-03-29 VITALS
HEIGHT: 62 IN | TEMPERATURE: 97 F | SYSTOLIC BLOOD PRESSURE: 125 MMHG | RESPIRATION RATE: 18 BRPM | DIASTOLIC BLOOD PRESSURE: 77 MMHG | BODY MASS INDEX: 25.76 KG/M2 | WEIGHT: 140 LBS | HEART RATE: 59 BPM

## 2021-03-29 DIAGNOSIS — M25.561 ACUTE PAIN OF RIGHT KNEE: ICD-10-CM

## 2021-03-29 DIAGNOSIS — M17.11 OSTEOARTHRITIS OF RIGHT KNEE, UNSPECIFIED OSTEOARTHRITIS TYPE: ICD-10-CM

## 2021-03-29 DIAGNOSIS — Z12.11 ENCOUNTER FOR FIT (FECAL IMMUNOCHEMICAL TEST) SCREENING: Primary | ICD-10-CM

## 2021-03-29 DIAGNOSIS — M25.561 ACUTE PAIN OF RIGHT KNEE: Primary | ICD-10-CM

## 2021-03-29 PROCEDURE — 3074F PR MOST RECENT SYSTOLIC BLOOD PRESSURE < 130 MM HG: ICD-10-PCS | Mod: CPTII,S$GLB,, | Performed by: PHYSICIAN ASSISTANT

## 2021-03-29 PROCEDURE — 99213 PR OFFICE/OUTPT VISIT, EST, LEVL III, 20-29 MIN: ICD-10-PCS | Mod: S$GLB,,, | Performed by: PHYSICIAN ASSISTANT

## 2021-03-29 PROCEDURE — 1125F AMNT PAIN NOTED PAIN PRSNT: CPT | Mod: S$GLB,,, | Performed by: PHYSICIAN ASSISTANT

## 2021-03-29 PROCEDURE — 99999 PR PBB SHADOW E&M-EST. PATIENT-LVL III: CPT | Mod: PBBFAC,,, | Performed by: PHYSICIAN ASSISTANT

## 2021-03-29 PROCEDURE — 73564 X-RAY EXAM KNEE 4 OR MORE: CPT | Mod: 26,RT,, | Performed by: RADIOLOGY

## 2021-03-29 PROCEDURE — 99213 OFFICE O/P EST LOW 20 MIN: CPT | Mod: S$GLB,,, | Performed by: PHYSICIAN ASSISTANT

## 2021-03-29 PROCEDURE — 73564 X-RAY EXAM KNEE 4 OR MORE: CPT | Mod: TC,RT

## 2021-03-29 PROCEDURE — 73564 XR KNEE ORTHO RIGHT WITH FLEXION: ICD-10-PCS | Mod: 26,RT,, | Performed by: RADIOLOGY

## 2021-03-29 PROCEDURE — 99999 PR PBB SHADOW E&M-EST. PATIENT-LVL III: ICD-10-PCS | Mod: PBBFAC,,, | Performed by: PHYSICIAN ASSISTANT

## 2021-03-29 PROCEDURE — 3074F SYST BP LT 130 MM HG: CPT | Mod: CPTII,S$GLB,, | Performed by: PHYSICIAN ASSISTANT

## 2021-03-29 PROCEDURE — 3078F PR MOST RECENT DIASTOLIC BLOOD PRESSURE < 80 MM HG: ICD-10-PCS | Mod: CPTII,S$GLB,, | Performed by: PHYSICIAN ASSISTANT

## 2021-03-29 PROCEDURE — 3008F BODY MASS INDEX DOCD: CPT | Mod: CPTII,S$GLB,, | Performed by: PHYSICIAN ASSISTANT

## 2021-03-29 PROCEDURE — 3078F DIAST BP <80 MM HG: CPT | Mod: CPTII,S$GLB,, | Performed by: PHYSICIAN ASSISTANT

## 2021-03-29 PROCEDURE — 1125F PR PAIN SEVERITY QUANTIFIED, PAIN PRESENT: ICD-10-PCS | Mod: S$GLB,,, | Performed by: PHYSICIAN ASSISTANT

## 2021-03-29 PROCEDURE — 3008F PR BODY MASS INDEX (BMI) DOCUMENTED: ICD-10-PCS | Mod: CPTII,S$GLB,, | Performed by: PHYSICIAN ASSISTANT

## 2021-04-05 ENCOUNTER — PATIENT MESSAGE (OUTPATIENT)
Dept: ADMINISTRATIVE | Facility: HOSPITAL | Age: 61
End: 2021-04-05

## 2021-04-16 ENCOUNTER — HOSPITAL ENCOUNTER (OUTPATIENT)
Dept: RADIOLOGY | Facility: HOSPITAL | Age: 61
Discharge: HOME | End: 2021-04-16
Attending: OBSTETRICS & GYNECOLOGY
Payer: COMMERCIAL

## 2021-04-16 DIAGNOSIS — Z12.31 SCREENING MAMMOGRAM FOR HIGH-RISK PATIENT: ICD-10-CM

## 2021-04-16 PROCEDURE — 77067 SCR MAMMO BI INCL CAD: CPT | Mod: TC,PO

## 2021-04-29 ENCOUNTER — LAB VISIT (OUTPATIENT)
Dept: LAB | Facility: HOSPITAL | Age: 61
End: 2021-04-29
Attending: FAMILY MEDICINE
Payer: COMMERCIAL

## 2021-04-29 DIAGNOSIS — Z00.00 ROUTINE GENERAL MEDICAL EXAMINATION AT A HEALTH CARE FACILITY: ICD-10-CM

## 2021-04-29 LAB
ALBUMIN SERPL BCP-MCNC: 3.9 G/DL (ref 3.5–5.2)
ALP SERPL-CCNC: 46 U/L (ref 55–135)
ALT SERPL W/O P-5'-P-CCNC: 14 U/L (ref 10–44)
ANION GAP SERPL CALC-SCNC: 7 MMOL/L (ref 8–16)
AST SERPL-CCNC: 20 U/L (ref 10–40)
BASOPHILS # BLD AUTO: 0.05 K/UL (ref 0–0.2)
BASOPHILS NFR BLD: 0.9 % (ref 0–1.9)
BILIRUB SERPL-MCNC: 0.7 MG/DL (ref 0.1–1)
BUN SERPL-MCNC: 21 MG/DL (ref 6–20)
CALCIUM SERPL-MCNC: 8.9 MG/DL (ref 8.7–10.5)
CHLORIDE SERPL-SCNC: 107 MMOL/L (ref 95–110)
CHOLEST SERPL-MCNC: 152 MG/DL (ref 120–199)
CHOLEST/HDLC SERPL: 1.8 {RATIO} (ref 2–5)
CO2 SERPL-SCNC: 26 MMOL/L (ref 23–29)
CREAT SERPL-MCNC: 0.7 MG/DL (ref 0.5–1.4)
DIFFERENTIAL METHOD: ABNORMAL
EOSINOPHIL # BLD AUTO: 0.2 K/UL (ref 0–0.5)
EOSINOPHIL NFR BLD: 3.4 % (ref 0–8)
ERYTHROCYTE [DISTWIDTH] IN BLOOD BY AUTOMATED COUNT: 12.4 % (ref 11.5–14.5)
EST. GFR  (AFRICAN AMERICAN): >60 ML/MIN/1.73 M^2
EST. GFR  (NON AFRICAN AMERICAN): >60 ML/MIN/1.73 M^2
GLUCOSE SERPL-MCNC: 97 MG/DL (ref 70–110)
HCT VFR BLD AUTO: 40 % (ref 37–48.5)
HDLC SERPL-MCNC: 86 MG/DL (ref 40–75)
HDLC SERPL: 56.6 % (ref 20–50)
HGB BLD-MCNC: 13.3 G/DL (ref 12–16)
IMM GRANULOCYTES # BLD AUTO: 0.01 K/UL (ref 0–0.04)
IMM GRANULOCYTES NFR BLD AUTO: 0.2 % (ref 0–0.5)
LDLC SERPL CALC-MCNC: 60.8 MG/DL (ref 63–159)
LYMPHOCYTES # BLD AUTO: 2 K/UL (ref 1–4.8)
LYMPHOCYTES NFR BLD: 35.6 % (ref 18–48)
MCH RBC QN AUTO: 31.4 PG (ref 27–31)
MCHC RBC AUTO-ENTMCNC: 33.3 G/DL (ref 32–36)
MCV RBC AUTO: 95 FL (ref 82–98)
MONOCYTES # BLD AUTO: 0.4 K/UL (ref 0.3–1)
MONOCYTES NFR BLD: 7.5 % (ref 4–15)
NEUTROPHILS # BLD AUTO: 2.9 K/UL (ref 1.8–7.7)
NEUTROPHILS NFR BLD: 52.4 % (ref 38–73)
NONHDLC SERPL-MCNC: 66 MG/DL
NRBC BLD-RTO: 0 /100 WBC
PLATELET # BLD AUTO: 381 K/UL (ref 150–450)
PMV BLD AUTO: 10.2 FL (ref 9.2–12.9)
POTASSIUM SERPL-SCNC: 4.7 MMOL/L (ref 3.5–5.1)
PROT SERPL-MCNC: 7 G/DL (ref 6–8.4)
RBC # BLD AUTO: 4.23 M/UL (ref 4–5.4)
SODIUM SERPL-SCNC: 140 MMOL/L (ref 136–145)
TRIGL SERPL-MCNC: 26 MG/DL (ref 30–150)
TSH SERPL DL<=0.005 MIU/L-ACNC: 1.1 UIU/ML (ref 0.4–4)
WBC # BLD AUTO: 5.59 K/UL (ref 3.9–12.7)

## 2021-04-29 PROCEDURE — 84443 ASSAY THYROID STIM HORMONE: CPT | Performed by: FAMILY MEDICINE

## 2021-04-29 PROCEDURE — 86703 HIV-1/HIV-2 1 RESULT ANTBDY: CPT | Performed by: FAMILY MEDICINE

## 2021-04-29 PROCEDURE — 36415 COLL VENOUS BLD VENIPUNCTURE: CPT | Mod: PN | Performed by: FAMILY MEDICINE

## 2021-04-29 PROCEDURE — 80053 COMPREHEN METABOLIC PANEL: CPT | Performed by: FAMILY MEDICINE

## 2021-04-29 PROCEDURE — 85025 COMPLETE CBC W/AUTO DIFF WBC: CPT | Performed by: FAMILY MEDICINE

## 2021-04-29 PROCEDURE — 80061 LIPID PANEL: CPT | Performed by: FAMILY MEDICINE

## 2021-04-30 LAB — HIV 1+2 AB+HIV1 P24 AG SERPL QL IA: NEGATIVE

## 2021-05-10 ENCOUNTER — HOSPITAL ENCOUNTER (OUTPATIENT)
Dept: RADIOLOGY | Facility: HOSPITAL | Age: 61
Discharge: HOME | End: 2021-05-10
Attending: FAMILY MEDICINE
Payer: COMMERCIAL

## 2021-05-10 ENCOUNTER — OFFICE VISIT (OUTPATIENT)
Dept: PRIMARY CARE CLINIC | Facility: CLINIC | Age: 61
End: 2021-05-10
Payer: COMMERCIAL

## 2021-05-10 VITALS
OXYGEN SATURATION: 97 % | HEIGHT: 62 IN | HEART RATE: 67 BPM | SYSTOLIC BLOOD PRESSURE: 100 MMHG | DIASTOLIC BLOOD PRESSURE: 64 MMHG | TEMPERATURE: 99 F | BODY MASS INDEX: 26.74 KG/M2 | WEIGHT: 145.31 LBS

## 2021-05-10 DIAGNOSIS — Z00.00 WELL ADULT EXAM: Primary | ICD-10-CM

## 2021-05-10 DIAGNOSIS — M53.3 SACRAL PAIN: ICD-10-CM

## 2021-05-10 DIAGNOSIS — M54.9 DORSALGIA, UNSPECIFIED: ICD-10-CM

## 2021-05-10 DIAGNOSIS — M54.9 DORSALGIA: ICD-10-CM

## 2021-05-10 DIAGNOSIS — R10.9 ABDOMINAL PAIN, UNSPECIFIED ABDOMINAL LOCATION: ICD-10-CM

## 2021-05-10 DIAGNOSIS — F43.22 ADJUSTMENT REACTION WITH ANXIOUS MOOD: ICD-10-CM

## 2021-05-10 LAB
BILIRUB UR QL STRIP: NEGATIVE
CLARITY UR REFRACT.AUTO: ABNORMAL
COLOR UR AUTO: YELLOW
GLUCOSE UR QL STRIP: NEGATIVE
HGB UR QL STRIP: NEGATIVE
KETONES UR QL STRIP: NEGATIVE
LEUKOCYTE ESTERASE UR QL STRIP: NEGATIVE
NITRITE UR QL STRIP: NEGATIVE
PH UR STRIP: 8 [PH] (ref 5–8)
PROT UR QL STRIP: NEGATIVE
SP GR UR STRIP: 1.02 (ref 1–1.03)
URN SPEC COLLECT METH UR: ABNORMAL

## 2021-05-10 PROCEDURE — 99396 PREV VISIT EST AGE 40-64: CPT | Mod: S$GLB,,, | Performed by: FAMILY MEDICINE

## 2021-05-10 PROCEDURE — 99999 PR PBB SHADOW E&M-EST. PATIENT-LVL V: CPT | Mod: PBBFAC,,, | Performed by: FAMILY MEDICINE

## 2021-05-10 PROCEDURE — 99999 PR PBB SHADOW E&M-EST. PATIENT-LVL V: ICD-10-PCS | Mod: PBBFAC,,, | Performed by: FAMILY MEDICINE

## 2021-05-10 PROCEDURE — 72220 X-RAY EXAM SACRUM TAILBONE: CPT | Mod: 26,,, | Performed by: RADIOLOGY

## 2021-05-10 PROCEDURE — 72110 XR LUMBAR SPINE 5 VIEW WITH FLEX AND EXT: ICD-10-PCS | Mod: 26,,, | Performed by: RADIOLOGY

## 2021-05-10 PROCEDURE — 72110 X-RAY EXAM L-2 SPINE 4/>VWS: CPT | Mod: TC,PN

## 2021-05-10 PROCEDURE — 3008F BODY MASS INDEX DOCD: CPT | Mod: CPTII,S$GLB,, | Performed by: FAMILY MEDICINE

## 2021-05-10 PROCEDURE — 99396 PR PREVENTIVE VISIT,EST,40-64: ICD-10-PCS | Mod: S$GLB,,, | Performed by: FAMILY MEDICINE

## 2021-05-10 PROCEDURE — 72220 X-RAY EXAM SACRUM TAILBONE: CPT | Mod: TC,PN

## 2021-05-10 PROCEDURE — 72110 X-RAY EXAM L-2 SPINE 4/>VWS: CPT | Mod: 26,,, | Performed by: RADIOLOGY

## 2021-05-10 PROCEDURE — 1125F PR PAIN SEVERITY QUANTIFIED, PAIN PRESENT: ICD-10-PCS | Mod: S$GLB,,, | Performed by: FAMILY MEDICINE

## 2021-05-10 PROCEDURE — 81003 URINALYSIS AUTO W/O SCOPE: CPT | Performed by: FAMILY MEDICINE

## 2021-05-10 PROCEDURE — 1125F AMNT PAIN NOTED PAIN PRSNT: CPT | Mod: S$GLB,,, | Performed by: FAMILY MEDICINE

## 2021-05-10 PROCEDURE — 3008F PR BODY MASS INDEX (BMI) DOCUMENTED: ICD-10-PCS | Mod: CPTII,S$GLB,, | Performed by: FAMILY MEDICINE

## 2021-05-10 PROCEDURE — 72220 XR SACRUM AND COCCYX: ICD-10-PCS | Mod: 26,,, | Performed by: RADIOLOGY

## 2021-05-10 RX ORDER — ONDANSETRON 4 MG/1
4 TABLET, ORALLY DISINTEGRATING ORAL EVERY 6 HOURS PRN
Qty: 30 TABLET | Refills: 5 | Status: SHIPPED | OUTPATIENT
Start: 2021-05-10 | End: 2022-06-20 | Stop reason: SDUPTHER

## 2021-05-10 RX ORDER — HYDROXYZINE HYDROCHLORIDE 25 MG/1
25 TABLET, FILM COATED ORAL 3 TIMES DAILY
Qty: 90 TABLET | Refills: 3 | Status: SHIPPED | OUTPATIENT
Start: 2021-05-10 | End: 2022-06-20

## 2021-05-10 RX ORDER — TRIAMCINOLONE ACETONIDE 1 MG/G
CREAM TOPICAL 2 TIMES DAILY
Qty: 80 G | Refills: 5 | Status: SHIPPED | OUTPATIENT
Start: 2021-05-10 | End: 2022-05-20

## 2021-05-11 PROBLEM — R10.9 ABDOMINAL PAIN: Status: ACTIVE | Noted: 2021-05-11

## 2021-05-11 PROBLEM — F43.22 ADJUSTMENT REACTION WITH ANXIOUS MOOD: Status: ACTIVE | Noted: 2021-05-11

## 2021-05-11 PROBLEM — M54.9 DORSALGIA, UNSPECIFIED: Status: ACTIVE | Noted: 2021-05-11

## 2021-05-25 ENCOUNTER — PATIENT OUTREACH (OUTPATIENT)
Dept: ADMINISTRATIVE | Facility: HOSPITAL | Age: 61
End: 2021-05-25

## 2021-05-26 ENCOUNTER — PATIENT OUTREACH (OUTPATIENT)
Dept: ADMINISTRATIVE | Facility: HOSPITAL | Age: 61
End: 2021-05-26

## 2021-05-27 ENCOUNTER — TELEPHONE (OUTPATIENT)
Dept: ENDOSCOPY | Facility: HOSPITAL | Age: 61
End: 2021-05-27

## 2021-05-27 ENCOUNTER — LAB VISIT (OUTPATIENT)
Dept: LAB | Facility: HOSPITAL | Age: 61
End: 2021-05-27
Payer: COMMERCIAL

## 2021-05-27 ENCOUNTER — OFFICE VISIT (OUTPATIENT)
Dept: GASTROENTEROLOGY | Facility: CLINIC | Age: 61
End: 2021-05-27
Payer: COMMERCIAL

## 2021-05-27 ENCOUNTER — PATIENT MESSAGE (OUTPATIENT)
Dept: GASTROENTEROLOGY | Facility: CLINIC | Age: 61
End: 2021-05-27

## 2021-05-27 VITALS
BODY MASS INDEX: 26.93 KG/M2 | WEIGHT: 142.63 LBS | HEART RATE: 65 BPM | SYSTOLIC BLOOD PRESSURE: 120 MMHG | HEIGHT: 61 IN | DIASTOLIC BLOOD PRESSURE: 78 MMHG

## 2021-05-27 DIAGNOSIS — R19.5 LOOSE STOOLS: ICD-10-CM

## 2021-05-27 DIAGNOSIS — K92.1 HEMATOCHEZIA: Primary | ICD-10-CM

## 2021-05-27 DIAGNOSIS — M54.9 BACK PAIN, UNSPECIFIED BACK LOCATION, UNSPECIFIED BACK PAIN LATERALITY, UNSPECIFIED CHRONICITY: ICD-10-CM

## 2021-05-27 DIAGNOSIS — R10.9 ABDOMINAL PAIN, UNSPECIFIED ABDOMINAL LOCATION: ICD-10-CM

## 2021-05-27 DIAGNOSIS — Z12.11 SPECIAL SCREENING FOR MALIGNANT NEOPLASMS, COLON: Primary | ICD-10-CM

## 2021-05-27 LAB — IGA SERPL-MCNC: 152 MG/DL (ref 40–350)

## 2021-05-27 PROCEDURE — 99204 OFFICE O/P NEW MOD 45 MIN: CPT | Mod: S$GLB,,, | Performed by: NURSE PRACTITIONER

## 2021-05-27 PROCEDURE — 36415 COLL VENOUS BLD VENIPUNCTURE: CPT | Performed by: NURSE PRACTITIONER

## 2021-05-27 PROCEDURE — 99999 PR PBB SHADOW E&M-EST. PATIENT-LVL IV: CPT | Mod: PBBFAC,,, | Performed by: NURSE PRACTITIONER

## 2021-05-27 PROCEDURE — 99999 PR PBB SHADOW E&M-EST. PATIENT-LVL IV: ICD-10-PCS | Mod: PBBFAC,,, | Performed by: NURSE PRACTITIONER

## 2021-05-27 PROCEDURE — 82784 ASSAY IGA/IGD/IGG/IGM EACH: CPT | Performed by: NURSE PRACTITIONER

## 2021-05-27 PROCEDURE — 3008F PR BODY MASS INDEX (BMI) DOCUMENTED: ICD-10-PCS | Mod: CPTII,S$GLB,, | Performed by: NURSE PRACTITIONER

## 2021-05-27 PROCEDURE — 1125F AMNT PAIN NOTED PAIN PRSNT: CPT | Mod: S$GLB,,, | Performed by: NURSE PRACTITIONER

## 2021-05-27 PROCEDURE — 99204 PR OFFICE/OUTPT VISIT, NEW, LEVL IV, 45-59 MIN: ICD-10-PCS | Mod: S$GLB,,, | Performed by: NURSE PRACTITIONER

## 2021-05-27 PROCEDURE — 1125F PR PAIN SEVERITY QUANTIFIED, PAIN PRESENT: ICD-10-PCS | Mod: S$GLB,,, | Performed by: NURSE PRACTITIONER

## 2021-05-27 PROCEDURE — 83516 IMMUNOASSAY NONANTIBODY: CPT | Performed by: NURSE PRACTITIONER

## 2021-05-27 PROCEDURE — 3008F BODY MASS INDEX DOCD: CPT | Mod: CPTII,S$GLB,, | Performed by: NURSE PRACTITIONER

## 2021-05-27 RX ORDER — SODIUM, POTASSIUM,MAG SULFATES 17.5-3.13G
1 SOLUTION, RECONSTITUTED, ORAL ORAL ONCE
Qty: 1 KIT | Refills: 0 | Status: SHIPPED | OUTPATIENT
Start: 2021-05-27 | End: 2021-05-27

## 2021-05-28 ENCOUNTER — LAB VISIT (OUTPATIENT)
Dept: LAB | Facility: HOSPITAL | Age: 61
End: 2021-05-28
Attending: NURSE PRACTITIONER
Payer: COMMERCIAL

## 2021-05-28 DIAGNOSIS — R19.5 LOOSE STOOLS: ICD-10-CM

## 2021-05-28 PROCEDURE — 89055 LEUKOCYTE ASSESSMENT FECAL: CPT | Performed by: NURSE PRACTITIONER

## 2021-05-28 PROCEDURE — 87209 SMEAR COMPLEX STAIN: CPT | Performed by: NURSE PRACTITIONER

## 2021-05-28 PROCEDURE — 87329 GIARDIA AG IA: CPT | Performed by: NURSE PRACTITIONER

## 2021-05-28 PROCEDURE — 83993 ASSAY FOR CALPROTECTIN FECAL: CPT | Performed by: NURSE PRACTITIONER

## 2021-05-29 LAB
CRYPTOSP AG STL QL IA: NEGATIVE
G LAMBLIA AG STL QL IA: NEGATIVE
WBC #/AREA STL HPF: NORMAL /[HPF]

## 2021-06-01 LAB
O+P STL MICRO: NORMAL
TTG IGA SER-ACNC: 5 UNITS

## 2021-06-03 LAB — CALPROTECTIN STL-MCNT: <27.1 MCG/G

## 2021-06-25 ENCOUNTER — ANESTHESIA EVENT (OUTPATIENT)
Dept: ENDOSCOPY | Facility: HOSPITAL | Age: 61
End: 2021-06-25
Payer: COMMERCIAL

## 2021-06-25 ENCOUNTER — HOSPITAL ENCOUNTER (OUTPATIENT)
Facility: HOSPITAL | Age: 61
Discharge: HOME | End: 2021-06-25
Attending: INTERNAL MEDICINE | Admitting: INTERNAL MEDICINE
Payer: COMMERCIAL

## 2021-06-25 ENCOUNTER — ANESTHESIA (OUTPATIENT)
Dept: ENDOSCOPY | Facility: HOSPITAL | Age: 61
End: 2021-06-25
Payer: COMMERCIAL

## 2021-06-25 VITALS
OXYGEN SATURATION: 100 % | HEIGHT: 62 IN | DIASTOLIC BLOOD PRESSURE: 67 MMHG | SYSTOLIC BLOOD PRESSURE: 120 MMHG | RESPIRATION RATE: 16 BRPM | HEART RATE: 63 BPM | TEMPERATURE: 98 F | BODY MASS INDEX: 25.76 KG/M2 | WEIGHT: 140 LBS

## 2021-06-25 DIAGNOSIS — K92.1 HEMATOCHEZIA: Primary | ICD-10-CM

## 2021-06-25 PROCEDURE — 37000009 HC ANESTHESIA EA ADD 15 MINS: Performed by: INTERNAL MEDICINE

## 2021-06-25 PROCEDURE — 37000008 HC ANESTHESIA 1ST 15 MINUTES: Performed by: INTERNAL MEDICINE

## 2021-06-25 PROCEDURE — E9220 PRA ENDO ANESTHESIA: HCPCS | Mod: ,,, | Performed by: NURSE ANESTHETIST, CERTIFIED REGISTERED

## 2021-06-25 PROCEDURE — 27201012 HC FORCEPS, HOT/COLD, DISP: Performed by: INTERNAL MEDICINE

## 2021-06-25 PROCEDURE — 45380 COLONOSCOPY AND BIOPSY: CPT | Mod: ,,, | Performed by: INTERNAL MEDICINE

## 2021-06-25 PROCEDURE — 45380 COLONOSCOPY AND BIOPSY: CPT | Performed by: INTERNAL MEDICINE

## 2021-06-25 PROCEDURE — 45380 PR COLONOSCOPY,BIOPSY: ICD-10-PCS | Mod: ,,, | Performed by: INTERNAL MEDICINE

## 2021-06-25 PROCEDURE — 88305 TISSUE EXAM BY PATHOLOGIST: CPT | Mod: 26,,, | Performed by: PATHOLOGY

## 2021-06-25 PROCEDURE — E9220 PRA ENDO ANESTHESIA: ICD-10-PCS | Mod: ,,, | Performed by: NURSE ANESTHETIST, CERTIFIED REGISTERED

## 2021-06-25 PROCEDURE — 25000003 PHARM REV CODE 250: Performed by: INTERNAL MEDICINE

## 2021-06-25 PROCEDURE — 63600175 PHARM REV CODE 636 W HCPCS: Performed by: NURSE ANESTHETIST, CERTIFIED REGISTERED

## 2021-06-25 PROCEDURE — 88305 TISSUE EXAM BY PATHOLOGIST: ICD-10-PCS | Mod: 26,,, | Performed by: PATHOLOGY

## 2021-06-25 PROCEDURE — 88305 TISSUE EXAM BY PATHOLOGIST: CPT | Mod: 59 | Performed by: PATHOLOGY

## 2021-06-25 RX ORDER — LIDOCAINE HCL/PF 100 MG/5ML
SYRINGE (ML) INTRAVENOUS
Status: DISCONTINUED | OUTPATIENT
Start: 2021-06-25 | End: 2021-06-25

## 2021-06-25 RX ORDER — PROPOFOL 10 MG/ML
VIAL (ML) INTRAVENOUS
Status: DISCONTINUED | OUTPATIENT
Start: 2021-06-25 | End: 2021-06-25

## 2021-06-25 RX ORDER — SODIUM CHLORIDE 9 MG/ML
INJECTION, SOLUTION INTRAVENOUS CONTINUOUS
Status: DISCONTINUED | OUTPATIENT
Start: 2021-06-25 | End: 2021-06-25 | Stop reason: HOSPADM

## 2021-06-25 RX ORDER — PROPOFOL 10 MG/ML
VIAL (ML) INTRAVENOUS CONTINUOUS PRN
Status: DISCONTINUED | OUTPATIENT
Start: 2021-06-25 | End: 2021-06-25

## 2021-06-25 RX ADMIN — SODIUM CHLORIDE: 0.9 INJECTION, SOLUTION INTRAVENOUS at 03:06

## 2021-06-25 RX ADMIN — PROPOFOL 100 MG: 10 INJECTION, EMULSION INTRAVENOUS at 03:06

## 2021-06-25 RX ADMIN — PROPOFOL 150 MCG/KG/MIN: 10 INJECTION, EMULSION INTRAVENOUS at 03:06

## 2021-06-25 RX ADMIN — Medication 80 MG: at 03:06

## 2021-07-06 ENCOUNTER — PATIENT MESSAGE (OUTPATIENT)
Dept: ADMINISTRATIVE | Facility: HOSPITAL | Age: 61
End: 2021-07-06

## 2021-07-06 LAB
FINAL PATHOLOGIC DIAGNOSIS: NORMAL
Lab: NORMAL

## 2021-07-30 ENCOUNTER — CLINICAL SUPPORT (OUTPATIENT)
Dept: URGENT CARE | Facility: CLINIC | Age: 61
End: 2021-07-30
Payer: COMMERCIAL

## 2021-07-30 DIAGNOSIS — Z20.822 EXPOSURE TO COVID-19 VIRUS: Primary | ICD-10-CM

## 2021-07-30 LAB
CTP QC/QA: YES
SARS-COV-2 RDRP RESP QL NAA+PROBE: NEGATIVE

## 2021-07-30 PROCEDURE — U0002 COVID-19 LAB TEST NON-CDC: HCPCS | Mod: QW,S$GLB,, | Performed by: NURSE PRACTITIONER

## 2021-07-30 PROCEDURE — U0002: ICD-10-PCS | Mod: QW,S$GLB,, | Performed by: NURSE PRACTITIONER

## 2022-02-02 ENCOUNTER — PATIENT MESSAGE (OUTPATIENT)
Dept: INTERNAL MEDICINE | Facility: CLINIC | Age: 62
End: 2022-02-02

## 2022-02-02 ENCOUNTER — OFFICE VISIT (OUTPATIENT)
Dept: INTERNAL MEDICINE | Facility: CLINIC | Age: 62
End: 2022-02-02
Payer: COMMERCIAL

## 2022-02-02 DIAGNOSIS — J32.9 SINUSITIS, UNSPECIFIED CHRONICITY, UNSPECIFIED LOCATION: Primary | ICD-10-CM

## 2022-02-02 PROCEDURE — 1160F RVW MEDS BY RX/DR IN RCRD: CPT | Mod: CPTII,95,, | Performed by: PHYSICIAN ASSISTANT

## 2022-02-02 PROCEDURE — 1160F PR REVIEW ALL MEDS BY PRESCRIBER/CLIN PHARMACIST DOCUMENTED: ICD-10-PCS | Mod: CPTII,95,, | Performed by: PHYSICIAN ASSISTANT

## 2022-02-02 PROCEDURE — 99213 PR OFFICE/OUTPT VISIT, EST, LEVL III, 20-29 MIN: ICD-10-PCS | Mod: 95,,, | Performed by: PHYSICIAN ASSISTANT

## 2022-02-02 PROCEDURE — 1159F PR MEDICATION LIST DOCUMENTED IN MEDICAL RECORD: ICD-10-PCS | Mod: CPTII,95,, | Performed by: PHYSICIAN ASSISTANT

## 2022-02-02 PROCEDURE — 99213 OFFICE O/P EST LOW 20 MIN: CPT | Mod: 95,,, | Performed by: PHYSICIAN ASSISTANT

## 2022-02-02 PROCEDURE — 1159F MED LIST DOCD IN RCRD: CPT | Mod: CPTII,95,, | Performed by: PHYSICIAN ASSISTANT

## 2022-02-02 RX ORDER — AZITHROMYCIN 250 MG/1
TABLET, FILM COATED ORAL
Qty: 6 TABLET | Refills: 0 | Status: SHIPPED | OUTPATIENT
Start: 2022-02-02 | End: 2022-04-13

## 2022-02-02 NOTE — PATIENT INSTRUCTIONS
Patient Education       Sinusitis in Adults   The Basics   Written by the doctors and editors at [facility]   What is sinusitis? -- Sinusitis is a condition that can cause a stuffy nose, pain in the face, and discharge (mucus) from the nose. The sinuses are hollow areas in the bones of the face (figure 1). They have a thin lining that normally makes a small amount of mucus. When this lining gets irritated or infected, it swells and makes extra mucus. This causes symptoms.  Sinusitis can occur when a person gets sick with a cold. The germs causing the cold can also infect the sinuses. Many times, a person feels like their cold is getting better. But then they get sinusitis and begin to feel sick again.  What are the symptoms of sinusitis? -- Common symptoms of sinusitis include:  · Stuffy or blocked nose  · Thick white, yellow, or green discharge from the nose  · Pain in the teeth  · Pain or pressure in the face - This often feels worse when a person bends forward.  People with sinusitis can also have other symptoms that include:  · Fever  · Cough  · Trouble smelling  · Ear pressure or fullness  · Headache  · Bad breath  · Feeling tired  Most of the time, symptoms start to improve in 7 to 10 days.  Should I see a doctor or nurse? -- See your doctor or nurse if your symptoms last more than 10 days, or if your symptoms first get better but then get worse.  Rarely, sinusitis can lead to serious problems. See your doctor or nurse right away (do not wait 10 days) if you have:  · Fever higher than 102°F (38.9°C)  · Sudden and severe pain in the face and head  · Trouble seeing or seeing double  · Trouble thinking clearly  · Swelling or redness around one or both eyes  · A stiff neck  Is there anything I can do on my own to feel better? -- Yes. To reduce your symptoms, you can:  · Take an over-the-counter pain reliever to reduce the pain  · Rinse your nose and sinuses with salt water a few times a day - Ask your doctor or  "nurse about the best way to do this.  Your doctor might also prescribe a steroid nose spray to reduce the swelling in your nose. (These kinds of steroid nose sprays are safe to take, and do not contain the same steroids that some athletes take illegally.)  How is sinusitis treated? -- Most of the time, sinusitis does not need to be treated with antibiotic medicines. This is because most sinusitis is caused by viruses, not bacteria, and antibiotics do not kill viruses. In fact, even sinusitis caused by bacteria will usually get better on its own without antibiotics.   Some people with sinusitis do need treatment with antibiotics. If your symptoms have not improved after 10 days, ask your doctor if you should take antibiotics. Your doctor might recommend that you wait 1 more week to see if your symptoms improve. But if you have symptoms such as a fever or a lot of pain, they might prescribe antibiotics. It is important to follow your doctor's instructions about taking your antibiotics.  What if my symptoms do not get better? -- If your symptoms do not get better, talk with your doctor or nurse. They might order tests to figure out why you still have symptoms. These can include:  · CT scan or other imaging tests - Imaging tests create pictures of the inside of the body.  · A test to look inside the sinuses - For this test, a doctor puts a thin tube with a camera on the end into the nose and up into the sinuses.  Some people get a lot of sinus infections or have symptoms that last at least 3 months. These people can have a different type of sinusitis called "chronic sinusitis." Chronic sinusitis can be caused by different things. For example, some people have growths inside their nose or sinuses that are called "polyps." Other people have allergies that cause their symptoms.  Chronic sinusitis can be treated in different ways. If you have chronic sinusitis, talk with your doctor about which treatments are right for " you.  All topics are updated as new evidence becomes available and our peer review process is complete.  This topic retrieved from [employer] on: Sep 21, 2021.  Topic 67648 Version 17.0  Release: 29.4.2 - C29.263  © 2021 UpToDate, Inc. and/or its affiliates. All rights reserved.  figure 1: Sinuses of the face     This drawing shows the sinuses of the face, from the side and front views.  Graphic 712407 Version 1.0    Consumer Information Use and Disclaimer   This information is not specific medical advice and does not replace information you receive from your health care provider. This is only a brief summary of general information. It does NOT include all information about conditions, illnesses, injuries, tests, procedures, treatments, therapies, discharge instructions or life-style choices that may apply to you. You must talk with your health care provider for complete information about your health and treatment options. This information should not be used to decide whether or not to accept your health care provider's advice, instructions or recommendations. Only your health care provider has the knowledge and training to provide advice that is right for you. The use of this information is governed by the [employer] End User License Agreement, available at [email or web address].The use of [employer] content is governed by the [employer] Terms of Use. ©2021 UpToDate, Inc. All rights reserved.  Copyright   © 2021 UpToDate, Inc. and/or its affiliates. All rights reserved.

## 2022-02-02 NOTE — PROGRESS NOTES
"Subjective:       Patient ID: Cristal Gonzalez is a 61 y.o. female.        Chief Complaint: URI    The patient location is: her home, [place]  The chief complaint leading to consultation is: congestion    Visit type: audiovisual    Face to Face time with patient: 12  20 minutes of total time spent on the encounter, which includes face to face time and non-face to face time preparing to see the patient (eg, review of tests), Obtaining and/or reviewing separately obtained history, Documenting clinical information in the electronic or other health record, Independently interpreting results (not separately reported) and communicating results to the patient/family/caregiver, or Care coordination (not separately reported).         Each patient to whom he or she provides medical services by telemedicine is:  (1) informed of the relationship between the physician and patient and the respective role of any other health care provider with respect to management of the patient; and (2) notified that he or she may decline to receive medical services by telemedicine and may withdraw from such care at any time.    Notes:   Nasal congestion  Watery eyes  Small white "pimple" left eye, pushed and drained it herself  Fatigue  Home covid test negative multiple times  Taking zyrtec and flonase but no real improvement  Several weeks of sx  Lots of sinus pressure and fullness, worse at night  Miserable with congestion  No fever or cough, no body aches    Never has taken hydroxyzine, rarely uses zofran    Allergies and consider MDP if not improving on zpak     Review of Systems   Constitutional: Negative for activity change, chills, diaphoresis, fatigue, fever and unexpected weight change.   HENT: Positive for congestion, rhinorrhea and sinus pressure. Negative for hearing loss, sore throat and trouble swallowing.    Eyes: Positive for discharge. Negative for visual disturbance.   Respiratory: Negative for cough, chest tightness, " "shortness of breath and wheezing.    Cardiovascular: Negative for chest pain, palpitations and leg swelling.   Gastrointestinal: Negative for abdominal pain, blood in stool, constipation, diarrhea, nausea and vomiting.   Endocrine: Negative for polydipsia and polyuria.   Genitourinary: Negative for difficulty urinating, dysuria, frequency, hematuria, menstrual problem and urgency.   Musculoskeletal: Negative for arthralgias, back pain, joint swelling and neck pain.   Skin: Negative for rash.   Neurological: Positive for headaches. Negative for dizziness, syncope and weakness.   Psychiatric/Behavioral: Negative for confusion, dysphoric mood and sleep disturbance. The patient is not nervous/anxious.        Objective:      Physical Exam  HENT:      Head: Normocephalic.      Nose: Nose normal.   Pulmonary:      Effort: Pulmonary effort is normal.   Neurological:      General: No focal deficit present.      Mental Status: She is alert.   Psychiatric:         Mood and Affect: Mood normal.         Assessment:       1. Sinusitis, unspecified chronicity, unspecified location        Plan:       Cristal was seen today for uri.    Diagnoses and all orders for this visit:    Sinusitis, unspecified chronicity, unspecified location  -     azithromycin (Z-KAYLEE) 250 MG tablet; 2 tabs on day 1 then 1 tab on days 2-5    Likely has allergic component as well, continue flonase and zyrtec, if not resolving may need short course of steroids, she will let me know how sx are doing    Pt has been given instructions populated from [employer] database and has verbalized understanding of the after visit summary and information contained wherein.    Follow up with a primary care provider. May go to ER for acute shortness of breath, lightheadedness, fever, or any other emergent complaints or changes in condition.    "This note will be shared with the patient"    No future appointments.              "

## 2022-02-08 ENCOUNTER — PATIENT MESSAGE (OUTPATIENT)
Dept: INTERNAL MEDICINE | Facility: CLINIC | Age: 62
End: 2022-02-08
Payer: COMMERCIAL

## 2022-02-08 DIAGNOSIS — J30.9 ALLERGIC RHINITIS, UNSPECIFIED SEASONALITY, UNSPECIFIED TRIGGER: Primary | ICD-10-CM

## 2022-02-08 RX ORDER — METHYLPREDNISOLONE 4 MG/1
TABLET ORAL
Qty: 1 EACH | Refills: 0 | Status: SHIPPED | OUTPATIENT
Start: 2022-02-08 | End: 2022-04-13

## 2022-02-28 ENCOUNTER — HOSPITAL ENCOUNTER (OUTPATIENT)
Dept: RADIOLOGY | Facility: HOSPITAL | Age: 62
Discharge: HOME | End: 2022-02-28
Attending: ORTHOPAEDIC SURGERY
Payer: COMMERCIAL

## 2022-02-28 ENCOUNTER — OFFICE VISIT (OUTPATIENT)
Dept: ORTHOPEDICS | Facility: CLINIC | Age: 62
End: 2022-02-28
Payer: COMMERCIAL

## 2022-02-28 ENCOUNTER — TELEPHONE (OUTPATIENT)
Dept: ORTHOPEDICS | Facility: CLINIC | Age: 62
End: 2022-02-28
Payer: COMMERCIAL

## 2022-02-28 VITALS
HEART RATE: 64 BPM | BODY MASS INDEX: 27.36 KG/M2 | OXYGEN SATURATION: 96 % | DIASTOLIC BLOOD PRESSURE: 73 MMHG | HEIGHT: 62 IN | SYSTOLIC BLOOD PRESSURE: 119 MMHG | WEIGHT: 148.69 LBS

## 2022-02-28 DIAGNOSIS — M25.519 SHOULDER PAIN, UNSPECIFIED CHRONICITY, UNSPECIFIED LATERALITY: ICD-10-CM

## 2022-02-28 DIAGNOSIS — S42.251A CLOSED DISPLACED FRACTURE OF GREATER TUBEROSITY OF RIGHT HUMERUS, INITIAL ENCOUNTER: Primary | ICD-10-CM

## 2022-02-28 DIAGNOSIS — M25.519 SHOULDER PAIN, UNSPECIFIED CHRONICITY, UNSPECIFIED LATERALITY: Primary | ICD-10-CM

## 2022-02-28 PROCEDURE — 3078F DIAST BP <80 MM HG: CPT | Mod: CPTII,S$GLB,, | Performed by: ORTHOPAEDIC SURGERY

## 2022-02-28 PROCEDURE — 73030 X-RAY EXAM OF SHOULDER: CPT | Mod: TC,FY,RT

## 2022-02-28 PROCEDURE — 73030 XR SHOULDER COMPLETE 2 OR MORE VIEWS RIGHT: ICD-10-PCS | Mod: 26,RT,, | Performed by: STUDENT IN AN ORGANIZED HEALTH CARE EDUCATION/TRAINING PROGRAM

## 2022-02-28 PROCEDURE — 1159F PR MEDICATION LIST DOCUMENTED IN MEDICAL RECORD: ICD-10-PCS | Mod: CPTII,S$GLB,, | Performed by: ORTHOPAEDIC SURGERY

## 2022-02-28 PROCEDURE — 3008F BODY MASS INDEX DOCD: CPT | Mod: CPTII,S$GLB,, | Performed by: ORTHOPAEDIC SURGERY

## 2022-02-28 PROCEDURE — 99999 PR PBB SHADOW E&M-EST. PATIENT-LVL IV: CPT | Mod: PBBFAC,,, | Performed by: ORTHOPAEDIC SURGERY

## 2022-02-28 PROCEDURE — 3074F SYST BP LT 130 MM HG: CPT | Mod: CPTII,S$GLB,, | Performed by: ORTHOPAEDIC SURGERY

## 2022-02-28 PROCEDURE — 99214 PR OFFICE/OUTPT VISIT, EST, LEVL IV, 30-39 MIN: ICD-10-PCS | Mod: S$GLB,,, | Performed by: ORTHOPAEDIC SURGERY

## 2022-02-28 PROCEDURE — 99214 OFFICE O/P EST MOD 30 MIN: CPT | Mod: S$GLB,,, | Performed by: ORTHOPAEDIC SURGERY

## 2022-02-28 PROCEDURE — 73030 X-RAY EXAM OF SHOULDER: CPT | Mod: 26,RT,, | Performed by: STUDENT IN AN ORGANIZED HEALTH CARE EDUCATION/TRAINING PROGRAM

## 2022-02-28 PROCEDURE — 3074F PR MOST RECENT SYSTOLIC BLOOD PRESSURE < 130 MM HG: ICD-10-PCS | Mod: CPTII,S$GLB,, | Performed by: ORTHOPAEDIC SURGERY

## 2022-02-28 PROCEDURE — 3078F PR MOST RECENT DIASTOLIC BLOOD PRESSURE < 80 MM HG: ICD-10-PCS | Mod: CPTII,S$GLB,, | Performed by: ORTHOPAEDIC SURGERY

## 2022-02-28 PROCEDURE — 1159F MED LIST DOCD IN RCRD: CPT | Mod: CPTII,S$GLB,, | Performed by: ORTHOPAEDIC SURGERY

## 2022-02-28 PROCEDURE — 99999 PR PBB SHADOW E&M-EST. PATIENT-LVL IV: ICD-10-PCS | Mod: PBBFAC,,, | Performed by: ORTHOPAEDIC SURGERY

## 2022-02-28 PROCEDURE — 3008F PR BODY MASS INDEX (BMI) DOCUMENTED: ICD-10-PCS | Mod: CPTII,S$GLB,, | Performed by: ORTHOPAEDIC SURGERY

## 2022-02-28 RX ORDER — INFLUENZA A VIRUS A/VICTORIA/2570/2019 IVR-215 (H1N1) ANTIGEN (FORMALDEHYDE INACTIVATED), INFLUENZA A VIRUS A/TASMANIA/503/2020 IVR-221 (H3N2) ANTIGEN (FORMALDEHYDE INACTIVATED), INFLUENZA B VIRUS B/PHUKET/3073/2013 ANTIGEN (FORMALDEHYDE INACTIVATED), AND INFLUENZA B VIRUS B/WASHINGTON/02/2019 ANTIGEN (FORMALDEHYDE INACTIVATED) 15; 15; 15; 15 UG/.5ML; UG/.5ML; UG/.5ML; UG/.5ML
INJECTION, SUSPENSION INTRAMUSCULAR
COMMUNITY
Start: 2021-11-29 | End: 2022-06-20

## 2022-02-28 RX ORDER — ACETAMINOPHEN 325 MG/1
325 TABLET ORAL EVERY 4 HOURS PRN
COMMUNITY
End: 2022-06-20

## 2022-02-28 NOTE — PROGRESS NOTES
[facility], Orthopedics and Sports Medicine  [facility]    New Patient Shoulder Office Visit  02/28/2022     Subjective:      Cristal Gonzalez is a 61 y.o. right handed female referred by [facility] for evaluation and treatment of a right shoulder problem. This is evaluated as a personal injury.  The patient fell from a standing height approximately 1-2 days ago landing directly on her right upper extremity.  She had pain and difficulty lifting her arm away from her body thereafter.  She presents now for further evaluation and treatment regarding this injury.  Patient has no history of right shoulder problems.  Patient denies any neurologic complaints in her right upper extremity.  Patient denies any other injuries from the recent fall.    Outside reports reviewed: historical medical records.    Past Medical History:   Diagnosis Date    GERD (gastroesophageal reflux disease)     Hot flash, menopausal        Patient Active Problem List   Diagnosis    Closed trimalleolar fracture of right ankle    Closed nondisplaced fracture of fifth left metatarsal bone with routine healing    Adjustment reaction with anxious mood    Dorsalgia, unspecified    Abdominal pain    Hematochezia    Closed displaced fracture of greater tuberosity of right humerus       Past Surgical History:   Procedure Laterality Date    ANKLE FRACTURE SURGERY      APPENDECTOMY  2011    BREAST BIOPSY      benign    COLONOSCOPY N/A 6/25/2021    Procedure: COLONOSCOPY;  Surgeon: Noehmy Parra MD;  Location: [facility] ([address] [place] [address]);  Service: Endoscopy;  Laterality: N/A;  Fully vacc Covid-19 - pg        Current Outpatient Medications   Medication Instructions    acetaminophen (TYLENOL) 325 mg, Oral, Every 4 hours PRN    azithromycin (Z-KAYLEE) 250 MG tablet 2 tabs on day 1 then 1 tab on days 2-5    cetirizine (ZYRTEC) 10 mg, Oral, Daily    FLUZONE QUAD 1215-5653, PF, 60 mcg (15 mcg x 4)/0.5 mL Syrg No dose,  route, or frequency recorded.    GLUCOSAM SUL NA/CHONDR THURSTON A NA (GLUCOSAMINE & CHONDROIT SUL.NA ORAL) Oral    hydrOXYzine HCL (ATARAX) 25 mg, Oral, 3 times daily, Prn severe anxiety.    INTRAROSA 6.5 mg Inst INSERT ONE APPLICATION INTO THE VAGINA QHS    methylPREDNISolone (MEDROL DOSEPACK) 4 mg tablet use as directed    MV, MIN #36/IRON,CARBONYL/FA (GERITOL COMPLETE ORAL) Oral    omeprazole (PRILOSEC) 40 MG capsule TAKE 1 CAPSULE(40 MG) BY MOUTH EVERY DAY    ondansetron (ZOFRAN-ODT) 4 mg, Oral, Every 6 hours PRN    sars-cov-2, covid-19, (PFIZER COVID-19) 30 mcg/0.3 ml injection No dose, route, or frequency recorded.    triamcinolone acetonide 0.1% (KENALOG) 0.1 % cream Topical (Top), 2 times daily, To affected areas for 1-2 wks prn rash        Review of patient's allergies indicates:   Allergen Reactions    Sulfa (sulfonamide antibiotics) Shortness Of Breath, Anxiety and Palpitations    Bactrim [sulfamethoxazole-trimethoprim]        Social History     Socioeconomic History    Marital status:     Number of children: 0   Occupational History    Occupation: Contractor [employer]     Employer: [employer]   Tobacco Use    Smoking status: Former Smoker     Packs/day: 0.20     Types: Cigarettes     Quit date: 3/1/2019     Years since quitting: 3.0    Smokeless tobacco: Never Used   Substance and Sexual Activity    Alcohol use: Yes     Alcohol/week: 6.0 - 7.0 standard drinks     Types: 4 Glasses of wine, 2 - 3 Standard drinks or equivalent per week     Comment: socially    Drug use: No    Sexual activity: Yes     Partners: Male     Birth control/protection: None   Social History Narrative        Helps care for elderly parents who live in [place] (father in , has alzheimer and Parkinson; mother in )       Family History   Problem Relation Age of Onset    Diabetes Mother     Alzheimer's disease Father     Cancer Maternal Aunt         breast cancer    Breast cancer Maternal Aunt           Review of Systems   Constitutional: Negative for chills and fever.   HENT: Negative for hearing loss.    Eyes: Negative for blurred vision.   Cardiovascular: Negative for chest pain.   Respiratory: Negative for shortness of breath.    Gastrointestinal: Negative for abdominal pain.   Neurological: Negative for light-headedness.        Objective:      General    Nursing note and vitals reviewed.  Constitutional: She is oriented to person, place, and time. She appears well-developed and well-nourished.   HENT:   Head: Normocephalic and atraumatic.   Eyes: Pupils are equal, round, and reactive to light.   Cardiovascular: Normal rate and regular rhythm.    Pulmonary/Chest: Effort normal.   Abdominal: Soft.   Neurological: She is oriented to person, place, and time.   Psychiatric: She has a normal mood and affect. Her behavior is normal.         Right Shoulder Exam     Tenderness   The patient is tender to palpation of the greater tuberosity.    Other   Sensation: normal    Comments:  Range of motion across the shoulder and strength of the rotator cuff were not tested due to known fracture.    Vascular Exam     Right Pulses      Radial:                    2+          Imaging:  Radiographs of the right shoulder taken 02/28/2022 were personally reviewed from the [facility].  Multiple views of the shoulder are available today for review, including an AP, scapular Y, axillary view.  The glenohumeral joint demonstrates mild degenerative changes .  The acromioclavicular joint demonstrates mild degenerative changes .  The glenohumeral joint is concentrically reduced.  There is a mildly displaced fracture of the greater tuberosity noted.  No other fractures are noted in these images.      Procedures        Assessment:       Cristal Gonzalez is a 61 y.o. female seen in the office today. The encounter diagnosis was Closed displaced fracture of greater tuberosity of right humerus, initial encounter.  Further  work-up is recommended at this time.  The patient will remain nonweightbearing on her right upper extremity for approximately 2 weeks.  At that time she will return for repeat radiographic examination to assess the position of her fracture.  If fracture remains in the same location, will consider nonsurgical treatment versus surgical treatment if fracture migration is noted.. The natural history and expected course discussed with patient. Various treatment options were discussed, including their risks and benefits. All of the patient's questions were answered.     Plan:      1. Tylenol 650mg TID, PRN pain.   2. Nonweightbearing right upper extremity, sling at all times  3. Follow-up in 2 weeks with repeat x-ray of right shoulder         Rayray Mcdonald IV, MD   of Clinical Orthopedics  Department of Orthopedic Surgery  [facility]  Office: [phone]  Website: [email or web address]      Orders Placed This Encounter    X-ray Shoulder 2 or More Views Right

## 2022-03-16 ENCOUNTER — HOSPITAL ENCOUNTER (OUTPATIENT)
Dept: RADIOLOGY | Facility: HOSPITAL | Age: 62
Discharge: HOME | End: 2022-03-16
Attending: ORTHOPAEDIC SURGERY
Payer: COMMERCIAL

## 2022-03-16 ENCOUNTER — OFFICE VISIT (OUTPATIENT)
Dept: ORTHOPEDICS | Facility: CLINIC | Age: 62
End: 2022-03-16
Payer: COMMERCIAL

## 2022-03-16 VITALS
HEART RATE: 61 BPM | HEIGHT: 62 IN | SYSTOLIC BLOOD PRESSURE: 113 MMHG | WEIGHT: 146.69 LBS | TEMPERATURE: 99 F | BODY MASS INDEX: 27 KG/M2 | DIASTOLIC BLOOD PRESSURE: 71 MMHG

## 2022-03-16 DIAGNOSIS — S42.251A CLOSED DISPLACED FRACTURE OF GREATER TUBEROSITY OF RIGHT HUMERUS, INITIAL ENCOUNTER: Primary | ICD-10-CM

## 2022-03-16 DIAGNOSIS — S42.251A CLOSED DISPLACED FRACTURE OF GREATER TUBEROSITY OF RIGHT HUMERUS, INITIAL ENCOUNTER: ICD-10-CM

## 2022-03-16 PROCEDURE — 3078F PR MOST RECENT DIASTOLIC BLOOD PRESSURE < 80 MM HG: ICD-10-PCS | Mod: CPTII,S$GLB,, | Performed by: ORTHOPAEDIC SURGERY

## 2022-03-16 PROCEDURE — 99999 PR PBB SHADOW E&M-EST. PATIENT-LVL IV: ICD-10-PCS | Mod: PBBFAC,,, | Performed by: ORTHOPAEDIC SURGERY

## 2022-03-16 PROCEDURE — 3008F PR BODY MASS INDEX (BMI) DOCUMENTED: ICD-10-PCS | Mod: CPTII,S$GLB,, | Performed by: ORTHOPAEDIC SURGERY

## 2022-03-16 PROCEDURE — 3008F BODY MASS INDEX DOCD: CPT | Mod: CPTII,S$GLB,, | Performed by: ORTHOPAEDIC SURGERY

## 2022-03-16 PROCEDURE — 99499 NO LOS: ICD-10-PCS | Mod: S$GLB,,, | Performed by: ORTHOPAEDIC SURGERY

## 2022-03-16 PROCEDURE — 3074F SYST BP LT 130 MM HG: CPT | Mod: CPTII,S$GLB,, | Performed by: ORTHOPAEDIC SURGERY

## 2022-03-16 PROCEDURE — 73030 X-RAY EXAM OF SHOULDER: CPT | Mod: TC,FY,RT

## 2022-03-16 PROCEDURE — 73030 X-RAY EXAM OF SHOULDER: CPT | Mod: 26,RT,, | Performed by: RADIOLOGY

## 2022-03-16 PROCEDURE — 99999 PR PBB SHADOW E&M-EST. PATIENT-LVL IV: CPT | Mod: PBBFAC,,, | Performed by: ORTHOPAEDIC SURGERY

## 2022-03-16 PROCEDURE — 73030 XR SHOULDER COMPLETE 2 OR MORE VIEWS RIGHT: ICD-10-PCS | Mod: 26,RT,, | Performed by: RADIOLOGY

## 2022-03-16 PROCEDURE — 23620 PR CLOSED RX GR TUBEROSITY HUM FX: ICD-10-PCS | Mod: RT,S$GLB,, | Performed by: ORTHOPAEDIC SURGERY

## 2022-03-16 PROCEDURE — 1159F PR MEDICATION LIST DOCUMENTED IN MEDICAL RECORD: ICD-10-PCS | Mod: CPTII,S$GLB,, | Performed by: ORTHOPAEDIC SURGERY

## 2022-03-16 PROCEDURE — 3074F PR MOST RECENT SYSTOLIC BLOOD PRESSURE < 130 MM HG: ICD-10-PCS | Mod: CPTII,S$GLB,, | Performed by: ORTHOPAEDIC SURGERY

## 2022-03-16 PROCEDURE — 1159F MED LIST DOCD IN RCRD: CPT | Mod: CPTII,S$GLB,, | Performed by: ORTHOPAEDIC SURGERY

## 2022-03-16 PROCEDURE — 3078F DIAST BP <80 MM HG: CPT | Mod: CPTII,S$GLB,, | Performed by: ORTHOPAEDIC SURGERY

## 2022-03-16 PROCEDURE — 99499 UNLISTED E&M SERVICE: CPT | Mod: S$GLB,,, | Performed by: ORTHOPAEDIC SURGERY

## 2022-03-16 PROCEDURE — 23620 CLTX GR HMRL TBRS FX WO MNPJ: CPT | Mod: RT,S$GLB,, | Performed by: ORTHOPAEDIC SURGERY

## 2022-03-16 NOTE — PROGRESS NOTES
[facility], Orthopedics and Sports Medicine  [facility]    Established Patient Office Visit  03/16/2022     Diagnosis:  Closed displaced fracture of greater tuberosity of right humerus     Date of Injury:   2/26/2022    Subjective:      Cristal Gonzalez is a 61 y.o. female who presents for follow up treatment of the above mentioned diagnosis.  Overall the patient's symptoms are improving. She has kept her RUE in a sling. Reports achy pain of her inner arm. Reports full ROM in her hand and elbow on the affected side. Has been using ice and heat therapy. Is taking tylenol PRN for pain. Overall feels like she is improving.     History of injury: fall, patient fell from a standing height  landing directly on her right upper extremity.        Past Medical History:   Diagnosis Date    GERD (gastroesophageal reflux disease)     Hot flash, menopausal        Patient Active Problem List   Diagnosis    Closed trimalleolar fracture of right ankle    Closed nondisplaced fracture of fifth left metatarsal bone with routine healing    Adjustment reaction with anxious mood    Dorsalgia, unspecified    Abdominal pain    Hematochezia    Closed displaced fracture of greater tuberosity of right humerus       Past Surgical History:   Procedure Laterality Date    ANKLE FRACTURE SURGERY      APPENDECTOMY  2011    BREAST BIOPSY      benign    COLONOSCOPY N/A 6/25/2021    Procedure: COLONOSCOPY;  Surgeon: Nohemy Parra MD;  Location: [facility] ([address] [place] [address]);  Service: Endoscopy;  Laterality: N/A;  Fully vacc Covid-19 - pg        Current Outpatient Medications   Medication Instructions    acetaminophen (TYLENOL) 325 mg, Oral, Every 4 hours PRN    azithromycin (Z-KAYLEE) 250 MG tablet 2 tabs on day 1 then 1 tab on days 2-5    cetirizine (ZYRTEC) 10 mg, Oral, Daily    FLUZONE QUAD 0244-9201, PF, 60 mcg (15 mcg x 4)/0.5 mL Syrg No dose, route, or frequency recorded.    GLUCOSAM SUL NA/CHONDR THURSTON A NA  (GLUCOSAMINE & CHONDROIT SUL.NA ORAL) Oral    hydrOXYzine HCL (ATARAX) 25 mg, Oral, 3 times daily, Prn severe anxiety.    INTRAROSA 6.5 mg Inst INSERT ONE APPLICATION INTO THE VAGINA QHS    methylPREDNISolone (MEDROL DOSEPACK) 4 mg tablet use as directed    MV, MIN #36/IRON,CARBONYL/FA (GERITOL COMPLETE ORAL) Oral    omeprazole (PRILOSEC) 40 MG capsule TAKE 1 CAPSULE(40 MG) BY MOUTH EVERY DAY    ondansetron (ZOFRAN-ODT) 4 mg, Oral, Every 6 hours PRN    sars-cov-2, covid-19, (PFIZER COVID-19) 30 mcg/0.3 ml injection No dose, route, or frequency recorded.    triamcinolone acetonide 0.1% (KENALOG) 0.1 % cream Topical (Top), 2 times daily, To affected areas for 1-2 wks prn rash        Review of patient's allergies indicates:   Allergen Reactions    Sulfa (sulfonamide antibiotics) Shortness Of Breath, Anxiety and Palpitations    Bactrim [sulfamethoxazole-trimethoprim]        Social History     Socioeconomic History    Marital status:     Number of children: 0   Occupational History    Occupation: [employer]     Employer: [employer]   Tobacco Use    Smoking status: Former Smoker     Packs/day: 0.20     Types: Cigarettes     Quit date: 3/1/2019     Years since quitting: 3.0    Smokeless tobacco: Never Used   Substance and Sexual Activity    Alcohol use: Yes     Alcohol/week: 6.0 - 7.0 standard drinks     Types: 4 Glasses of wine, 2 - 3 Standard drinks or equivalent per week     Comment: socially    Drug use: No    Sexual activity: Yes     Partners: Male     Birth control/protection: None   Social History Narrative        Helps care for elderly parents who live in [place] (father in , has alzheimer and Parkinson; mother in )       Family History   Problem Relation Age of Onset    Diabetes Mother     Alzheimer's disease Father     Cancer Maternal Aunt         breast cancer    Breast cancer Maternal Aunt          Review of Systems   Constitutional: Negative for chills  and fever.   HENT: Negative for hearing loss.    Eyes: Negative for blurred vision.   Cardiovascular: Negative for chest pain.   Respiratory: Negative for shortness of breath.    Gastrointestinal: Negative for abdominal pain.   Neurological: Negative for light-headedness.          Objective:      General    Nursing note and vitals reviewed.  Constitutional: She is oriented to person, place, and time. She appears well-developed and well-nourished.   HENT:   Head: Normocephalic and atraumatic.   Eyes: Pupils are equal, round, and reactive to light.   Cardiovascular: Normal rate and regular rhythm.    Pulmonary/Chest: Effort normal.   Abdominal: Soft.   Neurological: She is oriented to person, place, and time.   Psychiatric: She has a normal mood and affect. Her behavior is normal.         Right Shoulder Exam     Tenderness   The patient is tender to palpation of the greater tuberosity.    Other   Sensation: normal    Comments:  Range of motion across the shoulder and strength of the rotator cuff were not tested due to known fracture.    Vascular Exam     Right Pulses      Radial:                    2+          Imaging:  Radiographs of the right shoulder taken 03/16/2022 were personally reviewed from the [facility].  Multiple views of the shoulder are available today for review, including an AP, scapular Y, axillary view.  The glenohumeral joint demonstrates mild degenerative changes .  The acromioclavicular joint demonstrates mild degenerative changes .  The glenohumeral joint is concentrically reduced.  There is a mildly displaced fracture of the greater tuberosity noted. Stable in comparison to previous xray on 2/28/2022. No other fractures are noted in these images.      Procedures   Procedure Note for Closed Treatment of right greater tuberosity fracture  The patient was consented for closed treatment of right greater tuberosity fracture.  The correct side and site was identified after patient gave verbal  consent for proceeding.  A reduction maneuver was not required.  The patient was placed in a simple sling for treatment of the injury.  The patient tolerated the procedure without any complications.        Assessment:       Cristal Gonzalez is a 61 y.o. female seen in the office today. The encounter diagnosis was Closed displaced fracture of greater tuberosity of right humerus, initial encounter.  Non-operative treatment is recommended at this time.  The natural history and expected course discussed with patient. Various treatment options were discussed, including their risks and benefits. All of the patient's questions were answered.     Plan:      1. Follow up in 4 weeks.  2. Start pendulum exercises.  3. Continue using sling until next visit.     4. Tylenol 650mg TID PRN pain         Rayray Mcdonald IV, MD   of Clinical Orthopedics  Department of Orthopedic Surgery  [facility]  Office: [phone]  Website: [email or web address]    ---------------------------------------

## 2022-03-17 ENCOUNTER — PATIENT MESSAGE (OUTPATIENT)
Dept: ORTHOPEDICS | Facility: CLINIC | Age: 62
End: 2022-03-17
Payer: COMMERCIAL

## 2022-04-03 ENCOUNTER — PATIENT MESSAGE (OUTPATIENT)
Dept: ORTHOPEDICS | Facility: CLINIC | Age: 62
End: 2022-04-03
Payer: COMMERCIAL

## 2022-04-13 ENCOUNTER — OFFICE VISIT (OUTPATIENT)
Dept: ORTHOPEDICS | Facility: CLINIC | Age: 62
End: 2022-04-13
Payer: COMMERCIAL

## 2022-04-13 VITALS
HEIGHT: 62 IN | HEART RATE: 78 BPM | SYSTOLIC BLOOD PRESSURE: 119 MMHG | DIASTOLIC BLOOD PRESSURE: 78 MMHG | WEIGHT: 148.06 LBS | BODY MASS INDEX: 27.25 KG/M2

## 2022-04-13 DIAGNOSIS — S42.251A CLOSED DISPLACED FRACTURE OF GREATER TUBEROSITY OF RIGHT HUMERUS, INITIAL ENCOUNTER: Primary | ICD-10-CM

## 2022-04-13 PROCEDURE — 99999 PR PBB SHADOW E&M-EST. PATIENT-LVL III: ICD-10-PCS | Mod: PBBFAC,,, | Performed by: PHYSICIAN ASSISTANT

## 2022-04-13 PROCEDURE — 3074F SYST BP LT 130 MM HG: CPT | Mod: CPTII,S$GLB,, | Performed by: PHYSICIAN ASSISTANT

## 2022-04-13 PROCEDURE — 3008F PR BODY MASS INDEX (BMI) DOCUMENTED: ICD-10-PCS | Mod: CPTII,S$GLB,, | Performed by: PHYSICIAN ASSISTANT

## 2022-04-13 PROCEDURE — 1160F PR REVIEW ALL MEDS BY PRESCRIBER/CLIN PHARMACIST DOCUMENTED: ICD-10-PCS | Mod: CPTII,S$GLB,, | Performed by: PHYSICIAN ASSISTANT

## 2022-04-13 PROCEDURE — 3074F PR MOST RECENT SYSTOLIC BLOOD PRESSURE < 130 MM HG: ICD-10-PCS | Mod: CPTII,S$GLB,, | Performed by: PHYSICIAN ASSISTANT

## 2022-04-13 PROCEDURE — 1159F PR MEDICATION LIST DOCUMENTED IN MEDICAL RECORD: ICD-10-PCS | Mod: CPTII,S$GLB,, | Performed by: PHYSICIAN ASSISTANT

## 2022-04-13 PROCEDURE — 99999 PR PBB SHADOW E&M-EST. PATIENT-LVL III: CPT | Mod: PBBFAC,,, | Performed by: PHYSICIAN ASSISTANT

## 2022-04-13 PROCEDURE — 99024 PR POST-OP FOLLOW-UP VISIT: ICD-10-PCS | Mod: S$GLB,,, | Performed by: PHYSICIAN ASSISTANT

## 2022-04-13 PROCEDURE — 1159F MED LIST DOCD IN RCRD: CPT | Mod: CPTII,S$GLB,, | Performed by: PHYSICIAN ASSISTANT

## 2022-04-13 PROCEDURE — 3008F BODY MASS INDEX DOCD: CPT | Mod: CPTII,S$GLB,, | Performed by: PHYSICIAN ASSISTANT

## 2022-04-13 PROCEDURE — 3078F DIAST BP <80 MM HG: CPT | Mod: CPTII,S$GLB,, | Performed by: PHYSICIAN ASSISTANT

## 2022-04-13 PROCEDURE — 3078F PR MOST RECENT DIASTOLIC BLOOD PRESSURE < 80 MM HG: ICD-10-PCS | Mod: CPTII,S$GLB,, | Performed by: PHYSICIAN ASSISTANT

## 2022-04-13 PROCEDURE — 99024 POSTOP FOLLOW-UP VISIT: CPT | Mod: S$GLB,,, | Performed by: PHYSICIAN ASSISTANT

## 2022-04-13 PROCEDURE — 1160F RVW MEDS BY RX/DR IN RCRD: CPT | Mod: CPTII,S$GLB,, | Performed by: PHYSICIAN ASSISTANT

## 2022-04-13 RX ORDER — IBUPROFEN 800 MG/1
800 TABLET ORAL 3 TIMES DAILY
Qty: 90 TABLET | Refills: 1 | Status: SHIPPED | OUTPATIENT
Start: 2022-04-13 | End: 2022-06-12

## 2022-04-13 NOTE — PROGRESS NOTES
Subjective:      Patient ID: Cristal Gonzalez is a 61 y.o. female.    Chief Complaint: Pain of the Right Shoulder      61-year-old female 6 week follow-up right shoulder greater tuberosity fracture.  She is still using her sling today.  She notes some lateral shoulder pain and pain in the biceps.  She also notes some soreness in her entire right upper extremity.  Taking Tylenol and ibuprofen as needed.  She does have limited range of motion.  No radicular symptoms or paresthesias noted.  She is ready to start physical therapy.      Review of Systems   Constitutional: Negative for chills and fever.   Cardiovascular: Negative for chest pain.   Respiratory: Negative for cough.    Hematologic/Lymphatic: Does not bruise/bleed easily.   Skin: Negative for poor wound healing and rash.   Musculoskeletal: Positive for joint pain, joint swelling, myalgias and stiffness.   Gastrointestinal: Negative for abdominal pain.   Genitourinary: Negative for bladder incontinence.   Neurological: Negative for dizziness, loss of balance and weakness.   Psychiatric/Behavioral: Negative for altered mental status.       Review of patient's allergies indicates:   Allergen Reactions    Sulfa (sulfonamide antibiotics) Shortness Of Breath, Anxiety and Palpitations    Bactrim [sulfamethoxazole-trimethoprim]         Current Outpatient Medications   Medication Sig Dispense Refill    acetaminophen (TYLENOL) 325 MG tablet Take 325 mg by mouth every 4 (four) hours as needed for Pain.      cetirizine (ZYRTEC) 10 MG tablet Take 1 tablet (10 mg total) by mouth once daily. 30 tablet 5    FLUZONE QUAD 5142-1947, PF, 60 mcg (15 mcg x 4)/0.5 mL Syrg       GLUCOSAM SUL NA/CHONDR THURSTON A NA (GLUCOSAMINE & CHONDROIT SUL.NA ORAL) Take by mouth.      hydrOXYzine HCL (ATARAX) 25 MG tablet Take 1 tablet (25 mg total) by mouth 3 (three) times daily. Prn severe anxiety. 90 tablet 3    INTRAROSA 6.5 mg Inst INSERT ONE APPLICATION INTO THE VAGINA QHS  12     "MV, MIN #36/IRON,CARBONYL/FA (GERITOL COMPLETE ORAL) Take by mouth.      omeprazole (PRILOSEC) 40 MG capsule TAKE 1 CAPSULE(40 MG) BY MOUTH EVERY DAY 30 capsule 0    ondansetron (ZOFRAN-ODT) 4 MG TbDL Take 1 tablet (4 mg total) by mouth every 6 (six) hours as needed (nausea). 30 tablet 5    sars-cov-2, covid-19, (PFIZER COVID-19) 30 mcg/0.3 ml injection       triamcinolone acetonide 0.1% (KENALOG) 0.1 % cream Apply topically 2 (two) times daily. To affected areas for 1-2 wks prn rash 80 g 5    ibuprofen (ADVIL,MOTRIN) 800 MG tablet Take 1 tablet (800 mg total) by mouth 3 (three) times daily. 90 tablet 1     No current facility-administered medications for this visit.        The patient's relevant past medical, surgical, and social history was reviewed in Epic.       Objective:      VITAL SIGNS: /78 (BP Location: Left arm, Patient Position: Sitting, BP Method: Large (Automatic))   Pulse 78   Ht 5' 2" (1.575 m)   Wt 67.1 kg (148 lb 0.6 oz)   BMI 27.08 kg/m²     General    Nursing note and vitals reviewed.  Constitutional: She is oriented to person, place, and time. She appears well-developed and well-nourished.   Neurological: She is alert and oriented to person, place, and time.         Right Shoulder Exam     Inspection/Observation   Swelling: present  Bruising: present    Tenderness   The patient is tender to palpation of the greater tuberosity.    Range of Motion   Active abduction: 120   Passive abduction: 130   Forward Flexion: 120     Tests & Signs   Rotator Cuff Painful Arc/Range: moderate    Other   Sensation: normal    Muscle Strength   Right Upper Extremity   Shoulder Abduction: 4/5   Supraspinatus: 4/5     Vascular Exam     Right Pulses      Radial:                    1+               Assessment:       1. Closed displaced fracture of greater tuberosity of right humerus, initial encounter          Plan:         Cristal was seen today for pain.    Diagnoses and all orders for this " visit:    Closed displaced fracture of greater tuberosity of right humerus, initial encounter  -     ibuprofen (ADVIL,MOTRIN) 800 MG tablet; Take 1 tablet (800 mg total) by mouth 3 (three) times daily.  -     Ambulatory referral/consult to Physical/Occupational Therapy; Future    Physical therapy.  She would like to go to a facility in [place] called to [facility] orth[facility].  Will fax over an order there.  She make discontinue the sling at this time.  Weightbearing as tolerated on right upper extremity.  Activities as tolerated.  Follow-up in 6 weeks for repeat assessment.          Cristina Flowers PA-C   04/13/2022

## 2022-05-25 ENCOUNTER — OFFICE VISIT (OUTPATIENT)
Dept: ORTHOPEDICS | Facility: CLINIC | Age: 62
End: 2022-05-25
Payer: COMMERCIAL

## 2022-05-25 VITALS
DIASTOLIC BLOOD PRESSURE: 77 MMHG | WEIGHT: 147.69 LBS | HEIGHT: 62 IN | SYSTOLIC BLOOD PRESSURE: 124 MMHG | HEART RATE: 74 BPM | BODY MASS INDEX: 27.18 KG/M2

## 2022-05-25 DIAGNOSIS — S42.251A CLOSED DISPLACED FRACTURE OF GREATER TUBEROSITY OF RIGHT HUMERUS, INITIAL ENCOUNTER: Primary | ICD-10-CM

## 2022-05-25 PROCEDURE — 3008F PR BODY MASS INDEX (BMI) DOCUMENTED: ICD-10-PCS | Mod: CPTII,S$GLB,, | Performed by: PHYSICIAN ASSISTANT

## 2022-05-25 PROCEDURE — 99999 PR PBB SHADOW E&M-EST. PATIENT-LVL III: CPT | Mod: PBBFAC,,, | Performed by: PHYSICIAN ASSISTANT

## 2022-05-25 PROCEDURE — 1160F RVW MEDS BY RX/DR IN RCRD: CPT | Mod: CPTII,S$GLB,, | Performed by: PHYSICIAN ASSISTANT

## 2022-05-25 PROCEDURE — 3008F BODY MASS INDEX DOCD: CPT | Mod: CPTII,S$GLB,, | Performed by: PHYSICIAN ASSISTANT

## 2022-05-25 PROCEDURE — 99024 POSTOP FOLLOW-UP VISIT: CPT | Mod: S$GLB,,, | Performed by: PHYSICIAN ASSISTANT

## 2022-05-25 PROCEDURE — 99999 PR PBB SHADOW E&M-EST. PATIENT-LVL III: ICD-10-PCS | Mod: PBBFAC,,, | Performed by: PHYSICIAN ASSISTANT

## 2022-05-25 PROCEDURE — 1159F PR MEDICATION LIST DOCUMENTED IN MEDICAL RECORD: ICD-10-PCS | Mod: CPTII,S$GLB,, | Performed by: PHYSICIAN ASSISTANT

## 2022-05-25 PROCEDURE — 3078F PR MOST RECENT DIASTOLIC BLOOD PRESSURE < 80 MM HG: ICD-10-PCS | Mod: CPTII,S$GLB,, | Performed by: PHYSICIAN ASSISTANT

## 2022-05-25 PROCEDURE — 3078F DIAST BP <80 MM HG: CPT | Mod: CPTII,S$GLB,, | Performed by: PHYSICIAN ASSISTANT

## 2022-05-25 PROCEDURE — 3074F SYST BP LT 130 MM HG: CPT | Mod: CPTII,S$GLB,, | Performed by: PHYSICIAN ASSISTANT

## 2022-05-25 PROCEDURE — 3074F PR MOST RECENT SYSTOLIC BLOOD PRESSURE < 130 MM HG: ICD-10-PCS | Mod: CPTII,S$GLB,, | Performed by: PHYSICIAN ASSISTANT

## 2022-05-25 PROCEDURE — 1160F PR REVIEW ALL MEDS BY PRESCRIBER/CLIN PHARMACIST DOCUMENTED: ICD-10-PCS | Mod: CPTII,S$GLB,, | Performed by: PHYSICIAN ASSISTANT

## 2022-05-25 PROCEDURE — 1159F MED LIST DOCD IN RCRD: CPT | Mod: CPTII,S$GLB,, | Performed by: PHYSICIAN ASSISTANT

## 2022-05-25 PROCEDURE — 99024 PR POST-OP FOLLOW-UP VISIT: ICD-10-PCS | Mod: S$GLB,,, | Performed by: PHYSICIAN ASSISTANT

## 2022-05-25 NOTE — PROGRESS NOTES
Subjective:      Patient ID: Cristal Gonzalez is a 61 y.o. female.    Chief Complaint: Pain of the Right Shoulder      Follow-up right shoulder greater tuberosity fracture. DOI 2/26/22. she is currently in physical therapy at an outside facility.  She states she is doing very well.  She is noticing improvements in her range of motion and strength.  She still has limitations with abduction and internal rotation.  She does note some mild soreness in the biceps region.      Review of Systems   Constitutional: Negative for chills and fever.   Cardiovascular: Negative for chest pain.   Respiratory: Negative for cough.    Hematologic/Lymphatic: Does not bruise/bleed easily.   Skin: Negative for poor wound healing and rash.   Musculoskeletal: Positive for joint pain, muscle weakness, myalgias and stiffness.   Gastrointestinal: Negative for abdominal pain.   Genitourinary: Negative for bladder incontinence.   Neurological: Negative for dizziness, loss of balance and weakness.   Psychiatric/Behavioral: Negative for altered mental status.       Review of patient's allergies indicates:   Allergen Reactions    Sulfa (sulfonamide antibiotics) Shortness Of Breath, Anxiety and Palpitations    Bactrim [sulfamethoxazole-trimethoprim]         Current Outpatient Medications   Medication Sig Dispense Refill    acetaminophen (TYLENOL) 325 MG tablet Take 325 mg by mouth every 4 (four) hours as needed for Pain.      FLUZONE QUAD 4500-7814, PF, 60 mcg (15 mcg x 4)/0.5 mL Syrg       GLUCOSAM SUL NA/CHONDR THURSTON A NA (GLUCOSAMINE & CHONDROIT SUL.NA ORAL) Take by mouth.      hydrOXYzine HCL (ATARAX) 25 MG tablet Take 1 tablet (25 mg total) by mouth 3 (three) times daily. Prn severe anxiety. 90 tablet 3    ibuprofen (ADVIL,MOTRIN) 800 MG tablet Take 1 tablet (800 mg total) by mouth 3 (three) times daily. 90 tablet 1    INTRAROSA 6.5 mg Inst INSERT ONE APPLICATION INTO THE VAGINA QHS  12    MV, MIN #36/IRON,CARBONYL/FA (GERITOL  "COMPLETE ORAL) Take by mouth.      omeprazole (PRILOSEC) 40 MG capsule TAKE 1 CAPSULE(40 MG) BY MOUTH EVERY DAY 30 capsule 0    ondansetron (ZOFRAN-ODT) 4 MG TbDL Take 1 tablet (4 mg total) by mouth every 6 (six) hours as needed (nausea). 30 tablet 5    sars-cov-2, covid-19, (PFIZER COVID-19) 30 mcg/0.3 ml injection       triamcinolone acetonide 0.1% (KENALOG) 0.1 % cream APPLY TOPICALLY TO THE AFFECTED AREA TWICE DAILY FOR 1 TO 2 WEEKS AS NEEDED FOR RASH 80 g 0    cetirizine (ZYRTEC) 10 MG tablet Take 1 tablet (10 mg total) by mouth once daily. 30 tablet 5     No current facility-administered medications for this visit.        The patient's relevant past medical, surgical, and social history was reviewed in Epic.       Objective:      VITAL SIGNS: /77 (BP Location: Left arm, Patient Position: Sitting, BP Method: Small (Automatic))   Pulse 74   Ht 5' 2" (1.575 m)   Wt 67 kg (147 lb 11.3 oz)   BMI 27.02 kg/m²     General    Nursing note and vitals reviewed.  Constitutional: She is oriented to person, place, and time. She appears well-developed and well-nourished.   Neurological: She is alert and oriented to person, place, and time.         Right Shoulder Exam     Tenderness   Right shoulder tenderness location: biceps soreness     Range of Motion   Active abduction: 130   Forward Flexion: 170   External Rotation 0 degrees: 80   Internal rotation 0 degrees: T12     Other   Sensation: normal    Muscle Strength   Right Upper Extremity   Shoulder Abduction: 4/5   Shoulder Internal Rotation: 4/5   Shoulder External Rotation: 4/5   Supraspinatus: 4/5            Assessment:       1. Closed displaced fracture of greater tuberosity of right humerus, initial encounter          Plan:         Cristal was seen today for pain.    Diagnoses and all orders for this visit:    Closed displaced fracture of greater tuberosity of right humerus, initial encounter      Improving right greater tuberosity fracture. Patient is " happy with results. Continue PT. Weightbearing as tolerated. Activities as tolerated. F/U at anytime.         Cristina Flowers PA-C   05/25/2022

## 2022-06-20 ENCOUNTER — OFFICE VISIT (OUTPATIENT)
Dept: PRIMARY CARE CLINIC | Facility: CLINIC | Age: 62
End: 2022-06-20
Payer: COMMERCIAL

## 2022-06-20 ENCOUNTER — LAB VISIT (OUTPATIENT)
Dept: LAB | Facility: HOSPITAL | Age: 62
End: 2022-06-20
Attending: FAMILY MEDICINE
Payer: COMMERCIAL

## 2022-06-20 VITALS
DIASTOLIC BLOOD PRESSURE: 80 MMHG | HEIGHT: 62 IN | WEIGHT: 150.81 LBS | OXYGEN SATURATION: 98 % | SYSTOLIC BLOOD PRESSURE: 110 MMHG | HEART RATE: 61 BPM | BODY MASS INDEX: 27.75 KG/M2 | TEMPERATURE: 98 F

## 2022-06-20 DIAGNOSIS — Z00.00 ROUTINE MEDICAL EXAM: Primary | ICD-10-CM

## 2022-06-20 DIAGNOSIS — Z00.00 ROUTINE MEDICAL EXAM: ICD-10-CM

## 2022-06-20 DIAGNOSIS — Z12.83 SKIN CANCER SCREENING: ICD-10-CM

## 2022-06-20 DIAGNOSIS — Z12.31 ENCOUNTER FOR SCREENING MAMMOGRAM FOR MALIGNANT NEOPLASM OF BREAST: ICD-10-CM

## 2022-06-20 LAB
ALBUMIN SERPL BCP-MCNC: 4 G/DL (ref 3.5–5.2)
ALP SERPL-CCNC: 44 U/L (ref 55–135)
ALT SERPL W/O P-5'-P-CCNC: 14 U/L (ref 10–44)
ANION GAP SERPL CALC-SCNC: 8 MMOL/L (ref 8–16)
AST SERPL-CCNC: 20 U/L (ref 10–40)
BILIRUB SERPL-MCNC: 0.6 MG/DL (ref 0.1–1)
BUN SERPL-MCNC: 21 MG/DL (ref 8–23)
CALCIUM SERPL-MCNC: 9.8 MG/DL (ref 8.7–10.5)
CHLORIDE SERPL-SCNC: 105 MMOL/L (ref 95–110)
CHOLEST SERPL-MCNC: 165 MG/DL (ref 120–199)
CHOLEST/HDLC SERPL: 1.7 {RATIO} (ref 2–5)
CO2 SERPL-SCNC: 26 MMOL/L (ref 23–29)
CREAT SERPL-MCNC: 0.8 MG/DL (ref 0.5–1.4)
ERYTHROCYTE [DISTWIDTH] IN BLOOD BY AUTOMATED COUNT: 13.2 % (ref 11.5–14.5)
EST. GFR  (AFRICAN AMERICAN): >60 ML/MIN/1.73 M^2
EST. GFR  (NON AFRICAN AMERICAN): >60 ML/MIN/1.73 M^2
ESTIMATED AVG GLUCOSE: 108 MG/DL (ref 68–131)
GLUCOSE SERPL-MCNC: 96 MG/DL (ref 70–110)
HBA1C MFR BLD: 5.4 % (ref 4–5.6)
HCT VFR BLD AUTO: 41.2 % (ref 37–48.5)
HDLC SERPL-MCNC: 96 MG/DL (ref 40–75)
HDLC SERPL: 58.2 % (ref 20–50)
HGB BLD-MCNC: 12.8 G/DL (ref 12–16)
LDLC SERPL CALC-MCNC: 63.8 MG/DL (ref 63–159)
MCH RBC QN AUTO: 31.2 PG (ref 27–31)
MCHC RBC AUTO-ENTMCNC: 31.1 G/DL (ref 32–36)
MCV RBC AUTO: 101 FL (ref 82–98)
NONHDLC SERPL-MCNC: 69 MG/DL
PLATELET # BLD AUTO: 389 K/UL (ref 150–450)
PMV BLD AUTO: 10.3 FL (ref 9.2–12.9)
POTASSIUM SERPL-SCNC: 4.4 MMOL/L (ref 3.5–5.1)
PROT SERPL-MCNC: 7.1 G/DL (ref 6–8.4)
RBC # BLD AUTO: 4.1 M/UL (ref 4–5.4)
SODIUM SERPL-SCNC: 139 MMOL/L (ref 136–145)
TRIGL SERPL-MCNC: 26 MG/DL (ref 30–150)
TSH SERPL DL<=0.005 MIU/L-ACNC: 0.93 UIU/ML (ref 0.4–4)
WBC # BLD AUTO: 6.75 K/UL (ref 3.9–12.7)

## 2022-06-20 PROCEDURE — 99999 PR PBB SHADOW E&M-EST. PATIENT-LVL V: ICD-10-PCS | Mod: PBBFAC,,, | Performed by: FAMILY MEDICINE

## 2022-06-20 PROCEDURE — 3008F PR BODY MASS INDEX (BMI) DOCUMENTED: ICD-10-PCS | Mod: CPTII,S$GLB,, | Performed by: FAMILY MEDICINE

## 2022-06-20 PROCEDURE — 99999 PR PBB SHADOW E&M-EST. PATIENT-LVL V: CPT | Mod: PBBFAC,,, | Performed by: FAMILY MEDICINE

## 2022-06-20 PROCEDURE — 80053 COMPREHEN METABOLIC PANEL: CPT | Performed by: FAMILY MEDICINE

## 2022-06-20 PROCEDURE — 3079F PR MOST RECENT DIASTOLIC BLOOD PRESSURE 80-89 MM HG: ICD-10-PCS | Mod: CPTII,S$GLB,, | Performed by: FAMILY MEDICINE

## 2022-06-20 PROCEDURE — 36415 COLL VENOUS BLD VENIPUNCTURE: CPT | Mod: PN | Performed by: FAMILY MEDICINE

## 2022-06-20 PROCEDURE — 84443 ASSAY THYROID STIM HORMONE: CPT | Performed by: FAMILY MEDICINE

## 2022-06-20 PROCEDURE — 99396 PREV VISIT EST AGE 40-64: CPT | Mod: S$GLB,,, | Performed by: FAMILY MEDICINE

## 2022-06-20 PROCEDURE — 3074F PR MOST RECENT SYSTOLIC BLOOD PRESSURE < 130 MM HG: ICD-10-PCS | Mod: CPTII,S$GLB,, | Performed by: FAMILY MEDICINE

## 2022-06-20 PROCEDURE — 85027 COMPLETE CBC AUTOMATED: CPT | Performed by: FAMILY MEDICINE

## 2022-06-20 PROCEDURE — 83036 HEMOGLOBIN GLYCOSYLATED A1C: CPT | Performed by: FAMILY MEDICINE

## 2022-06-20 PROCEDURE — 3044F HG A1C LEVEL LT 7.0%: CPT | Mod: CPTII,S$GLB,, | Performed by: FAMILY MEDICINE

## 2022-06-20 PROCEDURE — 3074F SYST BP LT 130 MM HG: CPT | Mod: CPTII,S$GLB,, | Performed by: FAMILY MEDICINE

## 2022-06-20 PROCEDURE — 3044F PR MOST RECENT HEMOGLOBIN A1C LEVEL <7.0%: ICD-10-PCS | Mod: CPTII,S$GLB,, | Performed by: FAMILY MEDICINE

## 2022-06-20 PROCEDURE — 1160F RVW MEDS BY RX/DR IN RCRD: CPT | Mod: CPTII,S$GLB,, | Performed by: FAMILY MEDICINE

## 2022-06-20 PROCEDURE — 80061 LIPID PANEL: CPT | Performed by: FAMILY MEDICINE

## 2022-06-20 PROCEDURE — 1159F PR MEDICATION LIST DOCUMENTED IN MEDICAL RECORD: ICD-10-PCS | Mod: CPTII,S$GLB,, | Performed by: FAMILY MEDICINE

## 2022-06-20 PROCEDURE — 3079F DIAST BP 80-89 MM HG: CPT | Mod: CPTII,S$GLB,, | Performed by: FAMILY MEDICINE

## 2022-06-20 PROCEDURE — 1160F PR REVIEW ALL MEDS BY PRESCRIBER/CLIN PHARMACIST DOCUMENTED: ICD-10-PCS | Mod: CPTII,S$GLB,, | Performed by: FAMILY MEDICINE

## 2022-06-20 PROCEDURE — 1159F MED LIST DOCD IN RCRD: CPT | Mod: CPTII,S$GLB,, | Performed by: FAMILY MEDICINE

## 2022-06-20 PROCEDURE — 99396 PR PREVENTIVE VISIT,EST,40-64: ICD-10-PCS | Mod: S$GLB,,, | Performed by: FAMILY MEDICINE

## 2022-06-20 PROCEDURE — 3008F BODY MASS INDEX DOCD: CPT | Mod: CPTII,S$GLB,, | Performed by: FAMILY MEDICINE

## 2022-06-20 RX ORDER — ONDANSETRON 4 MG/1
4 TABLET, ORALLY DISINTEGRATING ORAL EVERY 6 HOURS PRN
Qty: 30 TABLET | Refills: 5 | Status: SHIPPED | OUTPATIENT
Start: 2022-06-20 | End: 2023-01-31

## 2022-06-20 RX ORDER — IBUPROFEN 800 MG/1
800 TABLET ORAL 3 TIMES DAILY
COMMUNITY
End: 2023-02-16

## 2022-06-20 RX ORDER — PANTOPRAZOLE SODIUM 40 MG/1
40 TABLET, DELAYED RELEASE ORAL DAILY
Qty: 30 TABLET | Refills: 0 | Status: SHIPPED | OUTPATIENT
Start: 2022-06-20 | End: 2023-07-07 | Stop reason: SDUPTHER

## 2022-06-20 RX ORDER — TRIAMCINOLONE ACETONIDE 1 MG/G
CREAM TOPICAL
Qty: 80 G | Refills: 11 | Status: SHIPPED | OUTPATIENT
Start: 2022-06-20 | End: 2023-07-07 | Stop reason: SDUPTHER

## 2022-06-20 NOTE — PROGRESS NOTES
Subjective:   Patient ID: Cristal Gonzalez is a 62 y.o. female.    Chief Complaint: Annual Exam      HPI    62 y.o. female here for annual wellness exam.    Health maintenance reviewed w pt inc screening labs and vaccines    Patient queried and denies any further complaints.    ALLERGIES AND MEDICATIONS: updated and reviewed.  Review of patient's allergies indicates:   Allergen Reactions    Sulfa (sulfonamide antibiotics) Shortness Of Breath, Anxiety and Palpitations       Current Outpatient Medications:     cetirizine (ZYRTEC) 10 MG tablet, Take 1 tablet (10 mg total) by mouth once daily., Disp: 30 tablet, Rfl: 5    GLUCOSAM SUL NA/CHONDR THURSTON A NA (GLUCOSAMINE & CHONDROIT SUL.NA ORAL), Take by mouth., Disp: , Rfl:     ibuprofen (ADVIL,MOTRIN) 800 MG tablet, Take 800 mg by mouth 3 (three) times daily., Disp: , Rfl:     INTRAROSA 6.5 mg Inst, INSERT ONE APPLICATION INTO THE VAGINA QHS, Disp: , Rfl: 12    MV, MIN #36/IRON,CARBONYL/FA (GERITOL COMPLETE ORAL), Take by mouth., Disp: , Rfl:     ondansetron (ZOFRAN-ODT) 4 MG TbDL, Take 1 tablet (4 mg total) by mouth every 6 (six) hours as needed (nausea)., Disp: 30 tablet, Rfl: 5    pantoprazole (PROTONIX) 40 MG tablet, Take 1 tablet (40 mg total) by mouth once daily. For 30d, Disp: 30 tablet, Rfl: 0    triamcinolone acetonide 0.1% (KENALOG) 0.1 % cream, APPLY TOPICALLY TO THE AFFECTED AREA TWICE DAILY FOR 1 TO 2 WEEKS AS NEEDED FOR RASH, Disp: 80 g, Rfl: 11    Review of Systems   Constitutional: Negative for activity change, appetite change, chills, diaphoresis, fatigue, fever and unexpected weight change.   HENT: Negative for congestion, ear discharge, ear pain, facial swelling, hearing loss, nosebleeds, postnasal drip, rhinorrhea, sinus pressure, sneezing, sore throat, tinnitus, trouble swallowing and voice change.    Eyes: Negative for photophobia, pain, discharge, redness, itching and visual disturbance.   Respiratory: Negative for cough, chest tightness,  shortness of breath and wheezing.    Cardiovascular: Negative for chest pain, palpitations and leg swelling.   Gastrointestinal: Negative for abdominal distention, abdominal pain, anal bleeding, blood in stool, constipation, diarrhea, nausea, rectal pain and vomiting.   Endocrine: Negative for cold intolerance, heat intolerance, polydipsia, polyphagia and polyuria.   Genitourinary: Negative for difficulty urinating, dysuria and flank pain.   Musculoskeletal: Negative for arthralgias, back pain, joint swelling, myalgias and neck pain.   Skin: Negative for rash.   Neurological: Negative for dizziness, tremors, seizures, syncope, speech difficulty, weakness, light-headedness, numbness and headaches.   Psychiatric/Behavioral: Negative for behavioral problems, confusion, decreased concentration, dysphoric mood, sleep disturbance and suicidal ideas. The patient is not nervous/anxious and is not hyperactive.        Lab Results   Component Value Date    WBC 6.75 06/20/2022    HGB 12.8 06/20/2022    HCT 41.2 06/20/2022     (H) 06/20/2022     06/20/2022         CMP  Sodium   Date Value Ref Range Status   06/20/2022 139 136 - 145 mmol/L Final     Potassium   Date Value Ref Range Status   06/20/2022 4.4 3.5 - 5.1 mmol/L Final     Chloride   Date Value Ref Range Status   06/20/2022 105 95 - 110 mmol/L Final     CO2   Date Value Ref Range Status   06/20/2022 26 23 - 29 mmol/L Final     Glucose   Date Value Ref Range Status   06/20/2022 96 70 - 110 mg/dL Final     BUN   Date Value Ref Range Status   06/20/2022 21 8 - 23 mg/dL Final     Creatinine   Date Value Ref Range Status   06/20/2022 0.8 0.5 - 1.4 mg/dL Final     Calcium   Date Value Ref Range Status   06/20/2022 9.8 8.7 - 10.5 mg/dL Final     Total Protein   Date Value Ref Range Status   06/20/2022 7.1 6.0 - 8.4 g/dL Final     Albumin   Date Value Ref Range Status   06/20/2022 4.0 3.5 - 5.2 g/dL Final     Total Bilirubin   Date Value Ref Range Status  "  06/20/2022 0.6 0.1 - 1.0 mg/dL Final     Comment:     For infants and newborns, interpretation of results should be based  on gestational age, weight and in agreement with clinical  observations.    Premature Infant recommended reference ranges:  Up to 24 hours.............<8.0 mg/dL  Up to 48 hours............<12.0 mg/dL  3-5 days..................<15.0 mg/dL  6-29 days.................<15.0 mg/dL       Alkaline Phosphatase   Date Value Ref Range Status   06/20/2022 44 (L) 55 - 135 U/L Final     AST   Date Value Ref Range Status   06/20/2022 20 10 - 40 U/L Final     ALT   Date Value Ref Range Status   06/20/2022 14 10 - 44 U/L Final     Anion Gap   Date Value Ref Range Status   06/20/2022 8 8 - 16 mmol/L Final     eGFR if    Date Value Ref Range Status   06/20/2022 >60.0 >60 mL/min/1.73 m^2 Final     eGFR if non    Date Value Ref Range Status   06/20/2022 >60.0 >60 mL/min/1.73 m^2 Final     Comment:     Calculation used to obtain the estimated glomerular filtration  rate (eGFR) is the CKD-EPI equation.           Lab Results   Component Value Date    HGBA1C 5.4 06/20/2022        Objective:     Vitals:    06/20/22 1013   BP: 110/80   Pulse: 61   Temp: 98.2 °F (36.8 °C)   TempSrc: Oral   SpO2: 98%   Weight: 68.4 kg (150 lb 12.7 oz)   Height: 5' 2" (1.575 m)   PainSc: 0-No pain     Body mass index is 27.58 kg/m².    Physical Exam  Vitals and nursing note reviewed.   Constitutional:       General: She is not in acute distress.     Appearance: Normal appearance. She is well-developed. She is not ill-appearing or diaphoretic.   HENT:      Head: Normocephalic and atraumatic.      Right Ear: Hearing, tympanic membrane, ear canal and external ear normal. No tenderness.      Left Ear: Hearing, tympanic membrane, ear canal and external ear normal. No tenderness.      Nose: Nose normal.   Eyes:      General: Lids are normal. No scleral icterus.        Right eye: No discharge.         Left eye: " No discharge.      Extraocular Movements:      Right eye: Normal extraocular motion.      Left eye: Normal extraocular motion.      Conjunctiva/sclera: Conjunctivae normal.      Right eye: Right conjunctiva is not injected.      Left eye: Left conjunctiva is not injected.   Neck:      Thyroid: No thyromegaly.      Vascular: No carotid bruit or JVD.      Trachea: No tracheal deviation.   Cardiovascular:      Rate and Rhythm: Normal rate and regular rhythm.      Pulses: Normal pulses.      Heart sounds: Normal heart sounds. No murmur heard.    No friction rub.   Pulmonary:      Effort: Pulmonary effort is normal. No accessory muscle usage or respiratory distress.      Breath sounds: Normal breath sounds. No wheezing, rhonchi or rales.   Abdominal:      General: Bowel sounds are normal. There is no distension or abdominal bruit.      Palpations: Abdomen is soft. There is no mass or pulsatile mass.      Tenderness: There is no abdominal tenderness. There is no right CVA tenderness, left CVA tenderness, guarding or rebound. Negative signs include Tai's sign and McBurney's sign.      Hernia: No hernia is present.   Musculoskeletal:      Cervical back: Normal range of motion and neck supple. No edema or tenderness.      Right lower leg: No edema.      Left lower leg: No edema.   Lymphadenopathy:      Head:      Right side of head: No submandibular, preauricular or posterior auricular adenopathy.      Left side of head: No submandibular, preauricular or posterior auricular adenopathy.      Cervical: No cervical adenopathy.   Skin:     General: Skin is warm and dry.      Findings: No ecchymosis, erythema or rash. Rash is not urticarial.      Nails: There is no clubbing.   Neurological:      General: No focal deficit present.      Mental Status: She is alert and oriented to person, place, and time.      GCS: GCS eye subscore is 4. GCS verbal subscore is 5. GCS motor subscore is 6.   Psychiatric:         Mood and Affect:  Mood normal. Mood is not anxious or depressed. Affect is not angry or inappropriate.         Speech: Speech normal.         Behavior: Behavior normal. Behavior is cooperative.         Thought Content: Thought content normal.         Assessment and Plan:   Cristal was seen today for annual exam.    Diagnoses and all orders for this visit:    Routine medical exam  -     CBC Without Differential; Future  -     Comprehensive Metabolic Panel; Future  -     Lipid Panel; Future  -     TSH; Future  -     Hemoglobin A1C; Future    Skin cancer screening  -     Ambulatory referral/consult to Dermatology; Future    Encounter for screening mammogram for malignant neoplasm of breast  -     Mammo Digital Screening Bilat w/ [facility]; Future    Other orders  -     triamcinolone acetonide 0.1% (KENALOG) 0.1 % cream; APPLY TOPICALLY TO THE AFFECTED AREA TWICE DAILY FOR 1 TO 2 WEEKS AS NEEDED FOR RASH  -     ondansetron (ZOFRAN-ODT) 4 MG TbDL; Take 1 tablet (4 mg total) by mouth every 6 (six) hours as needed (nausea).  -     pantoprazole (PROTONIX) 40 MG tablet; Take 1 tablet (40 mg total) by mouth once daily. For 30d        No follow-ups on file.    Steve Morin MD

## 2022-06-23 PROBLEM — F43.22 ADJUSTMENT REACTION WITH ANXIOUS MOOD: Status: RESOLVED | Noted: 2021-05-11 | Resolved: 2022-06-23

## 2022-06-23 PROBLEM — R10.9 ABDOMINAL PAIN: Status: RESOLVED | Noted: 2021-05-11 | Resolved: 2022-06-23

## 2022-07-12 ENCOUNTER — PATIENT MESSAGE (OUTPATIENT)
Dept: ADMINISTRATIVE | Facility: HOSPITAL | Age: 62
End: 2022-07-12
Payer: COMMERCIAL

## 2022-07-14 ENCOUNTER — OFFICE VISIT (OUTPATIENT)
Dept: ORTHOPEDICS | Facility: CLINIC | Age: 62
End: 2022-07-14
Payer: COMMERCIAL

## 2022-07-14 ENCOUNTER — HOSPITAL ENCOUNTER (OUTPATIENT)
Dept: RADIOLOGY | Facility: HOSPITAL | Age: 62
Discharge: HOME | End: 2022-07-14
Attending: ORTHOPAEDIC SURGERY
Payer: COMMERCIAL

## 2022-07-14 VITALS — BODY MASS INDEX: 26.87 KG/M2 | HEIGHT: 62 IN | WEIGHT: 146 LBS

## 2022-07-14 DIAGNOSIS — M25.562 PAIN IN BOTH KNEES, UNSPECIFIED CHRONICITY: Primary | ICD-10-CM

## 2022-07-14 DIAGNOSIS — M25.561 PAIN IN BOTH KNEES, UNSPECIFIED CHRONICITY: Primary | ICD-10-CM

## 2022-07-14 DIAGNOSIS — M17.12 PRIMARY OSTEOARTHRITIS OF LEFT KNEE: Primary | ICD-10-CM

## 2022-07-14 DIAGNOSIS — M25.561 PAIN IN BOTH KNEES, UNSPECIFIED CHRONICITY: ICD-10-CM

## 2022-07-14 DIAGNOSIS — M25.562 PAIN IN BOTH KNEES, UNSPECIFIED CHRONICITY: ICD-10-CM

## 2022-07-14 PROCEDURE — 73564 X-RAY EXAM KNEE 4 OR MORE: CPT | Mod: 26,50,, | Performed by: STUDENT IN AN ORGANIZED HEALTH CARE EDUCATION/TRAINING PROGRAM

## 2022-07-14 PROCEDURE — 3008F PR BODY MASS INDEX (BMI) DOCUMENTED: ICD-10-PCS | Mod: CPTII,S$GLB,, | Performed by: ORTHOPAEDIC SURGERY

## 2022-07-14 PROCEDURE — 73564 X-RAY EXAM KNEE 4 OR MORE: CPT | Mod: TC,50

## 2022-07-14 PROCEDURE — 1160F PR REVIEW ALL MEDS BY PRESCRIBER/CLIN PHARMACIST DOCUMENTED: ICD-10-PCS | Mod: CPTII,S$GLB,, | Performed by: ORTHOPAEDIC SURGERY

## 2022-07-14 PROCEDURE — 1160F RVW MEDS BY RX/DR IN RCRD: CPT | Mod: CPTII,S$GLB,, | Performed by: ORTHOPAEDIC SURGERY

## 2022-07-14 PROCEDURE — 99999 PR PBB SHADOW E&M-EST. PATIENT-LVL III: ICD-10-PCS | Mod: PBBFAC,,, | Performed by: ORTHOPAEDIC SURGERY

## 2022-07-14 PROCEDURE — 1159F MED LIST DOCD IN RCRD: CPT | Mod: CPTII,S$GLB,, | Performed by: ORTHOPAEDIC SURGERY

## 2022-07-14 PROCEDURE — 73564 XR KNEE ORTHO BILAT WITH FLEXION: ICD-10-PCS | Mod: 26,50,, | Performed by: STUDENT IN AN ORGANIZED HEALTH CARE EDUCATION/TRAINING PROGRAM

## 2022-07-14 PROCEDURE — 3008F BODY MASS INDEX DOCD: CPT | Mod: CPTII,S$GLB,, | Performed by: ORTHOPAEDIC SURGERY

## 2022-07-14 PROCEDURE — 99214 PR OFFICE/OUTPT VISIT, EST, LEVL IV, 30-39 MIN: ICD-10-PCS | Mod: S$GLB,,, | Performed by: ORTHOPAEDIC SURGERY

## 2022-07-14 PROCEDURE — 99214 OFFICE O/P EST MOD 30 MIN: CPT | Mod: S$GLB,,, | Performed by: ORTHOPAEDIC SURGERY

## 2022-07-14 PROCEDURE — 1159F PR MEDICATION LIST DOCUMENTED IN MEDICAL RECORD: ICD-10-PCS | Mod: CPTII,S$GLB,, | Performed by: ORTHOPAEDIC SURGERY

## 2022-07-14 PROCEDURE — 3044F PR MOST RECENT HEMOGLOBIN A1C LEVEL <7.0%: ICD-10-PCS | Mod: CPTII,S$GLB,, | Performed by: ORTHOPAEDIC SURGERY

## 2022-07-14 PROCEDURE — 3044F HG A1C LEVEL LT 7.0%: CPT | Mod: CPTII,S$GLB,, | Performed by: ORTHOPAEDIC SURGERY

## 2022-07-14 PROCEDURE — 99999 PR PBB SHADOW E&M-EST. PATIENT-LVL III: CPT | Mod: PBBFAC,,, | Performed by: ORTHOPAEDIC SURGERY

## 2022-07-14 NOTE — PROGRESS NOTES
HPI: Cristal Gonzalez is a 62 y.o. female presents to clinic for bilateral L>R knee pain. Previously known to me after ORIF R open trimalleolar ankle fracture 2015. She has 1 year L knee pain, lateral, activity related, worse after long day. She as tried naproxen with little relief. She states her right knee hurt roughly 1 month ago but has since resolved completely.     Past Medical History:   Diagnosis Date    GERD (gastroesophageal reflux disease)     Hot flash, menopausal        Current Outpatient Medications on File Prior to Visit   Medication Sig Dispense Refill    cetirizine (ZYRTEC) 10 MG tablet Take 1 tablet (10 mg total) by mouth once daily. 30 tablet 5    GLUCOSAM SUL NA/CHONDR THURSTON A NA (GLUCOSAMINE & CHONDROIT SUL.NA ORAL) Take by mouth.      ibuprofen (ADVIL,MOTRIN) 800 MG tablet Take 800 mg by mouth 3 (three) times daily.      INTRAROSA 6.5 mg Inst INSERT ONE APPLICATION INTO THE VAGINA QHS  12    MV, MIN #36/IRON,CARBONYL/FA (GERITOL COMPLETE ORAL) Take by mouth.      ondansetron (ZOFRAN-ODT) 4 MG TbDL Take 1 tablet (4 mg total) by mouth every 6 (six) hours as needed (nausea). 30 tablet 5    pantoprazole (PROTONIX) 40 MG tablet Take 1 tablet (40 mg total) by mouth once daily. For 30d 30 tablet 0    triamcinolone acetonide 0.1% (KENALOG) 0.1 % cream APPLY TOPICALLY TO THE AFFECTED AREA TWICE DAILY FOR 1 TO 2 WEEKS AS NEEDED FOR RASH 80 g 11     No current facility-administered medications on file prior to visit.         ROS:  Patient denies constitutional symptoms, cardiac symptoms, respiratory symptoms, GI symptoms, Urinary symptoms, skin issues, allergic issues  The remainder of the musculoskeletal ROS is included in the HPI.    PE:    AA&O x 4.  NAD.  Normal mood and affect.  HEENT:  NCAT, sclera nonicteric  Lungs:  Respirations are equal and unlabored.  Abd: Non distended  :  No evidence of incontinence  CV:  2+ bilateral upper and lower extremity pulses.  Skin:  Intact  throughout.    MS:      Left Lower Extremity Exam    - Skin intact, no deformity, no ecchymoses, no edema  - TTP over lateral femoral condyle  - NO pain or laxity with varus/valgus stress  - Normal lachman and posterior drawer test  - Compartments soft and compressible  - ROM full (eversion,inversion,plantar/dorsiflexion)  - TA/EHL/Gastroc/FHL assessed in isolation without deficit  - SILT throughout  - DP and PT palpated  2+  - Capillary Refill <3s      Rads:  Reviewed and interpreted today by me.  Bilateral, moderate osteoarthritis, varus, no bone loss or fracture.     A/P:  Left knee osteoarthritis. We discussed the pathophysiology of this diagnosis. We reviewed the non-operative treatment including NSAIDs, PT, icing, and steroid injections. At this time her symptoms are mild and she does not feel like pursuing further diagnostic workup or treatment. If her symptoms progress we could consider an MRI to eval for possible meniscal pathology that could benefit from arthroscopy.

## 2022-09-02 ENCOUNTER — HOSPITAL ENCOUNTER (OUTPATIENT)
Dept: RADIOLOGY | Facility: HOSPITAL | Age: 62
Discharge: HOME | End: 2022-09-02
Attending: FAMILY MEDICINE
Payer: COMMERCIAL

## 2022-09-02 DIAGNOSIS — Z12.31 ENCOUNTER FOR SCREENING MAMMOGRAM FOR MALIGNANT NEOPLASM OF BREAST: ICD-10-CM

## 2022-09-02 PROCEDURE — 77063 BREAST TOMOSYNTHESIS BI: CPT | Mod: TC,PO

## 2022-09-02 PROCEDURE — 77067 SCR MAMMO BI INCL CAD: CPT | Mod: TC,PO

## 2022-09-19 ENCOUNTER — PATIENT MESSAGE (OUTPATIENT)
Dept: ORTHOPEDICS | Facility: CLINIC | Age: 62
End: 2022-09-19
Payer: COMMERCIAL

## 2022-09-20 ENCOUNTER — PATIENT MESSAGE (OUTPATIENT)
Dept: ORTHOPEDICS | Facility: CLINIC | Age: 62
End: 2022-09-20
Payer: COMMERCIAL

## 2022-09-21 DIAGNOSIS — M25.562 PAIN IN BOTH KNEES, UNSPECIFIED CHRONICITY: Primary | ICD-10-CM

## 2022-09-21 DIAGNOSIS — M25.561 PAIN IN BOTH KNEES, UNSPECIFIED CHRONICITY: Primary | ICD-10-CM

## 2022-10-03 ENCOUNTER — HOSPITAL ENCOUNTER (OUTPATIENT)
Dept: RADIOLOGY | Facility: OTHER | Age: 62
Discharge: HOME | End: 2022-10-03
Attending: PHYSICIAN ASSISTANT
Payer: COMMERCIAL

## 2022-10-03 DIAGNOSIS — M25.561 PAIN IN BOTH KNEES, UNSPECIFIED CHRONICITY: ICD-10-CM

## 2022-10-03 DIAGNOSIS — M25.562 PAIN IN BOTH KNEES, UNSPECIFIED CHRONICITY: ICD-10-CM

## 2022-10-03 PROCEDURE — 73721 MRI JNT OF LWR EXTRE W/O DYE: CPT | Mod: 26,LT,, | Performed by: RADIOLOGY

## 2022-10-03 PROCEDURE — 73721 MRI KNEE WITHOUT CONTRAST LEFT: ICD-10-PCS | Mod: 26,LT,, | Performed by: RADIOLOGY

## 2022-10-03 PROCEDURE — 73721 MRI JNT OF LWR EXTRE W/O DYE: CPT | Mod: TC,LT

## 2022-10-06 ENCOUNTER — OFFICE VISIT (OUTPATIENT)
Dept: ORTHOPEDICS | Facility: CLINIC | Age: 62
End: 2022-10-06
Payer: COMMERCIAL

## 2022-10-06 DIAGNOSIS — M25.562 ANTERIOR KNEE PAIN, LEFT: Primary | ICD-10-CM

## 2022-10-06 PROCEDURE — 1159F MED LIST DOCD IN RCRD: CPT | Mod: CPTII,S$GLB,, | Performed by: ORTHOPAEDIC SURGERY

## 2022-10-06 PROCEDURE — 1160F RVW MEDS BY RX/DR IN RCRD: CPT | Mod: CPTII,S$GLB,, | Performed by: ORTHOPAEDIC SURGERY

## 2022-10-06 PROCEDURE — 99999 PR PBB SHADOW E&M-EST. PATIENT-LVL III: ICD-10-PCS | Mod: PBBFAC,,, | Performed by: ORTHOPAEDIC SURGERY

## 2022-10-06 PROCEDURE — 99999 PR PBB SHADOW E&M-EST. PATIENT-LVL III: CPT | Mod: PBBFAC,,, | Performed by: ORTHOPAEDIC SURGERY

## 2022-10-06 PROCEDURE — 99213 OFFICE O/P EST LOW 20 MIN: CPT | Mod: S$GLB,,, | Performed by: ORTHOPAEDIC SURGERY

## 2022-10-06 PROCEDURE — 1160F PR REVIEW ALL MEDS BY PRESCRIBER/CLIN PHARMACIST DOCUMENTED: ICD-10-PCS | Mod: CPTII,S$GLB,, | Performed by: ORTHOPAEDIC SURGERY

## 2022-10-06 PROCEDURE — 1159F PR MEDICATION LIST DOCUMENTED IN MEDICAL RECORD: ICD-10-PCS | Mod: CPTII,S$GLB,, | Performed by: ORTHOPAEDIC SURGERY

## 2022-10-06 PROCEDURE — 99213 PR OFFICE/OUTPT VISIT, EST, LEVL III, 20-29 MIN: ICD-10-PCS | Mod: S$GLB,,, | Performed by: ORTHOPAEDIC SURGERY

## 2022-10-06 PROCEDURE — 3044F HG A1C LEVEL LT 7.0%: CPT | Mod: CPTII,S$GLB,, | Performed by: ORTHOPAEDIC SURGERY

## 2022-10-06 PROCEDURE — 3044F PR MOST RECENT HEMOGLOBIN A1C LEVEL <7.0%: ICD-10-PCS | Mod: CPTII,S$GLB,, | Performed by: ORTHOPAEDIC SURGERY

## 2022-10-06 NOTE — PROGRESS NOTES
HPI:     07/14/2022:  Cristal Gonzalez is a 62 y.o. female presents to clinic for bilateral L>R knee pain. Previously known to me after ORIF R open trimalleolar ankle fracture 2015. She has 1 year L knee pain, lateral, activity related, worse after long day. She as tried naproxen with little relief. She states her right knee hurt roughly 1 month ago but has since resolved completely.     10/06/2022:  After last visit it was decided that she had continued knee pain we would obtain an MRI.  This was done.  She has had some continued pain since then, most notably at night with the knees in full extension.  The pain is going to wait prior to today's visit.  She does not know of any exacerbating symptoms, but states that sometimes when her leg straight at night it just hurts a great deal on the side of her patella.    Past Medical History:   Diagnosis Date    GERD (gastroesophageal reflux disease)     Hot flash, menopausal        Current Outpatient Medications on File Prior to Visit   Medication Sig Dispense Refill    cetirizine (ZYRTEC) 10 MG tablet Take 1 tablet (10 mg total) by mouth once daily. 30 tablet 5    GLUCOSAM SUL NA/CHONDR THURSTON A NA (GLUCOSAMINE & CHONDROIT SUL.NA ORAL) Take by mouth.      ibuprofen (ADVIL,MOTRIN) 800 MG tablet Take 800 mg by mouth 3 (three) times daily.      INTRAROSA 6.5 mg Inst INSERT ONE APPLICATION INTO THE VAGINA QHS  12    MV, MIN #36/IRON,CARBONYL/FA (GERITOL COMPLETE ORAL) Take by mouth.      ondansetron (ZOFRAN-ODT) 4 MG TbDL Take 1 tablet (4 mg total) by mouth every 6 (six) hours as needed (nausea). 30 tablet 5    pantoprazole (PROTONIX) 40 MG tablet Take 1 tablet (40 mg total) by mouth once daily. For 30d 30 tablet 0    triamcinolone acetonide 0.1% (KENALOG) 0.1 % cream APPLY TOPICALLY TO THE AFFECTED AREA TWICE DAILY FOR 1 TO 2 WEEKS AS NEEDED FOR RASH 80 g 11     No current facility-administered medications on file prior to visit.         ROS:  Patient denies constitutional  symptoms, cardiac symptoms, respiratory symptoms, GI symptoms, Urinary symptoms, skin issues, allergic issues  The remainder of the musculoskeletal ROS is included in the HPI.    PE:    AA&O x 4.  NAD.  Normal mood and affect.  HEENT:  NCAT, sclera nonicteric  Lungs:  Respirations are equal and unlabored.  Abd: Non distended  :  No evidence of incontinence  CV:  2+ bilateral upper and lower extremity pulses.  Skin:  Intact throughout.    MS:      Left Lower Extremity Exam      0/0/135° range of motion.  Negative Lachman's, pivot shift, posterior drawer.  No varus/valgus instability.  Normal patellar tracking and tilt.  No significant apprehension with patellar grind.  Distinct tenderness to palpation along the lateral aspect patella and the retinaculum.  No tenderness to palpation of the joint line.  No tenderness to palpation at the pes bursa.  Popliteal angle 20°.    Rads:  Reviewed and interpreted today by me.  MRI of the right knee shows some chondral fissuring in the patellofemoral joint but not excessively.  Menisci and ligaments are intact.  She does have small cysts underneath the pes tendons and posteromedial aspect of the knee.    A/P:  Left knee mild osteoarthritis.  There is really nothing organic to go after based on the results of the MRI.  I think that part of the chondral fissuring the patellofemoral joint may be at play here although it is more likely that she just has pain in the retinaculum on the lateral side of the knee.  Discussed hamstring stretching as well as quad strengthening in great detail.  She would like to try this for a while and work on therapy for the knee.  I did discuss steroid injections in the knee as a possible treatment solution but mentioned that in addition to the anti-inflammatory effect it to have somewhat of a degenerative affect as well.  She would like to hold off on that for now and used this only if her symptoms worsen.

## 2022-10-17 ENCOUNTER — OFFICE VISIT (OUTPATIENT)
Dept: DERMATOLOGY | Facility: CLINIC | Age: 62
End: 2022-10-17
Payer: COMMERCIAL

## 2022-10-17 DIAGNOSIS — D48.5 NEOPLASM OF UNCERTAIN BEHAVIOR OF SKIN: Primary | ICD-10-CM

## 2022-10-17 DIAGNOSIS — Z12.83 SKIN CANCER SCREENING: ICD-10-CM

## 2022-10-17 DIAGNOSIS — L82.1 SEBORRHEIC KERATOSES: ICD-10-CM

## 2022-10-17 DIAGNOSIS — D69.2 PURPURA: ICD-10-CM

## 2022-10-17 DIAGNOSIS — L57.0 AK (ACTINIC KERATOSIS): ICD-10-CM

## 2022-10-17 DIAGNOSIS — Z12.83 SCREENING EXAM FOR SKIN CANCER: ICD-10-CM

## 2022-10-17 DIAGNOSIS — D18.01 CHERRY ANGIOMA: ICD-10-CM

## 2022-10-17 DIAGNOSIS — L81.4 LENTIGO: ICD-10-CM

## 2022-10-17 DIAGNOSIS — D36.10 NEUROFIBROMA: ICD-10-CM

## 2022-10-17 PROCEDURE — 17000 DESTRUCT PREMALG LESION: CPT | Mod: 59,S$GLB,, | Performed by: DERMATOLOGY

## 2022-10-17 PROCEDURE — 1159F MED LIST DOCD IN RCRD: CPT | Mod: CPTII,S$GLB,, | Performed by: DERMATOLOGY

## 2022-10-17 PROCEDURE — 99999 PR PBB SHADOW E&M-EST. PATIENT-LVL III: ICD-10-PCS | Mod: PBBFAC,,, | Performed by: DERMATOLOGY

## 2022-10-17 PROCEDURE — 88342 IMHCHEM/IMCYTCHM 1ST ANTB: CPT | Mod: 26,,, | Performed by: PATHOLOGY

## 2022-10-17 PROCEDURE — 11102 PR TANGENTIAL BIOPSY, SKIN, SINGLE LESION: ICD-10-PCS | Mod: S$GLB,,, | Performed by: DERMATOLOGY

## 2022-10-17 PROCEDURE — 99203 OFFICE O/P NEW LOW 30 MIN: CPT | Mod: 25,S$GLB,, | Performed by: DERMATOLOGY

## 2022-10-17 PROCEDURE — 17000 PR DESTRUCTION(LASER SURGERY,CRYOSURGERY,CHEMOSURGERY),PREMALIGNANT LESIONS,FIRST LESION: ICD-10-PCS | Mod: 59,S$GLB,, | Performed by: DERMATOLOGY

## 2022-10-17 PROCEDURE — 17003 DESTRUCT PREMALG LES 2-14: CPT | Mod: 59,S$GLB,, | Performed by: DERMATOLOGY

## 2022-10-17 PROCEDURE — 11102 TANGNTL BX SKIN SINGLE LES: CPT | Mod: S$GLB,,, | Performed by: DERMATOLOGY

## 2022-10-17 PROCEDURE — 17003 DESTRUCTION, PREMALIGNANT LESIONS; SECOND THROUGH 14 LESIONS: ICD-10-PCS | Mod: 59,S$GLB,, | Performed by: DERMATOLOGY

## 2022-10-17 PROCEDURE — 88305 TISSUE EXAM BY PATHOLOGIST: ICD-10-PCS | Mod: 26,,, | Performed by: PATHOLOGY

## 2022-10-17 PROCEDURE — 88341 IMHCHEM/IMCYTCHM EA ADD ANTB: CPT | Mod: 26,,, | Performed by: PATHOLOGY

## 2022-10-17 PROCEDURE — 1159F PR MEDICATION LIST DOCUMENTED IN MEDICAL RECORD: ICD-10-PCS | Mod: CPTII,S$GLB,, | Performed by: DERMATOLOGY

## 2022-10-17 PROCEDURE — 99203 PR OFFICE/OUTPT VISIT, NEW, LEVL III, 30-44 MIN: ICD-10-PCS | Mod: 25,S$GLB,, | Performed by: DERMATOLOGY

## 2022-10-17 PROCEDURE — 3044F PR MOST RECENT HEMOGLOBIN A1C LEVEL <7.0%: ICD-10-PCS | Mod: CPTII,S$GLB,, | Performed by: DERMATOLOGY

## 2022-10-17 PROCEDURE — 88305 TISSUE EXAM BY PATHOLOGIST: CPT | Mod: 26,,, | Performed by: PATHOLOGY

## 2022-10-17 PROCEDURE — 99999 PR PBB SHADOW E&M-EST. PATIENT-LVL III: CPT | Mod: PBBFAC,,, | Performed by: DERMATOLOGY

## 2022-10-17 PROCEDURE — 3044F HG A1C LEVEL LT 7.0%: CPT | Mod: CPTII,S$GLB,, | Performed by: DERMATOLOGY

## 2022-10-17 PROCEDURE — 88341 PR IHC OR ICC EACH ADD'L SINGLE ANTIBODY  STAINPR: ICD-10-PCS | Mod: 26,,, | Performed by: PATHOLOGY

## 2022-10-17 PROCEDURE — 88341 IMHCHEM/IMCYTCHM EA ADD ANTB: CPT | Performed by: PATHOLOGY

## 2022-10-17 PROCEDURE — 88305 TISSUE EXAM BY PATHOLOGIST: CPT | Performed by: PATHOLOGY

## 2022-10-17 PROCEDURE — 88342 IMHCHEM/IMCYTCHM 1ST ANTB: CPT | Performed by: PATHOLOGY

## 2022-10-17 PROCEDURE — 88342 CHG IMMUNOCYTOCHEMISTRY: ICD-10-PCS | Mod: 26,,, | Performed by: PATHOLOGY

## 2022-10-17 NOTE — PATIENT INSTRUCTIONS
CRYOSURGERY      Your doctor has used a method called cryosurgery to treat your skin condition. Cryosurgery refers to the use of very cold substances to treat a variety of skin conditions such as warts, pre-skin cancers, molluscum contagiosum, sun spots, and several benign growths. The substance we use in cryosurgery is liquid nitrogen and is so cold (-195 degrees Celsius) that is burns when administered.     Following treatment in the office, the skin may immediately burn and become red. You may find the area around the lesion is affected as well. It is sometimes necessary to treat not only the lesion, but a small area of the surrounding normal skin to achieve a good response.     A blister, and even a blood filled blister, may form after treatment.   This is a normal response. If the blister is painful, it is acceptable to sterilize a needle and with rubbing alcohol and gently pop the blister. It is important that you gently wash the area with soap and warm water as the blister fluid may contain wart virus if a wart was treated. Do no remove the roof of the blister.     The area treated can take anywhere from 1-3 weeks to heal. Healing time depends on the kind skin lesion treated, the location, and how aggressively the lesion was treated. It is recommended that the areas treated are covered with Vaseline or bacitracin ointment and a band-aid. If a band-aid is not practical, just ointment applied several times per day will do. Keeping these areas moist will speed the healing time.    Treatment with liquid nitrogen can leave a scar. In dark skin, it may be a light or dark scar, in light skin it may be a white or pink scar. These will generally fade with time, but may never go away completely.     If you have any concerns after your treatment, please feel free to call the office.       [address] [facility] [place] [address]/ [phone] FAX/ [email or web address]  Shave Biopsy Wound Care    Your  doctor has performed a shave biopsy today.  A band aid and vaseline ointment has been placed over the site.  This should remain in place for NO LONGER THAN 48 hours.  It is fine to remove the bandaid after 24 hours, if the area is no longer bleeding. It is recommended that you keep the area dry (do not wet)) for the first 24 hours.  After 24 hours, wash the area with warm soap and water and apply Vaseline jelly.  Many patients prefer to use Neosporin or Bacitracin ointment.  This is acceptable; however, know that you can develop an allergy to this medication even if you have used it safely for years.  It is important to keep the area moist.  Letting it dry out and get air slows healing time, and will worsen the scar.        If you notice increasing redness, tenderness, pain, or yellow drainage at the biopsy site, please notify your doctor.  These are signs of an infection.    If your biopsy site is bleeding, apply firm pressure for 15 minutes straight.  Repeat for another 15 minutes, if it is still bleeding.   If the surgical site continues to bleed, then please contact your doctor.      For [facility] users:   You will receive your biopsy results in [facility] as soon as they are available. Please be assured that your physician/provider will review your results and will then determine what further treatment, evaluation, or planning is required. You should be contacted by your physician's/provider's office within 5 business days of receiving your results; If not, please reach out to directly. This is one more way [facility] is putting you first.     [facility] [place] [address]/ [phone] FAX/ [email or web address]

## 2022-10-17 NOTE — PROGRESS NOTES
"  Subjective:       Patient ID:  Cristal Gonzalez is a 62 y.o. female who presents for   Chief Complaint   Patient presents with    Skin Check     TBSE    Lesion     Left side abdomen , right arm     Patient is here today for a "mole" check.   Pt has a history of  moderate sun exposure in the past.   Pt recalls several blistering sunburns in the past- no  Pt has history of tanning bed use- no  Pt has  had moles removed in the past- no  Pt has history of melanoma in first degree relatives-  no    Review of Systems   Skin:  Positive for daily sunscreen use (face) and activity-related sunscreen use. Negative for recent sunburn.   Hematologic/Lymphatic: Bruises/bleeds easily.      Objective:    Physical Exam   Constitutional: She appears well-developed and well-nourished. No distress.   Neurological: She is alert and oriented to person, place, and time. She is not disoriented.   Psychiatric: She has a normal mood and affect.   Skin:   Areas Examined (abnormalities noted in diagram):   Scalp / Hair Palpated and Inspected  Head / Face Inspection Performed  Neck Inspection Performed  Chest / Axilla Inspection Performed  Abdomen Inspection Performed  Genitals / Buttocks / Groin Inspection Performed  Back Inspection Performed  RUE Inspected  LUE Inspection Performed  RLE Inspected  LLE Inspection Performed  Nails and Digits Inspection Performed                 Diagram Legend     Erythematous scaling macule/papule c/w actinic keratosis       Vascular papule c/w angioma      Pigmented verrucoid papule/plaque c/w seborrheic keratosis      Yellow umbilicated papule c/w sebaceous hyperplasia      Irregularly shaped tan macule c/w lentigo     1-2 mm smooth white papules consistent with Milia      Movable subcutaneous cyst with punctum c/w epidermal inclusion cyst      Subcutaneous movable cyst c/w pilar cyst      Firm pink to brown papule c/w dermatofibroma      Pedunculated fleshy papule(s) c/w skin tag(s)      Evenly " pigmented macule c/w junctional nevus     Mildly variegated pigmented, slightly irregular-bordered macule c/w mildly atypical nevus      Flesh colored to evenly pigmented papule c/w intradermal nevus       Pink pearly papule/plaque c/w basal cell carcinoma      Erythematous hyperkeratotic cursted plaque c/w SCC      Surgical scar with no sign of skin cancer recurrence      Open and closed comedones      Inflammatory papules and pustules      Verrucoid papule consistent consistent with wart     Erythematous eczematous patches and plaques     Dystrophic onycholytic nail with subungual debris c/w onychomycosis     Umbilicated papule    Erythematous-base heme-crusted tan verrucoid plaque consistent with inflamed seborrheic keratosis     Erythematous Silvery Scaling Plaque c/w Psoriasis     See annotation            Assessment / Plan:      Pathology Orders:       Normal Orders This Visit    Specimen to Pathology, Dermatology     Questions:    Procedure Type: Dermatology and skin neoplasms    Number of Specimens: 1    ------------------------: -------------------------    Spec 1 Procedure: Biopsy    Spec 1 Clinical Impression: r/o melanoma vs pgranuloma with lentigo    Spec 1 Source: right upper arm    Release to patient: Immediate          Neoplasm of uncertain behavior of skin  -     Specimen to Pathology, Dermatology  Shave biopsy procedure note:    Shave biopsy performed after verbal consent including risk of infection, scar, recurrence, need for additional treatment of site. Area prepped with alcohol, anesthetized with approximately 1.0cc of 1% lidocaine with epinephrine. Lesional tissue shaved with razor blade. Hemostasis achieved with application of aluminum chloride followed by hyfrecation. No complications. Dressing applied. Wound care explained.    Skin cancer screening  -     Ambulatory referral/consult to Dermatology  Patient instructed in importance in daily sun protection of at least spf 30. Sun avoidance  and topical protection discussed.     Recommend  for daily use on face and neck.    Patient encouraged to wear hat for all outdoor exposure.     Also discussed sun protective clothing. TASC or [employer] are good brands, UPF 50 or above. Recommend long sleeves when out in the sun.     Screening exam for skin cancer    Seborrheic keratoses  These are benign inherited growths without a malignant potential. Reassurance given to patient. No treatment is necessary.     Cherry angioma  This is a benign vascular lesion. Reassurance given. No treatment required.     Neurofibroma  Reassurance given to patient. No treatment is necessary.   Treatment of benign, asymptomatic lesions may be considered cosmetic.    Lentigo  This is a benign hyperpigmented sun induced lesion. Recommend daily sun protection/avoidance and use of at least SPF 30, broad spectrum sunscreen (OTC drug) will reduce the number of new lesions. Treatment of these benign lesions are considered cosmetic.    AK (actinic keratosis)  Cryosurgery Procedure Note    Verbal consent from the patient is obtained including, but not limited to, risk of hypopigmentation/hyperpigmentation, scar, recurrence of lesion. The patient is aware of the precancerous quality and need for treatment of these lesions. Liquid nitrogen cryosurgery is applied to the 2 actinic keratoses, as detailed in the physical exam, to produce a freeze injury. The patient is aware that blisters may form and is instructed on wound care with gentle cleansing and use of vaseline ointment to keep moist until healed. The patient is supplied a handout on cryosurgery and is instructed to call if lesions do not completely resolve.    Purpura  - discussed etiology with skin/bv being fragile due to decreased collagen production   - start using am lactin daily or excipial cream for easy bruising or urea cream 40% to hands/arms nightly  - Start Excipial  For bruising daily dietary supplement, pic in AVS or 500 mg  of vitamin C twice daily and at least 500 mg of rutin twice daily  - supplement can be found either online [employer] or drug stores       F/u 1year    No follow-ups on file.

## 2022-10-24 LAB
FINAL PATHOLOGIC DIAGNOSIS: NORMAL
GROSS: NORMAL
Lab: NORMAL
MICROSCOPIC EXAM: NORMAL

## 2022-10-27 ENCOUNTER — TELEPHONE (OUTPATIENT)
Dept: SURGERY | Facility: CLINIC | Age: 62
End: 2022-10-27
Payer: COMMERCIAL

## 2022-10-27 NOTE — TELEPHONE ENCOUNTER
Spoke with pt. Scheduled appt with Dr. Stallworth on 11/8 at 1:45pm. Location of appt reviewed. Pt verbalized understanding.

## 2022-10-27 NOTE — TELEPHONE ENCOUNTER
----- Message from Jim Vazquez MA sent at 10/27/2022  9:08 AM CDT -----  Contact: [phone]  Patient was told by Dr Aponte to see Dr Stallworth for melanoma removal. Please call and advise.

## 2022-10-28 ENCOUNTER — PATIENT MESSAGE (OUTPATIENT)
Dept: DERMATOLOGY | Facility: CLINIC | Age: 62
End: 2022-10-28
Payer: COMMERCIAL

## 2022-11-01 ENCOUNTER — TELEPHONE (OUTPATIENT)
Dept: DERMATOLOGY | Facility: CLINIC | Age: 62
End: 2022-11-01
Payer: COMMERCIAL

## 2022-11-08 ENCOUNTER — OFFICE VISIT (OUTPATIENT)
Dept: SURGERY | Facility: CLINIC | Age: 62
End: 2022-11-08
Payer: COMMERCIAL

## 2022-11-08 VITALS
WEIGHT: 151.88 LBS | SYSTOLIC BLOOD PRESSURE: 120 MMHG | DIASTOLIC BLOOD PRESSURE: 67 MMHG | OXYGEN SATURATION: 96 % | HEART RATE: 64 BPM | BODY MASS INDEX: 27.95 KG/M2 | HEIGHT: 62 IN

## 2022-11-08 DIAGNOSIS — C43.9 MALIGNANT MELANOMA: ICD-10-CM

## 2022-11-08 DIAGNOSIS — C43.61 MELANOMA OF UPPER ARM, RIGHT: Primary | ICD-10-CM

## 2022-11-08 PROCEDURE — 3008F PR BODY MASS INDEX (BMI) DOCUMENTED: ICD-10-PCS | Mod: CPTII,S$GLB,, | Performed by: SURGERY

## 2022-11-08 PROCEDURE — 3008F BODY MASS INDEX DOCD: CPT | Mod: CPTII,S$GLB,, | Performed by: SURGERY

## 2022-11-08 PROCEDURE — 3074F SYST BP LT 130 MM HG: CPT | Mod: CPTII,S$GLB,, | Performed by: SURGERY

## 2022-11-08 PROCEDURE — 99999 PR PBB SHADOW E&M-EST. PATIENT-LVL IV: CPT | Mod: PBBFAC,,, | Performed by: SURGERY

## 2022-11-08 PROCEDURE — 3078F PR MOST RECENT DIASTOLIC BLOOD PRESSURE < 80 MM HG: ICD-10-PCS | Mod: CPTII,S$GLB,, | Performed by: SURGERY

## 2022-11-08 PROCEDURE — 3074F PR MOST RECENT SYSTOLIC BLOOD PRESSURE < 130 MM HG: ICD-10-PCS | Mod: CPTII,S$GLB,, | Performed by: SURGERY

## 2022-11-08 PROCEDURE — 3044F HG A1C LEVEL LT 7.0%: CPT | Mod: CPTII,S$GLB,, | Performed by: SURGERY

## 2022-11-08 PROCEDURE — 3078F DIAST BP <80 MM HG: CPT | Mod: CPTII,S$GLB,, | Performed by: SURGERY

## 2022-11-08 PROCEDURE — 3044F PR MOST RECENT HEMOGLOBIN A1C LEVEL <7.0%: ICD-10-PCS | Mod: CPTII,S$GLB,, | Performed by: SURGERY

## 2022-11-08 PROCEDURE — 99999 PR PBB SHADOW E&M-EST. PATIENT-LVL IV: ICD-10-PCS | Mod: PBBFAC,,, | Performed by: SURGERY

## 2022-11-08 PROCEDURE — 99204 OFFICE O/P NEW MOD 45 MIN: CPT | Mod: S$GLB,,, | Performed by: SURGERY

## 2022-11-08 PROCEDURE — 99204 PR OFFICE/OUTPT VISIT, NEW, LEVL IV, 45-59 MIN: ICD-10-PCS | Mod: S$GLB,,, | Performed by: SURGERY

## 2022-11-08 RX ORDER — NITROFURANTOIN 25; 75 MG/1; MG/1
100 CAPSULE ORAL 2 TIMES DAILY
COMMUNITY
Start: 2022-11-04 | End: 2022-12-20

## 2022-11-08 NOTE — H&P (VIEW-ONLY)
"[facility]  Surgical Oncology  History and Physical      Subjective:     CC: Right Upper Arm Melanoma    HPI:  Cristal Gonzalez is a 62 y.o. 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 She noticed this mole about 6 months ago. Says it looked like a little red blister. Does report having an area at her left mid axillary line which used to have a "mushroom" which has since fallen off. She showed her dermatologist this site who felt it was benign.     Works at a [employer]. Tries to wear sunscreen when she knows she will have prolonged sun exposure  No family history of melanoma    ROS:   Gen: no fevers, no chills, no recent weight loss  CV: no palpitations, no peripheral edema  Respit: no cough, no SOB  Abd: no abd pain, no nausea, no vomiting  Skin: no rash, +wound on right upper arm at prior skin biopsy site  MSK: no back pain, no arthralgias, no myalgias  Endo: no excessive hunger or thirst  Heme: no lymphadenopathy  Neuro: no headache, no dizziness  Psych: no depression, no anxiety    Medical History  Past Medical History:   Diagnosis Date    GERD (gastroesophageal reflux disease)     Hot flash, menopausal      Past Surgical History:   Procedure Laterality Date    ANKLE FRACTURE SURGERY      APPENDECTOMY  2011    BREAST BIOPSY      benign    COLONOSCOPY N/A 6/25/2021    Procedure: COLONOSCOPY;  Surgeon: Nohemy Parra MD;  Location: [address]);  Service: Endoscopy;  Laterality: N/A;  Fully vacc Covid-19 - pg     Review of patient's allergies indicates:   Allergen Reactions    Sulfa (sulfonamide antibiotics) Shortness Of Breath, Anxiety and Palpitations       Current Outpatient Medications:     GLUCOSAM SUL NA/CHONDR THURSTON A NA (GLUCOSAMINE & CHONDROIT SUL.NA ORAL), Take by mouth., Disp: , Rfl:     ibuprofen (ADVIL,MOTRIN) 800 MG tablet, Take 800 mg " by mouth 3 (three) times daily., Disp: , Rfl:     INTRAROSA 6.5 mg Inst, INSERT ONE APPLICATION INTO THE VAGINA QHS, Disp: , Rfl: 12    MV, MIN #36/IRON,CARBONYL/FA (GERITOL COMPLETE ORAL), Take by mouth., Disp: , Rfl:     ondansetron (ZOFRAN-ODT) 4 MG TbDL, Take 1 tablet (4 mg total) by mouth every 6 (six) hours as needed (nausea)., Disp: 30 tablet, Rfl: 5    pantoprazole (PROTONIX) 40 MG tablet, Take 1 tablet (40 mg total) by mouth once daily. For 30d, Disp: 30 tablet, Rfl: 0    triamcinolone acetonide 0.1% (KENALOG) 0.1 % cream, APPLY TOPICALLY TO THE AFFECTED AREA TWICE DAILY FOR 1 TO 2 WEEKS AS NEEDED FOR RASH, Disp: 80 g, Rfl: 11    cetirizine (ZYRTEC) 10 MG tablet, Take 1 tablet (10 mg total) by mouth once daily., Disp: 30 tablet, Rfl: 5    nitrofurantoin, macrocrystal-monohydrate, (MACROBID) 100 MG capsule, Take 100 mg by mouth 2 (two) times daily., Disp: , Rfl:     Family History       Problem Relation (Age of Onset)    Alzheimer's disease Father    Breast cancer Maternal Aunt    Cancer Maternal Aunt    Diabetes Mother          Tobacco Use    Smoking status: Former     Packs/day: 0.20     Types: Cigarettes     Quit date: 3/1/2019     Years since quitting: 3.6    Smokeless tobacco: Never   Substance and Sexual Activity    Alcohol use: Yes     Alcohol/week: 6.0 - 7.0 standard drinks     Types: 4 Glasses of wine, 2 - 3 Standard drinks or equivalent per week     Comment: socially    Drug use: No    Sexual activity: Yes     Partners: Male     Birth control/protection: None       Objective:     Vitals:   Vitals:    11/08/22 1335   BP: 120/67   Pulse: 64       Physical Exam:  Gen: well appearing, no acute distress  HEENT: normocephalic, EOMI  Neck: no tracheal deviation, no cervical LAD  CV: RRR, extremities are warm and well perfused  Respit: breathing non-labored  Abd: soft, non-tender, non-distended  MSK: moves all extremities appropriately  Skin: No rash. Skin changes associated with sun exposure. Right upper  extremity shave biopsy site healing well. No palpable axillary nodes. Has multiple other benign appearing nevi and seborrheic keratosis  Neuro: alert, CN II - XII grossly intact  Psych: normal mood and affect    Significant Diagnostics:  Reviewed    Lab Results   Component Value Date    WBC 6.75 06/20/2022    HGB 12.8 06/20/2022    HCT 41.2 06/20/2022     (H) 06/20/2022     06/20/2022       Chemistry        Component Value Date/Time     06/20/2022 1051    K 4.4 06/20/2022 1051     06/20/2022 1051    CO2 26 06/20/2022 1051    BUN 21 06/20/2022 1051    CREATININE 0.8 06/20/2022 1051    GLU 96 06/20/2022 1051        Component Value Date/Time    CALCIUM 9.8 06/20/2022 1051    ALKPHOS 44 (L) 06/20/2022 1051    AST 20 06/20/2022 1051    ALT 14 06/20/2022 1051    BILITOT 0.6 06/20/2022 1051    ESTGFRAFRICA >60.0 06/20/2022 1051    EGFRNONAA >60.0 06/20/2022 1051          Final Pathologic Diagnosis   Date Value Ref Range Status   10/17/2022   Final    1. Skin, right upper arm, shave biopsy:  - MALIGNANT MELANOMA, INVASIVE, EXTENDING TO A MAXIMUM DEPTH OF 0.9 MM.  - MARGINS ARE NEGATIVE FOR INVASIVE MELANOMA, HOWEVER MELANOMA IN SITU  EXTENDS TO THE PERIPHERAL BIOPSY MARGINS.  MELANOMA STAGING SUMMARY:           - Procedure: biopsy           - Laterality: right           - Anatomic site: upper arm           - Malignant melanoma, invasive           - Histologic type: Nodular melanoma           - Breslow depth:  0.9 mm.           - José Miguel's level: III           - Ulceration: Not identified           - Peripheral margins: Involved by melanoma in situ           - Deep margin: Negative           - Mitotic index: 1/mm2.           - Lymphovascular invasion: Not identified           - Perineural invasion: Not identified           - Tumor-infiltrating lymphocytes: Not identified           - Tumor regression: Not identified           - Growth Phase: Vertical           - Pathologic staging: pT1a       Comment:      Interp By Magdalena Peng M.D., Signed on 10/24/2022 at 15:59         Assessment/Plan:     Cristal Gonzalez is a 62 y.o. female with nodular melanoma of the right upper arm, breslow depth 0.9 mm without ulceration pT1b    - Recommend WLE with SLNBx  - Consent obtained in clinic today. Discussed the anticipated surgical course and post operative recovery. Addressed all questions and concerns    Aruna Ramirez MD  General Surgery, PGY-4  [phone]

## 2022-11-08 NOTE — PROGRESS NOTES
"[facility]  Surgical Oncology  History and Physical      Subjective:     CC: Right Upper Arm Melanoma    HPI:  Cristal Gonzalez is a 62 y.o. 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 She noticed this mole about 6 months ago. Says it looked like a little red blister. Does report having an area at her left mid axillary line which used to have a "mushroom" which has since fallen off. She showed her dermatologist this site who felt it was benign.     Works at a [employer]. Tries to wear sunscreen when she knows she will have prolonged sun exposure  No family history of melanoma    ROS:   Gen: no fevers, no chills, no recent weight loss  CV: no palpitations, no peripheral edema  Respit: no cough, no SOB  Abd: no abd pain, no nausea, no vomiting  Skin: no rash, +wound on right upper arm at prior skin biopsy site  MSK: no back pain, no arthralgias, no myalgias  Endo: no excessive hunger or thirst  Heme: no lymphadenopathy  Neuro: no headache, no dizziness  Psych: no depression, no anxiety    Medical History  Past Medical History:   Diagnosis Date    GERD (gastroesophageal reflux disease)     Hot flash, menopausal      Past Surgical History:   Procedure Laterality Date    ANKLE FRACTURE SURGERY      APPENDECTOMY  2011    BREAST BIOPSY      benign    COLONOSCOPY N/A 6/25/2021    Procedure: COLONOSCOPY;  Surgeon: Nohemy Parra MD;  Location: [address]);  Service: Endoscopy;  Laterality: N/A;  Fully vacc Covid-19 - pg     Review of patient's allergies indicates:   Allergen Reactions    Sulfa (sulfonamide antibiotics) Shortness Of Breath, Anxiety and Palpitations       Current Outpatient Medications:     GLUCOSAM SUL NA/CHONDR THURSTON A NA (GLUCOSAMINE & CHONDROIT SUL.NA ORAL), Take by mouth., Disp: , Rfl:     ibuprofen (ADVIL,MOTRIN) 800 MG tablet, Take 800 mg " by mouth 3 (three) times daily., Disp: , Rfl:     INTRAROSA 6.5 mg Inst, INSERT ONE APPLICATION INTO THE VAGINA QHS, Disp: , Rfl: 12    MV, MIN #36/IRON,CARBONYL/FA (GERITOL COMPLETE ORAL), Take by mouth., Disp: , Rfl:     ondansetron (ZOFRAN-ODT) 4 MG TbDL, Take 1 tablet (4 mg total) by mouth every 6 (six) hours as needed (nausea)., Disp: 30 tablet, Rfl: 5    pantoprazole (PROTONIX) 40 MG tablet, Take 1 tablet (40 mg total) by mouth once daily. For 30d, Disp: 30 tablet, Rfl: 0    triamcinolone acetonide 0.1% (KENALOG) 0.1 % cream, APPLY TOPICALLY TO THE AFFECTED AREA TWICE DAILY FOR 1 TO 2 WEEKS AS NEEDED FOR RASH, Disp: 80 g, Rfl: 11    cetirizine (ZYRTEC) 10 MG tablet, Take 1 tablet (10 mg total) by mouth once daily., Disp: 30 tablet, Rfl: 5    nitrofurantoin, macrocrystal-monohydrate, (MACROBID) 100 MG capsule, Take 100 mg by mouth 2 (two) times daily., Disp: , Rfl:     Family History       Problem Relation (Age of Onset)    Alzheimer's disease Father    Breast cancer Maternal Aunt    Cancer Maternal Aunt    Diabetes Mother          Tobacco Use    Smoking status: Former     Packs/day: 0.20     Types: Cigarettes     Quit date: 3/1/2019     Years since quitting: 3.6    Smokeless tobacco: Never   Substance and Sexual Activity    Alcohol use: Yes     Alcohol/week: 6.0 - 7.0 standard drinks     Types: 4 Glasses of wine, 2 - 3 Standard drinks or equivalent per week     Comment: socially    Drug use: No    Sexual activity: Yes     Partners: Male     Birth control/protection: None       Objective:     Vitals:   Vitals:    11/08/22 1335   BP: 120/67   Pulse: 64       Physical Exam:  Gen: well appearing, no acute distress  HEENT: normocephalic, EOMI  Neck: no tracheal deviation, no cervical LAD  CV: RRR, extremities are warm and well perfused  Respit: breathing non-labored  Abd: soft, non-tender, non-distended  MSK: moves all extremities appropriately  Skin: No rash. Skin changes associated with sun exposure. Right upper  extremity shave biopsy site healing well. No palpable axillary nodes. Has multiple other benign appearing nevi and seborrheic keratosis  Neuro: alert, CN II - XII grossly intact  Psych: normal mood and affect    Significant Diagnostics:  Reviewed    Lab Results   Component Value Date    WBC 6.75 06/20/2022    HGB 12.8 06/20/2022    HCT 41.2 06/20/2022     (H) 06/20/2022     06/20/2022       Chemistry        Component Value Date/Time     06/20/2022 1051    K 4.4 06/20/2022 1051     06/20/2022 1051    CO2 26 06/20/2022 1051    BUN 21 06/20/2022 1051    CREATININE 0.8 06/20/2022 1051    GLU 96 06/20/2022 1051        Component Value Date/Time    CALCIUM 9.8 06/20/2022 1051    ALKPHOS 44 (L) 06/20/2022 1051    AST 20 06/20/2022 1051    ALT 14 06/20/2022 1051    BILITOT 0.6 06/20/2022 1051    ESTGFRAFRICA >60.0 06/20/2022 1051    EGFRNONAA >60.0 06/20/2022 1051          Final Pathologic Diagnosis   Date Value Ref Range Status   10/17/2022   Final    1. Skin, right upper arm, shave biopsy:  - MALIGNANT MELANOMA, INVASIVE, EXTENDING TO A MAXIMUM DEPTH OF 0.9 MM.  - MARGINS ARE NEGATIVE FOR INVASIVE MELANOMA, HOWEVER MELANOMA IN SITU  EXTENDS TO THE PERIPHERAL BIOPSY MARGINS.  MELANOMA STAGING SUMMARY:           - Procedure: biopsy           - Laterality: right           - Anatomic site: upper arm           - Malignant melanoma, invasive           - Histologic type: Nodular melanoma           - Breslow depth:  0.9 mm.           - José Miguel's level: III           - Ulceration: Not identified           - Peripheral margins: Involved by melanoma in situ           - Deep margin: Negative           - Mitotic index: 1/mm2.           - Lymphovascular invasion: Not identified           - Perineural invasion: Not identified           - Tumor-infiltrating lymphocytes: Not identified           - Tumor regression: Not identified           - Growth Phase: Vertical           - Pathologic staging: pT1a       Comment:      Interp By Magdalena Peng M.D., Signed on 10/24/2022 at 15:59         Assessment/Plan:     Cristal Gonzalez is a 62 y.o. female with nodular melanoma of the right upper arm, breslow depth 0.9 mm without ulceration pT1b    - Recommend WLE with SLNBx  - Consent obtained in clinic today. Discussed the anticipated surgical course and post operative recovery. Addressed all questions and concerns    Aruna Ramirez MD  General Surgery, PGY-4  [phone]

## 2022-11-14 DIAGNOSIS — C43.9 MALIGNANT MELANOMA, UNSPECIFIED SITE: Primary | ICD-10-CM

## 2022-11-14 DIAGNOSIS — C43.9 MALIGNANT MELANOMA: ICD-10-CM

## 2022-11-14 RX ORDER — SODIUM CHLORIDE, SODIUM LACTATE, POTASSIUM CHLORIDE, CALCIUM CHLORIDE 600; 310; 30; 20 MG/100ML; MG/100ML; MG/100ML; MG/100ML
INJECTION, SOLUTION INTRAVENOUS CONTINUOUS
Status: CANCELLED | OUTPATIENT
Start: 2022-11-14

## 2022-11-16 ENCOUNTER — TELEPHONE (OUTPATIENT)
Dept: SURGERY | Facility: CLINIC | Age: 62
End: 2022-11-16
Payer: COMMERCIAL

## 2022-11-17 ENCOUNTER — HOSPITAL ENCOUNTER (OUTPATIENT)
Facility: HOSPITAL | Age: 62
Discharge: HOME | End: 2022-11-17
Attending: SURGERY | Admitting: SURGERY
Payer: COMMERCIAL

## 2022-11-17 ENCOUNTER — ANESTHESIA EVENT (OUTPATIENT)
Dept: SURGERY | Facility: HOSPITAL | Age: 62
End: 2022-11-17
Payer: COMMERCIAL

## 2022-11-17 ENCOUNTER — ANESTHESIA (OUTPATIENT)
Dept: SURGERY | Facility: HOSPITAL | Age: 62
End: 2022-11-17
Payer: COMMERCIAL

## 2022-11-17 ENCOUNTER — HOSPITAL ENCOUNTER (OUTPATIENT)
Dept: RADIOLOGY | Facility: HOSPITAL | Age: 62
Discharge: HOME | End: 2022-11-17
Attending: SURGERY
Payer: COMMERCIAL

## 2022-11-17 VITALS
SYSTOLIC BLOOD PRESSURE: 122 MMHG | RESPIRATION RATE: 18 BRPM | HEART RATE: 65 BPM | TEMPERATURE: 98 F | OXYGEN SATURATION: 99 % | DIASTOLIC BLOOD PRESSURE: 74 MMHG

## 2022-11-17 DIAGNOSIS — C43.9 MALIGNANT MELANOMA: ICD-10-CM

## 2022-11-17 DIAGNOSIS — C43.9 MALIGNANT MELANOMA, UNSPECIFIED SITE: ICD-10-CM

## 2022-11-17 DIAGNOSIS — C43.61 MELANOMA OF UPPER ARM, RIGHT: Primary | ICD-10-CM

## 2022-11-17 PROCEDURE — 71000044 HC DOSC ROUTINE RECOVERY FIRST HOUR: Performed by: SURGERY

## 2022-11-17 PROCEDURE — 88342 IMHCHEM/IMCYTCHM 1ST ANTB: CPT | Mod: 26,,, | Performed by: PATHOLOGY

## 2022-11-17 PROCEDURE — 71000015 HC POSTOP RECOV 1ST HR: Performed by: SURGERY

## 2022-11-17 PROCEDURE — A9520 TC99 TILMANOCEPT DIAG 0.5MCI: HCPCS

## 2022-11-17 PROCEDURE — 36000706: Performed by: SURGERY

## 2022-11-17 PROCEDURE — 36000707: Performed by: SURGERY

## 2022-11-17 PROCEDURE — 25000003 PHARM REV CODE 250: Performed by: SURGERY

## 2022-11-17 PROCEDURE — 13121 PR RECMPL WND SCALP,EXTR 2.6-7.5 CM: ICD-10-PCS | Mod: 59,,, | Performed by: SURGERY

## 2022-11-17 PROCEDURE — 88341 PR IHC OR ICC EACH ADD'L SINGLE ANTIBODY  STAINPR: ICD-10-PCS | Mod: 26,,, | Performed by: PATHOLOGY

## 2022-11-17 PROCEDURE — 37000008 HC ANESTHESIA 1ST 15 MINUTES: Performed by: SURGERY

## 2022-11-17 PROCEDURE — 13121 CMPLX RPR S/A/L 2.6-7.5 CM: CPT | Mod: 59,,, | Performed by: SURGERY

## 2022-11-17 PROCEDURE — 88342 CHG IMMUNOCYTOCHEMISTRY: ICD-10-PCS | Mod: 26,,, | Performed by: PATHOLOGY

## 2022-11-17 PROCEDURE — 88342 IMHCHEM/IMCYTCHM 1ST ANTB: CPT | Mod: 59 | Performed by: PATHOLOGY

## 2022-11-17 PROCEDURE — 25000003 PHARM REV CODE 250: Performed by: NURSE ANESTHETIST, CERTIFIED REGISTERED

## 2022-11-17 PROCEDURE — 38525 BIOPSY/REMOVAL LYMPH NODES: CPT | Mod: RT,,, | Performed by: SURGERY

## 2022-11-17 PROCEDURE — 78195 LYMPH SYSTEM IMAGING: CPT | Mod: 26,,, | Performed by: STUDENT IN AN ORGANIZED HEALTH CARE EDUCATION/TRAINING PROGRAM

## 2022-11-17 PROCEDURE — 37000009 HC ANESTHESIA EA ADD 15 MINS: Performed by: SURGERY

## 2022-11-17 PROCEDURE — 88307 TISSUE EXAM BY PATHOLOGIST: CPT | Performed by: PATHOLOGY

## 2022-11-17 PROCEDURE — 38900 IO MAP OF SENT LYMPH NODE: CPT | Mod: ,,, | Performed by: SURGERY

## 2022-11-17 PROCEDURE — 63600175 PHARM REV CODE 636 W HCPCS: Performed by: NURSE ANESTHETIST, CERTIFIED REGISTERED

## 2022-11-17 PROCEDURE — 63600175 PHARM REV CODE 636 W HCPCS: Mod: JG | Performed by: SURGERY

## 2022-11-17 PROCEDURE — 11604 PR EXC SKIN MALIG 3.1-4 CM TRUNK,ARM,LEG: ICD-10-PCS | Mod: ,,, | Performed by: SURGERY

## 2022-11-17 PROCEDURE — D9220A PRA ANESTHESIA: ICD-10-PCS | Mod: CRNA,,, | Performed by: NURSE ANESTHETIST, CERTIFIED REGISTERED

## 2022-11-17 PROCEDURE — 11604 EXC TR-EXT MAL+MARG 3.1-4 CM: CPT | Mod: ,,, | Performed by: SURGERY

## 2022-11-17 PROCEDURE — 88305 TISSUE EXAM BY PATHOLOGIST: CPT | Performed by: PATHOLOGY

## 2022-11-17 PROCEDURE — 88305 TISSUE EXAM BY PATHOLOGIST: CPT | Mod: 26,,, | Performed by: PATHOLOGY

## 2022-11-17 PROCEDURE — 88307 PR  SURG PATH,LEVEL V: ICD-10-PCS | Mod: 26,,, | Performed by: PATHOLOGY

## 2022-11-17 PROCEDURE — D9220A PRA ANESTHESIA: Mod: ANES,,, | Performed by: ANESTHESIOLOGY

## 2022-11-17 PROCEDURE — D9220A PRA ANESTHESIA: Mod: CRNA,,, | Performed by: NURSE ANESTHETIST, CERTIFIED REGISTERED

## 2022-11-17 PROCEDURE — 88341 IMHCHEM/IMCYTCHM EA ADD ANTB: CPT | Mod: 26,,, | Performed by: PATHOLOGY

## 2022-11-17 PROCEDURE — 38525 PR BIOPSY/REM LYMPH NODES, AXILLARY: ICD-10-PCS | Mod: RT,,, | Performed by: SURGERY

## 2022-11-17 PROCEDURE — 38900 PR INTRAOPERATIVE SENTINEL LYMPH NODE ID W DYE INJECTION: ICD-10-PCS | Mod: ,,, | Performed by: SURGERY

## 2022-11-17 PROCEDURE — 88307 TISSUE EXAM BY PATHOLOGIST: CPT | Mod: 26,,, | Performed by: PATHOLOGY

## 2022-11-17 PROCEDURE — 88305 TISSUE EXAM BY PATHOLOGIST: ICD-10-PCS | Mod: 26,,, | Performed by: PATHOLOGY

## 2022-11-17 PROCEDURE — 25000003 PHARM REV CODE 250

## 2022-11-17 PROCEDURE — 88341 IMHCHEM/IMCYTCHM EA ADD ANTB: CPT | Mod: 59 | Performed by: PATHOLOGY

## 2022-11-17 PROCEDURE — 78195 NM LYMPHATICS AND LYMPH NODE IMAGING: ICD-10-PCS | Mod: 26,,, | Performed by: STUDENT IN AN ORGANIZED HEALTH CARE EDUCATION/TRAINING PROGRAM

## 2022-11-17 PROCEDURE — D9220A PRA ANESTHESIA: ICD-10-PCS | Mod: ANES,,, | Performed by: ANESTHESIOLOGY

## 2022-11-17 RX ORDER — FENTANYL CITRATE 50 UG/ML
INJECTION, SOLUTION INTRAMUSCULAR; INTRAVENOUS
Status: DISCONTINUED | OUTPATIENT
Start: 2022-11-17 | End: 2022-11-17

## 2022-11-17 RX ORDER — ISOSULFAN BLUE 50 MG/5ML
INJECTION, SOLUTION SUBCUTANEOUS
Status: DISCONTINUED | OUTPATIENT
Start: 2022-11-17 | End: 2022-11-17 | Stop reason: HOSPADM

## 2022-11-17 RX ORDER — ROCURONIUM BROMIDE 10 MG/ML
INJECTION, SOLUTION INTRAVENOUS
Status: DISCONTINUED | OUTPATIENT
Start: 2022-11-17 | End: 2022-11-17

## 2022-11-17 RX ORDER — MIDAZOLAM HYDROCHLORIDE 1 MG/ML
INJECTION, SOLUTION INTRAMUSCULAR; INTRAVENOUS
Status: DISCONTINUED | OUTPATIENT
Start: 2022-11-17 | End: 2022-11-17

## 2022-11-17 RX ORDER — PROPOFOL 10 MG/ML
VIAL (ML) INTRAVENOUS
Status: DISCONTINUED | OUTPATIENT
Start: 2022-11-17 | End: 2022-11-17

## 2022-11-17 RX ORDER — DEXTROMETHORPHAN HYDROBROMIDE, GUAIFENESIN 5; 100 MG/5ML; MG/5ML
650 LIQUID ORAL
Refills: 0 | COMMUNITY
Start: 2022-11-17 | End: 2023-02-16

## 2022-11-17 RX ORDER — LIDOCAINE HYDROCHLORIDE 20 MG/ML
INJECTION INTRAVENOUS
Status: DISCONTINUED | OUTPATIENT
Start: 2022-11-17 | End: 2022-11-17

## 2022-11-17 RX ORDER — HYDROMORPHONE HYDROCHLORIDE 1 MG/ML
0.2 INJECTION, SOLUTION INTRAMUSCULAR; INTRAVENOUS; SUBCUTANEOUS EVERY 5 MIN PRN
Status: DISCONTINUED | OUTPATIENT
Start: 2022-11-17 | End: 2022-11-17 | Stop reason: HOSPADM

## 2022-11-17 RX ORDER — ACETAMINOPHEN 10 MG/ML
INJECTION, SOLUTION INTRAVENOUS
Status: DISCONTINUED | OUTPATIENT
Start: 2022-11-17 | End: 2022-11-17

## 2022-11-17 RX ORDER — OXYCODONE HYDROCHLORIDE 5 MG/1
5 TABLET ORAL EVERY 4 HOURS PRN
Qty: 10 TABLET | Refills: 0 | Status: SHIPPED | OUTPATIENT
Start: 2022-11-17 | End: 2022-11-17 | Stop reason: SDUPTHER

## 2022-11-17 RX ORDER — MEPERIDINE HYDROCHLORIDE 50 MG/ML
12.5 INJECTION INTRAMUSCULAR; INTRAVENOUS; SUBCUTANEOUS ONCE AS NEEDED
Status: DISCONTINUED | OUTPATIENT
Start: 2022-11-17 | End: 2022-11-17 | Stop reason: HOSPADM

## 2022-11-17 RX ORDER — SODIUM CHLORIDE, SODIUM LACTATE, POTASSIUM CHLORIDE, CALCIUM CHLORIDE 600; 310; 30; 20 MG/100ML; MG/100ML; MG/100ML; MG/100ML
INJECTION, SOLUTION INTRAVENOUS CONTINUOUS
Status: DISCONTINUED | OUTPATIENT
Start: 2022-11-17 | End: 2022-11-17 | Stop reason: HOSPADM

## 2022-11-17 RX ORDER — DEXAMETHASONE SODIUM PHOSPHATE 4 MG/ML
INJECTION, SOLUTION INTRA-ARTICULAR; INTRALESIONAL; INTRAMUSCULAR; INTRAVENOUS; SOFT TISSUE
Status: DISCONTINUED | OUTPATIENT
Start: 2022-11-17 | End: 2022-11-17

## 2022-11-17 RX ORDER — BUPIVACAINE HYDROCHLORIDE 2.5 MG/ML
INJECTION, SOLUTION EPIDURAL; INFILTRATION; INTRACAUDAL
Status: DISCONTINUED | OUTPATIENT
Start: 2022-11-17 | End: 2022-11-17 | Stop reason: HOSPADM

## 2022-11-17 RX ORDER — DEXMEDETOMIDINE HYDROCHLORIDE 100 UG/ML
INJECTION, SOLUTION INTRAVENOUS
Status: DISCONTINUED | OUTPATIENT
Start: 2022-11-17 | End: 2022-11-17

## 2022-11-17 RX ORDER — ONDANSETRON 2 MG/ML
4 INJECTION INTRAMUSCULAR; INTRAVENOUS DAILY PRN
Status: DISCONTINUED | OUTPATIENT
Start: 2022-11-17 | End: 2022-11-17 | Stop reason: HOSPADM

## 2022-11-17 RX ORDER — NEOSTIGMINE METHYLSULFATE 0.5 MG/ML
INJECTION, SOLUTION INTRAVENOUS
Status: DISCONTINUED | OUTPATIENT
Start: 2022-11-17 | End: 2022-11-17

## 2022-11-17 RX ORDER — CEFAZOLIN SODIUM 1 G/3ML
INJECTION, POWDER, FOR SOLUTION INTRAMUSCULAR; INTRAVENOUS
Status: DISCONTINUED | OUTPATIENT
Start: 2022-11-17 | End: 2022-11-17

## 2022-11-17 RX ORDER — KETAMINE HCL IN 0.9 % NACL 50 MG/5 ML
SYRINGE (ML) INTRAVENOUS
Status: DISCONTINUED | OUTPATIENT
Start: 2022-11-17 | End: 2022-11-17

## 2022-11-17 RX ORDER — LORAZEPAM 2 MG/ML
0.25 INJECTION INTRAMUSCULAR ONCE AS NEEDED
Status: DISCONTINUED | OUTPATIENT
Start: 2022-11-17 | End: 2022-11-17 | Stop reason: HOSPADM

## 2022-11-17 RX ORDER — SODIUM CHLORIDE 9 MG/ML
INJECTION, SOLUTION INTRAVENOUS CONTINUOUS
Status: DISCONTINUED | OUTPATIENT
Start: 2022-11-17 | End: 2022-11-17 | Stop reason: HOSPADM

## 2022-11-17 RX ORDER — CEFAZOLIN SODIUM/WATER 2 G/20 ML
2 SYRINGE (ML) INTRAVENOUS
Status: DISCONTINUED | OUTPATIENT
Start: 2022-11-17 | End: 2022-11-17 | Stop reason: HOSPADM

## 2022-11-17 RX ORDER — HYDROCODONE BITARTRATE AND ACETAMINOPHEN 5; 325 MG/1; MG/1
TABLET ORAL
Status: COMPLETED
Start: 2022-11-17 | End: 2022-11-17

## 2022-11-17 RX ORDER — OXYCODONE HYDROCHLORIDE 5 MG/1
5 TABLET ORAL EVERY 4 HOURS PRN
Qty: 10 TABLET | Refills: 0 | Status: SHIPPED | OUTPATIENT
Start: 2022-11-17 | End: 2023-02-16

## 2022-11-17 RX ADMIN — PROPOFOL 150 MG: 10 INJECTION, EMULSION INTRAVENOUS at 11:11

## 2022-11-17 RX ADMIN — FENTANYL CITRATE 50 MCG: 50 INJECTION, SOLUTION INTRAMUSCULAR; INTRAVENOUS at 11:11

## 2022-11-17 RX ADMIN — MIDAZOLAM HYDROCHLORIDE 2 MG: 1 INJECTION, SOLUTION INTRAMUSCULAR; INTRAVENOUS at 11:11

## 2022-11-17 RX ADMIN — FENTANYL CITRATE 25 MCG: 50 INJECTION, SOLUTION INTRAMUSCULAR; INTRAVENOUS at 12:11

## 2022-11-17 RX ADMIN — DEXMEDETOMIDINE HYDROCHLORIDE 4 MCG: 100 INJECTION, SOLUTION INTRAVENOUS at 01:11

## 2022-11-17 RX ADMIN — GLYCOPYRROLATE 0.6 MG: 0.2 INJECTION INTRAMUSCULAR; INTRAVENOUS at 01:11

## 2022-11-17 RX ADMIN — ACETAMINOPHEN 1000 MG: 10 INJECTION INTRAVENOUS at 01:11

## 2022-11-17 RX ADMIN — LIDOCAINE HYDROCHLORIDE 20 MG: 20 INJECTION INTRAVENOUS at 01:11

## 2022-11-17 RX ADMIN — SODIUM CHLORIDE: 0.9 INJECTION, SOLUTION INTRAVENOUS at 11:11

## 2022-11-17 RX ADMIN — NEOSTIGMINE METHYLSULFATE 5 MG: 0.5 INJECTION, SOLUTION INTRAVENOUS at 01:11

## 2022-11-17 RX ADMIN — CEFAZOLIN 2 G: 330 INJECTION, POWDER, FOR SOLUTION INTRAMUSCULAR; INTRAVENOUS at 12:11

## 2022-11-17 RX ADMIN — Medication 25 MG: at 11:11

## 2022-11-17 RX ADMIN — ROCURONIUM BROMIDE 50 MG: 10 INJECTION INTRAVENOUS at 11:11

## 2022-11-17 RX ADMIN — LIDOCAINE HYDROCHLORIDE 60 MG: 20 INJECTION INTRAVENOUS at 11:11

## 2022-11-17 RX ADMIN — HYDROCODONE BITARTRATE AND ACETAMINOPHEN 1 TABLET: 5; 325 TABLET ORAL at 03:11

## 2022-11-17 RX ADMIN — DEXAMETHASONE SODIUM PHOSPHATE 4 MG: 4 INJECTION, SOLUTION INTRAMUSCULAR; INTRAVENOUS at 11:11

## 2022-11-17 NOTE — ANESTHESIA POSTPROCEDURE EVALUATION
Anesthesia Post Evaluation    Patient: Cristal Gonzalez    Procedure(s) Performed: Procedure(s) (LRB):  EXCISION-WIDE LOCAL, right upper arm (Right)  BIOPSY, LYMPH NODE with mapping (Right)    Final Anesthesia Type: general      Patient location during evaluation: PACU  Patient participation: Yes- Able to Participate  Level of consciousness: awake and alert  Post-procedure vital signs: reviewed and stable  Pain management: adequate  Airway patency: patent    PONV status at discharge: No PONV  Anesthetic complications: no      Cardiovascular status: blood pressure returned to baseline  Respiratory status: unassisted  Follow-up not needed.            No case tracking events are documented in the log.      Pain/Mihaela Score: Pain Rating Prior to Med Admin: 5 (11/17/2022  3:15 PM)

## 2022-11-17 NOTE — ANESTHESIA PROCEDURE NOTES
Intubation    Date/Time: 11/17/2022 12:01 PM  Performed by: Veena Torres CRNA  Authorized by: Nino Olsen MD     Intubation:     Induction:  Intravenous    Intubated:  Postinduction    Mask Ventilation:  Easy mask    Attempts:  1    Attempted By:  CRNA    Method of Intubation:  Direct    Blade:  Jasmeet 3    Laryngeal View Grade: Grade I - full view of cords      Difficult Airway Encountered?: No      Complications:  None    Airway Device:  Oral endotracheal tube    Airway Device Size:  7.5    Style/Cuff Inflation:  Cuffed (inflated to minimal occlusive pressure)    Tube secured:  21    Secured at:  The lips    Placement Verified By:  Capnometry    Complicating Factors:  None    Findings Post-Intubation:  BS equal bilateral and atraumatic/condition of teeth unchanged

## 2022-11-17 NOTE — ANESTHESIA PREPROCEDURE EVALUATION
11/17/2022  Cristal Gonzalez is a 62 y.o., female.      Pre-op Assessment    I have reviewed the Patient Summary Reports.     I have reviewed the Nursing Notes.       Review of Systems  Anesthesia Hx:  No problems with previous Anesthesia    Hematology/Oncology:  Hematology Normal   Oncology Normal     EENT/Dental:EENT/Dental Normal   Cardiovascular:  Cardiovascular Normal     Pulmonary:  Pulmonary Normal    Renal/:  Renal/ Normal     Hepatic/GI:   GERD    Musculoskeletal:  Musculoskeletal Normal    Neurological:  Neurology Normal    Endocrine:  Endocrine Normal    Dermatological:  Skin Normal    Psych:  Psychiatric Normal           Physical Exam  General: Well nourished    Airway:  Mallampati: II   Mouth Opening: Normal  TM Distance: Normal  Tongue: Normal  Neck ROM: Normal ROM    Dental:  Intact        Anesthesia Plan  Type of Anesthesia, risks & benefits discussed:    Anesthesia Type: Gen Supraglottic Airway, Gen ETT  Intra-op Monitoring Plan: Standard ASA Monitors  Post Op Pain Control Plan: multimodal analgesia  Induction:  IV  Airway Plan: Direct  Informed Consent: Informed consent signed with the Patient and all parties understand the risks and agree with anesthesia plan.  All questions answered. Patient consented to blood products? No  ASA Score: 3  Day of Surgery Review of History & Physical: H&P Update referred to the surgeon/provider.    Ready For Surgery From Anesthesia Perspective.     .

## 2022-11-17 NOTE — TRANSFER OF CARE
Anesthesia Transfer of Care Note    Patient: Cristal Gonzalez    Procedure(s) Performed: Procedure(s) (LRB):  EXCISION-WIDE LOCAL, right upper arm (Right)  BIOPSY, LYMPH NODE with mapping (Right)    Patient location: [facility]    Anesthesia Type: general    Transport from OR: Transported from OR on 6-10 L/min O2 by face mask with adequate spontaneous ventilation    Post pain: adequate analgesia    Post assessment: no apparent anesthetic complications    Post vital signs: stable    Level of consciousness: responds to stimulation and sedated    Nausea/Vomiting: no nausea/vomiting    Complications: none    Transfer of care protocol was followed      Last vitals:   Visit Vitals  BP (!) 142/69   Pulse 64   Temp 36.8 °C (98.3 °F) (Oral)   Resp 16   SpO2 98%   Breastfeeding No

## 2022-11-17 NOTE — BRIEF OP NOTE
Lonnie Dacosta - Surgery ([facility])  Brief Operative Note    Surgery Date: 11/17/2022     Surgeon(s) and Role:     * Jorge Stallworth MD - Primary     * Cristina Junior MD - Resident - Assisting     * Sosa Velasco MD - Resident - Assisting    Pre-op Diagnosis:  Malignant melanoma [C43.9]    Post-op Diagnosis:  Post-Op Diagnosis Codes:     * Malignant melanoma [C43.9]    Procedure(s) (LRB):  EXCISION-WIDE LOCAL, right upper arm (Right)  BIOPSY, LYMPH NODE with mapping (Right)    Anesthesia: General    Operative Findings: see op note    Estimated Blood Loss: 5mL         Specimens:   Specimen (24h ago, onward)       Start     Ordered    11/17/22 1303  Specimen to Pathology, Surgery General Surgery  Once        Comments: Pre-op Diagnosis: Malignant melanoma [C43.9]Procedure(s):EXCISION-WIDE LOCAL, right upper armBIOPSY, LYMPH NODE with mapping Number of specimens: 4Name of specimens:1. Right axilla sentinel node #1-600 blue-permanent2. Right axilla Non-sentinel node-permanent3. Right  axilla sentinel  node #2-200 not blue-permanent4. Right upper arm melanoma-short suture proximal, long thenar-permanent.     References:    Click here for ordering Quick Tip   Question Answer Comment   Procedure Type: General Surgery    Specimen Class: Known or suspected malignancy    Which provider would you like to cc? JORGE STALLWORTH    Release to patient Immediate        11/17/22 1310    Pending  Specimen to Pathology, Surgery General Surgery  Once        Comments: Pre-op Diagnosis: Malignant melanoma [C43.9]Procedure(s):EXCISION-WIDE LOCAL, right upper armBIOPSY, LYMPH NODE with mapping Number of specimens: 4Name of specimens: 1. Right axilla sentinel node #1-600 blue-  permanent2. Right axilla Non-sentinel node-permanent3. Right  axilla sentinel  node #2-200 not blue-permanent4. Right upper arm melanoma-short suture proximal, long thenar-permanent     References:    Click here for ordering Quick Tip   Question Answer Comment    Procedure Type: General Surgery    Specimen Class: Known or suspected malignancy    Which provider would you like to cc? JORGE WALDRON    Release to patient Immediate        Pending                      Discharge Note    OUTCOME: Patient tolerated treatment/procedure well without complication and is now ready for discharge.    DISPOSITION: Home or Self Care    FINAL DIAGNOSIS:  melanoma    FOLLOWUP: In clinic    DISCHARGE INSTRUCTIONS:    Discharge Procedure Orders   Diet Adult Regular     No driving until:   Order Comments: Do not drive while taking narcotic pain medications.     Notify your health care provider if you experience any of the following:  temperature >100.4     Notify your health care provider if you experience any of the following:  severe uncontrolled pain     Notify your health care provider if you experience any of the following:  redness, tenderness, or signs of infection (pain, swelling, redness, odor or green/yellow discharge around incision site)     Remove dressing in 48 hours   Order Comments: Remove dressing in 48 hours. Ok to shower after that. Leave incision open to air but can keep covered with gauze as needed to protect clothing.     Activity as tolerated     Shower on day dressing removed (No bath)   Order Comments: Shower after 48 hours after surgery, do not submerge incisions in pool/ocean for 4 weeks.     Cristina Junior MD  Pager: [phone]  General Surgery PGY-4  [facility]

## 2022-11-18 NOTE — OP NOTE
Lonnie Dacosta - Surgery ([facility])  Surgery Department  Operative Note       Date of Procedure: 11/17/2022     Procedure: Procedure(s) (LRB):  EXCISION-WIDE LOCAL, right upper arm (Right)  BIOPSY, LYMPH NODE with mapping (Right)     Surgeon(s) and Role:     * Ben Stallworth MD - Primary     * Cristina Junior MD - Resident - Assisting     * Sosa Velasco MD - Resident - Assisting      Pre-Operative Diagnosis: Melanoma Right Arm    Post-Operative Diagnosis: Post-Op Diagnosis Codes:     * Malignant melanoma [C43.9]  Same    Anesthesia: General    Synoptic Report:  Operation performed with curative intent: Yes  Original Breslow thickness of lesion: 0.9 mm  Clinical margin width: 1 cm  Depth of excision: Full-thickness skin/subcutaneous tissue down to fascia  SLN(s) mapped to: Ipsilateral   Agents:  Tilman[employer] and intraop blue dye  Reconstruction: Primary Closure    Procedures:  Wide Local Excision of Melanoma (3.5 cm defect), right upper arm  Complex Closure of 6.5 cm wound, right upper arm  Right Deep Axillary Bow Lymph Node Biopsy with Injection of Blue Dye and Intraoperative Lymphatic Mapping    Estimated Blood Loss (EBL):  <30mL           Indications:  Cristal Gonzalez presents for the above procedures, risks and benefits including bleeding, infection, seroma, lymphedema, margin positivity, need for additional procedures and imponderables were reviewed.  She gave consent to proceed.      Details:  Lymphoscintigraphy prior to the procedure demonstrated uptake in the right axillary lymphatic basin.  Following satisfactory anesthesia, the primary site was injected with blue dye circumferentially in an intradermal position.      The right axilla was surveyed with the gamma probe and an incision marked out at the area of maximum gamma intensity.  A field block was established with local anesthetic and an incision made with a scalpel and deepened with electrocautery through the superficial fascia.  Bow  lymph nodes were localized with the gamma probe and blue dye, dissected out with clips on draining lymphatics and vessels.  Counts were detailed on the pathology slip.  Following SLN excision, the background counts were well below 10% of the maximum ex vivo count.  There were no additional abnormal, blue-stained or gamma-intense nodes.  The wound was inspected for hemostasis and closed in layers using 3-0 vicryl and 4-0 monocryl sutures, dressed with steri-strips.    The primary site was addressed next.  Local anesthetic was injected and a 1 cm margin was measured around the right upper arm biopsy site.  This was eventually fashioned into an ellipse with a tumor defect / minor axis of 6.5 cm and a length/major axis of 3.5 cm to facilitate primary closure.  Incision made through the full thickness of the skin, through the adjacent soft tissue, down to the underlying muscular fascia, completing an oncologic resection.  Instruments were changed.  The wound was undermined extensively at the level of the fascia.  Complex closure was performed in multiple layers using absorbable and nylon suture.  Sterile Dressing was applied.     She tolerated the procedure well, was extubated and transported to the recovery room in stable condition.  All needle, sponge and instrument counts were reported as correct.  I communicated the intraoperative findings with the family following the procedure.       Specimens:   Specimen (24h ago, onward)      None                    Condition: Good    Disposition: PACU - hemodynamically stable.    Attestation: I was present and scrubbed for the key portions of the procedure.

## 2022-11-25 ENCOUNTER — PATIENT MESSAGE (OUTPATIENT)
Dept: SURGERY | Facility: CLINIC | Age: 62
End: 2022-11-25
Payer: COMMERCIAL

## 2022-11-25 ENCOUNTER — TELEPHONE (OUTPATIENT)
Dept: SURGERY | Facility: CLINIC | Age: 62
End: 2022-11-25
Payer: COMMERCIAL

## 2022-11-25 NOTE — TELEPHONE ENCOUNTER
Patient called to report that she developed swelling in her axilla where lymph nodes were removed on 11/17/22.  She denies pain, fever, or drainage from the site.  Photos send via My Chart.  Incision is C/D/I with swelling noted.  Per Dr. Junior, she can apply ice packs to the site, take OTC Tylenol or Ibuprofen, and report back with increasing swelling, pain, or drainage from the site.  She verbalized understanding and is agreeable to this plan of care.

## 2022-11-29 ENCOUNTER — OFFICE VISIT (OUTPATIENT)
Dept: SURGERY | Facility: CLINIC | Age: 62
End: 2022-11-29
Payer: COMMERCIAL

## 2022-11-29 VITALS
OXYGEN SATURATION: 97 % | DIASTOLIC BLOOD PRESSURE: 72 MMHG | HEIGHT: 62 IN | WEIGHT: 151.5 LBS | HEART RATE: 65 BPM | TEMPERATURE: 98 F | SYSTOLIC BLOOD PRESSURE: 116 MMHG | BODY MASS INDEX: 27.88 KG/M2

## 2022-11-29 DIAGNOSIS — C43.61 MELANOMA OF UPPER ARM, RIGHT: Primary | ICD-10-CM

## 2022-11-29 PROCEDURE — 99999 PR PBB SHADOW E&M-EST. PATIENT-LVL III: ICD-10-PCS | Mod: PBBFAC,,, | Performed by: SURGERY

## 2022-11-29 PROCEDURE — 99024 POSTOP FOLLOW-UP VISIT: CPT | Mod: S$GLB,,, | Performed by: SURGERY

## 2022-11-29 PROCEDURE — 3078F DIAST BP <80 MM HG: CPT | Mod: CPTII,S$GLB,, | Performed by: SURGERY

## 2022-11-29 PROCEDURE — 99999 PR PBB SHADOW E&M-EST. PATIENT-LVL III: CPT | Mod: PBBFAC,,, | Performed by: SURGERY

## 2022-11-29 PROCEDURE — 3078F PR MOST RECENT DIASTOLIC BLOOD PRESSURE < 80 MM HG: ICD-10-PCS | Mod: CPTII,S$GLB,, | Performed by: SURGERY

## 2022-11-29 PROCEDURE — 3074F SYST BP LT 130 MM HG: CPT | Mod: CPTII,S$GLB,, | Performed by: SURGERY

## 2022-11-29 PROCEDURE — 3008F BODY MASS INDEX DOCD: CPT | Mod: CPTII,S$GLB,, | Performed by: SURGERY

## 2022-11-29 PROCEDURE — 3044F HG A1C LEVEL LT 7.0%: CPT | Mod: CPTII,S$GLB,, | Performed by: SURGERY

## 2022-11-29 PROCEDURE — 3008F PR BODY MASS INDEX (BMI) DOCUMENTED: ICD-10-PCS | Mod: CPTII,S$GLB,, | Performed by: SURGERY

## 2022-11-29 PROCEDURE — 99024 PR POST-OP FOLLOW-UP VISIT: ICD-10-PCS | Mod: S$GLB,,, | Performed by: SURGERY

## 2022-11-29 PROCEDURE — 3074F PR MOST RECENT SYSTOLIC BLOOD PRESSURE < 130 MM HG: ICD-10-PCS | Mod: CPTII,S$GLB,, | Performed by: SURGERY

## 2022-11-29 PROCEDURE — 1159F MED LIST DOCD IN RCRD: CPT | Mod: CPTII,S$GLB,, | Performed by: SURGERY

## 2022-11-29 PROCEDURE — 3044F PR MOST RECENT HEMOGLOBIN A1C LEVEL <7.0%: ICD-10-PCS | Mod: CPTII,S$GLB,, | Performed by: SURGERY

## 2022-11-29 PROCEDURE — 1159F PR MEDICATION LIST DOCUMENTED IN MEDICAL RECORD: ICD-10-PCS | Mod: CPTII,S$GLB,, | Performed by: SURGERY

## 2022-11-29 NOTE — PROGRESS NOTES
SURGICAL ONCOLOGY CLINIC NOTE    CC:  POSTOP    HPI:  Cristal Gonzalez is a 62 y.o. female s/p RUE WLE for malignant melanoma on 11/17. Overall doing well. Denies pain, redness, swelling, nausea, vomiting. Path report is pending. Incision healing appropriately, Nylon sutures in place. Appears to have a seroma at the site of the lymph node biopsy. No signs of infection. Not taking any narcotic pain medicine.    ROS:   Review of Systems   Constitutional: Negative.    HENT: Negative.     Eyes: Negative.    Respiratory: Negative.     Cardiovascular: Negative.    Gastrointestinal: Negative.    Genitourinary: Negative.    Musculoskeletal: Negative.    Skin: Negative.    Neurological: Negative.    Endo/Heme/Allergies: Negative.    Psychiatric/Behavioral: Negative.        Past Medical History:   Diagnosis Date    GERD (gastroesophageal reflux disease)     Hot flash, menopausal        Past Surgical History:   Procedure Laterality Date    ANKLE FRACTURE SURGERY      APPENDECTOMY  2011    BREAST BIOPSY      benign    COLONOSCOPY N/A 6/25/2021    Procedure: COLONOSCOPY;  Surgeon: Nohemy Parra MD;  Location: [facility] (4TH FLR);  Service: Endoscopy;  Laterality: N/A;  Fully vacc Covid-19 - pg    LYMPH NODE BIOPSY Right 11/17/2022    Procedure: BIOPSY, LYMPH NODE with mapping;  Surgeon: Ben Stallworth MD;  [facility];  Service: General;  Laterality: Right;       Current Outpatient Medications on File Prior to Visit   Medication Sig Dispense Refill    acetaminophen (TYLENOL) 650 MG TbSR Take 1 tablet (650 mg total) by mouth every 6 to 8 hours as needed (For pain).  0    cetirizine (ZYRTEC) 10 MG tablet Take 1 tablet (10 mg total) by mouth once daily. (Patient not taking: Reported on 11/16/2022) 30 tablet 5    GLUCOSAM SUL NA/CHONDR THURSTON A NA (GLUCOSAMINE & CHONDROIT SUL.NA ORAL) Take by mouth.      ibuprofen (ADVIL,MOTRIN) 800 MG tablet Take 800 mg by mouth 3 (three) times daily.      INTRAROSA 6.5  mg Inst INSERT ONE APPLICATION INTO THE VAGINA QHS  12    MV, MIN #36/IRON,CARBONYL/FA (GERITOL COMPLETE ORAL) Take by mouth.      nitrofurantoin, macrocrystal-monohydrate, (MACROBID) 100 MG capsule Take 100 mg by mouth 2 (two) times daily.      ondansetron (ZOFRAN-ODT) 4 MG TbDL Take 1 tablet (4 mg total) by mouth every 6 (six) hours as needed (nausea). (Patient not taking: Reported on 11/16/2022) 30 tablet 5    oxyCODONE (ROXICODONE) 5 MG immediate release tablet Take 1 tablet (5 mg total) by mouth every 4 (four) hours as needed for Pain. 10 tablet 0    pantoprazole (PROTONIX) 40 MG tablet Take 1 tablet (40 mg total) by mouth once daily. For 30d (Patient not taking: Reported on 11/16/2022) 30 tablet 0    triamcinolone acetonide 0.1% (KENALOG) 0.1 % cream APPLY TOPICALLY TO THE AFFECTED AREA TWICE DAILY FOR 1 TO 2 WEEKS AS NEEDED FOR RASH 80 g 11     No current facility-administered medications on file prior to visit.       Social History     Socioeconomic History    Marital status:     Number of children: 0   Occupational History    Occupation: [employer]     Employer: [employer]   Tobacco Use    Smoking status: Former     Packs/day: 0.20     Types: Cigarettes     Quit date: 3/1/2019     Years since quitting: 3.7    Smokeless tobacco: Never   Substance and Sexual Activity    Alcohol use: Yes     Alcohol/week: 6.0 - 7.0 standard drinks     Types: 4 Glasses of wine, 2 - 3 Standard drinks or equivalent per week     Comment: socially    Drug use: No    Sexual activity: Yes     Partners: Male     Birth control/protection: None   Social History Narrative        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    Physical Exam  Vitals reviewed.   Constitutional:       Appearance: Normal appearance. She is normal weight.   Cardiovascular:      Rate and Rhythm: Normal rate and regular rhythm.      Pulses: Normal pulses.      Heart sounds: Normal heart sounds.   Pulmonary:      Effort: Pulmonary effort is normal.      Breath sounds: Normal breath sounds.   Musculoskeletal:      Comments: R incision healing appropriately. Sutures in place. Mild surrounding erythema in the superior portion of the incision however no signs of infection.  R axillary incision healed - Seroma at the site. No signs of infection. No drainage.   Neurological:      Mental Status: She is alert.       PERTINENT PATHOLOGY:  - Pending      ASSESSMENT/PLAN:  Cristal Gonzalez is a 62 y.o. female s/p RUE WLE for malignant melanoma on 11/17. Healing appropriately. Seroma at lymph node site - no need for drainage at this time.    - Path pending  - RTC in 1 week for wound check / path report.    Sosa Velasco MD  [facility] PGY-I  Pager: [phone]

## 2022-12-02 LAB
FINAL PATHOLOGIC DIAGNOSIS: NORMAL
GROSS: NORMAL
Lab: NORMAL
MICROSCOPIC EXAM: NORMAL

## 2022-12-06 ENCOUNTER — OFFICE VISIT (OUTPATIENT)
Dept: SURGERY | Facility: CLINIC | Age: 62
End: 2022-12-06
Payer: COMMERCIAL

## 2022-12-06 VITALS
HEIGHT: 62 IN | BODY MASS INDEX: 27.87 KG/M2 | TEMPERATURE: 97 F | WEIGHT: 151.44 LBS | OXYGEN SATURATION: 98 % | HEART RATE: 73 BPM | SYSTOLIC BLOOD PRESSURE: 118 MMHG | DIASTOLIC BLOOD PRESSURE: 59 MMHG

## 2022-12-06 DIAGNOSIS — C43.61 MELANOMA OF UPPER ARM, RIGHT: Primary | ICD-10-CM

## 2022-12-06 PROCEDURE — 3078F DIAST BP <80 MM HG: CPT | Mod: CPTII,S$GLB,, | Performed by: SURGERY

## 2022-12-06 PROCEDURE — 3078F PR MOST RECENT DIASTOLIC BLOOD PRESSURE < 80 MM HG: ICD-10-PCS | Mod: CPTII,S$GLB,, | Performed by: SURGERY

## 2022-12-06 PROCEDURE — 3008F PR BODY MASS INDEX (BMI) DOCUMENTED: ICD-10-PCS | Mod: CPTII,S$GLB,, | Performed by: SURGERY

## 2022-12-06 PROCEDURE — 99024 PR POST-OP FOLLOW-UP VISIT: ICD-10-PCS | Mod: S$GLB,,, | Performed by: SURGERY

## 2022-12-06 PROCEDURE — 3008F BODY MASS INDEX DOCD: CPT | Mod: CPTII,S$GLB,, | Performed by: SURGERY

## 2022-12-06 PROCEDURE — 3074F PR MOST RECENT SYSTOLIC BLOOD PRESSURE < 130 MM HG: ICD-10-PCS | Mod: CPTII,S$GLB,, | Performed by: SURGERY

## 2022-12-06 PROCEDURE — 99999 PR PBB SHADOW E&M-EST. PATIENT-LVL III: CPT | Mod: PBBFAC,,, | Performed by: SURGERY

## 2022-12-06 PROCEDURE — 3074F SYST BP LT 130 MM HG: CPT | Mod: CPTII,S$GLB,, | Performed by: SURGERY

## 2022-12-06 PROCEDURE — 99999 PR PBB SHADOW E&M-EST. PATIENT-LVL III: ICD-10-PCS | Mod: PBBFAC,,, | Performed by: SURGERY

## 2022-12-06 PROCEDURE — 99024 POSTOP FOLLOW-UP VISIT: CPT | Mod: S$GLB,,, | Performed by: SURGERY

## 2022-12-20 ENCOUNTER — OFFICE VISIT (OUTPATIENT)
Dept: URGENT CARE | Facility: CLINIC | Age: 62
End: 2022-12-20
Payer: COMMERCIAL

## 2022-12-20 VITALS
HEART RATE: 70 BPM | HEIGHT: 62 IN | DIASTOLIC BLOOD PRESSURE: 79 MMHG | RESPIRATION RATE: 18 BRPM | WEIGHT: 151 LBS | BODY MASS INDEX: 27.79 KG/M2 | SYSTOLIC BLOOD PRESSURE: 113 MMHG | TEMPERATURE: 98 F | OXYGEN SATURATION: 98 %

## 2022-12-20 DIAGNOSIS — R30.9 PAINFUL URINATION: ICD-10-CM

## 2022-12-20 DIAGNOSIS — N39.0 RECURRENT UTI: Primary | ICD-10-CM

## 2022-12-20 DIAGNOSIS — R39.15 URINARY URGENCY: ICD-10-CM

## 2022-12-20 DIAGNOSIS — R53.83 FATIGUE, UNSPECIFIED TYPE: ICD-10-CM

## 2022-12-20 LAB
BILIRUB UR QL STRIP: NEGATIVE
GLUCOSE UR QL STRIP: NEGATIVE
KETONES UR QL STRIP: POSITIVE
LEUKOCYTE ESTERASE UR QL STRIP: NEGATIVE
PH, POC UA: 5 (ref 5–8)
POC BLOOD, URINE: POSITIVE
POC NITRATES, URINE: NEGATIVE
PROT UR QL STRIP: NEGATIVE
SP GR UR STRIP: 1.01 (ref 1–1.03)
UROBILINOGEN UR STRIP-ACNC: ABNORMAL (ref 0.1–1.1)

## 2022-12-20 PROCEDURE — 3044F HG A1C LEVEL LT 7.0%: CPT | Mod: CPTII,S$GLB,, | Performed by: PHYSICIAN ASSISTANT

## 2022-12-20 PROCEDURE — 99214 OFFICE O/P EST MOD 30 MIN: CPT | Mod: S$GLB,,, | Performed by: PHYSICIAN ASSISTANT

## 2022-12-20 PROCEDURE — 3074F SYST BP LT 130 MM HG: CPT | Mod: CPTII,S$GLB,, | Performed by: PHYSICIAN ASSISTANT

## 2022-12-20 PROCEDURE — 87086 URINE CULTURE/COLONY COUNT: CPT | Performed by: PHYSICIAN ASSISTANT

## 2022-12-20 PROCEDURE — 3008F PR BODY MASS INDEX (BMI) DOCUMENTED: ICD-10-PCS | Mod: CPTII,S$GLB,, | Performed by: PHYSICIAN ASSISTANT

## 2022-12-20 PROCEDURE — 1159F MED LIST DOCD IN RCRD: CPT | Mod: CPTII,S$GLB,, | Performed by: PHYSICIAN ASSISTANT

## 2022-12-20 PROCEDURE — 3078F DIAST BP <80 MM HG: CPT | Mod: CPTII,S$GLB,, | Performed by: PHYSICIAN ASSISTANT

## 2022-12-20 PROCEDURE — 81003 URINALYSIS AUTO W/O SCOPE: CPT | Mod: QW,S$GLB,, | Performed by: PHYSICIAN ASSISTANT

## 2022-12-20 PROCEDURE — 81003 POCT URINALYSIS, DIPSTICK, AUTOMATED, W/O SCOPE: ICD-10-PCS | Mod: QW,S$GLB,, | Performed by: PHYSICIAN ASSISTANT

## 2022-12-20 PROCEDURE — 3044F PR MOST RECENT HEMOGLOBIN A1C LEVEL <7.0%: ICD-10-PCS | Mod: CPTII,S$GLB,, | Performed by: PHYSICIAN ASSISTANT

## 2022-12-20 PROCEDURE — 3008F BODY MASS INDEX DOCD: CPT | Mod: CPTII,S$GLB,, | Performed by: PHYSICIAN ASSISTANT

## 2022-12-20 PROCEDURE — 3074F PR MOST RECENT SYSTOLIC BLOOD PRESSURE < 130 MM HG: ICD-10-PCS | Mod: CPTII,S$GLB,, | Performed by: PHYSICIAN ASSISTANT

## 2022-12-20 PROCEDURE — 99214 PR OFFICE/OUTPT VISIT, EST, LEVL IV, 30-39 MIN: ICD-10-PCS | Mod: S$GLB,,, | Performed by: PHYSICIAN ASSISTANT

## 2022-12-20 PROCEDURE — 1159F PR MEDICATION LIST DOCUMENTED IN MEDICAL RECORD: ICD-10-PCS | Mod: CPTII,S$GLB,, | Performed by: PHYSICIAN ASSISTANT

## 2022-12-20 PROCEDURE — 3078F PR MOST RECENT DIASTOLIC BLOOD PRESSURE < 80 MM HG: ICD-10-PCS | Mod: CPTII,S$GLB,, | Performed by: PHYSICIAN ASSISTANT

## 2022-12-20 RX ORDER — CIPROFLOXACIN 500 MG/1
500 TABLET ORAL EVERY 12 HOURS
Qty: 14 TABLET | Refills: 0 | Status: SHIPPED | OUTPATIENT
Start: 2022-12-20 | End: 2022-12-27

## 2022-12-21 LAB
BACTERIA UR CULT: NORMAL
BACTERIA UR CULT: NORMAL

## 2022-12-21 NOTE — PATIENT INSTRUCTIONS
PLEASE READ YOUR DISCHARGE INSTRUCTIONS ENTIRELY AS IT CONTAINS IMPORTANT INFORMATION.  - A urine culture was sent. You will be contacted once it results and appropriate action will be taken if needed.  We will notify patient of the results in the next 3-5 days; can receive results on [facility].   - Drink plenty of fluids, wipe front to back, take showers not baths, no scented soaps, wear breathable cotton underwear, urinate after sexual intercourse.   - Tylenol or Ibuprofen as directed as needed for pain.    - If you were prescribed antibiotics, please take them to completion. Please supplement with OTC probiotics and yogurt.   - If you are female and on birth control pills - please use additional methods of contraception to prevent pregnancy while on antibiotics and for one cycle after.   -You must understand that you've received an Urgent Care treatment only and that you may be released before all your medical problems are known or treated. You, the patient, will arrange for follow up care as instructed. Please arrange follow up with your primary medical clinic within 2-5 days if your signs and symptoms have not resolved or worsen. Please go to the ER for worsening symptoms including worsening fever, flank pain, vomiting, unable to tolerate fluids, fatigue, etc.   - Follow up with your PCP or specialty clinic as directed in next 2-5 days for re-evaluation.  You can call [phone] to schedule an appointment with the appropriate provider.    - If your condition worsens or fails to improve we recommend that you receive another evaluation at the emergency room immediately or contact your primary medical clinic to discuss your concerns.     RED FLAGS/WARNING SYMPTOMS DISCUSSED WITH PATIENT THAT WOULD WARRANT EMERGENT MEDICAL ATTENTION. Patient aware and verbalized understanding.        Bladder Infection, Female (Adult)    Urine is normally doesn't have any bacteria in it. But bacteria can get into the urinary tract  "from the skin around the rectum. Or they can travel in the blood from elsewhere in the body. Once they are in your urinary tract, they can cause infection in the urethra (urethritis), the bladder (cystitis), or the kidneys (pyelonephritis).  The most common place for an infection is in the bladder. This is called a bladder infection. This is one of the most common infections in women. Most bladder infections are easily treated. They are not serious unless the infection spreads to the kidney.  The phrases "bladder infection," "UTI," and "cystitis" are often used to describe the same thing. But they are not always the same. Cystitis is an inflammation of the bladder. The most common cause of cystitis is an infection.  Symptoms  The infection causes inflammation in the urethra and bladder. This causes many of the symptoms. The most common symptoms of a bladder infection are:  Pain or burning when urinating  Having to urinate more often than usual  Urgent need to urinate  Only a small amount of urine comes out  Blood in urine  Abdominal discomfort. This is usually in the lower abdomen above the pubic bone.  Cloudy urine  Strong- or bad-smelling urine  Unable to urinate (urinary retention)  Unable to hold urine in (urinary incontinence)  Fever  Loss of appetite  Confusion (in older adults)  Causes  Bladder infections are not contagious. You can't get one from someone else, from a toilet seat, or from sharing a bath.  The most common cause of bladder infections is bacteria from the bowels. The bacteria get onto the skin around the opening of the urethra. From there, they can get into the urine and travel up to the bladder, causing inflammation and infection. This usually happens because of:  Wiping improperly after urinating. Always wipe from front to back.  Bowel incontinence  Pregnancy  Procedures such as having a catheter inserted  Older age  Not emptying your bladder. This can allow bacteria a chance to grow in your " urine.  Dehydration  Constipation  Sex  Use of a diaphragm for birth control   Treatment  Bladder infections are diagnosed by a urine test. They are treated with antibiotics and usually clear up quickly without complications. Treatment helps prevent a more serious kidney infection.  Medicines  Medicines can help in the treatment of a bladder infection:  Take antibiotics until they are used up, even if you feel better. It is important to finish them to make sure the infection has cleared.  You can use acetaminophen or ibuprofen for pain, fever, or discomfort, unless another medicine was prescribed. If you have chronic liver or kidney disease, talk with your healthcare provider before using these medicines. Also talk with your provider if you've ever had a stomach ulcer or gastrointestinal bleeding, or are taking blood-thinner medicines.  If you are given phenazopydridine to reduce burning with urination, it will cause your urine to become a bright orange color. This can stain clothing.  Care and prevention  These self-care steps can help prevent future infections:  Drink plenty of fluids to prevent dehydration and flush out your bladder. Do this unless you must restrict fluids for other health reasons, or your doctor told you not to.  Proper cleaning after going to the bathroom is important. Wipe from front to back after using the toilet to prevent the spread of bacteria.  Urinate more often. Don't try to hold urine in for a long time.  Wear loose-fitting clothes and cotton underwear. Avoid tight-fitting pants.  Improve your diet and prevent constipation. Eat more fresh fruit and vegetables, and fiber, and less junk and fatty foods.  Avoid sex until your symptoms are gone.  Avoid caffeine, alcohol, and spicy foods. These can irritate your bladder.  Urinate right after intercourse to flush out your bladder.  If you use birth control pills and have frequent bladder infections, discuss it with your doctor.  Follow-up  care  Call your healthcare provider if all symptoms are not gone after 3 days of treatment. This is especially important if you have repeat infections.  If a culture was done, you will be told if your treatment needs to be changed. If directed, you can call to find out the results.  If X-rays were done, you will be told if the results will affect your treatment.  Call 911  Call 911 if any of the following occur:  Trouble breathing  Hard to wake up or confusion  Fainting or loss of consciousness  Rapid heart rate  When to seek medical advice  Call your healthcare provider right away if any of these occur:  Fever of 100.4ºF (38.0ºC) or higher, or as directed by your healthcare provider  Symptoms are not better by the third day of treatment  Back or belly (abdominal) pain that gets worse  Repeated vomiting, or unable to keep medicine down  Weakness or dizziness  Vaginal discharge  Pain, redness, or swelling in the outer vaginal area (labia)  Date Last Reviewed: 10/1/2016  © 1229-0722 [employer]. [address] [place] [address], [place] [address]. All rights reserved. This information is not intended as a substitute for professional medical care. Always follow your healthcare professional's instructions.

## 2022-12-21 NOTE — PROGRESS NOTES
"Subjective:       Patient ID: Cristal Gonzalez is a 62 y.o. female.    Vitals:  height is 5' 2" (1.575 m) and weight is 68.5 kg (151 lb). Her oral temperature is 98.4 °F (36.9 °C). Her blood pressure is 113/79 and her pulse is 70. Her respiration is 18 and oxygen saturation is 98%.     Chief Complaint: Urinary Tract Infection    62-year-old female with a history of GERD, postmenopausal, and already vaccinated for COVID-19 who presents to urgent care clinic for evaluation.  Reports urinary tract infection October and was prescribed Macrobid by OBGYN.  Only took 1 dose but cause her to have significant abdominal pain and nausea without vomiting.  She stopped taking the medication.  Had skin procedure done for melanoma so forgot about her symptoms.  Symptoms returned again 2 days ago with lower back pain, mild painful urination, and urinary urgency.  She also developed significant fatigue in the last couple days as well.  No other associated symptoms.  No fever, chills, hematuria, history of kidney stones, nausea/vomiting, gait instability, falls, radiating leg pain, bladder bowel incontinence, or focal weakness/deficits.      Medical assistant note:  This is a 62 y.o. female who presents today with a chief complaint of  UTI. Patient's symptoms started day before yesterday.  Patient is having burning sensation when urinating and lower back pain.      Urinary Tract Infection   This is a new problem. The current episode started gradual onset. The problem occurs every urination. The quality of the pain is described as burning. The pain is at a severity of 4/10. The pain is moderate. There has been no fever. She is Sexually active. There is No history of pyelonephritis. Associated symptoms include flank pain and urgency. Pertinent negatives include no chills, frequency, hematuria, nausea, vomiting, constipation or rash. She has tried acetaminophen for the symptoms. The treatment provided no relief.   Constitution: " Positive for fatigue. Negative for activity change, appetite change, chills, sweating, fever and generalized weakness.   HENT:  Negative for ear pain, hearing loss, facial swelling, congestion, postnasal drip, sinus pain, sinus pressure, sore throat, trouble swallowing and voice change.    Neck: Negative for neck pain, neck stiffness and painful lymph nodes.   Cardiovascular:  Negative for chest pain, leg swelling, palpitations, sob on exertion and passing out.   Eyes:  Negative for eye discharge, eye pain, photophobia, vision loss, double vision and blurred vision.   Respiratory:  Negative for chest tightness, cough, sputum production, bloody sputum, COPD, shortness of breath, stridor, wheezing and asthma.    Gastrointestinal:  Negative for abdominal pain, nausea, vomiting, constipation, diarrhea, bright red blood in stool, rectal bleeding, heartburn and bowel incontinence.   Genitourinary:  Positive for dysuria, urgency and flank pain. Negative for frequency, urine decreased, bladder incontinence, hematuria, vaginal pain, vaginal discharge, vaginal bleeding, vaginal odor, painful ejaculation, genital sore, painful ejaculation and pelvic pain.   Musculoskeletal:  Negative for trauma, joint pain, joint swelling, abnormal ROM of joint, muscle cramps and muscle ache.   Skin:  Negative for color change, pale, rash and wound.   Allergic/Immunologic: Negative for seasonal allergies, asthma and immunocompromised state.   Neurological:  Negative for dizziness, history of vertigo, light-headedness, passing out, facial drooping, speech difficulty, coordination disturbances, loss of balance, headaches, disorientation, altered mental status, loss of consciousness, numbness, tingling and seizures.   Hematologic/Lymphatic: Negative for swollen lymph nodes, easy bruising/bleeding and trouble clotting. Does not bruise/bleed easily.   Psychiatric/Behavioral:  Negative for altered mental status and disorientation.        Past  Medical History:   Diagnosis Date    GERD (gastroesophageal reflux disease)     Hot flash, menopausal        Objective:      Physical Exam   Constitutional: She appears well-developed. She is cooperative.  Non-toxic appearance. She does not appear ill. No distress.      Comments:Well-appearing     HENT:   Head: Normocephalic and atraumatic.   Ears:   Right Ear: Hearing, external ear and ear canal normal.   Left Ear: Hearing, external ear and ear canal normal.   Nose: No rhinorrhea or congestion.   Mouth/Throat: Uvula is midline, oropharynx is clear and moist and mucous membranes are normal. Mucous membranes are moist. No posterior oropharyngeal edema. No tonsillar exudate. Oropharynx is clear.   Eyes: Conjunctivae, EOM and lids are normal. Pupils are equal, round, and reactive to light. Right eye exhibits no discharge. Left eye exhibits no discharge. Extraocular movement intact   Neck: Neck supple. No neck rigidity present.   Cardiovascular: Normal rate, regular rhythm and normal pulses.      Comments: No leg edema, calf tenderness/erythema, or Homans sign bilaterally.       Pulmonary/Chest: Effort normal. No accessory muscle usage. No respiratory distress. She has no wheezes. She exhibits no tenderness.   Abdominal: Normal appearance. She exhibits no distension. Soft. There is no abdominal tenderness. There is no rebound, no guarding, no left CVA tenderness and no right CVA tenderness.   Musculoskeletal: Normal range of motion.         General: Normal range of motion.      Right lower leg: No edema.      Left lower leg: No edema.      Comments: Moves all extremities with normal tone, strength, and ROM.   Neurological: no focal deficit. She is alert and at baseline. She has normal motor skills and normal sensation. She displays no weakness, facial symmetry and normal reflexes. No cranial nerve deficit or sensory deficit. She exhibits normal muscle tone. Gait and coordination normal. Coordination normal.   Skin: Skin  is warm, dry, not diaphoretic and no rash. Capillary refill takes less than 2 seconds.   Psychiatric: Her behavior is normal. Mood and thought content normal.   Nursing note and vitals reviewed.          Results for orders placed or performed in visit on 12/20/22   POCT Urinalysis, Dipstick, Automated, W/O Scope   Result Value Ref Range    POC Blood, Urine Positive (A) Negative    POC Bilirubin, Urine Negative Negative    POC Urobilinogen, Urine Norm 0.1 - 1.1    POC Ketones, Urine Positive (A) Negative    POC Protein, Urine Negative Negative    POC Nitrates, Urine Negative Negative    POC Glucose, Urine Negative Negative    pH, UA 5.0 5 - 8    POC Specific Gravity, Urine 1.010 1.003 - 1.029    POC Leukocytes, Urine Negative Negative         Assessment:       1. Recurrent UTI    2. Painful urination    3. Urinary urgency    4. Fatigue, unspecified type          On exam, patient is nontoxic appearing and vitals are stable.  Patient is essentially neurovascularly intact on exam.  No concern for sepsis, pyelonephritis, PID, or urinary obstruction.    urinalysis positive 5 RBC and minimal ketones (see above).  Urine culture sent.  We will notify patient of the results in the next 3-5 days; can receive results on [employer]. Diagnostic testing results were independently reviewed and interpreted, which were discussed in depth with patient. Patient was prescribed medications and recommended OTC treatments for their symptoms. If symptoms do not improve/worsens, patient was referred back to PCP for continued outpatient workup and management.     We had long discussion regarding steroid use.  It is not indicated at this time.  We also discussed the risk/alternatives/benefits/side effects of steroid use.    Patient was instructed to return for re-evaluation for any worsening or change in current symptoms. Strict ED versus clinic precautions given in depth. Discharge and follow-up instructions given verbally/printed with the  patient who expressed understanding and willingness to comply with my recommendations.  Patient verbalized understanding and agreed with the entirety of plan of care.    Note dictated with voice recognition software, please excuse any grammatical errors.    Plan:         Recurrent UTI  -     ciprofloxacin HCl (CIPRO) 500 MG tablet; Take 1 tablet (500 mg total) by mouth every 12 (twelve) hours. Increase fluid intake while on antibiotics.  Avoid excessive physical activity while on this antibiotic. for 7 days  Dispense: 14 tablet; Refill: 0    Painful urination  -     POCT Urinalysis, Dipstick, Automated, W/O Scope  -     CULTURE, URINE    Urinary urgency    Fatigue, unspecified type              Additional MDM:     Heart Failure Score:   COPD = No    Patient Instructions   PLEASE READ YOUR DISCHARGE INSTRUCTIONS ENTIRELY AS IT CONTAINS IMPORTANT INFORMATION.  - A urine culture was sent. You will be contacted once it results and appropriate action will be taken if needed.  We will notify patient of the results in the next 3-5 days; can receive results on [employer].   - Drink plenty of fluids, wipe front to back, take showers not baths, no scented soaps, wear breathable cotton underwear, urinate after sexual intercourse.   - Tylenol or Ibuprofen as directed as needed for pain.    - If you were prescribed antibiotics, please take them to completion. Please supplement with OTC probiotics and yogurt.   - If you are female and on birth control pills - please use additional methods of contraception to prevent pregnancy while on antibiotics and for one cycle after.   -You must understand that you've received an Urgent Care treatment only and that you may be released before all your medical problems are known or treated. You, the patient, will arrange for follow up care as instructed. Please arrange follow up with your primary medical clinic within 2-5 days if your signs and symptoms have not resolved or worsen. Please go to  "the ER for worsening symptoms including worsening fever, flank pain, vomiting, unable to tolerate fluids, fatigue, etc.   - Follow up with your PCP or specialty clinic as directed in next 2-5 days for re-evaluation.  You can call [phone] to schedule an appointment with the appropriate provider.    - If your condition worsens or fails to improve we recommend that you receive another evaluation at the emergency room immediately or contact your primary medical clinic to discuss your concerns.     RED FLAGS/WARNING SYMPTOMS DISCUSSED WITH PATIENT THAT WOULD WARRANT EMERGENT MEDICAL ATTENTION. Patient aware and verbalized understanding.        Bladder Infection, Female (Adult)    Urine is normally doesn't have any bacteria in it. But bacteria can get into the urinary tract from the skin around the rectum. Or they can travel in the blood from elsewhere in the body. Once they are in your urinary tract, they can cause infection in the urethra (urethritis), the bladder (cystitis), or the kidneys (pyelonephritis).  The most common place for an infection is in the bladder. This is called a bladder infection. This is one of the most common infections in women. Most bladder infections are easily treated. They are not serious unless the infection spreads to the kidney.  The phrases "bladder infection," "UTI," and "cystitis" are often used to describe the same thing. But they are not always the same. Cystitis is an inflammation of the bladder. The most common cause of cystitis is an infection.  Symptoms  The infection causes inflammation in the urethra and bladder. This causes many of the symptoms. The most common symptoms of a bladder infection are:  Pain or burning when urinating  Having to urinate more often than usual  Urgent need to urinate  Only a small amount of urine comes out  Blood in urine  Abdominal discomfort. This is usually in the lower abdomen above the pubic bone.  Cloudy urine  Strong- or bad-smelling " urine  Unable to urinate (urinary retention)  Unable to hold urine in (urinary incontinence)  Fever  Loss of appetite  Confusion (in older adults)  Causes  Bladder infections are not contagious. You can't get one from someone else, from a toilet seat, or from sharing a bath.  The most common cause of bladder infections is bacteria from the bowels. The bacteria get onto the skin around the opening of the urethra. From there, they can get into the urine and travel up to the bladder, causing inflammation and infection. This usually happens because of:  Wiping improperly after urinating. Always wipe from front to back.  Bowel incontinence  Pregnancy  Procedures such as having a catheter inserted  Older age  Not emptying your bladder. This can allow bacteria a chance to grow in your urine.  Dehydration  Constipation  Sex  Use of a diaphragm for birth control   Treatment  Bladder infections are diagnosed by a urine test. They are treated with antibiotics and usually clear up quickly without complications. Treatment helps prevent a more serious kidney infection.  Medicines  Medicines can help in the treatment of a bladder infection:  Take antibiotics until they are used up, even if you feel better. It is important to finish them to make sure the infection has cleared.  You can use acetaminophen or ibuprofen for pain, fever, or discomfort, unless another medicine was prescribed. If you have chronic liver or kidney disease, talk with your healthcare provider before using these medicines. Also talk with your provider if you've ever had a stomach ulcer or gastrointestinal bleeding, or are taking blood-thinner medicines.  If you are given phenazopydridine to reduce burning with urination, it will cause your urine to become a bright orange color. This can stain clothing.  Care and prevention  These self-care steps can help prevent future infections:  Drink plenty of fluids to prevent dehydration and flush out your bladder. Do  this unless you must restrict fluids for other health reasons, or your doctor told you not to.  Proper cleaning after going to the bathroom is important. Wipe from front to back after using the toilet to prevent the spread of bacteria.  Urinate more often. Don't try to hold urine in for a long time.  Wear loose-fitting clothes and cotton underwear. Avoid tight-fitting pants.  Improve your diet and prevent constipation. Eat more fresh fruit and vegetables, and fiber, and less junk and fatty foods.  Avoid sex until your symptoms are gone.  Avoid caffeine, alcohol, and spicy foods. These can irritate your bladder.  Urinate right after intercourse to flush out your bladder.  If you use birth control pills and have frequent bladder infections, discuss it with your doctor.  Follow-up care  Call your healthcare provider if all symptoms are not gone after 3 days of treatment. This is especially important if you have repeat infections.  If a culture was done, you will be told if your treatment needs to be changed. If directed, you can call to find out the results.  If X-rays were done, you will be told if the results will affect your treatment.  Call 911  Call 911 if any of the following occur:  Trouble breathing  Hard to wake up or confusion  Fainting or loss of consciousness  Rapid heart rate  When to seek medical advice  Call your healthcare provider right away if any of these occur:  Fever of 100.4ºF (38.0ºC) or higher, or as directed by your healthcare provider  Symptoms are not better by the third day of treatment  Back or belly (abdominal) pain that gets worse  Repeated vomiting, or unable to keep medicine down  Weakness or dizziness  Vaginal discharge  Pain, redness, or swelling in the outer vaginal area (labia)  Date Last Reviewed: 10/1/2016  © 9465-9047 [employer]. [address] [place] [address], [place] [address]. All rights reserved. This information is not intended as a substitute for professional medical  care. Always follow your healthcare professional's instructions.

## 2022-12-22 ENCOUNTER — TELEPHONE (OUTPATIENT)
Dept: URGENT CARE | Facility: CLINIC | Age: 62
End: 2022-12-22
Payer: COMMERCIAL

## 2022-12-29 ENCOUNTER — OFFICE VISIT (OUTPATIENT)
Dept: URGENT CARE | Facility: CLINIC | Age: 62
End: 2022-12-29
Payer: COMMERCIAL

## 2022-12-29 VITALS
BODY MASS INDEX: 26.68 KG/M2 | WEIGHT: 145 LBS | OXYGEN SATURATION: 97 % | TEMPERATURE: 99 F | HEART RATE: 70 BPM | HEIGHT: 62 IN | RESPIRATION RATE: 18 BRPM | SYSTOLIC BLOOD PRESSURE: 113 MMHG | DIASTOLIC BLOOD PRESSURE: 76 MMHG

## 2022-12-29 DIAGNOSIS — R09.89 CHEST CONGESTION: ICD-10-CM

## 2022-12-29 DIAGNOSIS — J06.9 VIRAL URI: Primary | ICD-10-CM

## 2022-12-29 LAB
CTP QC/QA: YES
CTP QC/QA: YES
POC MOLECULAR INFLUENZA A AGN: NEGATIVE
POC MOLECULAR INFLUENZA B AGN: NEGATIVE
SARS-COV-2 AG RESP QL IA.RAPID: NEGATIVE

## 2022-12-29 PROCEDURE — U0002 SARS CORONAVIRUS 2 ANTIGEN POCT, MANUAL READ: ICD-10-PCS | Mod: QW,S$GLB,, | Performed by: NURSE PRACTITIONER

## 2022-12-29 PROCEDURE — U0002 COVID-19 LAB TEST NON-CDC: HCPCS | Mod: QW,S$GLB,, | Performed by: NURSE PRACTITIONER

## 2022-12-29 PROCEDURE — 3044F PR MOST RECENT HEMOGLOBIN A1C LEVEL <7.0%: ICD-10-PCS | Mod: CPTII,S$GLB,, | Performed by: NURSE PRACTITIONER

## 2022-12-29 PROCEDURE — 3044F HG A1C LEVEL LT 7.0%: CPT | Mod: CPTII,S$GLB,, | Performed by: NURSE PRACTITIONER

## 2022-12-29 PROCEDURE — 1159F PR MEDICATION LIST DOCUMENTED IN MEDICAL RECORD: ICD-10-PCS | Mod: CPTII,S$GLB,, | Performed by: NURSE PRACTITIONER

## 2022-12-29 PROCEDURE — 99213 PR OFFICE/OUTPT VISIT, EST, LEVL III, 20-29 MIN: ICD-10-PCS | Mod: S$GLB,,, | Performed by: NURSE PRACTITIONER

## 2022-12-29 PROCEDURE — 87502 INFLUENZA DNA AMP PROBE: CPT | Mod: QW,S$GLB,, | Performed by: NURSE PRACTITIONER

## 2022-12-29 PROCEDURE — 1160F PR REVIEW ALL MEDS BY PRESCRIBER/CLIN PHARMACIST DOCUMENTED: ICD-10-PCS | Mod: CPTII,S$GLB,, | Performed by: NURSE PRACTITIONER

## 2022-12-29 PROCEDURE — 87502 POCT INFLUENZA A/B MOLECULAR: ICD-10-PCS | Mod: QW,S$GLB,, | Performed by: NURSE PRACTITIONER

## 2022-12-29 PROCEDURE — 1159F MED LIST DOCD IN RCRD: CPT | Mod: CPTII,S$GLB,, | Performed by: NURSE PRACTITIONER

## 2022-12-29 PROCEDURE — 3078F DIAST BP <80 MM HG: CPT | Mod: CPTII,S$GLB,, | Performed by: NURSE PRACTITIONER

## 2022-12-29 PROCEDURE — 3008F PR BODY MASS INDEX (BMI) DOCUMENTED: ICD-10-PCS | Mod: CPTII,S$GLB,, | Performed by: NURSE PRACTITIONER

## 2022-12-29 PROCEDURE — 3074F SYST BP LT 130 MM HG: CPT | Mod: CPTII,S$GLB,, | Performed by: NURSE PRACTITIONER

## 2022-12-29 PROCEDURE — 99213 OFFICE O/P EST LOW 20 MIN: CPT | Mod: S$GLB,,, | Performed by: NURSE PRACTITIONER

## 2022-12-29 PROCEDURE — 3078F PR MOST RECENT DIASTOLIC BLOOD PRESSURE < 80 MM HG: ICD-10-PCS | Mod: CPTII,S$GLB,, | Performed by: NURSE PRACTITIONER

## 2022-12-29 PROCEDURE — 3008F BODY MASS INDEX DOCD: CPT | Mod: CPTII,S$GLB,, | Performed by: NURSE PRACTITIONER

## 2022-12-29 PROCEDURE — 3074F PR MOST RECENT SYSTOLIC BLOOD PRESSURE < 130 MM HG: ICD-10-PCS | Mod: CPTII,S$GLB,, | Performed by: NURSE PRACTITIONER

## 2022-12-29 PROCEDURE — 1160F RVW MEDS BY RX/DR IN RCRD: CPT | Mod: CPTII,S$GLB,, | Performed by: NURSE PRACTITIONER

## 2022-12-29 NOTE — PATIENT INSTRUCTIONS
See additional patient Instructions provided    - Rest.    - Drink plenty of fluids.  - Bacterial infections can be treated with oral antibiotics, however, Viral upper respiratory infections will not respond to antibiotics but with simple symptomatic care, typically run their course in 10-14 days.     - Tylenol or Ibuprofen as directed as needed for fever/pain. Avoid tylenol if you have a history of liver disease. Do not take ibuprofen if you have a history of GI bleeding, kidney disease, or if you take blood thinners.     - You can take over-the-counter claritin, zyrtec, allegra, or xyzal as directed. These are antihistamines that can help with runny nose, nasal congestion, sneezing, and helps to dry up post-nasal drip, which usually causes sore throat and cough.   - If you do NOT have high blood pressure, you may use a decongestant form (D)  of this medication (ie. Claritin- D, zyrtec-D, allegra-D) or if you do not take the D form, you can take sudafed (pseudoephedrine) over the counter, which is a decongestant. Do NOT take two decongestant (D) medications at the same time (such as mucinex-D and claritin-D or plain sudafed and claritin D)    - You can use Flonase (fluticasone) nasal spray as directed for sinus congestion and postnasal drip. This is a steroid nasal spray that works locally over time to decrease the inflammation in your nose/sinuses and help with allergic symptoms. This is not an quick- relief spray like afrin, but it works well if used daily.  Discontinue if you develop nose bleed  - use nasal saline prior to Flonase.  - Use Ocean Spray Nasal Saline 1-3 puffs each nostril every 2-3 hours then blow out onto tissue. This is to irrigate the nasal passage way to clear the sinus openings. Use until sinus problem resolved.    - you can take plain Mucinex (guaifenesin) 1200 mg twice a day to help loosen mucous.     -warm salt water gargles can help with sore throat    - warm tea with honey can help with  cough. Honey is a natural cough suppressant.    - Dextromethorphan (DM) is a cough suppressant over the counter (ie. mucinex DM, robitussin, delsym; dayquil/nyquil has DM as well.)    - Follow up with your PCP or specialty clinic as directed in the next 1-2 weeks if not improved or as needed.  You can call [phone] to schedule an appointment with the appropriate provider.      - Go to the ER if you develop new or worsening symptoms.     - You must understand that you have received an Urgent Care treatment only and that you may be released before all of your medical problems are known or treated.   - You, the patient, will arrange for follow up care as instructed.   - If your condition worsens or fails to improve we recommend that you receive another evaluation at the ER immediately or contact your PCP to discuss your concerns or return here.     Patient Instructions   - You must understand that you have received an Urgent Care treatment only and that you may be released before all of your medical problems are known or treated.   - You, the patient, will arrange for follow up care as instructed.   - If your condition worsens or fails to improve we recommend that you receive another evaluation at the ER immediately or contact your PCP to discuss your concerns or return here.     Advised on return/follow-up precautions. Advised on ER precautions. Answered all patient questions. Patient verbalized understanding and voiced agreement with current treatment plan.

## 2022-12-29 NOTE — PROGRESS NOTES
"Subjective:       Patient ID: Cristal Gonzalez is a 62 y.o. female.    Vitals:  height is 5' 2" (1.575 m) and weight is 65.8 kg (145 lb). Her oral temperature is 98.9 °F (37.2 °C). Her blood pressure is 113/76 and her pulse is 70. Her respiration is 18 and oxygen saturation is 97%.     Chief Complaint: Chest Congestion    Patient's  tested positive for covid.     Other  This is a new problem. The current episode started in the past 7 days (Monday). The problem has been gradually worsening. Associated symptoms include chills, congestion, coughing and fatigue. Pertinent negatives include no abdominal pain, anorexia, arthralgias, change in bowel habit, chest pain, diaphoresis, fever, headaches, joint swelling, myalgias, nausea, neck pain, numbness, rash, sore throat, swollen glands, urinary symptoms, vertigo, visual change, vomiting or weakness. Associated symptoms comments: Runny nose. Nothing aggravates the symptoms. Treatments tried: Zrytec. The treatment provided mild relief.     Constitution: Positive for chills and fatigue. Negative for sweating and fever.   HENT:  Positive for congestion. Negative for ear pain, ear discharge, tinnitus, hearing loss, sinus pain, sinus pressure, sore throat, trouble swallowing and voice change.    Neck: Negative for neck pain and painful lymph nodes.   Cardiovascular:  Negative for chest pain, palpitations and sob on exertion.   Respiratory:  Positive for cough. Negative for sputum production, shortness of breath and wheezing.    Gastrointestinal:  Negative for abdominal pain, nausea, vomiting and diarrhea.   Musculoskeletal:  Negative for joint pain, joint swelling and muscle ache.   Skin:  Negative for color change, pale and rash.   Allergic/Immunologic: Negative for sneezing.   Neurological:  Negative for history of vertigo, headaches, numbness and tingling.   Hematologic/Lymphatic: Negative for swollen lymph nodes.     Objective:      Physical Exam "   Constitutional: She is oriented to person, place, and time. She appears well-developed. She is cooperative.  Non-toxic appearance. She does not appear ill. No distress.   HENT:   Head: Normocephalic and atraumatic.   Ears:   Right Ear: Hearing, tympanic membrane, external ear and ear canal normal.   Left Ear: Hearing, tympanic membrane, external ear and ear canal normal.   Nose: Nose normal. No mucosal edema, rhinorrhea, nasal deformity or congestion. No epistaxis. Right sinus exhibits no maxillary sinus tenderness and no frontal sinus tenderness. Left sinus exhibits no maxillary sinus tenderness and no frontal sinus tenderness.   Mouth/Throat: Uvula is midline, oropharynx is clear and moist and mucous membranes are normal. No trismus in the jaw. Normal dentition. No uvula swelling. No oropharyngeal exudate, posterior oropharyngeal edema or posterior oropharyngeal erythema.   Eyes: Conjunctivae and lids are normal. No scleral icterus.   Neck: Trachea normal and phonation normal. Neck supple. No edema present. No erythema present. No neck rigidity present.   Cardiovascular: Normal rate, regular rhythm and normal heart sounds.   Pulmonary/Chest: Effort normal and breath sounds normal. No stridor. No respiratory distress. She has no decreased breath sounds. She has no wheezes. She has no rhonchi. She has no rales. She exhibits no tenderness.   Abdominal: Normal appearance.   Musculoskeletal: Normal range of motion.         General: No deformity. Normal range of motion.      Cervical back: She exhibits no tenderness.   Lymphadenopathy:     She has cervical adenopathy.   Neurological: She is alert and oriented to person, place, and time. She exhibits normal muscle tone. Coordination normal.   Skin: Skin is warm, dry, intact, not diaphoretic and not pale.   Psychiatric: Her speech is normal and behavior is normal. Judgment and thought content normal.   Nursing note and vitals reviewed.      Results for orders placed or  performed in visit on 12/29/22   SARS Coronavirus 2 Antigen, POCT Manual Read   Result Value Ref Range    SARS Coronavirus 2 Antigen Negative Negative     Acceptable Yes    POCT Influenza A/B MOLECULAR   Result Value Ref Range    POC Molecular Influenza A Ag Negative Negative, Not Reported    POC Molecular Influenza B Ag Negative Negative, Not Reported     Acceptable Yes        Assessment:       1. Viral URI    2. Chest congestion          Plan:         Viral URI    Chest congestion  -     SARS Coronavirus 2 Antigen, POCT Manual Read  -     POCT Influenza A/B MOLECULAR                   Patient Instructions   See additional patient Instructions provided    - Rest.    - Drink plenty of fluids.  - Bacterial infections can be treated with oral antibiotics, however, Viral upper respiratory infections will not respond to antibiotics but with simple symptomatic care, typically run their course in 10-14 days.     - Tylenol or Ibuprofen as directed as needed for fever/pain. Avoid tylenol if you have a history of liver disease. Do not take ibuprofen if you have a history of GI bleeding, kidney disease, or if you take blood thinners.     - You can take over-the-counter claritin, zyrtec, allegra, or xyzal as directed. These are antihistamines that can help with runny nose, nasal congestion, sneezing, and helps to dry up post-nasal drip, which usually causes sore throat and cough.   - If you do NOT have high blood pressure, you may use a decongestant form (D)  of this medication (ie. Claritin- D, zyrtec-D, allegra-D) or if you do not take the D form, you can take sudafed (pseudoephedrine) over the counter, which is a decongestant. Do NOT take two decongestant (D) medications at the same time (such as mucinex-D and claritin-D or plain sudafed and claritin D)    - You can use Flonase (fluticasone) nasal spray as directed for sinus congestion and postnasal drip. This is a steroid nasal spray that  works locally over time to decrease the inflammation in your nose/sinuses and help with allergic symptoms. This is not an quick- relief spray like afrin, but it works well if used daily.  Discontinue if you develop nose bleed  - use nasal saline prior to Flonase.  - Use Ocean Spray Nasal Saline 1-3 puffs each nostril every 2-3 hours then blow out onto tissue. This is to irrigate the nasal passage way to clear the sinus openings. Use until sinus problem resolved.    - you can take plain Mucinex (guaifenesin) 1200 mg twice a day to help loosen mucous.     -warm salt water gargles can help with sore throat    - warm tea with honey can help with cough. Honey is a natural cough suppressant.    - Dextromethorphan (DM) is a cough suppressant over the counter (ie. mucinex DM, robitussin, delsym; dayquil/nyquil has DM as well.)    - Follow up with your PCP or specialty clinic as directed in the next 1-2 weeks if not improved or as needed.  You can call [phone] to schedule an appointment with the appropriate provider.      - Go to the ER if you develop new or worsening symptoms.     - You must understand that you have received an Urgent Care treatment only and that you may be released before all of your medical problems are known or treated.   - You, the patient, will arrange for follow up care as instructed.   - If your condition worsens or fails to improve we recommend that you receive another evaluation at the ER immediately or contact your PCP to discuss your concerns or return here.     Patient Instructions   - You must understand that you have received an Urgent Care treatment only and that you may be released before all of your medical problems are known or treated.   - You, the patient, will arrange for follow up care as instructed.   - If your condition worsens or fails to improve we recommend that you receive another evaluation at the ER immediately or contact your PCP to discuss your concerns or return here.      Advised on return/follow-up precautions. Advised on ER precautions. Answered all patient questions. Patient verbalized understanding and voiced agreement with current treatment plan.

## 2023-01-04 ENCOUNTER — OFFICE VISIT (OUTPATIENT)
Dept: URGENT CARE | Facility: CLINIC | Age: 63
End: 2023-01-04
Payer: COMMERCIAL

## 2023-01-04 VITALS
HEART RATE: 70 BPM | OXYGEN SATURATION: 96 % | TEMPERATURE: 97 F | DIASTOLIC BLOOD PRESSURE: 73 MMHG | BODY MASS INDEX: 26.13 KG/M2 | WEIGHT: 142 LBS | SYSTOLIC BLOOD PRESSURE: 116 MMHG | HEIGHT: 62 IN | RESPIRATION RATE: 18 BRPM

## 2023-01-04 DIAGNOSIS — J40 BRONCHITIS: Primary | ICD-10-CM

## 2023-01-04 PROCEDURE — 3074F PR MOST RECENT SYSTOLIC BLOOD PRESSURE < 130 MM HG: ICD-10-PCS | Mod: CPTII,S$GLB,, | Performed by: NURSE PRACTITIONER

## 2023-01-04 PROCEDURE — 99214 OFFICE O/P EST MOD 30 MIN: CPT | Mod: S$GLB,,, | Performed by: NURSE PRACTITIONER

## 2023-01-04 PROCEDURE — 1159F MED LIST DOCD IN RCRD: CPT | Mod: CPTII,S$GLB,, | Performed by: NURSE PRACTITIONER

## 2023-01-04 PROCEDURE — 1160F PR REVIEW ALL MEDS BY PRESCRIBER/CLIN PHARMACIST DOCUMENTED: ICD-10-PCS | Mod: CPTII,S$GLB,, | Performed by: NURSE PRACTITIONER

## 2023-01-04 PROCEDURE — 1160F RVW MEDS BY RX/DR IN RCRD: CPT | Mod: CPTII,S$GLB,, | Performed by: NURSE PRACTITIONER

## 2023-01-04 PROCEDURE — 3078F PR MOST RECENT DIASTOLIC BLOOD PRESSURE < 80 MM HG: ICD-10-PCS | Mod: CPTII,S$GLB,, | Performed by: NURSE PRACTITIONER

## 2023-01-04 PROCEDURE — 99214 PR OFFICE/OUTPT VISIT, EST, LEVL IV, 30-39 MIN: ICD-10-PCS | Mod: S$GLB,,, | Performed by: NURSE PRACTITIONER

## 2023-01-04 PROCEDURE — 3008F BODY MASS INDEX DOCD: CPT | Mod: CPTII,S$GLB,, | Performed by: NURSE PRACTITIONER

## 2023-01-04 PROCEDURE — 1159F PR MEDICATION LIST DOCUMENTED IN MEDICAL RECORD: ICD-10-PCS | Mod: CPTII,S$GLB,, | Performed by: NURSE PRACTITIONER

## 2023-01-04 PROCEDURE — 3074F SYST BP LT 130 MM HG: CPT | Mod: CPTII,S$GLB,, | Performed by: NURSE PRACTITIONER

## 2023-01-04 PROCEDURE — 3078F DIAST BP <80 MM HG: CPT | Mod: CPTII,S$GLB,, | Performed by: NURSE PRACTITIONER

## 2023-01-04 PROCEDURE — 3008F PR BODY MASS INDEX (BMI) DOCUMENTED: ICD-10-PCS | Mod: CPTII,S$GLB,, | Performed by: NURSE PRACTITIONER

## 2023-01-04 RX ORDER — BENZONATATE 200 MG/1
200 CAPSULE ORAL 3 TIMES DAILY PRN
Qty: 30 CAPSULE | Refills: 0 | Status: SHIPPED | OUTPATIENT
Start: 2023-01-04 | End: 2023-07-07

## 2023-01-04 RX ORDER — AZITHROMYCIN 250 MG/1
TABLET, FILM COATED ORAL
Qty: 6 TABLET | Refills: 0 | Status: SHIPPED | OUTPATIENT
Start: 2023-01-04 | End: 2023-02-16

## 2023-01-04 NOTE — PROGRESS NOTES
"Subjective:       Patient ID: Cristal Gonzalez is a 62 y.o. female.    Vitals:  height is 5' 2" (1.575 m) and weight is 64.4 kg (142 lb). Her oral temperature is 97.4 °F (36.3 °C). Her blood pressure is 116/73 and her pulse is 70. Her respiration is 18 and oxygen saturation is 96%.     Chief Complaint: Sinus Problem and Cough    Pt states sinus congestion and cough x 10 days.  Pt was seen at  on 12/29/22 with same symptoms.  Pt tested negative for covid and flu on 12/29/22. Pt was told to take flonase, zyrtec, and Robitussin DM which she has been doing with little relief.  Pt 's  was covid positive on 12/27/22. Pt has repeatedly tested negative for covid at home.      Sinus Problem  This is a new problem. The current episode started 1 to 4 weeks ago. The problem is unchanged. Her pain is at a severity of 0/10. She is experiencing no pain. Associated symptoms include congestion, coughing and sinus pressure. Pertinent negatives include no chills, diaphoresis, ear pain, headaches, hoarse voice, neck pain, shortness of breath, sneezing, sore throat or swollen glands. Treatments tried: robitussin DM, zyrtecm, flonase. The treatment provided mild relief.   Cough  Pertinent negatives include no chills, ear pain, headaches, sore throat or shortness of breath.     Constitution: Negative for chills and sweating.   HENT:  Positive for congestion and sinus pressure. Negative for ear pain and sore throat.    Neck: Negative for neck pain.   Respiratory:  Positive for cough. Negative for shortness of breath.    Allergic/Immunologic: Negative for sneezing.   Neurological:  Negative for headaches.     Objective:      Physical Exam   Constitutional: She is oriented to person, place, and time. She appears well-developed. She is cooperative.  Non-toxic appearance. She does not appear ill. No distress.   HENT:   Head: Normocephalic and atraumatic.   Ears:   Right Ear: Hearing, tympanic membrane, external ear and ear canal " normal.   Left Ear: Hearing, tympanic membrane, external ear and ear canal normal.   Nose: Nose normal. No mucosal edema, rhinorrhea or nasal deformity. No epistaxis. Right sinus exhibits no maxillary sinus tenderness and no frontal sinus tenderness. Left sinus exhibits no maxillary sinus tenderness and no frontal sinus tenderness.   Mouth/Throat: Uvula is midline and mucous membranes are normal. No trismus in the jaw. Normal dentition. No uvula swelling. Posterior oropharyngeal erythema present. No oropharyngeal exudate or posterior oropharyngeal edema.   Eyes: Conjunctivae and lids are normal. No scleral icterus.   Neck: Trachea normal and phonation normal. Neck supple. No edema present. No erythema present. No neck rigidity present.   Cardiovascular: Normal rate, regular rhythm, normal heart sounds and normal pulses.   Pulmonary/Chest: Effort normal and breath sounds normal. No respiratory distress. She has no decreased breath sounds. She has no rhonchi.   Persistent cough present         Comments: Persistent cough present    Abdominal: Normal appearance.   Musculoskeletal: Normal range of motion.         General: No deformity. Normal range of motion.   Neurological: She is alert and oriented to person, place, and time. She exhibits normal muscle tone. Coordination normal.   Skin: Skin is warm, dry, intact, not diaphoretic and not pale.   Psychiatric: Her speech is normal and behavior is normal. Judgment and thought content normal.   Nursing note and vitals reviewed.      Assessment:       1. Bronchitis          Plan:     Pt instructed to f/u with PCP PRN.    Bronchitis    Other orders  -     benzonatate (TESSALON) 200 MG capsule; Take 1 capsule (200 mg total) by mouth 3 (three) times daily as needed for Cough.  Dispense: 30 capsule; Refill: 0  -     azithromycin (Z-KAYLEE) 250 MG tablet; Take 2 tablets by mouth on day 1; Take 1 tablet by mouth on days 2-5  Dispense: 6 tablet; Refill: 0

## 2023-01-31 ENCOUNTER — OFFICE VISIT (OUTPATIENT)
Dept: URGENT CARE | Facility: CLINIC | Age: 63
End: 2023-01-31
Payer: COMMERCIAL

## 2023-01-31 VITALS
SYSTOLIC BLOOD PRESSURE: 111 MMHG | BODY MASS INDEX: 26.13 KG/M2 | WEIGHT: 142 LBS | TEMPERATURE: 99 F | HEIGHT: 62 IN | OXYGEN SATURATION: 96 % | RESPIRATION RATE: 16 BRPM | DIASTOLIC BLOOD PRESSURE: 72 MMHG | HEART RATE: 69 BPM

## 2023-01-31 DIAGNOSIS — J01.00 ACUTE NON-RECURRENT MAXILLARY SINUSITIS: ICD-10-CM

## 2023-01-31 DIAGNOSIS — Z11.59 ENCOUNTER FOR SCREENING FOR OTHER VIRAL DISEASES: Primary | ICD-10-CM

## 2023-01-31 DIAGNOSIS — J30.9 ALLERGIC RHINITIS, UNSPECIFIED SEASONALITY, UNSPECIFIED TRIGGER: ICD-10-CM

## 2023-01-31 DIAGNOSIS — R42 VERTIGO: ICD-10-CM

## 2023-01-31 DIAGNOSIS — R09.82 POSTNASAL DRIP: ICD-10-CM

## 2023-01-31 LAB
CTP QC/QA: YES
SARS-COV-2 AG RESP QL IA.RAPID: NEGATIVE

## 2023-01-31 PROCEDURE — 3074F PR MOST RECENT SYSTOLIC BLOOD PRESSURE < 130 MM HG: ICD-10-PCS | Mod: CPTII,S$GLB,, | Performed by: FAMILY MEDICINE

## 2023-01-31 PROCEDURE — 3078F DIAST BP <80 MM HG: CPT | Mod: CPTII,S$GLB,, | Performed by: FAMILY MEDICINE

## 2023-01-31 PROCEDURE — 87811 SARS CORONAVIRUS 2 ANTIGEN POCT, MANUAL READ: ICD-10-PCS | Mod: QW,S$GLB,, | Performed by: FAMILY MEDICINE

## 2023-01-31 PROCEDURE — 3008F PR BODY MASS INDEX (BMI) DOCUMENTED: ICD-10-PCS | Mod: CPTII,S$GLB,, | Performed by: FAMILY MEDICINE

## 2023-01-31 PROCEDURE — 1159F PR MEDICATION LIST DOCUMENTED IN MEDICAL RECORD: ICD-10-PCS | Mod: CPTII,S$GLB,, | Performed by: FAMILY MEDICINE

## 2023-01-31 PROCEDURE — 99214 PR OFFICE/OUTPT VISIT, EST, LEVL IV, 30-39 MIN: ICD-10-PCS | Mod: S$GLB,,, | Performed by: FAMILY MEDICINE

## 2023-01-31 PROCEDURE — 1160F RVW MEDS BY RX/DR IN RCRD: CPT | Mod: CPTII,S$GLB,, | Performed by: FAMILY MEDICINE

## 2023-01-31 PROCEDURE — 3078F PR MOST RECENT DIASTOLIC BLOOD PRESSURE < 80 MM HG: ICD-10-PCS | Mod: CPTII,S$GLB,, | Performed by: FAMILY MEDICINE

## 2023-01-31 PROCEDURE — 1159F MED LIST DOCD IN RCRD: CPT | Mod: CPTII,S$GLB,, | Performed by: FAMILY MEDICINE

## 2023-01-31 PROCEDURE — 87811 SARS-COV-2 COVID19 W/OPTIC: CPT | Mod: QW,S$GLB,, | Performed by: FAMILY MEDICINE

## 2023-01-31 PROCEDURE — 99214 OFFICE O/P EST MOD 30 MIN: CPT | Mod: S$GLB,,, | Performed by: FAMILY MEDICINE

## 2023-01-31 PROCEDURE — 3074F SYST BP LT 130 MM HG: CPT | Mod: CPTII,S$GLB,, | Performed by: FAMILY MEDICINE

## 2023-01-31 PROCEDURE — 1160F PR REVIEW ALL MEDS BY PRESCRIBER/CLIN PHARMACIST DOCUMENTED: ICD-10-PCS | Mod: CPTII,S$GLB,, | Performed by: FAMILY MEDICINE

## 2023-01-31 PROCEDURE — 3008F BODY MASS INDEX DOCD: CPT | Mod: CPTII,S$GLB,, | Performed by: FAMILY MEDICINE

## 2023-01-31 RX ORDER — AMOXICILLIN 875 MG/1
875 TABLET, FILM COATED ORAL 2 TIMES DAILY
Qty: 14 TABLET | Refills: 0 | Status: SHIPPED | OUTPATIENT
Start: 2023-01-31 | End: 2023-02-16

## 2023-01-31 RX ORDER — FLUTICASONE PROPIONATE 50 MCG
1 SPRAY, SUSPENSION (ML) NASAL DAILY
Qty: 15.8 ML | Refills: 0 | Status: SHIPPED | OUTPATIENT
Start: 2023-01-31

## 2023-01-31 RX ORDER — MECLIZINE HCL 12.5 MG 12.5 MG/1
12.5 TABLET ORAL 3 TIMES DAILY PRN
Qty: 30 TABLET | Refills: 0 | Status: SHIPPED | OUTPATIENT
Start: 2023-01-31 | End: 2023-02-16

## 2023-02-01 NOTE — PROGRESS NOTES
"Subjective:       Patient ID: Cristal Gonzalez is a 62 y.o. female.    Vitals:  height is 5' 2" (1.575 m) and weight is 64.4 kg (142 lb). Her oral temperature is 98.5 °F (36.9 °C). Her blood pressure is 111/72 and her pulse is 69. Her respiration is 16 and oxygen saturation is 96%.     Chief Complaint: Sinus Problem    62 y.o female presents with sinus symptoms, postnasal drip, mild cough and sinus pressure/headache for last 5 weeks.  Also reports vertigo like symptoms with mild dizziness and nausea on and off.  Patient denies any fever, chills, chest pain can not SOB, vomiting, diarrhea, abdominal pain, dysuria.     Sinus Problem  This is a recurrent problem. The current episode started more than 1 month ago. The problem has been waxing and waning since onset. Her pain is at a severity of 4/10. Associated symptoms include congestion, coughing (very little), headaches and sinus pressure. Pertinent negatives include no sore throat. (Dizziness) Past treatments include oral decongestants (allergy;cough medicine). The treatment provided mild relief.     HENT:  Positive for congestion and sinus pressure. Negative for sore throat.    Respiratory:  Positive for cough (very little).    Neurological:  Positive for headaches.     Objective:      Physical Exam   Constitutional: She is oriented to person, place, and time. She appears well-developed. She is cooperative.  Non-toxic appearance. She does not appear ill. No distress.   HENT:   Head: Normocephalic and atraumatic.   Ears:   Right Ear: Hearing, tympanic membrane, external ear and ear canal normal. impacted cerumen  Left Ear: Hearing, tympanic membrane, external ear and ear canal normal. impacted cerumen  Nose: Congestion present. No mucosal edema, rhinorrhea or nasal deformity. No epistaxis. Right sinus exhibits no maxillary sinus tenderness and no frontal sinus tenderness. Left sinus exhibits no maxillary sinus tenderness and no frontal sinus tenderness.      " Comments:   Positive nasal and maxillary sinus tenderness.  Mouth/Throat: Uvula is midline, oropharynx is clear and moist and mucous membranes are normal. No trismus in the jaw. Normal dentition. No uvula swelling. No oropharyngeal exudate, posterior oropharyngeal edema or posterior oropharyngeal erythema.   Eyes: Conjunctivae and lids are normal. No scleral icterus.   Neck: Trachea normal and phonation normal. Neck supple. No edema present. No erythema present. No neck rigidity present.   Cardiovascular: Normal rate, regular rhythm, normal heart sounds and normal pulses.   No murmur heard.  Pulmonary/Chest: Effort normal and breath sounds normal. No stridor. No respiratory distress. She has no decreased breath sounds. She has no wheezes. She has no rhonchi. She has no rales.   Abdominal: Normal appearance. She exhibits no distension. There is no abdominal tenderness. There is no left CVA tenderness and no right CVA tenderness.   Musculoskeletal: Normal range of motion.         General: No deformity. Normal range of motion.      Cervical back: She exhibits no tenderness.   Lymphadenopathy:     She has no cervical adenopathy.   Neurological: She is alert and oriented to person, place, and time. She exhibits normal muscle tone. Coordination normal.   Skin: Skin is warm, dry, intact, not diaphoretic and not pale.   Psychiatric: Her speech is normal and behavior is normal. Judgment and thought content normal.   Nursing note and vitals reviewed.      Assessment:       1. Encounter for screening for other viral diseases    2. Acute non-recurrent maxillary sinusitis    3. Allergic rhinitis, unspecified seasonality, unspecified trigger    4. Postnasal drip    5. Vertigo          Plan:         Encounter for screening for other viral diseases  -     SARS Coronavirus 2 Antigen, POCT Manual Read    Acute non-recurrent maxillary sinusitis    Allergic rhinitis, unspecified seasonality, unspecified trigger    Postnasal  drip    Vertigo    Other orders  -     meclizine (ANTIVERT) 12.5 mg tablet; Take 1 tablet (12.5 mg total) by mouth 3 (three) times daily as needed for Nausea or Dizziness.  Dispense: 30 tablet; Refill: 0  -     amoxicillin (AMOXIL) 875 MG tablet; Take 1 tablet (875 mg total) by mouth 2 (two) times daily. for 7 days  Dispense: 14 tablet; Refill: 0  -     fluticasone propionate (FLONASE) 50 mcg/actuation nasal spray; 1 spray (50 mcg total) by Each Nostril route once daily.  Dispense: 15.8 mL; Refill: 0

## 2023-02-16 ENCOUNTER — OFFICE VISIT (OUTPATIENT)
Dept: PRIMARY CARE CLINIC | Facility: CLINIC | Age: 63
End: 2023-02-16
Payer: COMMERCIAL

## 2023-02-16 VITALS
OXYGEN SATURATION: 96 % | HEART RATE: 67 BPM | WEIGHT: 154.56 LBS | HEIGHT: 62 IN | DIASTOLIC BLOOD PRESSURE: 72 MMHG | TEMPERATURE: 99 F | BODY MASS INDEX: 28.44 KG/M2 | SYSTOLIC BLOOD PRESSURE: 106 MMHG

## 2023-02-16 DIAGNOSIS — C43.61 MELANOMA OF UPPER ARM, RIGHT: ICD-10-CM

## 2023-02-16 DIAGNOSIS — J01.90 ACUTE BACTERIAL SINUSITIS: ICD-10-CM

## 2023-02-16 DIAGNOSIS — B96.89 ACUTE BACTERIAL SINUSITIS: ICD-10-CM

## 2023-02-16 DIAGNOSIS — R42 VERTIGO: Primary | ICD-10-CM

## 2023-02-16 PROCEDURE — 99214 OFFICE O/P EST MOD 30 MIN: CPT | Mod: S$GLB,,, | Performed by: FAMILY MEDICINE

## 2023-02-16 PROCEDURE — 99214 PR OFFICE/OUTPT VISIT, EST, LEVL IV, 30-39 MIN: ICD-10-PCS | Mod: S$GLB,,, | Performed by: FAMILY MEDICINE

## 2023-02-16 PROCEDURE — 99999 PR PBB SHADOW E&M-EST. PATIENT-LVL IV: CPT | Mod: PBBFAC,,, | Performed by: FAMILY MEDICINE

## 2023-02-16 PROCEDURE — 1159F PR MEDICATION LIST DOCUMENTED IN MEDICAL RECORD: ICD-10-PCS | Mod: CPTII,S$GLB,, | Performed by: FAMILY MEDICINE

## 2023-02-16 PROCEDURE — 3078F DIAST BP <80 MM HG: CPT | Mod: CPTII,S$GLB,, | Performed by: FAMILY MEDICINE

## 2023-02-16 PROCEDURE — 3008F PR BODY MASS INDEX (BMI) DOCUMENTED: ICD-10-PCS | Mod: CPTII,S$GLB,, | Performed by: FAMILY MEDICINE

## 2023-02-16 PROCEDURE — 3074F SYST BP LT 130 MM HG: CPT | Mod: CPTII,S$GLB,, | Performed by: FAMILY MEDICINE

## 2023-02-16 PROCEDURE — 1160F PR REVIEW ALL MEDS BY PRESCRIBER/CLIN PHARMACIST DOCUMENTED: ICD-10-PCS | Mod: CPTII,S$GLB,, | Performed by: FAMILY MEDICINE

## 2023-02-16 PROCEDURE — 1159F MED LIST DOCD IN RCRD: CPT | Mod: CPTII,S$GLB,, | Performed by: FAMILY MEDICINE

## 2023-02-16 PROCEDURE — 99999 PR PBB SHADOW E&M-EST. PATIENT-LVL IV: ICD-10-PCS | Mod: PBBFAC,,, | Performed by: FAMILY MEDICINE

## 2023-02-16 PROCEDURE — 1160F RVW MEDS BY RX/DR IN RCRD: CPT | Mod: CPTII,S$GLB,, | Performed by: FAMILY MEDICINE

## 2023-02-16 PROCEDURE — 3078F PR MOST RECENT DIASTOLIC BLOOD PRESSURE < 80 MM HG: ICD-10-PCS | Mod: CPTII,S$GLB,, | Performed by: FAMILY MEDICINE

## 2023-02-16 PROCEDURE — 3008F BODY MASS INDEX DOCD: CPT | Mod: CPTII,S$GLB,, | Performed by: FAMILY MEDICINE

## 2023-02-16 PROCEDURE — 3074F PR MOST RECENT SYSTOLIC BLOOD PRESSURE < 130 MM HG: ICD-10-PCS | Mod: CPTII,S$GLB,, | Performed by: FAMILY MEDICINE

## 2023-02-16 NOTE — PATIENT INSTRUCTIONS
Benign Paroxysmal Positional Vertigo is a difficult to treat, sometimes recurring disorder    Hydrate well with LOTS of water (until your urine is clear)    No caffeine, no alcohol.     The problem is with the semicircular canals in your inner ear. You have otoliths  caught in these canals & you feel dizzy. You won't feel better until they move out of the circles & back to their regular spot.     You can try these maneuvers     On [email or web address], search -- Katelyn Davis MD Vertigo Treatment [facility]    If you have neck problems, or difficulty with extreme dizziness, feel free to make an appointment & we can perform the maneuvers in my office together.   ==============================================================    ENT & Neurology (vertigo) - Dr Ramin Newman , Dr Alma Wise Otologist Neurotologist   [facility]  [phone]    [facility]  Dr Yash Vick & Dr Rubio  [address] [facility] [address]. [facility] [address]  [place] [address]  [phone] (phone), [phone] (fax)    [facility]  April Belk  [facility]  _ Jaimee Bernard & Missy Grayson  Vestibular PT at [facility] - Odette Carmona [phone]. ([email or web address]

## 2023-02-16 NOTE — PROGRESS NOTES
Subjective:      Patient ID: Cristal Gonzalez is a 62 y.o. female.    Chief Complaint: Dizziness    61 yo hx melanoma  Here today for dizziness. I'm feeling better, 50% better than a week ago. Able to drive. At work when I look up or turned head, I felt a lag.  No vomiting. . I couldn't get an appt for a month, so I've been seen in urgent care several times . I have been using FLonase. Since I stopped it in the past week, the dizziness is better. Sunday, I blew my nose & left nose was bloody.     Water 16 oz, caffeine 2 c , unsweet tea 1 cup. 1-2 vodka nightly. I do exercise w walking 3 x a week. I work in garden. I care for my mo . I work job 2d.     Evaluated at urgent care several times in the past few months   12/29 URI  1/4 Bronchitis , sinusitis, tx w Zpack & Benzonatate  1/31 Sinusitis & vertigo.  tx w Amoxil , meclizine (took once & felt worse), flonase    Current Outpatient Medications   Medication Instructions    benzonatate (TESSALON) 200 mg, Oral, 3 times daily PRN    cetirizine (ZYRTEC) 10 mg, Oral, Daily    fluticasone propionate (FLONASE) 50 mcg, Each Nostril, Daily    GLUCOSAM SUL NA/CHONDR THURSTON A NA (GLUCOSAMINE & CHONDROIT SUL.NA ORAL) Oral    INTRAROSA 6.5 mg Inst INSERT ONE APPLICATION INTO THE VAGINA QHS    MV, MIN #36/IRON,CARBONYL/FA (GERITOL COMPLETE ORAL) Oral    pantoprazole (PROTONIX) 40 mg, Oral, Daily, For 30d    triamcinolone acetonide 0.1% (KENALOG) 0.1 % cream APPLY TOPICALLY TO THE AFFECTED AREA TWICE DAILY FOR 1 TO 2 WEEKS AS NEEDED FOR RASH       Lab Results   Component Value Date    HGBA1C 5.4 06/20/2022    HGBA1C 5.5 06/19/2018     No results found for: MICALBCREAT  Lab Results   Component Value Date    LDLCALC 63.8 06/20/2022    LDLCALC 60.8 (L) 04/29/2021    CHOL 165 06/20/2022    HDL 96 (H) 06/20/2022    TRIG 26 (L) 06/20/2022       Lab Results   Component Value Date     06/20/2022    K 4.4 06/20/2022     06/20/2022    CO2 26 06/20/2022    GLU 96 06/20/2022     "BUN 21 06/20/2022    CREATININE 0.8 06/20/2022    CALCIUM 9.8 06/20/2022    PROT 7.1 06/20/2022    ALBUMIN 4.0 06/20/2022    BILITOT 0.6 06/20/2022    ALKPHOS 44 (L) 06/20/2022    AST 20 06/20/2022    ALT 14 06/20/2022    ANIONGAP 8 06/20/2022    ESTGFRAFRICA >60.0 06/20/2022    EGFRNONAA >60.0 06/20/2022    WBC 6.75 06/20/2022    HGB 12.8 06/20/2022    HGB 13.3 04/29/2021    HCT 41.2 06/20/2022     (H) 06/20/2022     06/20/2022    TSH 0.929 06/20/2022    HEPCAB Negative 10/10/2019       Lab Results   Component Value Date    FERRITIN 125 05/25/2017    IRON 64 05/25/2017    TRANSFERRIN 234 05/25/2017    TIBC 346 05/25/2017    FESATURATED 18 (L) 05/25/2017         Past Medical History:   Diagnosis Date    GERD (gastroesophageal reflux disease)     Hot flash, menopausal      Past Surgical History:   Procedure Laterality Date    ANKLE FRACTURE SURGERY      APPENDECTOMY  2011    BREAST BIOPSY      benign    COLONOSCOPY N/A 6/25/2021    Procedure: COLONOSCOPY;  Surgeon: Nohemy Parra MD;  Location: [facility] ([address] [place] [address]);  Service: Endoscopy;  Laterality: N/A;  Fully vacc Covid-19 - pg    LYMPH NODE BIOPSY Right 11/17/2022    Procedure: BIOPSY, LYMPH NODE with mapping;  Surgeon: Ben Stallworth MD;  Location: [address];  Service: General;  Laterality: Right;     Social History     Social History Narrative        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   Constitutional:       General: She is not " in acute distress.     Appearance: She is well-developed.      Comments: No nystagmus, no dizzines w lying to sitting   HENT:      Right Ear: Tympanic membrane and external ear normal.      Left Ear: Tympanic membrane and external ear normal.      Nose: Mucosal edema and rhinorrhea present.      Right Sinus: No maxillary sinus tenderness or frontal sinus tenderness.      Left Sinus: No maxillary sinus tenderness or frontal sinus tenderness.      Mouth/Throat:      Pharynx: Posterior oropharyngeal erythema present. No oropharyngeal exudate.   Eyes:      Pupils: Pupils are equal, round, and reactive to light.   Neck:      Thyroid: No thyromegaly.   Cardiovascular:      Rate and Rhythm: Normal rate and regular rhythm.      Heart sounds: Normal heart sounds. No murmur heard.  Pulmonary:      Effort: Pulmonary effort is normal.      Breath sounds: Normal breath sounds. No wheezing or rales.   Musculoskeletal:      Cervical back: Normal range of motion and neck supple.   Lymphadenopathy:      Cervical: No cervical adenopathy.   Skin:     General: Skin is warm and dry.     Assessment:     1. Vertigo    2. Acute bacterial sinusitis    3. Melanoma of upper arm, right      Plan:          Patient Instructions   Benign Paroxysmal Positional Vertigo is a difficult to treat, sometimes recurring disorder    Hydrate well with LOTS of water (until your urine is clear)    No caffeine, no alcohol.     The problem is with the semicircular canals in your inner ear. You have otoliths  caught in these canals & you feel dizzy. You won't feel better until they move out of the circles & back to their regular spot.     You can try these maneuvers     On [email or web address], search -- Katelyn Davis MD Vertigo Treatment [facility]    If you have neck problems, or difficulty with extreme dizziness, feel free to make an appointment & we can perform the maneuvers in my office together.    ==============================================================    ENT & Neurology (vertigo) - Dr Ramin Newman , Dr Alma Wise Otologist Neurotologist   [address]  [phone]    [facility] [place], [facility]  Dr Yash Vick & Dr Rubio  [address] [facility]. [place] [address]  [phone] (phone), [phone] (fax)    [facility]  April Belk  [facility]  _ Jaimee Bernard & Missy Barker [employer]  Vestibular PT at [facility] - Odette Carmona [phone]. ([email or web address].)

## 2023-03-10 ENCOUNTER — PATIENT MESSAGE (OUTPATIENT)
Dept: DERMATOLOGY | Facility: CLINIC | Age: 63
End: 2023-03-10
Payer: COMMERCIAL

## 2023-03-11 ENCOUNTER — PATIENT MESSAGE (OUTPATIENT)
Dept: PRIMARY CARE CLINIC | Facility: CLINIC | Age: 63
End: 2023-03-11
Payer: COMMERCIAL

## 2023-03-13 ENCOUNTER — OFFICE VISIT (OUTPATIENT)
Dept: URGENT CARE | Facility: CLINIC | Age: 63
End: 2023-03-13
Payer: COMMERCIAL

## 2023-03-13 VITALS
TEMPERATURE: 99 F | SYSTOLIC BLOOD PRESSURE: 130 MMHG | WEIGHT: 154 LBS | OXYGEN SATURATION: 95 % | HEIGHT: 62 IN | BODY MASS INDEX: 28.34 KG/M2 | HEART RATE: 92 BPM | DIASTOLIC BLOOD PRESSURE: 95 MMHG | RESPIRATION RATE: 18 BRPM

## 2023-03-13 DIAGNOSIS — K59.00 CONSTIPATION, UNSPECIFIED CONSTIPATION TYPE: ICD-10-CM

## 2023-03-13 DIAGNOSIS — R10.30 LOWER ABDOMINAL PAIN: ICD-10-CM

## 2023-03-13 DIAGNOSIS — R11.2 NAUSEA AND VOMITING, UNSPECIFIED VOMITING TYPE: Primary | ICD-10-CM

## 2023-03-13 LAB
BILIRUB UR QL STRIP: NEGATIVE
GLUCOSE UR QL STRIP: NEGATIVE
KETONES UR QL STRIP: POSITIVE
LEUKOCYTE ESTERASE UR QL STRIP: NEGATIVE
PH, POC UA: 5 (ref 5–8)
POC BLOOD, URINE: POSITIVE
POC NITRATES, URINE: NEGATIVE
PROT UR QL STRIP: NEGATIVE
SP GR UR STRIP: 1.02 (ref 1–1.03)
UROBILINOGEN UR STRIP-ACNC: NORMAL (ref 0.1–1.1)

## 2023-03-13 PROCEDURE — 99214 OFFICE O/P EST MOD 30 MIN: CPT | Mod: 25,S$GLB,, | Performed by: FAMILY MEDICINE

## 2023-03-13 PROCEDURE — 81003 POCT URINALYSIS, DIPSTICK, AUTOMATED, W/O SCOPE: ICD-10-PCS | Mod: QW,S$GLB,, | Performed by: FAMILY MEDICINE

## 2023-03-13 PROCEDURE — S0119 ONDANSETRON 4 MG: HCPCS | Mod: S$GLB,,, | Performed by: FAMILY MEDICINE

## 2023-03-13 PROCEDURE — 81003 URINALYSIS AUTO W/O SCOPE: CPT | Mod: QW,S$GLB,, | Performed by: FAMILY MEDICINE

## 2023-03-13 PROCEDURE — 96372 THER/PROPH/DIAG INJ SC/IM: CPT | Mod: S$GLB,,, | Performed by: FAMILY MEDICINE

## 2023-03-13 PROCEDURE — S0119 PR ONDANSETRON, ORAL, 4MG: ICD-10-PCS | Mod: S$GLB,,, | Performed by: FAMILY MEDICINE

## 2023-03-13 PROCEDURE — 87086 URINE CULTURE/COLONY COUNT: CPT | Performed by: FAMILY MEDICINE

## 2023-03-13 PROCEDURE — 74019 XR ABDOMEN FLAT AND ERECT: ICD-10-PCS | Mod: S$GLB,,, | Performed by: RADIOLOGY

## 2023-03-13 PROCEDURE — 74019 RADEX ABDOMEN 2 VIEWS: CPT | Mod: S$GLB,,, | Performed by: RADIOLOGY

## 2023-03-13 PROCEDURE — 96372 PR INJECTION,THERAP/PROPH/DIAG2ST, IM OR SUBCUT: ICD-10-PCS | Mod: S$GLB,,, | Performed by: FAMILY MEDICINE

## 2023-03-13 PROCEDURE — 99214 PR OFFICE/OUTPT VISIT, EST, LEVL IV, 30-39 MIN: ICD-10-PCS | Mod: 25,S$GLB,, | Performed by: FAMILY MEDICINE

## 2023-03-13 RX ORDER — DICYCLOMINE HYDROCHLORIDE 10 MG/5ML
20 SOLUTION ORAL
Status: COMPLETED | OUTPATIENT
Start: 2023-03-13 | End: 2023-03-13

## 2023-03-13 RX ORDER — LACTULOSE 10 G/15ML
10 SOLUTION ORAL 3 TIMES DAILY PRN
Qty: 240 ML | Refills: 0 | Status: SHIPPED | OUTPATIENT
Start: 2023-03-13 | End: 2023-07-07

## 2023-03-13 RX ORDER — ONDANSETRON 4 MG/1
4 TABLET, ORALLY DISINTEGRATING ORAL
Status: COMPLETED | OUTPATIENT
Start: 2023-03-13 | End: 2023-03-13

## 2023-03-13 RX ORDER — DICYCLOMINE HYDROCHLORIDE 10 MG/1
10 CAPSULE ORAL
Qty: 20 CAPSULE | Refills: 0 | Status: SHIPPED | OUTPATIENT
Start: 2023-03-13 | End: 2023-03-18

## 2023-03-13 RX ORDER — POLYETHYLENE GLYCOL 3350 17 G/17G
17 POWDER, FOR SOLUTION ORAL DAILY
Qty: 10 PACKET | Refills: 0 | Status: SHIPPED | OUTPATIENT
Start: 2023-03-13 | End: 2023-07-07

## 2023-03-13 RX ORDER — DOCUSATE SODIUM 100 MG/1
100 CAPSULE, LIQUID FILLED ORAL 2 TIMES DAILY PRN
Qty: 60 CAPSULE | Refills: 0 | Status: SHIPPED | OUTPATIENT
Start: 2023-03-13 | End: 2023-03-13

## 2023-03-13 RX ORDER — ONDANSETRON 4 MG/1
4 TABLET, ORALLY DISINTEGRATING ORAL EVERY 8 HOURS PRN
Qty: 12 TABLET | Refills: 0 | Status: SHIPPED | OUTPATIENT
Start: 2023-03-13 | End: 2023-03-17

## 2023-03-13 RX ORDER — ONDANSETRON 2 MG/ML
4 INJECTION INTRAMUSCULAR; INTRAVENOUS
Status: COMPLETED | OUTPATIENT
Start: 2023-03-13 | End: 2023-03-13

## 2023-03-13 RX ADMIN — DICYCLOMINE HYDROCHLORIDE 20 MG: 10 SOLUTION ORAL at 07:03

## 2023-03-13 RX ADMIN — ONDANSETRON 4 MG: 2 INJECTION INTRAMUSCULAR; INTRAVENOUS at 07:03

## 2023-03-13 RX ADMIN — ONDANSETRON 4 MG: 4 TABLET, ORALLY DISINTEGRATING ORAL at 07:03

## 2023-03-13 NOTE — PROGRESS NOTES
"Subjective:       Patient ID: Cristal Gonzalez is a 62 y.o. female.    Vitals:  height is 5' 2" (1.575 m) and weight is 69.9 kg (154 lb). Her temperature is 98.5 °F (36.9 °C). Her blood pressure is 130/95 (abnormal) and her pulse is 92. Her respiration is 18 and oxygen saturation is 95%.     Chief Complaint: Abdominal Pain    62 years old female presents with constipation for 3 days, lower abdominal pain started this a.m. and vomiting started about 3 hours ago.  Denies fever, chills, dysuria, frequency, cough, sore throat, chest pain, SOB.     Abdominal Pain  This is a new problem. The current episode started in the past 7 days. The onset quality is sudden. The problem occurs constantly. The problem has been unchanged. The pain is located in the periumbilical region. The pain is at a severity of 10/10. The pain is severe. The quality of the pain is cramping. The abdominal pain radiates to the pelvis. Associated symptoms include belching, constipation, flatus, nausea and vomiting. Pertinent negatives include no anorexia, arthralgias, diarrhea, dysuria, fever, frequency, headaches, hematochezia, hematuria, melena, myalgias or weight loss. The pain is aggravated by bowel movement and movement. Treatments tried: laxative,suppoistory. The treatment provided mild relief. There is no history of abdominal surgery, colon cancer, Crohn's disease, gallstones, GERD, irritable bowel syndrome, pancreatitis, PUD or ulcerative colitis. Patient's medical history does not include kidney stones and UTI.     Constitution: Negative for fever.   Gastrointestinal:  Positive for abdominal pain, nausea, vomiting and constipation. Negative for history of abdominal surgery, diarrhea and bright red blood in stool.   Genitourinary:  Negative for dysuria, frequency and hematuria.   Musculoskeletal:  Negative for joint pain and muscle ache.   Neurological:  Negative for headaches.     Objective:      Physical Exam   Constitutional: She is " oriented to person, place, and time. She appears well-developed.   HENT:   Head: Normocephalic and atraumatic.   Ears:   Right Ear: External ear normal.   Left Ear: External ear normal.   Nose: Nose normal. No rhinorrhea or congestion.   Mouth/Throat: Mucous membranes are normal.   Eyes: Conjunctivae and lids are normal.   Neck: Trachea normal. Neck supple.   Cardiovascular: Normal rate, regular rhythm and normal heart sounds.   No murmur heard.  Pulmonary/Chest: Effort normal and breath sounds normal. No stridor. No respiratory distress. She has no wheezes. She has no rhonchi. She has no rales.   Abdominal: Normal appearance and bowel sounds are normal. She exhibits no distension and no mass. Soft. There is abdominal tenderness. There is no rebound, no guarding, no left CVA tenderness and no right CVA tenderness.      Comments:   Positive mild abdominal distension.  Positive suprapubic tenderness.     Musculoskeletal: Normal range of motion.         General: Normal range of motion.      Cervical back: She exhibits no tenderness.   Lymphadenopathy:     She has no cervical adenopathy.   Neurological: She is alert, oriented to person, place, and time and at baseline. She has normal strength. She displays no weakness. No cranial nerve deficit or sensory deficit.   Skin: Skin is warm, dry, intact, not diaphoretic and not pale.   Psychiatric: Her speech is normal and behavior is normal. Mood, judgment and thought content normal.   Nursing note and vitals reviewed.      Assessment:       1. Nausea and vomiting, unspecified vomiting type    2. Lower abdominal pain    3. Constipation, unspecified constipation type          Plan:     X-ray abdomen is negative for obstruction, shows excessive stools.  Discussed results/diagnosis/plan with patient in clinic.   Constipation precautions, diet and fluid advised.  Advised to f/u with PCP. ER precautions given if symptoms get any worse. All questions answered. Patient verbally  understood and agreed with treatment plan.     Nausea and vomiting, unspecified vomiting type  -     ondansetron disintegrating tablet 4 mg  -     ondansetron injection 4 mg  -     POCT Urinalysis, Dipstick, Automated, W/O Scope    Lower abdominal pain  -     X-Ray Abdomen Flat And Erect; Future; Expected date: 03/13/2023  -     Urine culture    Constipation, unspecified constipation type  -     X-Ray Abdomen Flat And Erect; Future; Expected date: 03/13/2023    Other orders  -     dicyclomine 10 mg/5 mL syrup 20 mg  -     lactulose (CHRONULAC) 20 gram/30 mL Soln; Take 15 mLs (10 g total) by mouth 3 (three) times daily as needed (Constipation).  Dispense: 240 mL; Refill: 0  -     polyethylene glycol (MIRALAX) 17 gram PwPk; Take 17 g by mouth once daily.  Dispense: 10 packet; Refill: 0  -     dicyclomine (BENTYL) 10 MG capsule; Take 1 capsule (10 mg total) by mouth 4 (four) times daily before meals and nightly. for 5 days  Dispense: 20 capsule; Refill: 0  -     ondansetron (ZOFRAN-ODT) 4 MG TbDL; Take 1 tablet (4 mg total) by mouth every 8 (eight) hours as needed (Nausea).  Dispense: 12 tablet; Refill: 0

## 2023-03-14 ENCOUNTER — HOSPITAL ENCOUNTER (EMERGENCY)
Facility: HOSPITAL | Age: 63
Discharge: HOME | End: 2023-03-14
Attending: EMERGENCY MEDICINE
Payer: COMMERCIAL

## 2023-03-14 VITALS
OXYGEN SATURATION: 95 % | DIASTOLIC BLOOD PRESSURE: 68 MMHG | HEIGHT: 62 IN | TEMPERATURE: 98 F | RESPIRATION RATE: 18 BRPM | HEART RATE: 83 BPM | BODY MASS INDEX: 26.13 KG/M2 | WEIGHT: 142 LBS | SYSTOLIC BLOOD PRESSURE: 130 MMHG

## 2023-03-14 DIAGNOSIS — K59.00 CONSTIPATION, UNSPECIFIED CONSTIPATION TYPE: Primary | ICD-10-CM

## 2023-03-14 PROCEDURE — 99283 EMERGENCY DEPT VISIT LOW MDM: CPT | Mod: ,,, | Performed by: EMERGENCY MEDICINE

## 2023-03-14 PROCEDURE — 96361 HYDRATE IV INFUSION ADD-ON: CPT

## 2023-03-14 PROCEDURE — 96374 THER/PROPH/DIAG INJ IV PUSH: CPT

## 2023-03-14 PROCEDURE — 96375 TX/PRO/DX INJ NEW DRUG ADDON: CPT

## 2023-03-14 PROCEDURE — 63600175 PHARM REV CODE 636 W HCPCS

## 2023-03-14 PROCEDURE — 25000003 PHARM REV CODE 250

## 2023-03-14 PROCEDURE — 99284 EMERGENCY DEPT VISIT MOD MDM: CPT | Mod: 25

## 2023-03-14 PROCEDURE — 99283 PR EMERGENCY DEPT VISIT,LEVEL III: ICD-10-PCS | Mod: ,,, | Performed by: EMERGENCY MEDICINE

## 2023-03-14 PROCEDURE — 96376 TX/PRO/DX INJ SAME DRUG ADON: CPT

## 2023-03-14 RX ORDER — ACETAMINOPHEN 500 MG
1000 TABLET ORAL
Status: COMPLETED | OUTPATIENT
Start: 2023-03-14 | End: 2023-03-14

## 2023-03-14 RX ORDER — ONDANSETRON 2 MG/ML
4 INJECTION INTRAMUSCULAR; INTRAVENOUS
Status: COMPLETED | OUTPATIENT
Start: 2023-03-14 | End: 2023-03-14

## 2023-03-14 RX ORDER — SYRING-NEEDL,DISP,INSUL,0.3 ML 29 G X1/2"
296 SYRINGE, EMPTY DISPOSABLE MISCELLANEOUS
Status: DISCONTINUED | OUTPATIENT
Start: 2023-03-14 | End: 2023-03-14

## 2023-03-14 RX ORDER — PSEUDOEPHEDRINE/ACETAMINOPHEN 30MG-500MG
100 TABLET ORAL
Status: COMPLETED | OUTPATIENT
Start: 2023-03-14 | End: 2023-03-14

## 2023-03-14 RX ORDER — KETOROLAC TROMETHAMINE 30 MG/ML
10 INJECTION, SOLUTION INTRAMUSCULAR; INTRAVENOUS
Status: COMPLETED | OUTPATIENT
Start: 2023-03-14 | End: 2023-03-14

## 2023-03-14 RX ORDER — PSEUDOEPHEDRINE/ACETAMINOPHEN 30MG-500MG
100 TABLET ORAL
Status: DISCONTINUED | OUTPATIENT
Start: 2023-03-14 | End: 2023-03-14

## 2023-03-14 RX ADMIN — ACETAMINOPHEN 1000 MG: 500 TABLET ORAL at 05:03

## 2023-03-14 RX ADMIN — Medication 100 ML: at 05:03

## 2023-03-14 RX ADMIN — Medication 1 ENEMA: at 02:03

## 2023-03-14 RX ADMIN — KETOROLAC TROMETHAMINE 10 MG: 30 INJECTION, SOLUTION INTRAMUSCULAR; INTRAVENOUS at 02:03

## 2023-03-14 RX ADMIN — SODIUM CHLORIDE 500 ML: 9 INJECTION, SOLUTION INTRAVENOUS at 05:03

## 2023-03-14 RX ADMIN — Medication 1 ENEMA: at 03:03

## 2023-03-14 RX ADMIN — ONDANSETRON 4 MG: 2 INJECTION INTRAMUSCULAR; INTRAVENOUS at 05:03

## 2023-03-14 RX ADMIN — Medication 1 ENEMA: at 05:03

## 2023-03-14 RX ADMIN — ONDANSETRON 4 MG: 2 INJECTION INTRAMUSCULAR; INTRAVENOUS at 02:03

## 2023-03-14 NOTE — ED TRIAGE NOTES
Pt states having severe  sharp abdominal pain  and having vomited a white froth frequently starting in the last 24 hours. Pt states not having a bowel movement Friday and took medication to make her go. Pt states going to [facility] around 5pm 03/13/2023. Pt states urgent care having done x-rays there.

## 2023-03-14 NOTE — ED PROVIDER NOTES
"Encounter Date: 3/14/2023       History     Chief Complaint   Patient presents with    Abdominal Pain     Abdominal pain and vomiting x 1 day. States " I think im constipated" states last bm was friday     62-year-old female with no known significant past medical history.  Patient now presents with a 3 day history of decreased stooling and a 2 day history of acutely worsening abdominal pain.  Patient reports that her last bowel movement was 3 days ago and she is been unable to pass stool and has become increasingly nauseated with "white frothy vomitus" and abdominal pain which is generalized, constant, 8/10.  Patient denies any dark brown vomitus.  Patient has previous surgery for appendicitis 10 years ago.    The history is provided by the patient.   Review of patient's allergies indicates:   Allergen Reactions    Sulfa (sulfonamide antibiotics) Shortness Of Breath, Anxiety and Palpitations    Macrobid [nitrofurantoin monohyd/m-cryst] Nausea Only     Abd pain and nausea     Past Medical History:   Diagnosis Date    GERD (gastroesophageal reflux disease)     Hot flash, menopausal      Past Surgical History:   Procedure Laterality Date    ANKLE FRACTURE SURGERY      APPENDECTOMY      BREAST BIOPSY      benign    COLONOSCOPY N/A 2021    Procedure: COLONOSCOPY;  Surgeon: Nohemy Parra MD;  Location: [facility] (4TH FLR);  Service: Endoscopy;  Laterality: N/A;  Fully vacc Covid-19 - pg    LYMPH NODE BIOPSY Right 2022    Procedure: BIOPSY, LYMPH NODE with mapping;  Surgeon: Ben Stallworth MD;  Location: [facility] OR 2ND FLR;  Service: General;  Laterality: Right;     Family History   Problem Relation Age of Onset    Diabetes Mother     Alzheimer's disease Father     Cancer Maternal Aunt         breast cancer    Breast cancer Maternal Aunt      Social History     Tobacco Use    Smoking status: Former     Packs/day: 0.20     Types: Cigarettes     Quit date: 3/1/2019     Years since quittin.0    " Smokeless tobacco: Never   Substance Use Topics    Alcohol use: Yes     Alcohol/week: 6.0 - 7.0 standard drinks     Types: 4 Glasses of wine, 2 - 3 Standard drinks or equivalent per week     Comment: socially    Drug use: No     Review of Systems   Constitutional:  Negative for chills and fever.   HENT:  Negative for rhinorrhea and sneezing.    Eyes:  Negative for photophobia and visual disturbance.   Respiratory:  Negative for cough, chest tightness, shortness of breath and wheezing.    Cardiovascular:  Negative for chest pain, palpitations and leg swelling.   Gastrointestinal:  Positive for abdominal pain, constipation, nausea and vomiting. Negative for diarrhea and rectal pain.   Genitourinary:  Negative for decreased urine volume, dysuria, flank pain and urgency.   Musculoskeletal:  Negative for back pain, gait problem, joint swelling and neck pain.   Skin:  Negative for color change, pallor and rash.   Neurological:  Negative for dizziness, tremors, facial asymmetry, weakness, numbness and headaches.   Hematological:  Negative for adenopathy. Does not bruise/bleed easily.   Psychiatric/Behavioral:  Negative for agitation, confusion and decreased concentration.      Physical Exam     Initial Vitals [03/14/23 0036]   BP Pulse Resp Temp SpO2   (!) 154/99 89 18 98.1 °F (36.7 °C) 97 %      MAP       --         Physical Exam    Nursing note and vitals reviewed.    Gen: AxOx3, well nourished, appears stated age, no pallor, no jaundice, appears well hydrated  Eye: EOMI, no scleral icterus, no periorbital edema or ecchymosis  Head: normocephalic, atraumatic, no lesions, scalp appears normal  ENT: neck supple, no stridor, no masses, no drooling or voice changes  CVS: All distal pulses intact with normal rate and rhythm, no JVD, normal S1/S2, no murmur  Pulm: Normal breath sounds, no wheezes, rales or rhonchi, no increased work of breathing  Abd: soft, mildly tender to palpation without guarding or rebound tenderness,  nondistended, no organomegaly, no CVAT  Ext: no edema, no lesions, rashes, or deformity  Neuro: GCS15, moving all extremities, gait intact, face grossly symmetric  Psych: normal affect, cooperative, well groomed, makes good eye contact  NETO:  Performed with chaperone present, normal tone, no stool in vault, no blood on glove, no stool on glove.    ED Course   Procedures  Labs Reviewed   HIV 1 / 2 ANTIBODY   HEPATITIS C ANTIBODY          Imaging Results    None          Medications   ketorolac injection 9.999 mg (9.999 mg Intravenous Given 3/14/23 0223)   ondansetron injection 4 mg (4 mg Intravenous Given 3/14/23 0219)   sodium phosphates 19-7 gram/118 mL enema 1 enema (1 enema Rectal Given 3/14/23 0235)   sodium phosphates 19-7 gram/118 mL enema 1 enema (1 enema Rectal Given 3/14/23 0313)   glycerin 99.5% topical solution 100 mL (100 mLs Rectal Given 3/14/23 0504)     And   sodium chloride 0.9% bolus 500 mL 500 mL (0 mLs Rectal Stopped 3/14/23 0639)   sodium phosphates 19-7 gram/118 mL enema 1 enema (1 enema Rectal Given 3/14/23 0504)   ondansetron injection 4 mg (4 mg Intravenous Given 3/14/23 0530)   acetaminophen tablet 1,000 mg (1,000 mg Oral Given 3/14/23 0530)     Medical Decision Making:   History:   Old Records Summarized: records from clinic visits.       <> Summary of Records: Patient was seen at outside facility and abdominal x-ray was obtained which showed no concern for bowel obstruction including no air-fluid levels, however stool burden was noted.  Initial Assessment:   62-year-old female with no known significant past medical history.  Patient now presents with a 3 day history of decreased stooling and a 2 day history of acutely worsening abdominal pain. Patient is able to speak, breathing spontaneously, hemodynamically stable, oriented, moving all 4 limbs spontaneously.  Examination is consistent with abdominal pain.  Differential Diagnosis:   Considered constipation, ileus, doubt but considered  SBO/ perforated viscus/ colitis/ diverticulitis  ED Management:  Patient presented to emergency department after not having had a bowel movement in 3 days with significant abdominal pain.  Patient's pain and nausea was treated with Toradol and Zofran.  Patient was given a Fleet enema with only a single small stool was passed.  Additional Fleet enema was ordered but with only mild improvements.  Patient was ordered a brown bomb enema and patient reports multiple stools and much relief.  Patient was discharged home with instructions on diet and follow up with PCP.                        Clinical Impression:   Final diagnoses:  [K59.00] Constipation, unspecified constipation type (Primary)        ED Disposition Condition    Discharge Stable          ED Prescriptions    None       Follow-up Information       Follow up With Specialties Details Why Contact Info    Steve Morin MD Family Medicine Schedule an appointment as soon as possible for a visit   [address] [facility] [address]  [phone]      [facility] - Emergency Dept Emergency Medicine  As needed, If symptoms worsen [address] [facility] [address] [phone]             Claudette Weiss MD  Resident  03/14/23 0816

## 2023-03-14 NOTE — DISCHARGE INSTRUCTIONS
Thank you for coming to our Emergency Department today. It is important to remember that some problems or medical conditions are difficult to diagnose and may not be found or addressed during your Emergency Department visit.     Be sure to follow up with your primary care doctor and review all labs/imaging/tests that were performed during your ER visit with them. Some labs/imaging/tests may be outside of the normal range, and require non-emergent follow-up and/or further investigation/treatment/procedures/testing to help diagnose/exclude/prevent complications or other potentially serious medical conditions that were not discussed or addressed during your ER visit.    If you do not have a primary care doctor, you may contact the one listed on your discharge paperwork or you may also call the [facility] Appointment Desk at [phone] to schedule an appointment and establish care with one. It is important to your health that you have a primary care doctor.    Please take all medications as directed. All medications may potentially have side-effects and it is impossible to predict which medications may give you side-effects or what side-effects (if any) they will give you.. If you feel that you are having a negative effect or side-effect of any medication you should immediately stop taking them and seek medical attention. If you feel that you are having a life-threatening reaction call 911.    Return to the ER with any questions/concerns, new/concerning symptoms, worsening or failure to improve.     Do not drive, swim, climb to height, take a bath, operate heavy machinery, drink alcohol or take potentially sedating medications, sign any legal documents or make any important decisions for 24 hours if you have received any pain medications, sedatives or mood altering drugs during your ER visit or within 24 hours of taking them if they have been prescribed to you.     You can find additional resources for Dentists,  hearing aids, durable medical equipment, low cost pharmacies and other resources at [email or web address].org    BELOW THIS LINE ONLY APPLIES IF YOU HAVE A COVID TEST PENDING OR IF YOU HAVE BEEN DIAGNOSED WITH COVID:  Please access [facility] to review the results of your test. Until the results of your COVID test return, you should isolate yourself so as not to potentially spread illness to others.   If your COVID test returns positive, you should isolate yourself so as not to spread illness to others. After five full days, if you are feeling better and you have not had fever for 24 hours, you can return to your typical daily activities, but you must wear a mask around others for an additional 5 days.   If your COVID test returns negative and you are either unvaccinated or more than six months out from your two-dose vaccine and are not yet boosted, you should still quarantine for 5 full days followed by strict mask use for an additional 5 full days.   If your COVID test returns negative and you have received your 2-dose initial vaccine as well as a booster, you should continue strict mask use for 10 full days after the exposure.  For all those exposed, best practice includes a test at day 5 after the exposure. This can be a home test or a test through one of the many testing centers throughout our community.   Masking is always advised to limit the spread of COVID. Cdc.gov is an excellent site to obtain the latest up to date recommendations regarding COVID and COVID testing.     CDC Testing and Quarantine Guidelines for patients with exposure to a known-positive COVID-19 person:  A close exposure is defined as anyone who has had an exposure (masked or unmasked) to a known COVID -19 positive person within 6 feet of someone for a cumulative total of 15 minutes or more over a 24-hour period.   Vaccinated and/or if you recently had a positive covid test within 90 days do NOT need to quarantine after contact with someone  who had COVID-19 unless you develop symptoms.   Fully vaccinated people who have not had a positive test within 90 days, should get tested 3-5 days after their exposure, even if they don't have symptoms and wear a mask indoors in public for 14 days following exposure or until their test result is negative.      Unvaccinated and/or NOT had a positive test within 90 days and meet close exposure  You are required by CDC guidelines to quarantine for at least 5 days from time of exposure followed by 5 days of strict masking. It is recommended, but not required to test after 5 days, unless you develop symptoms, in which case you should test at that time.  If you get tested after 5 days and your test is positive, your 5 day period of isolation starts the day of the positive test.    If your exposure does not meet the above definition, you can return to your normal daily activities to include social distancing, wearing a mask and frequent handwashing.      Here is a link to guidance from the CDC:  https://www.cdc.gov/media/releases/2021/s1227-isolation-quarantine-guidance.html      [facility] Testing Sites:  https://ldh.la.gov/page/3934      [facility] website with testing locations and guidance:  [email or web address]

## 2023-03-15 ENCOUNTER — TELEPHONE (OUTPATIENT)
Dept: URGENT CARE | Facility: CLINIC | Age: 63
End: 2023-03-15
Payer: COMMERCIAL

## 2023-03-15 ENCOUNTER — OFFICE VISIT (OUTPATIENT)
Dept: DERMATOLOGY | Facility: CLINIC | Age: 63
End: 2023-03-15
Payer: COMMERCIAL

## 2023-03-15 DIAGNOSIS — D22.9 NEVUS: ICD-10-CM

## 2023-03-15 DIAGNOSIS — L81.4 LENTIGO: ICD-10-CM

## 2023-03-15 DIAGNOSIS — L82.1 SK (SEBORRHEIC KERATOSIS): ICD-10-CM

## 2023-03-15 DIAGNOSIS — L82.0 INFLAMED SEBORRHEIC KERATOSIS: Primary | ICD-10-CM

## 2023-03-15 DIAGNOSIS — D36.10 NEUROFIBROMA: ICD-10-CM

## 2023-03-15 DIAGNOSIS — D18.01 CHERRY ANGIOMA: ICD-10-CM

## 2023-03-15 DIAGNOSIS — L90.5 SCAR: ICD-10-CM

## 2023-03-15 DIAGNOSIS — C43.61 MELANOMA OF RIGHT UPPER ARM: ICD-10-CM

## 2023-03-15 LAB — BACTERIA UR CULT: NORMAL

## 2023-03-15 PROCEDURE — 99999 PR PBB SHADOW E&M-EST. PATIENT-LVL III: ICD-10-PCS | Mod: PBBFAC,,, | Performed by: DERMATOLOGY

## 2023-03-15 PROCEDURE — 1160F PR REVIEW ALL MEDS BY PRESCRIBER/CLIN PHARMACIST DOCUMENTED: ICD-10-PCS | Mod: CPTII,S$GLB,, | Performed by: DERMATOLOGY

## 2023-03-15 PROCEDURE — 99999 PR PBB SHADOW E&M-EST. PATIENT-LVL III: CPT | Mod: PBBFAC,,, | Performed by: DERMATOLOGY

## 2023-03-15 PROCEDURE — 1159F PR MEDICATION LIST DOCUMENTED IN MEDICAL RECORD: ICD-10-PCS | Mod: CPTII,S$GLB,, | Performed by: DERMATOLOGY

## 2023-03-15 PROCEDURE — 99214 OFFICE O/P EST MOD 30 MIN: CPT | Mod: S$GLB,,, | Performed by: DERMATOLOGY

## 2023-03-15 PROCEDURE — 99214 PR OFFICE/OUTPT VISIT, EST, LEVL IV, 30-39 MIN: ICD-10-PCS | Mod: S$GLB,,, | Performed by: DERMATOLOGY

## 2023-03-15 PROCEDURE — 1159F MED LIST DOCD IN RCRD: CPT | Mod: CPTII,S$GLB,, | Performed by: DERMATOLOGY

## 2023-03-15 PROCEDURE — 1160F RVW MEDS BY RX/DR IN RCRD: CPT | Mod: CPTII,S$GLB,, | Performed by: DERMATOLOGY

## 2023-03-15 NOTE — PROGRESS NOTES
Subjective:       Patient ID:  Cristal Gonzalez is a 62 y.o. female who presents for   Chief Complaint   Patient presents with    Skin Cancer     MM clinic      Date of biopsy: 10/17/2022  Hillman test  not performed  Location: right upper arm  Depth: 0.9 mm  LN: n/a  Date of excision: 11/17/2022 by Dr. Stallworth    Pt has a history of  moderate sun exposure in the past.   Pt recalls several blistering sunburns in the past:  never  Pt has history of tanning bed use: never  Pt has had atypical moles removed in the past: no  Pt has personal history of breast cancer: no  Pt has history of melanoma in first degree relatives:  no  Pt has family history of pancreatic cancer: no  Pt is currently immunosuppressed: no    Andres Type: I    Last dental exam: 10/2022  Last eye exam: 8/2022  Last GYN exam: 8/2022    Patient with new complaint of lesion(s)  Location: back  Duration: 1 month  Symptoms: itchy and scabs  Relieving factors/Previous treatments: hydrocortisone - 3 days     Pt also has lesion on right inner forearm; getting larger. No tx. Interested in possible removal              Review of Systems   Constitutional:  Negative for fever, chills, weight loss, fatigue, night sweats and malaise.   HENT:  Negative for headaches.    Respiratory:  Negative for cough and shortness of breath.    Gastrointestinal:  Negative for indigestion.   Skin:  Positive for daily sunscreen use, activity-related sunscreen use and wears hat. Negative for recent sunburn.   Neurological:  Negative for headaches.   Hematologic/Lymphatic: Negative for adenopathy. Bruises/bleeds easily.      Objective:    Physical Exam   Constitutional: She appears well-developed and well-nourished. No distress.   Neurological: She is alert and oriented to person, place, and time. She is not disoriented.   Psychiatric: She has a normal mood and affect.   Skin:   Areas Examined (abnormalities noted in diagram):   Scalp / Hair Palpated and Inspected  Head /  Face Inspection Performed  Neck Inspection Performed  Chest / Axilla Inspection Performed  Abdomen Inspection Performed  Genitals / Buttocks / Groin Inspection Performed  Back Inspection Performed  RUE Inspected  LUE Inspection Performed  RLE Inspected  LLE Inspection Performed  Nails and Digits Inspection Performed                       Diagram Legend     Erythematous scaling macule/papule c/w actinic keratosis       Vascular papule c/w angioma      Pigmented verrucoid papule/plaque c/w seborrheic keratosis      Yellow umbilicated papule c/w sebaceous hyperplasia      Irregularly shaped tan macule c/w lentigo     1-2 mm smooth white papules consistent with Milia      Movable subcutaneous cyst with punctum c/w epidermal inclusion cyst      Subcutaneous movable cyst c/w pilar cyst      Firm pink to brown papule c/w dermatofibroma      Pedunculated fleshy papule(s) c/w skin tag(s)      Evenly pigmented macule c/w junctional nevus     Mildly variegated pigmented, slightly irregular-bordered macule c/w mildly atypical nevus      Flesh colored to evenly pigmented papule c/w intradermal nevus       Pink pearly papule/plaque c/w basal cell carcinoma      Erythematous hyperkeratotic cursted plaque c/w SCC      Surgical scar with no sign of skin cancer recurrence      Open and closed comedones      Inflammatory papules and pustules      Verrucoid papule consistent consistent with wart     Erythematous eczematous patches and plaques     Dystrophic onycholytic nail with subungual debris c/w onychomycosis     Umbilicated papule    Erythematous-base heme-crusted tan verrucoid plaque consistent with inflamed seborrheic keratosis     Erythematous Silvery Scaling Plaque c/w Psoriasis     See annotation      Assessment / Plan:        Inflamed seborrheic keratosis  Cryosurgery procedure note:    Verbal consent from the patient is obtained including, but not limited to, risk of hypopigmentation/hyperpigmentation, scar, recurrence of  lesion. Liquid nitrogen cryosurgery is applied to 1 lesions to produce a freeze injury. The patient is aware that blisters may form and is instructed on wound care with gentle cleansing and use of vaseline ointment to keep moist until healed. The patient is supplied a handout on cryosurgery and is instructed to call if lesions do not completely resolve.    Lentigo  This is a benign hyperpigmented sun induced lesion. Recommend daily sun protection/avoidance and use of at least SPF 30, broad spectrum sunscreen (OTC drug) will reduce the number of new lesions. Treatment of these benign lesions are considered cosmetic.  The nature of sun-induced photo-aging and skin cancers is discussed.  Sun avoidance, protective clothing, and the use of 30-SPF sunscreens is advised. Observe closely for skin damage/changes, and call if such occurs.    Cherry angioma  These are benign vascular lesions that are inherited.  Treatment is not necessary.    Nevus  Discussed ABCDE's of nevi.  Monitor for new mole or moles that are becoming bigger, darker, irritated, or developing irregular borders. Brochure provided. Instructed patient to observe lesion(s) for changes and follow up in clinic if changes are noted. Patient to monitor skin at home for new or changing lesions.     SK (seborrheic keratosis)  These are benign inherited growths without a malignant potential. Reassurance given to patient. No treatment is necessary.     Neurofibroma  Reassurance given to patient. No treatment is necessary.   Treatment of benign, asymptomatic lesions may be considered cosmetic.    Melanoma of right upper arm  Scar  Area of previous melanoma examined. Site well healed with no signs of recurrence.  Stage 1B     Total body skin examination performed today including at least 12 points as noted in physical examination. No lesions suspicious for malignancy noted.      Patient health maintenance:    Monthly Self Skin examinations as well as checking melanoma  scar and lymph nodes drainage basin for abnormalities as discussed during the visit. [email or web address]/[email or web address]  Regular dermatologic examinations with your board certified dermatologist   Need for sunscreen and sun protection- SPF 30 +, sun protective clothing, regular use of broad brim hat for outdoor exposure, avoidance of sun in peak hours, Heliocare anti-oxidant vitamin for prolonged sun exposure   Family member screening/risk for 1st degree relatives  Need for yearly checks by dentist and ophthalmology, and GYN if female  Remain up to date on age appropriate cancer screenings  Anti-inflammatory diet and no smoking- smoking can decrease survival of melanoma patients of any stage      Referral to : no  Referral to hematology/oncology: no  Need for radiologic follow up no  Referral for Total Body Photography no  40+ minutes spent reviewing patients chart including all previous pathology reports pertaining to melanoma, surgical excision report, initial surgical consult, previous mole removals, and discussing all of the above with the patient.     F/U for TBSE 3 months         Follow up in about 3 months (around 6/15/2023) for TBSE, Dr Gold.

## 2023-03-16 NOTE — TELEPHONE ENCOUNTER
Called For follow-up and negative urine culture.  Patient is feeling much better.  Advised constipation precautions.  Advised to follow-up with PCP. Answered all questions.

## 2023-04-14 ENCOUNTER — HOSPITAL ENCOUNTER (EMERGENCY)
Facility: HOSPITAL | Age: 63
Discharge: HOME | End: 2023-04-14
Attending: EMERGENCY MEDICINE
Payer: COMMERCIAL

## 2023-04-14 ENCOUNTER — TELEPHONE (OUTPATIENT)
Dept: ORTHOPEDICS | Facility: CLINIC | Age: 63
End: 2023-04-14
Payer: COMMERCIAL

## 2023-04-14 VITALS
TEMPERATURE: 98 F | BODY MASS INDEX: 26.87 KG/M2 | DIASTOLIC BLOOD PRESSURE: 58 MMHG | SYSTOLIC BLOOD PRESSURE: 99 MMHG | OXYGEN SATURATION: 100 % | HEIGHT: 62 IN | WEIGHT: 146 LBS | RESPIRATION RATE: 19 BRPM | HEART RATE: 64 BPM

## 2023-04-14 DIAGNOSIS — S52.611B: ICD-10-CM
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
## 2023-04-14 DIAGNOSIS — S62.316A CLOSED DISPLACED FRACTURE OF BASE OF FIFTH METACARPAL BONE OF RIGHT HAND, INITIAL ENCOUNTER: ICD-10-CM

## 2023-04-14 DIAGNOSIS — S52.501B TYPE I OR II OPEN FRACTURE OF DISTAL END OF RIGHT RADIUS, UNSPECIFIED FRACTURE MORPHOLOGY, INITIAL ENCOUNTER: Primary | ICD-10-CM

## 2023-04-14 PROBLEM — S52.501A CLOSED FRACTURE OF RIGHT DISTAL RADIUS: Status: ACTIVE | Noted: 2023-04-14

## 2023-04-14 LAB
25(OH)D3+25(OH)D2 SERPL-MCNC: 24 NG/ML (ref 30–96)
ALBUMIN SERPL BCP-MCNC: 3.9 G/DL (ref 3.5–5.2)
ALP SERPL-CCNC: 48 U/L (ref 55–135)
ALT SERPL W/O P-5'-P-CCNC: 15 U/L (ref 10–44)
ANION GAP SERPL CALC-SCNC: 12 MMOL/L (ref 8–16)
AST SERPL-CCNC: 18 U/L (ref 10–40)
BASOPHILS # BLD AUTO: 0.04 K/UL (ref 0–0.2)
BASOPHILS NFR BLD: 0.4 % (ref 0–1.9)
BILIRUB SERPL-MCNC: 0.3 MG/DL (ref 0.1–1)
BUN SERPL-MCNC: 13 MG/DL (ref 8–23)
CALCIUM SERPL-MCNC: 9.2 MG/DL (ref 8.7–10.5)
CHLORIDE SERPL-SCNC: 105 MMOL/L (ref 95–110)
CO2 SERPL-SCNC: 20 MMOL/L (ref 23–29)
CREAT SERPL-MCNC: 0.7 MG/DL (ref 0.5–1.4)
DIFFERENTIAL METHOD: ABNORMAL
EOSINOPHIL # BLD AUTO: 0 K/UL (ref 0–0.5)
EOSINOPHIL NFR BLD: 0 % (ref 0–8)
ERYTHROCYTE [DISTWIDTH] IN BLOOD BY AUTOMATED COUNT: 12.9 % (ref 11.5–14.5)
EST. GFR  (NO RACE VARIABLE): >60 ML/MIN/1.73 M^2
ESTIMATED AVG GLUCOSE: 111 MG/DL (ref 68–131)
GLUCOSE SERPL-MCNC: 92 MG/DL (ref 70–110)
HBA1C MFR BLD: 5.5 % (ref 4–5.6)
HCT VFR BLD AUTO: 41.7 % (ref 37–48.5)
HGB BLD-MCNC: 13.3 G/DL (ref 12–16)
IMM GRANULOCYTES # BLD AUTO: 0.05 K/UL (ref 0–0.04)
IMM GRANULOCYTES NFR BLD AUTO: 0.5 % (ref 0–0.5)
LYMPHOCYTES # BLD AUTO: 1.3 K/UL (ref 1–4.8)
LYMPHOCYTES NFR BLD: 12 % (ref 18–48)
MCH RBC QN AUTO: 30.7 PG (ref 27–31)
MCHC RBC AUTO-ENTMCNC: 31.9 G/DL (ref 32–36)
MCV RBC AUTO: 96 FL (ref 82–98)
MONOCYTES # BLD AUTO: 0.4 K/UL (ref 0.3–1)
MONOCYTES NFR BLD: 4 % (ref 4–15)
NEUTROPHILS # BLD AUTO: 8.9 K/UL (ref 1.8–7.7)
NEUTROPHILS NFR BLD: 83.1 % (ref 38–73)
NRBC BLD-RTO: 0 /100 WBC
PLATELET # BLD AUTO: 355 K/UL (ref 150–450)
PMV BLD AUTO: 9.7 FL (ref 9.2–12.9)
POCT GLUCOSE: 118 MG/DL (ref 70–110)
POTASSIUM SERPL-SCNC: 3.9 MMOL/L (ref 3.5–5.1)
PREALB SERPL-MCNC: 20 MG/DL (ref 20–43)
PROT SERPL-MCNC: 7 G/DL (ref 6–8.4)
RBC # BLD AUTO: 4.33 M/UL (ref 4–5.4)
SODIUM SERPL-SCNC: 137 MMOL/L (ref 136–145)
WBC # BLD AUTO: 10.65 K/UL (ref 3.9–12.7)

## 2023-04-14 PROCEDURE — 25000003 PHARM REV CODE 250: Performed by: STUDENT IN AN ORGANIZED HEALTH CARE EDUCATION/TRAINING PROGRAM

## 2023-04-14 PROCEDURE — 63600175 PHARM REV CODE 636 W HCPCS: Performed by: STUDENT IN AN ORGANIZED HEALTH CARE EDUCATION/TRAINING PROGRAM

## 2023-04-14 PROCEDURE — 25605 CLTX DST RDL FX/EPHYS SEP W/: CPT | Mod: RT

## 2023-04-14 PROCEDURE — 25605 PR CLOSED RX DIST RAD/ULNA FX,MANIPUL: ICD-10-PCS | Mod: 54,RT,GC, | Performed by: EMERGENCY MEDICINE

## 2023-04-14 PROCEDURE — 99285 EMERGENCY DEPT VISIT HI MDM: CPT | Mod: 57,GC,, | Performed by: EMERGENCY MEDICINE

## 2023-04-14 PROCEDURE — 99285 EMERGENCY DEPT VISIT HI MDM: CPT | Mod: 25

## 2023-04-14 PROCEDURE — 83036 HEMOGLOBIN GLYCOSYLATED A1C: CPT | Performed by: STUDENT IN AN ORGANIZED HEALTH CARE EDUCATION/TRAINING PROGRAM

## 2023-04-14 PROCEDURE — 82962 GLUCOSE BLOOD TEST: CPT

## 2023-04-14 PROCEDURE — 99285 PR EMERGENCY DEPT VISIT,LEVEL V: ICD-10-PCS | Mod: 57,GC,, | Performed by: EMERGENCY MEDICINE

## 2023-04-14 PROCEDURE — 96375 TX/PRO/DX INJ NEW DRUG ADDON: CPT | Mod: 59

## 2023-04-14 PROCEDURE — 90715 TDAP VACCINE 7 YRS/> IM: CPT | Performed by: STUDENT IN AN ORGANIZED HEALTH CARE EDUCATION/TRAINING PROGRAM

## 2023-04-14 PROCEDURE — 85025 COMPLETE CBC W/AUTO DIFF WBC: CPT | Performed by: STUDENT IN AN ORGANIZED HEALTH CARE EDUCATION/TRAINING PROGRAM

## 2023-04-14 PROCEDURE — 84134 ASSAY OF PREALBUMIN: CPT | Performed by: STUDENT IN AN ORGANIZED HEALTH CARE EDUCATION/TRAINING PROGRAM

## 2023-04-14 PROCEDURE — 96374 THER/PROPH/DIAG INJ IV PUSH: CPT

## 2023-04-14 PROCEDURE — 96361 HYDRATE IV INFUSION ADD-ON: CPT

## 2023-04-14 PROCEDURE — 80053 COMPREHEN METABOLIC PANEL: CPT | Performed by: STUDENT IN AN ORGANIZED HEALTH CARE EDUCATION/TRAINING PROGRAM

## 2023-04-14 PROCEDURE — 82306 VITAMIN D 25 HYDROXY: CPT | Performed by: STUDENT IN AN ORGANIZED HEALTH CARE EDUCATION/TRAINING PROGRAM

## 2023-04-14 PROCEDURE — 25605 CLTX DST RDL FX/EPHYS SEP W/: CPT | Mod: 54,RT,GC, | Performed by: EMERGENCY MEDICINE

## 2023-04-14 PROCEDURE — 90471 IMMUNIZATION ADMIN: CPT | Performed by: STUDENT IN AN ORGANIZED HEALTH CARE EDUCATION/TRAINING PROGRAM

## 2023-04-14 RX ORDER — DOXYCYCLINE 100 MG/1
100 CAPSULE ORAL 2 TIMES DAILY
Qty: 20 CAPSULE | Refills: 0 | Status: SHIPPED | OUTPATIENT
Start: 2023-04-14 | End: 2023-04-24

## 2023-04-14 RX ORDER — OXYCODONE HYDROCHLORIDE 5 MG/1
5 TABLET ORAL EVERY 4 HOURS PRN
Qty: 25 TABLET | Refills: 0 | Status: SHIPPED | OUTPATIENT
Start: 2023-04-14 | End: 2023-04-20 | Stop reason: HOSPADM

## 2023-04-14 RX ORDER — LIDOCAINE HYDROCHLORIDE 10 MG/ML
10 INJECTION INFILTRATION; PERINEURAL ONCE
Status: COMPLETED | OUTPATIENT
Start: 2023-04-14 | End: 2023-04-14

## 2023-04-14 RX ORDER — IBUPROFEN 600 MG/1
600 TABLET ORAL 4 TIMES DAILY
Qty: 56 TABLET | Refills: 0 | Status: SHIPPED | OUTPATIENT
Start: 2023-04-14 | End: 2023-04-20 | Stop reason: HOSPADM

## 2023-04-14 RX ORDER — METHOCARBAMOL 500 MG/1
500 TABLET, FILM COATED ORAL 3 TIMES DAILY
Qty: 42 TABLET | Refills: 0 | Status: SHIPPED | OUTPATIENT
Start: 2023-04-14 | End: 2023-04-28

## 2023-04-14 RX ORDER — ONDANSETRON 2 MG/ML
4 INJECTION INTRAMUSCULAR; INTRAVENOUS
Status: COMPLETED | OUTPATIENT
Start: 2023-04-14 | End: 2023-04-14

## 2023-04-14 RX ORDER — OXYCODONE HYDROCHLORIDE 5 MG/1
5 TABLET ORAL
Status: COMPLETED | OUTPATIENT
Start: 2023-04-14 | End: 2023-04-14

## 2023-04-14 RX ORDER — DEXTROMETHORPHAN HYDROBROMIDE, GUAIFENESIN 5; 100 MG/5ML; MG/5ML
650 LIQUID ORAL EVERY 8 HOURS
Qty: 42 TABLET | Refills: 0 | Status: SHIPPED | OUTPATIENT
Start: 2023-04-14 | End: 2023-04-28

## 2023-04-14 RX ORDER — MORPHINE SULFATE 4 MG/ML
4 INJECTION, SOLUTION INTRAMUSCULAR; INTRAVENOUS
Status: COMPLETED | OUTPATIENT
Start: 2023-04-14 | End: 2023-04-14

## 2023-04-14 RX ORDER — FENTANYL CITRATE 50 UG/ML
50 INJECTION, SOLUTION INTRAMUSCULAR; INTRAVENOUS ONCE
Status: COMPLETED | OUTPATIENT
Start: 2023-04-14 | End: 2023-04-14

## 2023-04-14 RX ADMIN — LIDOCAINE HYDROCHLORIDE 10 ML: 10 INJECTION, SOLUTION INFILTRATION; PERINEURAL at 01:04

## 2023-04-14 RX ADMIN — FENTANYL CITRATE 50 MCG: 50 INJECTION INTRAMUSCULAR; INTRAVENOUS at 01:04

## 2023-04-14 RX ADMIN — MORPHINE SULFATE 4 MG: 4 INJECTION INTRAVENOUS at 11:04

## 2023-04-14 RX ADMIN — SODIUM CHLORIDE, POTASSIUM CHLORIDE, SODIUM LACTATE AND CALCIUM CHLORIDE 500 ML: 600; 310; 30; 20 INJECTION, SOLUTION INTRAVENOUS at 11:04

## 2023-04-14 RX ADMIN — TETANUS TOXOID, REDUCED DIPHTHERIA TOXOID AND ACELLULAR PERTUSSIS VACCINE, ADSORBED 0.5 ML: 5; 2.5; 8; 8; 2.5 SUSPENSION INTRAMUSCULAR at 12:04

## 2023-04-14 RX ADMIN — SODIUM CHLORIDE, POTASSIUM CHLORIDE, SODIUM LACTATE AND CALCIUM CHLORIDE 500 ML: 600; 310; 30; 20 INJECTION, SOLUTION INTRAVENOUS at 12:04

## 2023-04-14 RX ADMIN — CEFAZOLIN 2 G: 2 INJECTION, POWDER, FOR SOLUTION INTRAMUSCULAR; INTRAVENOUS at 01:04

## 2023-04-14 RX ADMIN — ONDANSETRON 4 MG: 2 INJECTION INTRAMUSCULAR; INTRAVENOUS at 11:04

## 2023-04-14 RX ADMIN — OXYCODONE HYDROCHLORIDE 5 MG: 5 TABLET ORAL at 05:04

## 2023-04-14 NOTE — ED PROVIDER NOTES
Encounter Date: 2023       History     Chief Complaint   Patient presents with    Fall     Deformity to r wrist and pain to r knee denies loc     62-year-old right-hand dominant female with past medical history of right humeral fracture of greater tuberosity in 2022 presenting to ED with right wrist and right knee pain after falling.  Patient states that she was walking outside when her foot got caught and she experienced a mechanical fall onto her outstretched right hand.  She also reports landing on her right knee.  She denies head trauma or loss of consciousness. She has not ate or drank today. She does not believe she is up to date on her tetanus vaccine.       Review of patient's allergies indicates:   Allergen Reactions    Sulfa (sulfonamide antibiotics) Shortness Of Breath, Anxiety and Palpitations    Macrobid [nitrofurantoin monohyd/m-cryst] Nausea Only     Abd pain and nausea     Past Medical History:   Diagnosis Date    GERD (gastroesophageal reflux disease)     Hot flash, menopausal     Melanoma 10/2022    0.9 mm - right upper arm     Past Surgical History:   Procedure Laterality Date    ANKLE FRACTURE SURGERY      APPENDECTOMY      BREAST BIOPSY      benign    COLONOSCOPY N/A 2021    Procedure: COLONOSCOPY;  Surgeon: Nohemy Parra MD;  Location: [facility] (4TH FLR);  Service: Endoscopy;  Laterality: N/A;  Fully vacc Covid-19 - pg    LYMPH NODE BIOPSY Right 2022    Procedure: BIOPSY, LYMPH NODE with mapping;  Surgeon: Ben Stallworth MD;  Location: [facility] OR 2ND FLR;  Service: General;  Laterality: Right;     Family History   Problem Relation Age of Onset    Diabetes Mother     Alzheimer's disease Father     Cancer Maternal Aunt         breast cancer    Breast cancer Maternal Aunt      Social History     Tobacco Use    Smoking status: Former     Packs/day: 0.20     Types: Cigarettes     Quit date: 3/1/2019     Years since quittin.1    Smokeless tobacco: Never    Substance Use Topics    Alcohol use: Yes     Alcohol/week: 6.0 - 7.0 standard drinks     Types: 4 Glasses of wine, 2 - 3 Standard drinks or equivalent per week     Comment: socially    Drug use: No     Review of Systems   Constitutional:  Negative for chills and fever.   HENT:  Negative for congestion and rhinorrhea.    Eyes:  Negative for pain and visual disturbance.   Respiratory:  Negative for cough and shortness of breath.    Cardiovascular:  Negative for chest pain and palpitations.   Gastrointestinal:  Negative for abdominal pain and vomiting.   Genitourinary:  Negative for difficulty urinating and dysuria.   Musculoskeletal:  Negative for gait problem and joint swelling.   Skin:  Positive for wound (R wrist). Negative for rash.   Neurological:  Negative for numbness and headaches.     Physical Exam     Initial Vitals [04/14/23 1024]   BP Pulse Resp Temp SpO2   (!) 97/52 (!) 55 18 98.2 °F (36.8 °C) 100 %      MAP       --         Physical Exam    Nursing note and vitals reviewed.  Constitutional: She is not diaphoretic. No distress.   HENT:   Head: Normocephalic and atraumatic.   Mouth/Throat: Oropharynx is clear and moist.   Eyes: Conjunctivae and EOM are normal.   Neck: Neck supple.   Normal range of motion.  Cardiovascular:  Normal rate, regular rhythm and normal heart sounds.           Pulmonary/Chest: Breath sounds normal. She has no wheezes. She has no rhonchi. She has no rales.   Abdominal: Abdomen is soft. She exhibits no distension. There is no abdominal tenderness.   Musculoskeletal:         General: Tenderness present.      Cervical back: Normal range of motion and neck supple.      Comments: Right wrist deformity with palpable radial pulse and brisk capillary refill.  She is neurovascularly intact. Superficial abrasion of right proximal wrist. Full ROM of elbow and shoulder.      Neurological: She is alert and oriented to person, place, and time.   Skin: Skin is warm and dry. Capillary refill  takes less than 2 seconds.   Superficial abrasion over right knee.       ED Course   Orthopedic Injury    Date/Time: 2023 1:45 PM  Performed by: Edin Carrero MD  Authorized by: DO Dm Espinal procedure was performed:  [facility] EMERGENCY DEPARTMENT  Pre-operative diagnosis:  Right distal radius fracture  Post-operative diagnosis:  Right distal radius fracture  Consent Done?:  Yes  Universal Protocol:     Verbal consent obtained?: Yes      Risks and benefits: Risks, benefits and alternatives were discussed      Consent given by:  Patient    Patient states understanding of procedure being performed: Yes      Patient identity confirmed:  , name, verbally with patient and MRN  Injury:     Injury location:  Wrist    Location details:  Right wrist    Injury type:  Fracture    Fracture type: distal radius and ulnar styloid      Fracture type: distal radius and ulnar styloid        Pre-procedure assessment:     Neurovascular status: Neurovascularly intact      Distal perfusion: normal      Neurological function: normal      Range of motion: reduced      Local anesthesia used?: Yes      Anesthesia:  Hematoma block    Local anesthetic:  Lidocaine 1% without epinephrine    Anesthetic total (ml):  10      Selections made in this section will also lock the Injury type section above.:     Manipulation performed?: Yes      Reduction successful?: Yes      Confirmation: Reduction confirmed by x-ray      Immobilization:  Splint    Splint type:  Sugar tong    Supplies used:  Plaster  Post-procedure assessment:     Neurovascular status: Neurovascularly intact      Distal perfusion: normal      Neurological function: normal      Range of motion: splinted      Patient tolerance:  Patient tolerated the procedure well with no immediate complications  Labs Reviewed   CBC W/ AUTO DIFFERENTIAL - Abnormal; Notable for the following components:       Result Value    MCHC 31.9 (*)     Gran # (ANC) 8.9 (*)     Immature  Grans (Abs) 0.05 (*)     Gran % 83.1 (*)     Lymph % 12.0 (*)     All other components within normal limits   COMPREHENSIVE METABOLIC PANEL - Abnormal; Notable for the following components:    CO2 20 (*)     Alkaline Phosphatase 48 (*)     All other components within normal limits   VITAMIN D - Abnormal; Notable for the following components:    Vit D, 25-Hydroxy 24 (*)     All other components within normal limits   POCT GLUCOSE - Abnormal; Notable for the following components:    POCT Glucose 118 (*)     All other components within normal limits   PREALBUMIN   HEMOGLOBIN A1C          Imaging Results               CT Wrist Without Contrast Right (Final result)  Result time 04/14/23 16:05:20      Final result by Federico Gillespie MD (04/14/23 16:05:20)                   Impression:      Fractures of the distal radius, ulnar styloid, and 5th metacarpal as detailed above.    This report was flagged in Epic as abnormal.      Electronically signed by: Federico Gillespie  Date:    04/14/2023  Time:    16:05               Narrative:    EXAMINATION:  CT WRIST WITHOUT CONTRAST RIGHT; CT 3D WITHOUT INDEPENDENT WS    CLINICAL HISTORY:  Wrist trauma, fracture suspected, nondiagnostic xray;Wrist trauma, subluxation on xray;; Wrist trauma, fracture suspected, nondiagnostic xray;Wrist trauma, subluxation on xray;n;    TECHNIQUE:  CT of the right wrist, without intravenous contrast.    3D reconstructed images were generated by post processing on an independent workstation and archived for permanent record.  3D images were generated for operative planning.    COMPARISON:  Radiographs 04/14/2023    FINDINGS:  BONE: Diffuse osteopenia.  There is a comminuted fracture of the distal radius extending to the radiocarpal and distal radioulnar joints.  There are displaced, impacted, and rotated fracture fragments.  The carpus is subluxed in a volar and radial direction.  There are mildly displaced fractures involving the ulnar styloid and base of  the 5th metacarpal as well.  Carpal bones are intact.    JOINT: Scattered degenerative changes, for example the triscaphe and thumb MCP joints.  No significant joint effusion.    SOFT TISSUE: There is soft tissue edema about the fractures.  There is mass effect on the 3rd and 4th extensor compartment tendons by dorsally displaced fracture fragments.  Flexor tendons are grossly intact.  Normal muscle bulk.    MISCELLANEOUS: Overlying cast in place.                                        CT 3D RECON WITHOUT INDEPENDENT WS (Final result)  Result time 04/14/23 16:05:20      Final result by Federico Gillespie MD (04/14/23 16:05:20)                   Impression:      Fractures of the distal radius, ulnar styloid, and 5th metacarpal as detailed above.    This report was flagged in Epic as abnormal.      Electronically signed by: Federico Gillespie  Date:    04/14/2023  Time:    16:05               Narrative:    EXAMINATION:  CT WRIST WITHOUT CONTRAST RIGHT; CT 3D WITHOUT INDEPENDENT WS    CLINICAL HISTORY:  Wrist trauma, fracture suspected, nondiagnostic xray;Wrist trauma, subluxation on xray;; Wrist trauma, fracture suspected, nondiagnostic xray;Wrist trauma, subluxation on xray;n;    TECHNIQUE:  CT of the right wrist, without intravenous contrast.    3D reconstructed images were generated by post processing on an independent workstation and archived for permanent record.  3D images were generated for operative planning.    COMPARISON:  Radiographs 04/14/2023    FINDINGS:  BONE: Diffuse osteopenia.  There is a comminuted fracture of the distal radius extending to the radiocarpal and distal radioulnar joints.  There are displaced, impacted, and rotated fracture fragments.  The carpus is subluxed in a volar and radial direction.  There are mildly displaced fractures involving the ulnar styloid and base of the 5th metacarpal as well.  Carpal bones are intact.    JOINT: Scattered degenerative changes, for example the triscaphe and  thumb MCP joints.  No significant joint effusion.    SOFT TISSUE: There is soft tissue edema about the fractures.  There is mass effect on the 3rd and 4th extensor compartment tendons by dorsally displaced fracture fragments.  Flexor tendons are grossly intact.  Normal muscle bulk.    MISCELLANEOUS: Overlying cast in place.                                       X-Ray Wrist Complete Right (Final result)  Result time 04/14/23 15:18:03      Final result by Tony Carrizales MD (04/14/23 15:18:03)                   Impression:      See above      Electronically signed by: Tony Carrizales MD  Date:    04/14/2023  Time:    15:18               Narrative:    EXAMINATION:  XR WRIST COMPLETE 3 VIEWS RIGHT    CLINICAL HISTORY:  post reduction;    TECHNIQUE:  PA, lateral, and oblique views of the right wrist were performed.    COMPARISON:  N 04/14/2023 one    FINDINGS:  Comminuted distal radius fracture slightly improved position and alignment as compared to the previous study.  Fracture of the ulnar styloid also noted .                                       X-Ray Hand 3 view Right (Final result)  Result time 04/14/23 13:16:39      Final result by Tony Joshua MD (04/14/23 13:16:39)                   Impression:      1. Recent intra-articular fracture of the distal right radius.  2. Recent fracture of the tip of the right ulnar styloid.  3. Recent fracture of the base of the right 5th metacarpal.      Electronically signed by: Tony Joshua MD  Date:    04/14/2023  Time:    13:16               Narrative:    EXAMINATION:  XR HAND COMPLETE 3 VIEW RIGHT    CLINICAL HISTORY:  FOOSH;    TECHNIQUE:  Four views were obtained.    COMPARISON:  No relevant comparison examinations are currently available.  Clinical information of trauma.    FINDINGS:  As was documented on a concurrently obtained examination of the right wrist, there are several recent fractures.  There is a comminuted intra-articular fracture of the distal right radius, a fracture  of the base of the right 5th metacarpal, and a fracture of the tip of the ulnar styloid.  No additional areas suspicious for recent fracture or other significant abnormality identified.  Spurring is seen at several DIP joint levels, particularly the 2nd DIP, and at the interphalangeal joint of the thumb.  Prominent local soft tissue swelling is seen about the wrist.                                       X-Ray Wrist Complete Right (Final result)  Result time 04/14/23 13:09:09      Final result by Tony Joshua MD (04/14/23 13:09:09)                   Impression:      1. Recent comminuted intra-articular fracture of the distal right radius.  2. Recent fracture of the tip of the ulnar styloid.  3. Recent fracture of the base of the right 5th metacarpal.      Electronically signed by: Tony Joshua MD  Date:    04/14/2023  Time:    13:09               Narrative:    EXAMINATION:  XR WRIST COMPLETE 3 VIEWS RIGHT    TECHNIQUE:  Three views of the right wrist were obtained, with PA, lateral, and oblique projections submitted.    COMPARISON:  No relevant comparison examinations are currently available.  Clinical information of trauma.    FINDINGS:  A recent, likely mildly comminuted fracture of the distal right radius is appreciated, with some impaction at the fracture site and posterior displacement of the major distal fragment seen, some of the fracture lines extending to the articular surface of the distal radius.  There is also a small, sliver- like separate ossific density in the soft tissues adjacent to the ulnar styloid which likely represents a fracture of the tip of the ulnar styloid.  Finally, there is also a recent, nondisplaced/nonangulated fracture involving the base of the right 5th metacarpal.  Remaining osseous structures appear intact, with no additional areas suspicious for recent fracture or other significant abnormality identified.  Considerable soft tissue swelling about the distal forearm/wrist is seen in  association with the fractures referenced above.  Some joint space narrowing and spurring involving the intercarpal joint between the scaphoid and trapezium is an incidental observation.                                       X-Ray Elbow Complete Right (Final result)  Result time 04/14/23 13:04:01      Final result by Tony Carrizales MD (04/14/23 13:04:01)                   Impression:      See above      Electronically signed by: Tony Carrizales MD  Date:    04/14/2023  Time:    13:04               Narrative:    EXAMINATION:  XR ELBOW COMPLETE 3 VIEW RIGHT    CLINICAL HISTORY:  . Unspecified fall, initial encounter    TECHNIQUE:  AP, lateral, and oblique views of the right elbow were performed.    COMPARISON:  None    FINDINGS:  No definite acute fracture or dislocation.  No joint effusion.  No bone destruction identified                                       X-Ray Knee 3 View Right (Final result)  Result time 04/14/23 13:00:26      Final result by Rock Aguilar MD (04/14/23 13:00:26)                   Impression:      1. No acute displaced fracture or dislocation of the knee.      Electronically signed by: Rock Aguilar MD  Date:    04/14/2023  Time:    13:00               Narrative:    EXAMINATION:  XR KNEE 3 VIEW RIGHT    CLINICAL HISTORY:  Unspecified fall, initial encounter    TECHNIQUE:  AP, lateral, and Merchant views of the right knee were performed.    COMPARISON:  07/14/2022    FINDINGS:  Three views right knee.    There is osteopenia.  No acute displaced fracture or dislocation of the knee.  No radiopaque foreign body.  No large knee joint effusion.                                       X-Ray Forearm Right (Final result)  Result time 04/14/23 13:01:35      Final result by Tony Carrizales MD (04/14/23 13:01:35)                   Impression:      See above      Electronically signed by: Tony Carrizales MD  Date:    04/14/2023  Time:    13:01               Narrative:    EXAMINATION:  XR FOREARM  RIGHT    CLINICAL HISTORY:  Unspecified fall, initial encounter    TECHNIQUE:  AP and lateral views of the right forearm were performed.    COMPARISON:  None    FINDINGS:  There is a comminuted and displaced distal radius fracture identified.  Fracture of the ulnar styloid also noted.  No fracture or dislocation involving the proximal forearm                                    X-Rays:   Independently Interpreted Readings:   Other Readings:  X-ray right wrist:  Multiple fractures of the distal radius, ulnar styloid and 5th metacarpal base  Medications   morphine injection 4 mg (4 mg Intravenous Given 4/14/23 1123)   ondansetron injection 4 mg (4 mg Intravenous Given 4/14/23 1123)   lactated ringers bolus 500 mL (0 mLs Intravenous Stopped 4/14/23 1243)   Tdap (BOOSTRIX) vaccine injection 0.5 mL (0.5 mLs Intramuscular Given 4/14/23 1243)   LIDOcaine HCL 10 mg/ml (1%) injection 10 mL (10 mLs Other Given by Provider 4/14/23 1345)   lactated ringers bolus 500 mL (0 mLs Intravenous Stopped 4/14/23 1345)   fentaNYL 50 mcg/mL injection 50 mcg (50 mcg Intravenous Given 4/14/23 1340)   ceFAZolin 2 g in dextrose 5 % in water (D5W) 5 % 50 mL IVPB (MB+) (0 g Intravenous Stopped 4/14/23 1345)   oxyCODONE immediate release tablet 5 mg (5 mg Oral Given 4/14/23 1708)     Medical Decision Making:   History:   Old Medical Records: I decided to obtain old medical records.  Differential Diagnosis:   Fracture  Dislocation  Hematoma  Compartment syndrome  Independently Interpreted Test(s):   I have ordered and independently interpreted X-rays - see prior notes.  Clinical Tests:   Radiological Study: Ordered and Reviewed  ED Management:  Patient initially hypotensive in triage the normotensive when rechecked in ED bed.  Morphine given for pain.  IV fluid bolus also given.  X-ray demonstrates comminuted intra-articular fracture of the distal right radius, fracture of the tip of the ulnar styloid, and fracture of the base of the right 5th  metacarpal.  Orthopedic surgery consulted for further management.  Orthopedic surgery reduced fracture at bedside and splinted patient.  Patient received Td booster and IV antibiotics.  Repeat x-ray and CT scan of arm are pending.  Orthopedic surgery will determine whether to admit or discharge based on CT.  Care of this patient signed out to oncoming ED team.  Other:   I have discussed this case with another health care provider.       <> Summary of the Discussion: Orthopedic surgery          Attending Attestation:   Physician Attestation Statement for Resident:  As the supervising MD   Physician Attestation Statement: I have personally seen and examined this patient.   I agree with the above history.  -:   As the supervising MD I agree with the above PE.     As the supervising MD I agree with the above treatment, course, plan, and disposition.                        I have reviewed and concur with the resident's history, physical, assessment, and plan.  I have personally interviewed and examined the patient at bedside.   I did supervise any and all procedures and was present for any critical portion, and was always immediately available for help and as a resource.     The above history physical, review of symptoms, HPI and physical exam reflect my independent interpretation and evaluation.    Briefly, 62-year-old otherwise healthy female presenting with multiple fractures of the right wrist, ulnar styloid and 5th metacarpal base.  Imaging shows fracture, discussed case with Orthopedic surgery who evaluated patient at bedside.  Under my supervision, splint, orthopedic reduction was completed.  Patient will be discharged home with outpatient follow-up and operative plan.  Please see below for orthopedic surgery recommendations.    -- Right Distal radius fracture reduced and splinted in sugar tong splint at bedside in the ED  -- NWB to affected extremity   -- Follow up in ortho clinic in one week: message sent to  clinic staff to call and schedule, discussed surgical nature of this injury and consent forms were signed  -- Sling for comfort   -- Pain control per primary   -- Educated on signs and symptoms of compartment syndrome   -- Instructed to keep splint clean and dry   -- Message sent to clinic staff to schedule follow up     Complexity: High Risk    Final diagnoses:  [W19.XXXA] Fall  [S52.501B] Type I or II open fracture of distal end of right radius, unspecified fracture morphology, initial encounter (Primary)  [S62.316A] Closed displaced fracture of base of fifth metacarpal bone of right hand, initial encounter  [S52.611B] Type I or II open displaced fracture of styloid process of right ulna, initial encounter     Kam Breaux DO, FAAEM  Emergency Staff Physician   Dept of Emergency Medicine   [facility]  Spectralink: [id number]        Disclaimer: This note has been generated using voice-recognition software. There may be typographical errors that have been missed during proof-reading.                 Clinical Impression:   Final diagnoses:  [W19.XXXA] Fall  [S52.501B] Type I or II open fracture of distal end of right radius, unspecified fracture morphology, initial encounter (Primary)  [S62.316A] Closed displaced fracture of base of fifth metacarpal bone of right hand, initial encounter  [S52.611B] Type I or II open displaced fracture of styloid process of right ulna, initial encounter        ED Disposition Condition    Discharge Stable          ED Prescriptions       Medication Sig Dispense Start Date End Date Auth. Provider    oxyCODONE (ROXICODONE) 5 MG immediate release tablet Take 1 tablet (5 mg total) by mouth every 4 (four) hours as needed for Pain. 25 tablet 4/14/2023 -- Edin Carrero MD    methocarbamoL (ROBAXIN) 500 MG Tab Take 1 tablet (500 mg total) by mouth 3 (three) times daily. for 14 days 42 tablet 4/14/2023 4/28/2023 Edin Carrero MD    acetaminophen (TYLENOL) 650 MG TbSR Take 1  tablet (650 mg total) by mouth every 8 (eight) hours. for 14 days 42 tablet 4/14/2023 4/28/2023 Edin Carrero MD    ibuprofen (ADVIL,MOTRIN) 600 MG tablet Take 1 tablet (600 mg total) by mouth 4 (four) times daily. for 14 days 56 tablet 4/14/2023 4/28/2023 Edin Carrero MD    doxycycline (MONODOX) 100 MG capsule Take 1 capsule (100 mg total) by mouth 2 (two) times daily. for 10 days 20 capsule 4/14/2023 4/24/2023 Edin Carrero MD          Follow-up Information       Follow up With Specialties Details Why Contact Info Additional Information    [facility] - [facility] Go to  They will call you for follow-up appointment [id number]  [facility] [address] [phone] [facility] - Main Building, 5th Floor Please park in [facility] and take Atrium elevator    [facility] - Emergency Dept Emergency Medicine Go to  As needed, If symptoms worsen [id number]  [facility] [address] [phone]              Sendy Dietz MD  Resident  04/14/23 1524       Kam Breaux DO  04/15/23 0595

## 2023-04-14 NOTE — PROVIDER PROGRESS NOTES - EMERGENCY DEPT.
"Encounter Date: 4/14/2023    ED Physician Progress Notes          ED Resident HAND-OFF NOTE:  2:52 PM 4/14/2023  Cristal Gonzalez is a 62 y.o. female who presented to the ED on 4/14/2023 and has been managed by Dr. Dietz, who reports patient C/O fall. I assumed care of patient from off-going ED physician team at 2:52 PM pending CTs.      On my exam, I appreciate:  /60   Pulse 73   Temp 98.2 °F (36.8 °C) (Oral)   Resp 18   Ht 5' 2" (1.575 m)   Wt 66.2 kg (146 lb)   SpO2 99%   BMI 26.70 kg/m²         Disposition: Per Ortho  I have discussed and counseled Cristal Gonzalez regarding exam, results, diagnosis, treatment, and plan.  ______________________  Elijah Price MD   Emergency Medicine Resident  2:52 PM 4/14/2023      UPDATE:   I discussed the case with Orthopedic surgery after the CT scans and they report that the patient can be discharged at this time with a sling.  They have organized operative follow-up.  They have already sent prescriptions for pain management and antibiotics to her pharmacy which I confirmed with the patient.  They ordered preop labs which is still in process.  Patient is hemodynamically and clinically stable and she and her  agree with the plan.      Clinical Impression  :  Fall  Type I or II open fracture of distal end of right radius, unspecified fracture morphology, initial encounter (Primary)  Closed displaced fracture of base of fifth metacarpal bone of right hand, initial encounter  Type I or II open displaced fracture of styloid process of right ulna, initial encounter    "

## 2023-04-14 NOTE — SUBJECTIVE & OBJECTIVE
Past Medical History:   Diagnosis Date    GERD (gastroesophageal reflux disease)     Hot flash, menopausal     Melanoma 10/2022    0.9 mm - right upper arm       Past Surgical History:   Procedure Laterality Date    ANKLE FRACTURE SURGERY      APPENDECTOMY  2011    BREAST BIOPSY      benign    COLONOSCOPY N/A 6/25/2021    Procedure: COLONOSCOPY;  Surgeon: Nohemy Parra MD;  Location: [facility] (4TH FLR);  Service: Endoscopy;  Laterality: N/A;  Fully vacc Covid-19 - pg    LYMPH NODE BIOPSY Right 11/17/2022    Procedure: BIOPSY, LYMPH NODE with mapping;  Surgeon: Ben Stallworth MD;  Location: [facility] OR 2ND FLR;  Service: General;  Laterality: Right;       Review of patient's allergies indicates:   Allergen Reactions    Sulfa (sulfonamide antibiotics) Shortness Of Breath, Anxiety and Palpitations    Macrobid [nitrofurantoin monohyd/m-cryst] Nausea Only     Abd pain and nausea       No current facility-administered medications for this encounter.     Current Outpatient Medications   Medication Sig    acetaminophen (TYLENOL) 650 MG TbSR Take 1 tablet (650 mg total) by mouth every 8 (eight) hours. for 14 days    benzonatate (TESSALON) 200 MG capsule Take 1 capsule (200 mg total) by mouth 3 (three) times daily as needed for Cough.    cetirizine (ZYRTEC) 10 MG tablet Take 1 tablet (10 mg total) by mouth once daily.    doxycycline (MONODOX) 100 MG capsule Take 1 capsule (100 mg total) by mouth 2 (two) times daily. for 10 days    fluticasone propionate (FLONASE) 50 mcg/actuation nasal spray 1 spray (50 mcg total) by Each Nostril route once daily.    GLUCOSAM SUL NA/CHONDR THURSTON A NA (GLUCOSAMINE & CHONDROIT SUL.NA ORAL) Take by mouth.    ibuprofen (ADVIL,MOTRIN) 600 MG tablet Take 1 tablet (600 mg total) by mouth 4 (four) times daily. for 14 days    INTRAROSA 6.5 mg Inst INSERT ONE APPLICATION INTO THE VAGINA QHS    lactulose (CHRONULAC) 20 gram/30 mL Soln Take 15 mLs (10 g total) by mouth 3 (three) times daily as  needed (Constipation).    methocarbamoL (ROBAXIN) 500 MG Tab Take 1 tablet (500 mg total) by mouth 3 (three) times daily. for 14 days    MV, MIN #36/IRON,CARBONYL/FA (GERITOL COMPLETE ORAL) Take by mouth.    oxyCODONE (ROXICODONE) 5 MG immediate release tablet Take 1 tablet (5 mg total) by mouth every 4 (four) hours as needed for Pain.    pantoprazole (PROTONIX) 40 MG tablet Take 1 tablet (40 mg total) by mouth once daily. For 30d    polyethylene glycol (MIRALAX) 17 gram PwPk Take 17 g by mouth once daily.    triamcinolone acetonide 0.1% (KENALOG) 0.1 % cream APPLY TOPICALLY TO THE AFFECTED AREA TWICE DAILY FOR 1 TO 2 WEEKS AS NEEDED FOR RASH     Family History       Problem Relation (Age of Onset)    Alzheimer's disease Father    Breast cancer Maternal Aunt    Cancer Maternal Aunt    Diabetes Mother          Tobacco Use    Smoking status: Former     Packs/day: 0.20     Types: Cigarettes     Quit date: 3/1/2019     Years since quittin.1    Smokeless tobacco: Never   Substance and Sexual Activity    Alcohol use: Yes     Alcohol/week: 6.0 - 7.0 standard drinks     Types: 4 Glasses of wine, 2 - 3 Standard drinks or equivalent per week     Comment: socially    Drug use: No    Sexual activity: Yes     Partners: Male     Birth control/protection: None     ROS  Constitutional: negative for fevers/chills/night sweats  Eyes: no acute visual changes  ENT: negative acute  for hearing loss  Respiratory: negative for dyspnea  Cardiovascular: negative for chest pain  Gastrointestinal: negative for abdominal pain  Genitourinary: negative for dysuria  Neurological: negative for headaches  Behavioral/Psych: negative for hallucinations  Endocrine: negative for temperature intolerance  MSK: per HPI  Objective:     Vital Signs (Most Recent):  Temp: 98.2 °F (36.8 °C) (23 1024)  Pulse: 64 (23 170)  Resp: 19 (23 170)  BP: (!) 99/58 (23 170)  SpO2: 100 % (23)   Vital Signs (24h Range):  Temp:   "[98.2 °F (36.8 °C)] 98.2 °F (36.8 °C)  Pulse:  [55-73] 64  Resp:  [16-19] 19  SpO2:  [99 %-100 %] 100 %  BP: ()/(52-66) 99/58     Weight: 66.2 kg (146 lb)  Height: 5' 2" (157.5 cm)  Body mass index is 26.7 kg/m².      Intake/Output Summary (Last 24 hours) at 4/14/2023 1718  Last data filed at 4/14/2023 1345  Gross per 24 hour   Intake 1050 ml   Output --   Net 1050 ml       Ortho/SPM Exam  General:  no acute distress, appears stated age   Neuro: alert and oriented x3  Psych: normal mood  Head: normocephalic, atraumatic.  Eyes: no scleral icterus  Mouth: moist mucous membranes  Cardiovascular: extremities warm and well perfused  Lungs: breathing comfortably, equal chest rise bilat  Skin: clean, dry, intact (any exceptions noted in below musculoskeletal exam)       Musculoskeletal  Right Upper Extremity     -small 1.5 cm abrasion over the distal ulnar aspect of the forearm.  -Deltoid, biceps, triceps intact   -ROM wrist range of motion deferred due to known fracture, she has full intact range of motion of the fingers and elbow.  -SILT M/R/U/Ax  -Motor intact Ain/PIN/U/Ax  -WWP     Left Upper Extremity     -Skin, Intact : no ecchymosis, lesions, lacerations or abrasions   -Deltoid, biceps, triceps intact   -ROM full in the elbow, wrist, shoulder  -SILT M/R/U/Ax  -Motor intact Ain/PIN/U/Ax  -WWP     Right Lower Extremity Exam     - Skin Intact : no ecchymosis, lesions, lacerations or abrasions   - Quad/ Hip flexor intact   - ROM full in the knee and hip  - TA/EHL/Gastroc/FHL intact  - SILT throughout  - WWP        Left Lower Extremity Exam    - Skin Intact : no ecchymosis, lesions, lacerations or abrasions   - Quad/ Hip flexor intact   - ROM full in the knee and hip  - TA/EHL/Gastroc/FHL intact  - SILT throughout  - WWP      All joints (shoulder/elbow/wrist/hip/knee/ankle) were examined and had full ROM and were non-tender to palpation except as above         Significant Labs: BMP:   Recent Labs   Lab " 04/14/23  1627   GLU 92      K 3.9      CO2 20*   BUN 13   CREATININE 0.7   CALCIUM 9.2     CBC:   Recent Labs   Lab 04/14/23  1627   WBC 10.65   HGB 13.3   HCT 41.7        All pertinent labs within the past 24 hours have been reviewed.    Significant Imaging: I have reviewed and interpreted all pertinent imaging results/findings.  X-ray of the right wrist with severely comminuted intra-articular right distal radius fracture in volar displacement.

## 2023-04-14 NOTE — ASSESSMENT & PLAN NOTE
Cristal Gonzalez is a 62 y.o. female who presents after ground level fall on 2023 with immediate pain and deformity to the right wrist.  There is a small abrasion over the ulnar aspect of the distal forearm, but on exam this did not appear to communicate with bone, and there was no air in the fracture site on the CT scan.  Due to skin abrasion in proximity to her fracture she was given Ancef and discharged with doxycycline abrasion site was thoroughly irrigated with Betadine and normal saline in the emergency room. They are neurovascularly intact.  She is right-hand dominant and has no significant past medical history.    -- right Distal radius fracture reduced and splinted in sugar tong splint at bedside in the ED, see below procedure note   -- NWB to affected extremity   -- Follow up in ortho clinic in one week: message sent to clinic staff to call and schedule, discussed surgical nature of this injury and consent forms were signed  -- Sling for comfort   -- Pain control per primary   -- Educated on signs and symptoms of compartment syndrome   -- Instructed to keep splint clean and dry   -- Message sent to clinic staff to schedule follow up     Procedure Note:  Right distal radius closed reduction  Patient was explained risks, benefits, and alternatives to treatment and verbalized consent to proceed. Time out was performed and patient name, , site, and procedure were confirmed.  A lidocaine hematoma block and IV fentanyl were used for pain control. Fracture was reduced under fluoroscopy. Post-reduction films were performed and confirmed adequate reduction. Patient tolerated the procedure well.

## 2023-04-14 NOTE — HPI
62-year-old female, right-hand dominant, with past medical history of a prior right ankle fracture fixed by Dr. Doyle in 2015, and a right arm melanoma which was removed.  She had a ground level fall earlier today and presented to the ED with a severely comminuted right distal radius fracture with a abrasion over her distal ulnar forearm.  Orthopedic surgery was consulted for evaluation.  Right hand is neurovascularly intact.  On exam the abrasion does not appear to communicate with the bone, but she was treated with Ancef and tetanus.  She denies any other injuries or areas of pain at this time.

## 2023-04-14 NOTE — DISCHARGE INSTRUCTIONS
It was a pleasure taking care of you today!     Home Care:   - Non weight bearing right arm keep splint clean and dry   - Take the antibiotics and pain medications as prescribed by Orthopedics    Follow-Up Plan:  - Follow up with Orthopedics as directed  - Follow-up with primary care doctor within 3 - 5 days to discuss your recent ER visit and any additional concerns that you may have.  - Additional testing and/or evaluation as directed by your primary doctor    Return to the Emergency Department for symptoms including but not limited to: persistence or worsening of symptoms, shortness of breath or chest pain, inability to drink without vomiting, passing out/fainting/ loss of consciousness, or if you have other concerns.

## 2023-04-14 NOTE — CONSULTS
[facility] - Emergency Dept  Orthopedics  Consult Note    Patient Name: Cristal Gonzalez  MRN: [id number]  Admission Date: 4/14/2023  Hospital Length of Stay: 0 days  Attending Provider: Kam Breaux DO  Primary Care Provider: Steve Morin MD    Inpatient consult to Orthopedic Surgery  Consult performed by: Edin Carrero MD  Consult ordered by: Senyd Dietz MD        Subjective:     Principal Problem:Closed fracture of right distal radius    Chief Complaint:   Chief Complaint   Patient presents with    Fall     Deformity to r wrist and pain to r knee denies loc        HPI: 62-year-old female, right-hand dominant, with past medical history of a prior right ankle fracture fixed by Dr. Doyle in 2015, and a right arm melanoma which was removed.  She had a ground level fall earlier today and presented to the ED with a severely comminuted right distal radius fracture and 5th metacarpal base fracture with an abrasion over her distal ulnar forearm.  Orthopedic surgery was consulted for evaluation.  Right hand is neurovascularly intact.  On exam the abrasion does not appear to communicate with the bone, but she was treated with Ancef and tetanus.  She denies any other injuries or areas of significant pain at this time.        Past Medical History:   Diagnosis Date    GERD (gastroesophageal reflux disease)     Hot flash, menopausal     Melanoma 10/2022    0.9 mm - right upper arm       Past Surgical History:   Procedure Laterality Date    ANKLE FRACTURE SURGERY      APPENDECTOMY  2011    BREAST BIOPSY      benign    COLONOSCOPY N/A 6/25/2021    Procedure: COLONOSCOPY;  Surgeon: Nohemy Parra MD;  Location: [facility] (4TH FLR);  Service: Endoscopy;  Laterality: N/A;  Fully vacc Covid-19 - pg    LYMPH NODE BIOPSY Right 11/17/2022    Procedure: BIOPSY, LYMPH NODE with mapping;  Surgeon: Ben Stallworth MD;  [address];  Service: General;  Laterality: Right;       Review of patient's  allergies indicates:   Allergen Reactions    Sulfa (sulfonamide antibiotics) Shortness Of Breath, Anxiety and Palpitations    Macrobid [nitrofurantoin monohyd/m-cryst] Nausea Only     Abd pain and nausea       No current facility-administered medications for this encounter.     Current Outpatient Medications   Medication Sig    acetaminophen (TYLENOL) 650 MG TbSR Take 1 tablet (650 mg total) by mouth every 8 (eight) hours. for 14 days    benzonatate (TESSALON) 200 MG capsule Take 1 capsule (200 mg total) by mouth 3 (three) times daily as needed for Cough.    cetirizine (ZYRTEC) 10 MG tablet Take 1 tablet (10 mg total) by mouth once daily.    doxycycline (MONODOX) 100 MG capsule Take 1 capsule (100 mg total) by mouth 2 (two) times daily. for 10 days    fluticasone propionate (FLONASE) 50 mcg/actuation nasal spray 1 spray (50 mcg total) by Each Nostril route once daily.    GLUCOSAM SUL NA/CHONDR THURSTON A NA (GLUCOSAMINE & CHONDROIT SUL.NA ORAL) Take by mouth.    ibuprofen (ADVIL,MOTRIN) 600 MG tablet Take 1 tablet (600 mg total) by mouth 4 (four) times daily. for 14 days    INTRAROSA 6.5 mg Inst INSERT ONE APPLICATION INTO THE VAGINA QHS    lactulose (CHRONULAC) 20 gram/30 mL Soln Take 15 mLs (10 g total) by mouth 3 (three) times daily as needed (Constipation).    methocarbamoL (ROBAXIN) 500 MG Tab Take 1 tablet (500 mg total) by mouth 3 (three) times daily. for 14 days    MV, MIN #36/IRON,CARBONYL/FA (GERITOL COMPLETE ORAL) Take by mouth.    oxyCODONE (ROXICODONE) 5 MG immediate release tablet Take 1 tablet (5 mg total) by mouth every 4 (four) hours as needed for Pain.    pantoprazole (PROTONIX) 40 MG tablet Take 1 tablet (40 mg total) by mouth once daily. For 30d    polyethylene glycol (MIRALAX) 17 gram PwPk Take 17 g by mouth once daily.    triamcinolone acetonide 0.1% (KENALOG) 0.1 % cream APPLY TOPICALLY TO THE AFFECTED AREA TWICE DAILY FOR 1 TO 2 WEEKS AS NEEDED FOR RASH     Family History       Problem Relation  "(Age of Onset)    Alzheimer's disease Father    Breast cancer Maternal Aunt    Cancer Maternal Aunt    Diabetes Mother          Tobacco Use    Smoking status: Former     Packs/day: 0.20     Types: Cigarettes     Quit date: 3/1/2019     Years since quittin.1    Smokeless tobacco: Never   Substance and Sexual Activity    Alcohol use: Yes     Alcohol/week: 6.0 - 7.0 standard drinks     Types: 4 Glasses of wine, 2 - 3 Standard drinks or equivalent per week     Comment: socially    Drug use: No    Sexual activity: Yes     Partners: Male     Birth control/protection: None     ROS  Constitutional: negative for fevers/chills/night sweats  Eyes: no acute visual changes  ENT: negative acute  for hearing loss  Respiratory: negative for dyspnea  Cardiovascular: negative for chest pain  Gastrointestinal: negative for abdominal pain  Genitourinary: negative for dysuria  Neurological: negative for headaches  Behavioral/Psych: negative for hallucinations  Endocrine: negative for temperature intolerance  MSK: per HPI  Objective:     Vital Signs (Most Recent):  Temp: 98.2 °F (36.8 °C) (23 1024)  Pulse: 64 (23 1709)  Resp: 19 (23 1709)  BP: (!) 99/58 (23 1709)  SpO2: 100 % (23 1709)   Vital Signs (24h Range):  Temp:  [98.2 °F (36.8 °C)] 98.2 °F (36.8 °C)  Pulse:  [55-73] 64  Resp:  [16-19] 19  SpO2:  [99 %-100 %] 100 %  BP: ()/(52-66) 99/58     Weight: 66.2 kg (146 lb)  Height: 5' 2" (157.5 cm)  Body mass index is 26.7 kg/m².      Intake/Output Summary (Last 24 hours) at 2023 1718  Last data filed at 2023 1345  Gross per 24 hour   Intake 1050 ml   Output --   Net 1050 ml       Ortho/SPM Exam  General:  no acute distress, appears stated age   Neuro: alert and oriented x3  Psych: normal mood  Head: normocephalic, atraumatic.  Eyes: no scleral icterus  Mouth: moist mucous membranes  Cardiovascular: extremities warm and well perfused  Lungs: breathing comfortably, equal chest rise " bilat  Skin: clean, dry, intact (any exceptions noted in below musculoskeletal exam)       Musculoskeletal  Right Upper Extremity     -small 1.5 cm abrasion over the distal ulnar aspect of the forearm.  -Deltoid, biceps, triceps intact   -ROM wrist range of motion deferred due to known fracture, she has full intact range of motion of the fingers and elbow.  -SILT M/R/U/Ax  -Motor intact Ain/PIN/U/Ax  -WWP     Left Upper Extremity     -Skin, Intact : no ecchymosis, lesions, lacerations or abrasions   -Deltoid, biceps, triceps intact   -ROM full in the elbow, wrist, shoulder  -SILT M/R/U/Ax  -Motor intact Ain/PIN/U/Ax  -WWP     Right Lower Extremity Exam     - Skin Intact : no ecchymosis, lesions, lacerations or abrasions   - Quad/ Hip flexor intact   - ROM full in the knee and hip  - TA/EHL/Gastroc/FHL intact  - SILT throughout  - WWP        Left Lower Extremity Exam    - Skin Intact : no ecchymosis, lesions, lacerations or abrasions   - Quad/ Hip flexor intact   - ROM full in the knee and hip  - TA/EHL/Gastroc/FHL intact  - SILT throughout  - WWP      All joints (shoulder/elbow/wrist/hip/knee/ankle) were examined and had full ROM and were non-tender to palpation except as above         Significant Labs: BMP:   Recent Labs   Lab 04/14/23  1627   GLU 92      K 3.9      CO2 20*   BUN 13   CREATININE 0.7   CALCIUM 9.2     CBC:   Recent Labs   Lab 04/14/23  1627   WBC 10.65   HGB 13.3   HCT 41.7        All pertinent labs within the past 24 hours have been reviewed.    Significant Imaging: I have reviewed and interpreted all pertinent imaging results/findings.  X-ray of the right wrist with severely comminuted intra-articular right distal radius fracture in volar displacement. There is also a small minimally displaced fracture of the fifth metacarpal base.     Assessment/Plan:     * Closed fracture of right distal radius  Cristal Gonzalez is a 62 y.o. female who presents after ground level fall on  2023 with immediate pain and deformity to the right wrist.  There is a small abrasion over the ulnar aspect of the distal forearm, but on exam this did not appear to communicate with bone, and there was no air in the fracture site on the CT scan.  Due to skin abrasion in proximity to her fracture she was given Ancef and discharged with doxycycline abrasion site was thoroughly irrigated with Betadine and normal saline in the emergency room. They are neurovascularly intact.  She is right-hand dominant and has no significant past medical history.    -- Right Distal radius fracture reduced and splinted in sugar tong splint at bedside in the ED, see below procedure note   -- NWB to affected extremity   -- Follow up in ortho clinic in one week: message sent to clinic staff to call and schedule, discussed surgical nature of this injury and consent forms were signed  -- Sling for comfort   -- Pain control per primary   -- Educated on signs and symptoms of compartment syndrome   -- Instructed to keep splint clean and dry   -- Message sent to clinic staff to schedule follow up     Procedure Note:  Right distal radius closed reduction  Patient was explained risks, benefits, and alternatives to treatment and verbalized consent to proceed. Time out was performed and patient name, , site, and procedure were confirmed.  A lidocaine hematoma block and IV fentanyl were used for pain control. Fracture was reduced under fluoroscopy. Post-reduction films were performed and confirmed adequate reduction. Patient tolerated the procedure well.             Edin Carrero MD  Orthopedics  [facility] - Emergency Dept

## 2023-04-14 NOTE — TELEPHONE ENCOUNTER
"Received a call from pt.  Asked if she could see Dr Doyle today for her wrist.  Pt reports that she just fell and broke her wrist.  States the wrist is deformed and "bone is sticking out.  Not out of the skin but, it is pretty bad.'   Advised [place] to [place] [facility] [place] and treatment.  Advised pt that we are unable to reduce fractures in clinic if this is what she needs.  Pt verbalized understanding and will go to ED now.   "

## 2023-04-14 NOTE — ED TRIAGE NOTES
Pt states she was walking in her neighborhood when she tripped and fell on a crack in the concrete. Fell to her right wrist. Denies hitting her head. Deformity to right wrist

## 2023-04-17 ENCOUNTER — TELEPHONE (OUTPATIENT)
Dept: ORTHOPEDICS | Facility: CLINIC | Age: 63
End: 2023-04-17
Payer: COMMERCIAL

## 2023-04-17 NOTE — TELEPHONE ENCOUNTER
Spoke with pt.  Pt is a previous patient of Dr Doyle's.  Pt was told that Dr Doyle does not operate on wrists.   I explained to pt that Dr Doyle does perform wrist fx surgery but he does not do procedures on metacarpals.  Pt states she is asking because she really wanted to see him for this injury.  Pt states that she is already scheduled for an office visit with Martinez on Thursday and surgery with Dr Geller on Friday.  Advised pt that she is in great hands with Dr Geller and Martinez.   Pt is now more at ease with the providers who will be caring for her during this time.

## 2023-04-17 NOTE — TELEPHONE ENCOUNTER
Spoke with the patient, scheduled appointment at her convenience. All questions answered. Patient verbalized understanding and was thankful.     Zaria Rose MS, OTC  Clinical & OR Assistant to Dr. Ross Dunbar [facility]  [phone]      ----- Message from Edin Carrero MD sent at 4/16/2023  4:52 PM CDT -----  Regarding: ED follow up  Can you please schedule a follow-up appointment for this patient she has a right distal radius and 5th metacarpal base fracture, has an abrasion over the ulnar wrist so she got Ancef and was discharged with Bactrim.  Reduced and splinted in the ED this weekend.

## 2023-04-17 NOTE — TELEPHONE ENCOUNTER
----- Message from Emily Escoto sent at 4/17/2023  8:07 AM CDT -----  Regarding: pt called  Name of Who is Calling: CARA DURAN [[id number]]      What is the request in detail:  pt is requesting a call back from Jami . She would like to ask if The Dr would perform her procedure on her wrist and finger. Please advise       Can the clinic reply by [facility]: No      What Number to Call Back if not in [facility]: [phone]

## 2023-04-19 ENCOUNTER — PATIENT MESSAGE (OUTPATIENT)
Dept: ORTHOPEDICS | Facility: CLINIC | Age: 63
End: 2023-04-19
Payer: COMMERCIAL

## 2023-04-19 ENCOUNTER — TELEPHONE (OUTPATIENT)
Dept: ORTHOPEDICS | Facility: CLINIC | Age: 63
End: 2023-04-19
Payer: COMMERCIAL

## 2023-04-19 DIAGNOSIS — M25.531 RIGHT WRIST PAIN: Primary | ICD-10-CM

## 2023-04-20 ENCOUNTER — HOSPITAL ENCOUNTER (OUTPATIENT)
Dept: RADIOLOGY | Facility: HOSPITAL | Age: 63
Discharge: HOME | End: 2023-04-20
Attending: PHYSICIAN ASSISTANT
Payer: COMMERCIAL

## 2023-04-20 ENCOUNTER — OFFICE VISIT (OUTPATIENT)
Dept: ORTHOPEDICS | Facility: CLINIC | Age: 63
End: 2023-04-20
Payer: COMMERCIAL

## 2023-04-20 ENCOUNTER — ANESTHESIA EVENT (OUTPATIENT)
Dept: SURGERY | Facility: HOSPITAL | Age: 63
End: 2023-04-20
Payer: COMMERCIAL

## 2023-04-20 ENCOUNTER — TELEPHONE (OUTPATIENT)
Dept: ORTHOPEDICS | Facility: CLINIC | Age: 63
End: 2023-04-20
Payer: COMMERCIAL

## 2023-04-20 DIAGNOSIS — S62.316A CLOSED DISPLACED FRACTURE OF BASE OF FIFTH METACARPAL BONE OF RIGHT HAND, INITIAL ENCOUNTER: ICD-10-CM

## 2023-04-20 DIAGNOSIS — M25.531 RIGHT WRIST PAIN: ICD-10-CM

## 2023-04-20 DIAGNOSIS — S52.571A OTHER CLOSED INTRA-ARTICULAR FRACTURE OF DISTAL END OF RIGHT RADIUS, INITIAL ENCOUNTER: Primary | ICD-10-CM

## 2023-04-20 PROCEDURE — 1159F MED LIST DOCD IN RCRD: CPT | Mod: CPTII,S$GLB,, | Performed by: PHYSICIAN ASSISTANT

## 2023-04-20 PROCEDURE — 99999 PR PBB SHADOW E&M-EST. PATIENT-LVL IV: ICD-10-PCS | Mod: PBBFAC,,, | Performed by: PHYSICIAN ASSISTANT

## 2023-04-20 PROCEDURE — 99214 OFFICE O/P EST MOD 30 MIN: CPT | Mod: S$PBB,57,, | Performed by: PHYSICIAN ASSISTANT

## 2023-04-20 PROCEDURE — 3044F PR MOST RECENT HEMOGLOBIN A1C LEVEL <7.0%: ICD-10-PCS | Mod: CPTII,S$GLB,, | Performed by: PHYSICIAN ASSISTANT

## 2023-04-20 PROCEDURE — 3044F HG A1C LEVEL LT 7.0%: CPT | Mod: CPTII,S$GLB,, | Performed by: PHYSICIAN ASSISTANT

## 2023-04-20 PROCEDURE — 1159F PR MEDICATION LIST DOCUMENTED IN MEDICAL RECORD: ICD-10-PCS | Mod: CPTII,S$GLB,, | Performed by: PHYSICIAN ASSISTANT

## 2023-04-20 PROCEDURE — 99214 PR OFFICE/OUTPT VISIT, EST, LEVL IV, 30-39 MIN: ICD-10-PCS | Mod: S$PBB,57,, | Performed by: PHYSICIAN ASSISTANT

## 2023-04-20 PROCEDURE — 99999 PR PBB SHADOW E&M-EST. PATIENT-LVL IV: CPT | Mod: PBBFAC,,, | Performed by: PHYSICIAN ASSISTANT

## 2023-04-20 RX ORDER — CEFAZOLIN SODIUM 2 G/50ML
2 SOLUTION INTRAVENOUS
Status: CANCELLED | OUTPATIENT
Start: 2023-04-20

## 2023-04-20 RX ORDER — MUPIROCIN 20 MG/G
OINTMENT TOPICAL
Status: CANCELLED | OUTPATIENT
Start: 2023-04-20

## 2023-04-20 RX ORDER — ACETAMINOPHEN 500 MG
1000 TABLET ORAL 2 TIMES DAILY
Qty: 60 TABLET | Refills: 0 | Status: SHIPPED | OUTPATIENT
Start: 2023-04-20 | End: 2023-07-07

## 2023-04-20 RX ORDER — IBUPROFEN 600 MG/1
600 TABLET ORAL 3 TIMES DAILY
Qty: 90 TABLET | Refills: 0 | Status: SHIPPED | OUTPATIENT
Start: 2023-04-20 | End: 2023-07-07

## 2023-04-20 RX ORDER — OXYCODONE AND ACETAMINOPHEN 5; 325 MG/1; MG/1
1 TABLET ORAL EVERY 4 HOURS PRN
Qty: 10 TABLET | Refills: 0 | Status: SHIPPED | OUTPATIENT
Start: 2023-04-20 | End: 2023-07-07

## 2023-04-20 NOTE — PROGRESS NOTES
[facility]  History & Physical  Orthopedics    SUBJECTIVE:      Chief Complaint: right wrist fractures    Referring Provider: Erin Castanon Dr. is the supervising physician for this encounter/patient    History of Present Illness:  Patient is a 62 y.o. right hand dominant female who presents today for continued treatment of her right wrist injury. She fell and landed on outstretched hand on 4/14/23, was seen in the ED same day. Ortho resident did performed closed reduction and splinting of displaced distal radius fracture. Known 5th metacarpal base fracture and ulnar styloid fracture as well. She has maintained reduction splint, takes tylenol and oxycodone as needed. She was also given doxycycline for a small abrasion on the hand. She has stopped the ibuprofen in preparation for surgery. She clearly denies any numbness/tingling in the hand today.     Onset of symptoms/DOI was 4/14/23.    Symptoms are aggravated by activity and movement.    Symptoms are alleviated by rest and immobilization.    Symptoms consist of pain, swelling, ecchymosis, and decreased ROM.    The patient rates their pain as a 5/10.    Attempted treatment(s) and/or interventions include activity modifications, rest, narcotic medications, closed reduction in the emergency department, and splinting/casting.     The patient denies any fevers, chills, N/V, D/C and presents for evaluation.       Past Medical History:   Diagnosis Date    GERD (gastroesophageal reflux disease)     Hot flash, menopausal     Melanoma 10/2022    0.9 mm - right upper arm     Past Surgical History:   Procedure Laterality Date    ANKLE FRACTURE SURGERY      APPENDECTOMY  2011    BREAST BIOPSY      benign    COLONOSCOPY N/A 6/25/2021    Procedure: COLONOSCOPY;  Surgeon: Nohemy Parra MD;  Location: [address]);  Service: Endoscopy;  Laterality: N/A;  Fully vacc Covid-19 - pg    LYMPH NODE BIOPSY Right 11/17/2022    Procedure:  BIOPSY, LYMPH NODE with mapping;  Surgeon: Ben Stallworth MD;  Location: [facility] OR [address] [place] [address];  Service: General;  Laterality: Right;     Review of patient's allergies indicates:   Allergen Reactions    Sulfa (sulfonamide antibiotics) Shortness Of Breath, Anxiety and Palpitations    Macrobid [nitrofurantoin monohyd/m-cryst] Nausea Only     Abd pain and nausea     Social History     Social History Narrative        Helps care for elderly parents who live in [place] (father in , has alzheimer and Parkinson; mother in )     Family History   Problem Relation Age of Onset    Diabetes Mother     Alzheimer's disease Father     Cancer Maternal Aunt         breast cancer    Breast cancer Maternal Aunt          Current Outpatient Medications:     acetaminophen (TYLENOL) 650 MG TbSR, Take 1 tablet (650 mg total) by mouth every 8 (eight) hours. for 14 days, Disp: 42 tablet, Rfl: 0    benzonatate (TESSALON) 200 MG capsule, Take 1 capsule (200 mg total) by mouth 3 (three) times daily as needed for Cough., Disp: 30 capsule, Rfl: 0    cetirizine (ZYRTEC) 10 MG tablet, Take 1 tablet (10 mg total) by mouth once daily., Disp: 30 tablet, Rfl: 5    doxycycline (MONODOX) 100 MG capsule, Take 1 capsule (100 mg total) by mouth 2 (two) times daily. for 10 days, Disp: 20 capsule, Rfl: 0    fluticasone propionate (FLONASE) 50 mcg/actuation nasal spray, 1 spray (50 mcg total) by Each Nostril route once daily., Disp: 15.8 mL, Rfl: 0    GLUCOSAM SUL NA/CHONDR THURSTON A NA (GLUCOSAMINE & CHONDROIT SUL.NA ORAL), Take by mouth., Disp: , Rfl:     ibuprofen (ADVIL,MOTRIN) 600 MG tablet, Take 1 tablet (600 mg total) by mouth 4 (four) times daily. for 14 days, Disp: 56 tablet, Rfl: 0    INTRAROSA 6.5 mg Inst, INSERT ONE APPLICATION INTO THE VAGINA QHS, Disp: , Rfl: 12    lactulose (CHRONULAC) 20 gram/30 mL Soln, Take 15 mLs (10 g total) by mouth 3 (three) times daily as needed (Constipation)., Disp: 240 mL, Rfl: 0    methocarbamoL (ROBAXIN) 500  MG Tab, Take 1 tablet (500 mg total) by mouth 3 (three) times daily. for 14 days, Disp: 42 tablet, Rfl: 0    MV, MIN #36/IRON,CARBONYL/FA (GERITOL COMPLETE ORAL), Take by mouth., Disp: , Rfl:     oxyCODONE (ROXICODONE) 5 MG immediate release tablet, Take 1 tablet (5 mg total) by mouth every 4 (four) hours as needed for Pain., Disp: 25 tablet, Rfl: 0    pantoprazole (PROTONIX) 40 MG tablet, Take 1 tablet (40 mg total) by mouth once daily. For 30d, Disp: 30 tablet, Rfl: 0    polyethylene glycol (MIRALAX) 17 gram PwPk, Take 17 g by mouth once daily., Disp: 10 packet, Rfl: 0    triamcinolone acetonide 0.1% (KENALOG) 0.1 % cream, APPLY TOPICALLY TO THE AFFECTED AREA TWICE DAILY FOR 1 TO 2 WEEKS AS NEEDED FOR RASH, Disp: 80 g, Rfl: 11      Review of Systems:  Constitutional: no fever or chills  Eyes: no visual changes  ENT: no nasal congestion or sore throat  Respiratory: no cough or shortness of breath  Cardiovascular: no chest pain  Gastrointestinal: no nausea or vomiting, tolerating diet  Musculoskeletal: pain, soreness, and decreased ROM    OBJECTIVE:      Vital Signs (Most Recent):  There were no vitals filed for this visit.  There is no height or weight on file to calculate BMI.      Physical Exam:  Constitutional: The patient appears well-developed and well-nourished. No distress.   Skin: No lesions appreciated  Head: Normocephalic and atraumatic.   Nose: Nose normal.   Ears: No deformities seen  Eyes: Conjunctivae and EOM are normal.   Neck: No tracheal deviation present.   Cardiovascular: Normal rate and intact distal pulses.    Pulmonary/Chest: Effort normal. No respiratory distress.   Abdominal: There is no guarding.   Neurological: The patient is alert.   Psychiatric: The patient has a normal mood and affect.     Right Hand/Wrist Examination:    Observation/Inspection:  Swelling  Noted in the fingers    Deformity  none  Discoloration  Ecchymosis noted in the fingers    Scars   none    Atrophy  none  She  maintains reduction splint today, therefore evaluation of the hand/wrist/elbow is not performed. She does have swelling and ecchymosis in the fingers. She is able to move her fingers without issues, denies any n/t and has full SILT.    Abdomen not guarded  Respirations nonlabored  Perfusion intact    Diagnostic Results:     Imaging - I independently viewed the patient's imaging as well as the radiology report.      Wrist CT 4/14/23  Impression:     Fractures of the distal radius, ulnar styloid, and 5th metacarpal as detailed above.    Wrist Xray 4/14/23  FINDINGS:  As was documented on a concurrently obtained examination of the right wrist, there are several recent fractures.  There is a comminuted intra-articular fracture of the distal right radius, a fracture of the base of the right 5th metacarpal, and a fracture of the tip of the ulnar styloid.  No additional areas suspicious for recent fracture or other significant abnormality identified.  Spurring is seen at several DIP joint levels, particularly the 2nd DIP, and at the interphalangeal joint of the thumb.  Prominent local soft tissue swelling is seen about the wrist.     Impression:     1. Recent intra-articular fracture of the distal right radius.  2. Recent fracture of the tip of the right ulnar styloid.  3. Recent fracture of the base of the right 5th metacarpal.      ASSESSMENT/PLAN:      62 y.o. yo female with Right displaced intra-articular distal radius fracture, non displaced 5th metacarpal fracture and non displaced ulnar styloid fracture    Plan: The patient and I had a thorough discussion today.  We discussed the working diagnosis as well as several other potential alternative diagnoses.  Treatment options were discussed, both conservative and surgical, including risks/benefits of both options. Surgery is firmly recommended for this injury, which the patient and  both agree with. Per Dr. Geller, ok to maintain reduction splint until surgery  tomorrow.    She is consented for Right ORIF vs bridge plate of distal radius fracture, possible CRPP 5th metacarpal fracture and any indicated procedures with Dr. Geller on 4/21/23. Case ordered for Tomasa.    The patient has not responded to adequate non operative treatment at this time and/or non operative treatment is not indicated. Thus, the risks, benefits and alternatives to surgery were discussed with the patient in detail.  Specific risks include but are not limited to bleeding, infection, vessel and/or nerve damage, pain, numbness, tingling, compartment syndrome, need for additional surgery, failure to return to pre-injury and/or preoperative functional status, inability to return to work, scar sensitivity, delayed healing, complex regional pain syndrome, weakness, pulley injury, tendon injury, bowstringing, partial and/or incomplete relief of symptoms, persistence of and/or worsening of symptoms, hardware and/or surgical failure, prominent and/or symptomatic hardware possibly necessitating future removal, osteomyelitis, amputation, loss of function, stiffness, rotational malalignment, functional debility, dysfunction, decreased  strength, need for prolonged postoperative rehabilitation, malunion, nonunion, deep venous thrombosis, pulmonary embolism, arthritis and death.  The patient states an understanding and wishes to proceed with surgery.   All questions were answered.  No guarantees were implied or stated.  Written informed consent was obtained.    Should the patient's symptoms worsen, persist, or fail to improve they should return for reevaluation and I would be happy to see them back anytime.           Please do not hesitate to reach out to us via email, phone, or MyChart with any questions, concerns, or feedback.

## 2023-04-20 NOTE — H&P (VIEW-ONLY)
[facility]  History & Physical  Orthopedics    SUBJECTIVE:      Chief Complaint: right wrist fractures    Referring Provider: Erin Castanon Dr. is the supervising physician for this encounter/patient    History of Present Illness:  Patient is a 62 y.o. right hand dominant female who presents today for continued treatment of her right wrist injury. She fell and landed on outstretched hand on 4/14/23, was seen in the ED same day. Ortho resident did performed closed reduction and splinting of displaced distal radius fracture. Known 5th metacarpal base fracture and ulnar styloid fracture as well. She has maintained reduction splint, takes tylenol and oxycodone as needed. She was also given doxycycline for a small abrasion on the hand. She has stopped the ibuprofen in preparation for surgery. She clearly denies any numbness/tingling in the hand today.     Onset of symptoms/DOI was 4/14/23.    Symptoms are aggravated by activity and movement.    Symptoms are alleviated by rest and immobilization.    Symptoms consist of pain, swelling, ecchymosis, and decreased ROM.    The patient rates their pain as a 5/10.    Attempted treatment(s) and/or interventions include activity modifications, rest, narcotic medications, closed reduction in the emergency department, and splinting/casting.     The patient denies any fevers, chills, N/V, D/C and presents for evaluation.       Past Medical History:   Diagnosis Date    GERD (gastroesophageal reflux disease)     Hot flash, menopausal     Melanoma 10/2022    0.9 mm - right upper arm     Past Surgical History:   Procedure Laterality Date    ANKLE FRACTURE SURGERY      APPENDECTOMY  2011    BREAST BIOPSY      benign    COLONOSCOPY N/A 6/25/2021    Procedure: COLONOSCOPY;  Surgeon: Nohemy Parra MD;  Location: [address]);  Service: Endoscopy;  Laterality: N/A;  Fully vacc Covid-19 - pg    LYMPH NODE BIOPSY Right 11/17/2022    Procedure:  BIOPSY, LYMPH NODE with mapping;  Surgeon: Ben Stallworth MD;  Location: [facility] OR [address] [place] [address];  Service: General;  Laterality: Right;     Review of patient's allergies indicates:   Allergen Reactions    Sulfa (sulfonamide antibiotics) Shortness Of Breath, Anxiety and Palpitations    Macrobid [nitrofurantoin monohyd/m-cryst] Nausea Only     Abd pain and nausea     Social History     Social History Narrative        Helps care for elderly parents who live in [place] (father in , has alzheimer and Parkinson; mother in )     Family History   Problem Relation Age of Onset    Diabetes Mother     Alzheimer's disease Father     Cancer Maternal Aunt         breast cancer    Breast cancer Maternal Aunt          Current Outpatient Medications:     acetaminophen (TYLENOL) 650 MG TbSR, Take 1 tablet (650 mg total) by mouth every 8 (eight) hours. for 14 days, Disp: 42 tablet, Rfl: 0    benzonatate (TESSALON) 200 MG capsule, Take 1 capsule (200 mg total) by mouth 3 (three) times daily as needed for Cough., Disp: 30 capsule, Rfl: 0    cetirizine (ZYRTEC) 10 MG tablet, Take 1 tablet (10 mg total) by mouth once daily., Disp: 30 tablet, Rfl: 5    doxycycline (MONODOX) 100 MG capsule, Take 1 capsule (100 mg total) by mouth 2 (two) times daily. for 10 days, Disp: 20 capsule, Rfl: 0    fluticasone propionate (FLONASE) 50 mcg/actuation nasal spray, 1 spray (50 mcg total) by Each Nostril route once daily., Disp: 15.8 mL, Rfl: 0    GLUCOSAM SUL NA/CHONDR THURSTON A NA (GLUCOSAMINE & CHONDROIT SUL.NA ORAL), Take by mouth., Disp: , Rfl:     ibuprofen (ADVIL,MOTRIN) 600 MG tablet, Take 1 tablet (600 mg total) by mouth 4 (four) times daily. for 14 days, Disp: 56 tablet, Rfl: 0    INTRAROSA 6.5 mg Inst, INSERT ONE APPLICATION INTO THE VAGINA QHS, Disp: , Rfl: 12    lactulose (CHRONULAC) 20 gram/30 mL Soln, Take 15 mLs (10 g total) by mouth 3 (three) times daily as needed (Constipation)., Disp: 240 mL, Rfl: 0    methocarbamoL (ROBAXIN) 500  MG Tab, Take 1 tablet (500 mg total) by mouth 3 (three) times daily. for 14 days, Disp: 42 tablet, Rfl: 0    MV, MIN #36/IRON,CARBONYL/FA (GERITOL COMPLETE ORAL), Take by mouth., Disp: , Rfl:     oxyCODONE (ROXICODONE) 5 MG immediate release tablet, Take 1 tablet (5 mg total) by mouth every 4 (four) hours as needed for Pain., Disp: 25 tablet, Rfl: 0    pantoprazole (PROTONIX) 40 MG tablet, Take 1 tablet (40 mg total) by mouth once daily. For 30d, Disp: 30 tablet, Rfl: 0    polyethylene glycol (MIRALAX) 17 gram PwPk, Take 17 g by mouth once daily., Disp: 10 packet, Rfl: 0    triamcinolone acetonide 0.1% (KENALOG) 0.1 % cream, APPLY TOPICALLY TO THE AFFECTED AREA TWICE DAILY FOR 1 TO 2 WEEKS AS NEEDED FOR RASH, Disp: 80 g, Rfl: 11      Review of Systems:  Constitutional: no fever or chills  Eyes: no visual changes  ENT: no nasal congestion or sore throat  Respiratory: no cough or shortness of breath  Cardiovascular: no chest pain  Gastrointestinal: no nausea or vomiting, tolerating diet  Musculoskeletal: pain, soreness, and decreased ROM    OBJECTIVE:      Vital Signs (Most Recent):  There were no vitals filed for this visit.  There is no height or weight on file to calculate BMI.      Physical Exam:  Constitutional: The patient appears well-developed and well-nourished. No distress.   Skin: No lesions appreciated  Head: Normocephalic and atraumatic.   Nose: Nose normal.   Ears: No deformities seen  Eyes: Conjunctivae and EOM are normal.   Neck: No tracheal deviation present.   Cardiovascular: Normal rate and intact distal pulses.    Pulmonary/Chest: Effort normal. No respiratory distress.   Abdominal: There is no guarding.   Neurological: The patient is alert.   Psychiatric: The patient has a normal mood and affect.     Right Hand/Wrist Examination:    Observation/Inspection:  Swelling  Noted in the fingers    Deformity  none  Discoloration  Ecchymosis noted in the fingers    Scars   none    Atrophy  none  She  maintains reduction splint today, therefore evaluation of the hand/wrist/elbow is not performed. She does have swelling and ecchymosis in the fingers. She is able to move her fingers without issues, denies any n/t and has full SILT.    Abdomen not guarded  Respirations nonlabored  Perfusion intact    Diagnostic Results:     Imaging - I independently viewed the patient's imaging as well as the radiology report.      Wrist CT 4/14/23  Impression:     Fractures of the distal radius, ulnar styloid, and 5th metacarpal as detailed above.    Wrist Xray 4/14/23  FINDINGS:  As was documented on a concurrently obtained examination of the right wrist, there are several recent fractures.  There is a comminuted intra-articular fracture of the distal right radius, a fracture of the base of the right 5th metacarpal, and a fracture of the tip of the ulnar styloid.  No additional areas suspicious for recent fracture or other significant abnormality identified.  Spurring is seen at several DIP joint levels, particularly the 2nd DIP, and at the interphalangeal joint of the thumb.  Prominent local soft tissue swelling is seen about the wrist.     Impression:     1. Recent intra-articular fracture of the distal right radius.  2. Recent fracture of the tip of the right ulnar styloid.  3. Recent fracture of the base of the right 5th metacarpal.      ASSESSMENT/PLAN:      62 y.o. yo female with Right displaced intra-articular distal radius fracture, non displaced 5th metacarpal fracture and non displaced ulnar styloid fracture    Plan: The patient and I had a thorough discussion today.  We discussed the working diagnosis as well as several other potential alternative diagnoses.  Treatment options were discussed, both conservative and surgical, including risks/benefits of both options. Surgery is firmly recommended for this injury, which the patient and  both agree with. Per Dr. Geller, ok to maintain reduction splint until surgery  tomorrow.    She is consented for Right ORIF vs bridge plate of distal radius fracture, possible CRPP 5th metacarpal fracture and any indicated procedures with Dr. Geller on 4/21/23. Case ordered for Tomasa.    The patient has not responded to adequate non operative treatment at this time and/or non operative treatment is not indicated. Thus, the risks, benefits and alternatives to surgery were discussed with the patient in detail.  Specific risks include but are not limited to bleeding, infection, vessel and/or nerve damage, pain, numbness, tingling, compartment syndrome, need for additional surgery, failure to return to pre-injury and/or preoperative functional status, inability to return to work, scar sensitivity, delayed healing, complex regional pain syndrome, weakness, pulley injury, tendon injury, bowstringing, partial and/or incomplete relief of symptoms, persistence of and/or worsening of symptoms, hardware and/or surgical failure, prominent and/or symptomatic hardware possibly necessitating future removal, osteomyelitis, amputation, loss of function, stiffness, rotational malalignment, functional debility, dysfunction, decreased  strength, need for prolonged postoperative rehabilitation, malunion, nonunion, deep venous thrombosis, pulmonary embolism, arthritis and death.  The patient states an understanding and wishes to proceed with surgery.   All questions were answered.  No guarantees were implied or stated.  Written informed consent was obtained.    Should the patient's symptoms worsen, persist, or fail to improve they should return for reevaluation and I would be happy to see them back anytime.           Please do not hesitate to reach out to us via email, phone, or MyChart with any questions, concerns, or feedback.

## 2023-04-20 NOTE — TELEPHONE ENCOUNTER
Spoke with the patient. Notified of 10 AM arrival time to the [facility].  Informed of current visitor policy.  Reminded of NPO and need for transportation. Patient verbalized understanding to all.    Zaria Rose MS, OTC  Clinical Assistant to Dr. Ross Dunbar [facility]

## 2023-04-21 ENCOUNTER — ANESTHESIA (OUTPATIENT)
Dept: SURGERY | Facility: HOSPITAL | Age: 63
End: 2023-04-21
Payer: COMMERCIAL

## 2023-04-21 ENCOUNTER — PATIENT MESSAGE (OUTPATIENT)
Dept: SURGERY | Facility: HOSPITAL | Age: 63
End: 2023-04-21
Payer: COMMERCIAL

## 2023-04-21 ENCOUNTER — HOSPITAL ENCOUNTER (OUTPATIENT)
Facility: HOSPITAL | Age: 63
Discharge: HOME | End: 2023-04-21
Attending: ORTHOPAEDIC SURGERY | Admitting: ORTHOPAEDIC SURGERY
Payer: COMMERCIAL

## 2023-04-21 VITALS
BODY MASS INDEX: 26.87 KG/M2 | HEART RATE: 71 BPM | TEMPERATURE: 98 F | DIASTOLIC BLOOD PRESSURE: 73 MMHG | RESPIRATION RATE: 24 BRPM | SYSTOLIC BLOOD PRESSURE: 141 MMHG | WEIGHT: 146 LBS | OXYGEN SATURATION: 96 % | HEIGHT: 62 IN

## 2023-04-21 DIAGNOSIS — S62.316A CLOSED DISPLACED FRACTURE OF BASE OF FIFTH METACARPAL BONE OF RIGHT HAND, INITIAL ENCOUNTER: ICD-10-CM

## 2023-04-21 DIAGNOSIS — S52.591A OTHER CLOSED FRACTURE OF DISTAL END OF RIGHT RADIUS, INITIAL ENCOUNTER: Primary | ICD-10-CM

## 2023-04-21 DIAGNOSIS — S52.571A OTHER CLOSED INTRA-ARTICULAR FRACTURE OF DISTAL END OF RIGHT RADIUS, INITIAL ENCOUNTER: ICD-10-CM

## 2023-04-21 PROCEDURE — 25000003 PHARM REV CODE 250: Performed by: NURSE ANESTHETIST, CERTIFIED REGISTERED

## 2023-04-21 PROCEDURE — 71000033 HC RECOVERY, INTIAL HOUR: Performed by: ORTHOPAEDIC SURGERY

## 2023-04-21 PROCEDURE — 27200750 HC INSULATED NEEDLE/ STIMUPLEX: Performed by: ANESTHESIOLOGY

## 2023-04-21 PROCEDURE — D9220A PRA ANESTHESIA: ICD-10-PCS | Mod: ANES,,, | Performed by: STUDENT IN AN ORGANIZED HEALTH CARE EDUCATION/TRAINING PROGRAM

## 2023-04-21 PROCEDURE — 26608 PR CLOSED RX METACARPAL FX,PERCUT: ICD-10-PCS | Mod: 51,RT,, | Performed by: ORTHOPAEDIC SURGERY

## 2023-04-21 PROCEDURE — 71000015 HC POSTOP RECOV 1ST HR: Performed by: ORTHOPAEDIC SURGERY

## 2023-04-21 PROCEDURE — 64415 SUPRACLAVICULAR SINGLE SHOT: ICD-10-PCS | Mod: 59,RT,, | Performed by: ANESTHESIOLOGY

## 2023-04-21 PROCEDURE — D9220A PRA ANESTHESIA: ICD-10-PCS | Mod: CRNA,,, | Performed by: NURSE ANESTHETIST, CERTIFIED REGISTERED

## 2023-04-21 PROCEDURE — 25000003 PHARM REV CODE 250: Performed by: ANESTHESIOLOGY

## 2023-04-21 PROCEDURE — 63600175 PHARM REV CODE 636 W HCPCS: Performed by: PHYSICIAN ASSISTANT

## 2023-04-21 PROCEDURE — C1769 GUIDE WIRE: HCPCS | Performed by: ORTHOPAEDIC SURGERY

## 2023-04-21 PROCEDURE — 25609 PR OPEN RX DISTAL RADIUS FX, INTRA-ARTICULAR, 3+ FRAG: ICD-10-PCS | Mod: RT,,, | Performed by: ORTHOPAEDIC SURGERY

## 2023-04-21 PROCEDURE — 37000009 HC ANESTHESIA EA ADD 15 MINS: Performed by: ORTHOPAEDIC SURGERY

## 2023-04-21 PROCEDURE — 36000710: Performed by: ORTHOPAEDIC SURGERY

## 2023-04-21 PROCEDURE — 25000003 PHARM REV CODE 250: Performed by: PHYSICIAN ASSISTANT

## 2023-04-21 PROCEDURE — 63600175 PHARM REV CODE 636 W HCPCS: Performed by: NURSE ANESTHETIST, CERTIFIED REGISTERED

## 2023-04-21 PROCEDURE — 63600175 PHARM REV CODE 636 W HCPCS: Performed by: STUDENT IN AN ORGANIZED HEALTH CARE EDUCATION/TRAINING PROGRAM

## 2023-04-21 PROCEDURE — 27201423 OPTIME MED/SURG SUP & DEVICES STERILE SUPPLY: Performed by: ORTHOPAEDIC SURGERY

## 2023-04-21 PROCEDURE — 64415 NJX AA&/STRD BRCH PLXS IMG: CPT | Mod: 59 | Performed by: ANESTHESIOLOGY

## 2023-04-21 PROCEDURE — D9220A PRA ANESTHESIA: Mod: ANES,,, | Performed by: STUDENT IN AN ORGANIZED HEALTH CARE EDUCATION/TRAINING PROGRAM

## 2023-04-21 PROCEDURE — 94761 N-INVAS EAR/PLS OXIMETRY MLT: CPT

## 2023-04-21 PROCEDURE — 99900035 HC TECH TIME PER 15 MIN (STAT)

## 2023-04-21 PROCEDURE — 26608 TREAT METACARPAL FRACTURE: CPT | Mod: 51,RT,, | Performed by: ORTHOPAEDIC SURGERY

## 2023-04-21 PROCEDURE — 25000003 PHARM REV CODE 250: Performed by: STUDENT IN AN ORGANIZED HEALTH CARE EDUCATION/TRAINING PROGRAM

## 2023-04-21 PROCEDURE — 25609 OPTX DST RD XART FX/EP SEP3+: CPT | Mod: RT,,, | Performed by: ORTHOPAEDIC SURGERY

## 2023-04-21 PROCEDURE — 37000008 HC ANESTHESIA 1ST 15 MINUTES: Performed by: ORTHOPAEDIC SURGERY

## 2023-04-21 PROCEDURE — 36000711: Performed by: ORTHOPAEDIC SURGERY

## 2023-04-21 PROCEDURE — C1713 ANCHOR/SCREW BN/BN,TIS/BN: HCPCS | Performed by: ORTHOPAEDIC SURGERY

## 2023-04-21 PROCEDURE — D9220A PRA ANESTHESIA: Mod: CRNA,,, | Performed by: NURSE ANESTHETIST, CERTIFIED REGISTERED

## 2023-04-21 DEVICE — PEG BONE CORTICAL 2.3X18 TI: Type: IMPLANTABLE DEVICE | Site: WRIST | Status: FUNCTIONAL

## 2023-04-21 DEVICE — SCREW BONE 2.3 X 18MM: Type: IMPLANTABLE DEVICE | Site: WRIST | Status: FUNCTIONAL

## 2023-04-21 DEVICE — SCREW BNE N LOK HEXLB 3.5X12: Type: IMPLANTABLE DEVICE | Site: WRIST | Status: FUNCTIONAL

## 2023-04-21 DEVICE — PLATE VDR NARROW RIGHT: Type: IMPLANTABLE DEVICE | Site: WRIST | Status: FUNCTIONAL

## 2023-04-21 DEVICE — SCREW BONE 2.3 X 16MM: Type: IMPLANTABLE DEVICE | Site: WRIST | Status: FUNCTIONAL

## 2023-04-21 DEVICE — SCREW BONE NONLOCK HEX 3.5X10: Type: IMPLANTABLE DEVICE | Site: WRIST | Status: FUNCTIONAL

## 2023-04-21 RX ORDER — PROPOFOL 10 MG/ML
VIAL (ML) INTRAVENOUS CONTINUOUS PRN
Status: DISCONTINUED | OUTPATIENT
Start: 2023-04-21 | End: 2023-04-21

## 2023-04-21 RX ORDER — MUPIROCIN 20 MG/G
OINTMENT TOPICAL
Status: DISCONTINUED | OUTPATIENT
Start: 2023-04-21 | End: 2023-04-21 | Stop reason: HOSPADM

## 2023-04-21 RX ORDER — FENTANYL CITRATE 50 UG/ML
25-200 INJECTION, SOLUTION INTRAMUSCULAR; INTRAVENOUS
Status: DISCONTINUED | OUTPATIENT
Start: 2023-04-21 | End: 2023-07-07

## 2023-04-21 RX ORDER — OXYCODONE HYDROCHLORIDE 5 MG/1
5 TABLET ORAL
Status: DISCONTINUED | OUTPATIENT
Start: 2023-04-21 | End: 2023-04-21 | Stop reason: HOSPADM

## 2023-04-21 RX ORDER — LIDOCAINE HYDROCHLORIDE 20 MG/ML
INJECTION INTRAVENOUS
Status: DISCONTINUED | OUTPATIENT
Start: 2023-04-21 | End: 2023-04-21

## 2023-04-21 RX ORDER — HALOPERIDOL 5 MG/ML
0.5 INJECTION INTRAMUSCULAR EVERY 10 MIN PRN
Status: DISCONTINUED | OUTPATIENT
Start: 2023-04-21 | End: 2023-04-21 | Stop reason: HOSPADM

## 2023-04-21 RX ORDER — ONDANSETRON 2 MG/ML
INJECTION INTRAMUSCULAR; INTRAVENOUS
Status: DISCONTINUED | OUTPATIENT
Start: 2023-04-21 | End: 2023-04-21

## 2023-04-21 RX ORDER — BUPIVACAINE HYDROCHLORIDE 5 MG/ML
INJECTION, SOLUTION EPIDURAL; INTRACAUDAL
Status: COMPLETED | OUTPATIENT
Start: 2023-04-21 | End: 2023-04-21

## 2023-04-21 RX ORDER — OXYCODONE AND ACETAMINOPHEN 5; 325 MG/1; MG/1
1 TABLET ORAL EVERY 4 HOURS PRN
COMMUNITY
End: 2023-07-07

## 2023-04-21 RX ORDER — MIDAZOLAM HYDROCHLORIDE 1 MG/ML
.5-4 INJECTION INTRAMUSCULAR; INTRAVENOUS
Status: DISCONTINUED | OUTPATIENT
Start: 2023-04-21 | End: 2023-07-07

## 2023-04-21 RX ORDER — CELECOXIB 200 MG/1
400 CAPSULE ORAL ONCE
Status: COMPLETED | OUTPATIENT
Start: 2023-04-21 | End: 2023-04-21

## 2023-04-21 RX ORDER — ONDANSETRON 2 MG/ML
4 INJECTION INTRAMUSCULAR; INTRAVENOUS DAILY PRN
Status: DISCONTINUED | OUTPATIENT
Start: 2023-04-21 | End: 2023-04-21 | Stop reason: HOSPADM

## 2023-04-21 RX ORDER — DEXAMETHASONE SODIUM PHOSPHATE 4 MG/ML
INJECTION, SOLUTION INTRA-ARTICULAR; INTRALESIONAL; INTRAMUSCULAR; INTRAVENOUS; SOFT TISSUE
Status: DISCONTINUED | OUTPATIENT
Start: 2023-04-21 | End: 2023-04-21

## 2023-04-21 RX ORDER — KETAMINE HCL IN 0.9 % NACL 50 MG/5 ML
SYRINGE (ML) INTRAVENOUS
Status: DISCONTINUED | OUTPATIENT
Start: 2023-04-21 | End: 2023-04-21

## 2023-04-21 RX ORDER — FENTANYL CITRATE 50 UG/ML
25 INJECTION, SOLUTION INTRAMUSCULAR; INTRAVENOUS EVERY 5 MIN PRN
Status: DISCONTINUED | OUTPATIENT
Start: 2023-04-21 | End: 2023-04-21 | Stop reason: HOSPADM

## 2023-04-21 RX ORDER — HYDROMORPHONE HYDROCHLORIDE 1 MG/ML
0.2 INJECTION, SOLUTION INTRAMUSCULAR; INTRAVENOUS; SUBCUTANEOUS EVERY 5 MIN PRN
Status: DISCONTINUED | OUTPATIENT
Start: 2023-04-21 | End: 2023-04-21 | Stop reason: HOSPADM

## 2023-04-21 RX ORDER — FAMOTIDINE 10 MG/ML
INJECTION INTRAVENOUS
Status: DISCONTINUED | OUTPATIENT
Start: 2023-04-21 | End: 2023-04-21

## 2023-04-21 RX ORDER — ACETAMINOPHEN 500 MG
1000 TABLET ORAL
Status: COMPLETED | OUTPATIENT
Start: 2023-04-21 | End: 2023-04-21

## 2023-04-21 RX ADMIN — FENTANYL CITRATE 50 MCG: 50 INJECTION, SOLUTION INTRAMUSCULAR; INTRAVENOUS at 11:04

## 2023-04-21 RX ADMIN — FAMOTIDINE 20 MG: 10 INJECTION, SOLUTION INTRAVENOUS at 02:04

## 2023-04-21 RX ADMIN — ONDANSETRON 4 MG: 2 INJECTION, SOLUTION INTRAMUSCULAR; INTRAVENOUS at 02:04

## 2023-04-21 RX ADMIN — CEFAZOLIN 2 G: 2 INJECTION, POWDER, FOR SOLUTION INTRAMUSCULAR; INTRAVENOUS at 02:04

## 2023-04-21 RX ADMIN — MUPIROCIN: 20 OINTMENT TOPICAL at 11:04

## 2023-04-21 RX ADMIN — LIDOCAINE HYDROCHLORIDE 100 MG: 20 INJECTION INTRAVENOUS at 02:04

## 2023-04-21 RX ADMIN — ACETAMINOPHEN 1000 MG: 500 TABLET ORAL at 11:04

## 2023-04-21 RX ADMIN — SODIUM CHLORIDE: 0.9 INJECTION, SOLUTION INTRAVENOUS at 12:04

## 2023-04-21 RX ADMIN — SODIUM CHLORIDE, SODIUM GLUCONATE, SODIUM ACETATE, POTASSIUM CHLORIDE, MAGNESIUM CHLORIDE, SODIUM PHOSPHATE, DIBASIC, AND POTASSIUM PHOSPHATE: .53; .5; .37; .037; .03; .012; .00082 INJECTION, SOLUTION INTRAVENOUS at 02:04

## 2023-04-21 RX ADMIN — BUPIVACAINE HYDROCHLORIDE 30 ML: 5 INJECTION, SOLUTION EPIDURAL; INTRACAUDAL; PERINEURAL at 11:04

## 2023-04-21 RX ADMIN — PROPOFOL 50 MCG/KG/MIN: 10 INJECTION, EMULSION INTRAVENOUS at 02:04

## 2023-04-21 RX ADMIN — MIDAZOLAM 2 MG: 1 INJECTION INTRAMUSCULAR; INTRAVENOUS at 11:04

## 2023-04-21 RX ADMIN — CELECOXIB 400 MG: 200 CAPSULE ORAL at 11:04

## 2023-04-21 RX ADMIN — DEXAMETHASONE SODIUM PHOSPHATE 8 MG: 4 INJECTION, SOLUTION INTRAMUSCULAR; INTRAVENOUS at 02:04

## 2023-04-21 RX ADMIN — Medication 15 MG: at 02:04

## 2023-04-21 NOTE — INTERVAL H&P NOTE

## 2023-04-21 NOTE — PLAN OF CARE
Pre-op complete. Waiting on anesthesia consent.  at bedside. Bed locked in lowest position with call bell within reach.

## 2023-04-21 NOTE — ANESTHESIA PROCEDURE NOTES
Supraclavicular single shot    Patient location during procedure: pre-op   Block not for primary anesthetic.  Reason for block: at surgeon's request and post-op pain management   Post-op Pain Location: right hand pain   Start time: 4/21/2023 11:52 AM  Timeout: 4/21/2023 11:50 AM   End time: 4/21/2023 11:54 AM    Staffing  Authorizing Provider: Neetu Sewell MD  Performing Provider: Neetu Sewell MD    Preanesthetic Checklist  Completed: patient identified, IV checked, site marked, risks and benefits discussed, surgical consent, monitors and equipment checked, pre-op evaluation and timeout performed  Peripheral Block  Patient position: supine  Prep: ChloraPrep  Patient monitoring: heart rate, cardiac monitor, continuous pulse ox, continuous capnometry and frequent blood pressure checks  Block type: supraclavicular  Laterality: right  Injection technique: single shot  Needle  Needle type: Stimuplex   Needle gauge: 22 G  Needle length: 2 in  Needle localization: anatomical landmarks and ultrasound guidance   -ultrasound image captured on disc.  Assessment  Injection assessment: negative aspiration, negative parasthesia and local visualized surrounding nerve  Paresthesia pain: none  Heart rate change: no  Slow fractionated injection: yes    Medications:    Medications: bupivacaine (pf) (MARCAINE) injection 0.5% - Perineural   30 mL - 4/21/2023 11:50:00 AM    Additional Notes  VSS.  [facility] RN monitoring vitals throughout procedure.  Patient tolerated procedure well.

## 2023-04-21 NOTE — TRANSFER OF CARE
"Anesthesia Transfer of Care Note    Patient: Cristal Livingstonr    Procedure(s) Performed: Procedure(s) (LRB):  ORIF, FRACTURE, RADIUS, DISTAL - RIGHT poss bridge plate (Right)  CLOSED REDUCTION, FRACTURE, METACARPAL BONE - possible CRPP 5th metacarpal base (Right)    Patient location: PACU    Anesthesia Type: general    Transport from OR: Transported from OR on 6-10 L/min O2 by face mask with adequate spontaneous ventilation    Post pain: adequate analgesia    Post assessment: no apparent anesthetic complications    Post vital signs: stable    Level of consciousness: sedated    Nausea/Vomiting: no nausea/vomiting    Complications: none    Transfer of care protocol was followed      Last vitals:   Visit Vitals  /62 (BP Location: Left arm, Patient Position: Lying)   Pulse 67   Temp 36.4 °C (97.6 °F) (Oral)   Resp 14   Ht 5' 2" (1.575 m)   Wt 66.2 kg (146 lb)   SpO2 100%   Breastfeeding No   BMI 26.70 kg/m²     "

## 2023-04-21 NOTE — INTERVAL H&P NOTE

## 2023-04-21 NOTE — PLAN OF CARE
VSS. Patient able to tolerate oral liquids. Patient denies c/o pain. Patient/family received home medication per bedside delivery. Dressing intact. No distress noted. Patient states she is ready for discharge. Discharge instructions reviewed with patient and family, verbalized understanding. IV discontinued with catheter tip intact. Staff at bedside to help patient dress. Patient wheeled to lobby via staff.

## 2023-04-21 NOTE — ANESTHESIA POSTPROCEDURE EVALUATION
Anesthesia Post Evaluation    Patient: Cristal Livingstonr    Procedure(s) Performed: Procedure(s) (LRB):  ORIF, FRACTURE, RADIUS, DISTAL - RIGHT poss bridge plate (Right)  CLOSED REDUCTION, FRACTURE, METACARPAL BONE - possible CRPP 5th metacarpal base (Right)    Final Anesthesia Type: general      Patient location during evaluation: PACU  Patient participation: Yes- Able to Participate  Level of consciousness: awake and alert  Post-procedure vital signs: reviewed and stable  Pain management: adequate  Airway patency: patent  MARGARITA mitigation strategies: Multimodal analgesia  PONV status at discharge: No PONV  Anesthetic complications: no      Cardiovascular status: blood pressure returned to baseline and hemodynamically stable  Respiratory status: unassisted  Hydration status: euvolemic  Follow-up not needed.          Vitals Value Taken Time   /70 04/21/23 1616   Temp 37 04/21/23 1626   Pulse 73 04/21/23 1625   Resp 21 04/21/23 1625   SpO2 96 % 04/21/23 1625   Vitals shown include unvalidated device data.      No case tracking events are documented in the log.      Pain/Mihaela Score: Pain Rating Prior to Med Admin: 3 (4/21/2023 11:19 AM)  Mihaela Score: 9 (4/21/2023  4:02 PM)

## 2023-04-21 NOTE — ANESTHESIA PREPROCEDURE EVALUATION
04/21/2023  Cristal Gonzalez is a 62 y.o., female.    Pre-operative evaluation for Procedure(s) (LRB):  ORIF, FRACTURE, RADIUS, DISTAL - RIGHT poss bridge plate (Right)  CLOSED REDUCTION, FRACTURE, METACARPAL BONE - possible CRPP 5th metacarpal base (Right)        Patient Active Problem List   Diagnosis    Closed trimalleolar fracture of right ankle    Closed nondisplaced fracture of fifth left metatarsal bone with routine healing    Dorsalgia, unspecified    Hematochezia    Closed displaced fracture of greater tuberosity of right humerus    AK (actinic keratosis)    Melanoma of upper arm, right    Closed fracture of right distal radius       Review of patient's allergies indicates:   Allergen Reactions    Sulfa (sulfonamide antibiotics) Shortness Of Breath, Anxiety and Palpitations    Macrobid [nitrofurantoin monohyd/m-cryst] Nausea Only     Abd pain and nausea       No current facility-administered medications on file prior to encounter.     Current Outpatient Medications on File Prior to Encounter   Medication Sig Dispense Refill    acetaminophen (TYLENOL) 650 MG TbSR Take 1 tablet (650 mg total) by mouth every 8 (eight) hours. for 14 days 42 tablet 0    doxycycline (MONODOX) 100 MG capsule Take 1 capsule (100 mg total) by mouth 2 (two) times daily. for 10 days 20 capsule 0    fluticasone propionate (FLONASE) 50 mcg/actuation nasal spray 1 spray (50 mcg total) by Each Nostril route once daily. 15.8 mL 0    GLUCOSAM SUL NA/CHONDR THURSTON A NA (GLUCOSAMINE & CHONDROIT SUL.NA ORAL) Take by mouth.      MV, MIN #36/IRON,CARBONYL/FA (GERITOL COMPLETE ORAL) Take by mouth.      oxyCODONE-acetaminophen (PERCOCET) 5-325 mg per tablet Take 1 tablet by mouth every 4 (four) hours as needed for Pain.      pantoprazole (PROTONIX) 40 MG tablet Take 1 tablet (40 mg total) by mouth once daily. For 30d 30  tablet 0    benzonatate (TESSALON) 200 MG capsule Take 1 capsule (200 mg total) by mouth 3 (three) times daily as needed for Cough. 30 capsule 0    cetirizine (ZYRTEC) 10 MG tablet Take 1 tablet (10 mg total) by mouth once daily. 30 tablet 5    INTRAROSA 6.5 mg Inst INSERT ONE APPLICATION INTO THE VAGINA QHS  12    lactulose (CHRONULAC) 20 gram/30 mL Soln Take 15 mLs (10 g total) by mouth 3 (three) times daily as needed (Constipation). 240 mL 0    methocarbamoL (ROBAXIN) 500 MG Tab Take 1 tablet (500 mg total) by mouth 3 (three) times daily. for 14 days 42 tablet 0    polyethylene glycol (MIRALAX) 17 gram PwPk Take 17 g by mouth once daily. 10 packet 0    triamcinolone acetonide 0.1% (KENALOG) 0.1 % cream APPLY TOPICALLY TO THE AFFECTED AREA TWICE DAILY FOR 1 TO 2 WEEKS AS NEEDED FOR RASH 80 g 11       Past Surgical History:   Procedure Laterality Date    ANKLE FRACTURE SURGERY      APPENDECTOMY  2011    BREAST BIOPSY      benign    COLONOSCOPY N/A 06/25/2021    Procedure: COLONOSCOPY;  Surgeon: Nohemy Parra MD;  Location: [facility] (4TH FLR);  Service: Endoscopy;  Laterality: N/A;  Fully vacc Covid-19 - pg    EXCISION OF MELANOMA Right     arm    LYMPH NODE BIOPSY Right 11/17/2022    Procedure: BIOPSY, LYMPH NODE with mapping;  Surgeon: Ben Stallworth MD;  Location: [facility] OR [address] [place] [address];  Service: General;  Laterality: Right;         CBC:  Lab Results   Component Value Date    WBC 10.65 04/14/2023    RBC 4.33 04/14/2023    HGB 13.3 04/14/2023    HCT 41.7 04/14/2023     04/14/2023    MCV 96 04/14/2023    MCH 30.7 04/14/2023    MCHC 31.9 (L) 04/14/2023       CMP:   Lab Results   Component Value Date     04/14/2023    K 3.9 04/14/2023     04/14/2023    CO2 20 (L) 04/14/2023    BUN 13 04/14/2023    CREATININE 0.7 04/14/2023    GLU 92 04/14/2023    CALCIUM 9.2 04/14/2023    ALBUMIN 3.9 04/14/2023    PROT 7.0 04/14/2023    ALKPHOS 48 (L) 04/14/2023    ALT 15 04/14/2023    AST 18  "04/14/2023    BILITOT 0.3 04/14/2023       INR:  No results found for: PT, INR, PROTIME, APTT      Diagnostic Studies:      EKG:   Results for orders placed or performed in visit on 03/14/23   EKG 12-lead    Collection Time: 03/14/23  1:51 AM    Narrative    Test Reason :     Vent. Rate : 079 BPM     Atrial Rate : 079 BPM     P-R Int : 136 ms          QRS Dur : 078 ms      QT Int : 412 ms       P-R-T Axes : 071 056 047 degrees     QTc Int : 472 ms    Normal sinus rhythm  Possible Left atrial enlargement  Nonspecific ST and T wave abnormality  Abnormal ECG  No previous ECGs available  Confirmed by Adolfo ARVIZU MD ([id number]) on 3/14/2023 11:18:41 AM    Referred By: RAYMUNDO   SELF           Confirmed By:Adolfo ARVIZU MD          Pre-op Vitals [04/21/23 1110]   BP Pulse Resp Temp SpO2   134/61 69 16 36.4 °C (97.6 °F) 100 %      Height Weight BMI (Calculated)     5' 2" 146 lb 26.7          Pre-op Assessment    I have reviewed the Patient Summary Reports.     I have reviewed the Nursing Notes. I have reviewed the NPO Status.      Review of Systems  Anesthesia Hx:  No problems with previous Anesthesia    Social:  No Alcohol Use, Non-Smoker    Cardiovascular:   Exercise tolerance: good    Hepatic/GI:   GERD, well controlled        Physical Exam  General: Well nourished and Cooperative    Airway:  Mallampati: II   Mouth Opening: Normal  TM Distance: Normal  Tongue: Normal    Dental:  Intact    Chest/Lungs:  Normal Respiratory Rate    Heart:  Rhythm: Regular Rhythm        Anesthesia Plan  Type of Anesthesia, risks & benefits discussed:    Anesthesia Type: Gen Natural Airway, Regional  Intra-op Monitoring Plan: Standard ASA Monitors  Post Op Pain Control Plan: multimodal analgesia and IV/PO Opioids PRN  Induction:  IV  Informed Consent: Informed consent signed with the Patient and all parties understand the risks and agree with anesthesia plan.  All questions answered.   ASA Score: 1    Ready For Surgery From Anesthesia Perspective. "     .

## 2023-04-22 NOTE — DISCHARGE SUMMARY
[facility] - Surgery (Hospital)  Discharge Note  Short Stay    Procedure(s) (LRB):  ORIF, FRACTURE, RADIUS, DISTAL - RIGHT poss bridge plate (Right)  CLOSED REDUCTION, FRACTURE, METACARPAL BONE - possible CRPP 5th metacarpal base (Right)      OUTCOME: Patient tolerated treatment/procedure well without complication and is now ready for discharge.    DISPOSITION: Home or Self Care    FINAL DIAGNOSIS:  Distal Radius Fracture and 5th Metacarpal Base Fracture    FOLLOWUP: In clinic    DISCHARGE INSTRUCTIONS:    Discharge Procedure Orders   Diet general     Call MD for:  temperature >100.4     Call MD for:  persistent nausea and vomiting     Call MD for:  severe uncontrolled pain     Call MD for:  difficulty breathing, headache or visual disturbances     Call MD for:  redness, tenderness, or signs of infection (pain, swelling, redness, odor or green/yellow discharge around incision site)     Call MD for:  hives     Call MD for:  persistent dizziness or light-headedness     Call MD for:  extreme fatigue     Leave dressing on - Keep it clean, dry, and intact until clinic visit        TIME SPENT ON DISCHARGE: 5 minutes    Should the patient's symptoms worsen, persist, or fail to improve they should return for reevaluation and I would be happy to see them back anytime.        Anand Geller M.D.    Please be aware that this note has been generated with the assistance of [employer] voice-to-text.  Please excuse any spelling or grammatical errors.    Thank you for choosing Dr. Anand Geller for your orthopedic hand and upper extremity care. It is our goal to provide you with exceptional care that will help keep you healthy, active, and get you back in the game.     If you felt that you received exemplary care today, please consider leaving feedback for Dr. Geller on [employer] [facility] at [email or web address].    Please do not hesitate to reach out to us via email, phone, or MyChart with any questions,  concerns, or feedback.

## 2023-04-24 DIAGNOSIS — S52.571A OTHER CLOSED INTRA-ARTICULAR FRACTURE OF DISTAL END OF RIGHT RADIUS, INITIAL ENCOUNTER: Primary | ICD-10-CM

## 2023-04-24 NOTE — OP NOTE
DATE OF PROCEDURE:  4/21/2023     SERVICE:  Orthopedics.     SURGEON:  Anand Geller M.D.     FIRST ASSISTANT:  MYRON Mahoney (RES)     PREOPERATIVE DIAGNOSES:  1.  right intra-articular distal radius fracture  2.  Right 5th metacarpal base fracture, intra-articular with extension into the carpometacarpal joint      POSTOPERATIVE DIAGNOSES:  1.  right intra-articular distal radius fracture  2.  Right 5th metacarpal base fracture, intra-articular with extension into the carpometacarpal joint      PROCEDURES PERFORMED:  1.  Open reduction and internal fixation of right distal radius fracture,   intraarticular, 3+ parts.  2.  Closed reduction and percutaneous pin fixation right 5th metacarpal base fracture, intra-articular with extension into the carpometacarpal joint     TOURNIQUET TIME:  56 minutes at 250 mmHg.     ESTIMATED BLOOD LOSS:  4 mL.     IMPLANTS:    1) Acumed volar distal radius plate with a combination of   cortical and locking screws and pegs.  2) 0.045 Italo wire x1     COMPLICATIONS:  None.     PACKS AND DRAINS:  None.     SPECIMENS:  None.     ANESTHESIA:  Regional MAC.     INTRAVENOUS FLUIDS:  Crystalloid.     CONDITION:  Stable.     MICROBIOLOGY:  None.     INDICATIONS FOR PROCEDURE:  The patient is a 62-year-old female who   previously sustained significant trauma to the left upper extremity.  The patient was evaluated in the Orthopedics Clinic and found to have a displaced distal radius fracture as well as intra-articular base of the left 5th metacarpal fracture.   The risks, benefits and alternatives to operative intervention were   discussed with the patient in detail.  Specific risks include, but   Are not limited to bleeding, infection, neurovascular damage, pain, numbness,   tingling, compartment syndrome, need for additional surgery, failure to return   to pre-injury and/or preoperative functional status, inability to return to   work, scar sensitivity, delayed healing, complex  regional pain syndrome,   weakness, tendon injury, bowstringing, partial and/or incomplete relief of   symptoms, persistence of and/or worsening of symptoms, hardware and/or surgical   failure, prominent and/or symptomatic hardware, possibly necessitating future   removal, osteomyelitis, amputation, loss of function, stiffness, rotational   malalignment, functional debility, dysfunction, decreased  strength, need   for prolonged postoperative rehabilitation, malunion, nonunion, DVT, PE,   arthritis, death, and others.  The patient consented to the risks of surgical intervention and wished to   proceed with surgery.  All questions were answered and no guarantees were   implied or stated.  Written informed consent was obtained.     PROCEDURE IN DETAIL:  On the date of the operative intervention, the patient was   evaluated in the preoperative holding area.  With their participation, the left   upper extremity was marked as the operative site.  The patient then underwent regional   anesthesia and was transferred to the Operative Suite.  The patient was then transferred   to the OR table with all superficial bony prominences well padded.  The left   upper extremity was placed on a hand table and then a nonsterile tourniquet was   placed high on the patient's upper arm.  The operative upper extremity was then   prepped and draped in the usual sterile fashion and a timeout was taken and   confirmed by all present to confirm the correct patient, site, procedure and   administration of preoperative antibiotics.  All were in agreement, so I   proceeded.  The standard FCR approach to the distal radius was   marked out for a length of approximately 6 cm.  Esmarch was utilized for exsanguination.   Tourniquet was then inflated to 250 mmHg.  Skin   incision to the volar aspect of the left distal radius was then made sharply   with a scalpel and dissection was carried down to the flexor carpi radialis   tendon.  The tendon  was retracted ulnarly and the fascia deep to this was   incised sharply with a scalpel.  All flexor tendons including the flexor carpi   radialis and flexor pollicis longus were then swept ulnarly and the radial   artery was swept radially.  Great care was taken throughout the duration of the procedure to protect all neurovascular and tendinous structures.  The pronator quadratus was then identified and   sharply divided at the red-white junction distally and also the radial aspect on   the radius.  A key elevator was then utilized to sweep the pronator quadratus   muscle off the volar surface of the radius.  The fracture site was then readily   identified.  There was significant intra-articular extension and involvement of this fracture with 3+ parts.  The fracture site was then   cleaned of fracture hematoma and debris. A reduction maneuver was   performed.  I was able to restore length alignment and rotation to the fracture.  An appropriate Acumed volar distal radius plate was then   chosen and provisionally fixed to the radius with 3 K-wires.  Fluoroscopy was   then brought into the field, which verified placement of all hardware as well as   reduction.  Once I was happy with our reduction and placement of the hardware,   I then fixed the plate to the bone with a combination of distal locking screws and pegs  and the proximal shaft screws in succession.  Fluoroscopy was again utilized to   verify placement of the hardware and maintain the reduction.  Once all hardware was in its final position, I then took final   fluoroscopic x-rays, which again verified maintenance of our reduction and that   all hardware was in appropriate position and not in violation of the joint surface.  I was pleased with   the reduction and then I performed a Shuck maneuver to stress the distal   radioulnar joint, which I found to be stable.  The wrist was taken through range   of motion.  No hardware penetrated the joint.  There was no  crepitus with   wrist range of motion.  The wound was then copiously irrigated with sterile   saline.  The tourniquet was let down.  There was no significant bleeding, and  hemostasis was achieved with bipolar electrocautery.  The wound was then closed   in a layered fashion with 4-0 Vicryl suture in the subcutaneous and dermal   tissues followed by 4-0 nylon in a horizontal mattress fashion to close the   skin.  Having completed fixation of the right distal radius fracture, I turned my attention towards the right 5th metacarpal base intra-articular fracture.  Under fluoroscopic guidance, a 0.045 Italo wire was utilized as a joystick to facilitate reduction of this piece.  Excellent reduction was obtained in the joint surface was congruent and near anatomically reduced.  The Italo wire was then driven through the fracture fragment and into the 4th and 3rd metacarpals for fixation.  Purchase and fixation and stability were excellent.  Orthogonal fluoroscopy verified good placement of hardware.  Sterile dressings were then applied consisting of Xeroform, 4 x 4s, gauze   and a splint.  The patient was then awakened from   anesthesia and returned to the Postanesthesia Care Unit in stable condition.    There were no complications and as the attending surgeon, I was present for and   performed the critical portions of the procedure.     POSTOPERATIVE PLAN FOR THE PATIENT:  The patient will be discharged home in stable condition. They will remain nonweightbearing to the operative upper extremity during   the healing process.  I will re-evaluate the patient in approximately 2 weeks for suture removal and re-evaluation of the postoperative plan.

## 2023-04-28 DIAGNOSIS — S52.571A OTHER CLOSED INTRA-ARTICULAR FRACTURE OF DISTAL END OF RIGHT RADIUS, INITIAL ENCOUNTER: Primary | ICD-10-CM

## 2023-05-03 ENCOUNTER — TELEPHONE (OUTPATIENT)
Dept: ORTHOPEDICS | Facility: CLINIC | Age: 63
End: 2023-05-03
Payer: COMMERCIAL

## 2023-05-03 NOTE — TELEPHONE ENCOUNTER
Spoke with the patient. Informed of X-rays scheduled prior to appointment. Answered all the questions and Patient verbalized understanding and was thankful.

## 2023-05-04 ENCOUNTER — OFFICE VISIT (OUTPATIENT)
Dept: ORTHOPEDICS | Facility: CLINIC | Age: 63
End: 2023-05-04
Payer: COMMERCIAL

## 2023-05-04 ENCOUNTER — HOSPITAL ENCOUNTER (OUTPATIENT)
Dept: RADIOLOGY | Facility: HOSPITAL | Age: 63
Discharge: HOME | End: 2023-05-04
Attending: PHYSICIAN ASSISTANT
Payer: COMMERCIAL

## 2023-05-04 DIAGNOSIS — Z98.890 POSTOPERATIVE STATE: ICD-10-CM

## 2023-05-04 DIAGNOSIS — S52.571A OTHER CLOSED INTRA-ARTICULAR FRACTURE OF DISTAL END OF RIGHT RADIUS, INITIAL ENCOUNTER: Primary | ICD-10-CM

## 2023-05-04 DIAGNOSIS — S52.571A OTHER CLOSED INTRA-ARTICULAR FRACTURE OF DISTAL END OF RIGHT RADIUS, INITIAL ENCOUNTER: ICD-10-CM

## 2023-05-04 PROCEDURE — 99024 POSTOP FOLLOW-UP VISIT: CPT | Mod: S$GLB,,, | Performed by: PHYSICIAN ASSISTANT

## 2023-05-04 PROCEDURE — 99999 PR PBB SHADOW E&M-EST. PATIENT-LVL III: CPT | Mod: PBBFAC,,, | Performed by: PHYSICIAN ASSISTANT

## 2023-05-04 PROCEDURE — 3044F PR MOST RECENT HEMOGLOBIN A1C LEVEL <7.0%: ICD-10-PCS | Mod: CPTII,S$GLB,, | Performed by: PHYSICIAN ASSISTANT

## 2023-05-04 PROCEDURE — 1159F MED LIST DOCD IN RCRD: CPT | Mod: CPTII,S$GLB,, | Performed by: PHYSICIAN ASSISTANT

## 2023-05-04 PROCEDURE — 99999 PR PBB SHADOW E&M-EST. PATIENT-LVL III: ICD-10-PCS | Mod: PBBFAC,,, | Performed by: PHYSICIAN ASSISTANT

## 2023-05-04 PROCEDURE — 1159F PR MEDICATION LIST DOCUMENTED IN MEDICAL RECORD: ICD-10-PCS | Mod: CPTII,S$GLB,, | Performed by: PHYSICIAN ASSISTANT

## 2023-05-04 PROCEDURE — 99024 PR POST-OP FOLLOW-UP VISIT: ICD-10-PCS | Mod: S$GLB,,, | Performed by: PHYSICIAN ASSISTANT

## 2023-05-04 PROCEDURE — 73110 XR WRIST COMPLETE 3 VIEWS RIGHT: ICD-10-PCS | Mod: 26,RT,, | Performed by: RADIOLOGY

## 2023-05-04 PROCEDURE — 73110 X-RAY EXAM OF WRIST: CPT | Mod: 26,RT,, | Performed by: RADIOLOGY

## 2023-05-04 PROCEDURE — 73110 X-RAY EXAM OF WRIST: CPT | Mod: TC,PN,RT

## 2023-05-04 PROCEDURE — 3044F HG A1C LEVEL LT 7.0%: CPT | Mod: CPTII,S$GLB,, | Performed by: PHYSICIAN ASSISTANT

## 2023-05-04 RX ORDER — HYDROCODONE BITARTRATE AND ACETAMINOPHEN 5; 325 MG/1; MG/1
1 TABLET ORAL EVERY 6 HOURS PRN
Qty: 15 TABLET | Refills: 0 | Status: SHIPPED | OUTPATIENT
Start: 2023-05-04 | End: 2023-07-07

## 2023-05-04 NOTE — PROGRESS NOTES
Dr. Geller is the supervising physician for this encounter/patient    Cristal Gonzalez presents for post-operative evaluation.  The patient is now 2 weeks s/p Right ORIF distal radius fracture and CRPP 5th metacarpal base fracture with Dr. Geller on 4/21/23.  Overall the patient reports doing well.  She reports 3/10 pain, denies any f/c/s, she does report some tingling into the hand that comes/goes. She is taking Tylenol and Ibuprofen, will take a percocet as needed. She is scheduled to start OT on Monday.    PE:    AA&O x 4.  NAD  HEENT:  NCAT, sclera nonicteric  Lungs:  Respirations are equal and unlabored.  CV:  2+ bilateral upper and lower extremity pulses.  MSK: The wound is healing well with no signs of erythema or warmth.  There is no drainage.  No clinical signs or symptoms of infection are present.  Healing ecchymosis present to volar wrist. Decreased hand motion due to pain/stiffness. SILT. DNVI.    IMAGING - reviewed with patient  FINDINGS:  Postop ORIF comminuted distal radial fracture that extends into the radiocarpal joint.  Surgical pin base of the 5th metacarpal though not convincingly traversing the proximal fracture fragment.  Probable chip fracture ulnar styloid.  Mild separation of the radial fracture fragments.     Impression:     Postop changes above.  The pin does not definitely traversed the fracture fragment the base of the 5th metacarpal.    A/P: Status post above, doing well  1) Transitioned to wrist brace today. Start OT for postop rehab. We discuss focusing on motion of the fingers for now. NWB until 6 weeks.  2) All sutures removed today. Wound care and signs of infection discussed.  3) F/U 3 weeks, repeat xrays and pin removal.  4) Call with any questions/concerns in the interim      Jamie Russo-DiGeorge PA-C

## 2023-05-08 ENCOUNTER — CLINICAL SUPPORT (OUTPATIENT)
Dept: REHABILITATION | Facility: HOSPITAL | Age: 63
End: 2023-05-08
Attending: ORTHOPAEDIC SURGERY
Payer: COMMERCIAL

## 2023-05-08 DIAGNOSIS — M79.641 RIGHT HAND PAIN: ICD-10-CM

## 2023-05-08 DIAGNOSIS — S52.571A OTHER CLOSED INTRA-ARTICULAR FRACTURE OF DISTAL END OF RIGHT RADIUS, INITIAL ENCOUNTER: ICD-10-CM

## 2023-05-08 PROCEDURE — 97165 OT EVAL LOW COMPLEX 30 MIN: CPT | Mod: PO

## 2023-05-08 PROCEDURE — 97530 THERAPEUTIC ACTIVITIES: CPT | Mod: PO

## 2023-05-08 NOTE — PROGRESS NOTES
[facility] Occupational Therapy  Initial Evaluation     Date: 5/8/2023  Patient: Cristal Gonzalez  Chart Number: [id number]  Referring Physician: Anand Geller MD  Therapy Diagnosis:   1. Other closed intra-articular fracture of distal end of right radius, initial encounter  Ambulatory referral/consult to Physical/Occupational Therapy      2. Right hand pain            Medical Diagnosis: S52.571A (ICD-10-CM) - Other closed intra-articular fracture of distal end of right radius, initial encounter  Physician Orders: Start OT for postop rehab. Focusing on motion of the fingers for now. NWB until 6 weeks.  Evaluation Date: 5/8/2023  Plan of Care Certification Date: 7/31/2023  Authorization Period: 12/29/2023  Surgery Date and Procedure: 4/21/2023  Date of Return to MD: 5/25/2023    Visit #: 1 of 1  Time In: 2:20 PM  Time Out: 3:00 PM  Total Billable Time: 40    Precautions: Standard, Fall and Weightbearing    Subjective     Involved Side: Right  Dominant Side: Right  Date of Onset: 4/14/2023  History of Current Condition: Patient is a 62 y.o. right hand dominant female who fell and landed on outstretched hand on 4/14/23, was seen in the ED same day. Ortho resident performed closed reduction and splinting of displaced distal radius fracture. Known 5th metacarpal base fracture and ulnar styloid fracture as well. The patient is now 2 weeks s/p Right ORIF distal radius fracture and CRPP 5th metacarpal base fracture with Dr. Geller on 4/21/23.   Imaging: Postop ORIF comminuted distal radial fracture that extends into the radiocarpal joint.  Surgical pin base of the 5th metacarpal though not convincingly traversing the proximal fracture fragment.  Probable chip fracture ulnar styloid.  Mild separation of the radial fracture fragments.  Previous Therapy: None    Patient's Goals for Therapy: To get hand back to normal, write again, and use hand again    Pain:  Functional Pain Scale Rating 0-10:   4/10  on average  2/10 at best  6/10 at worst  Location: Ulnar and dorsal side; incision site  Description: Constant, stiff, achy, tingling (incision site)  Aggravating Factors: supination  Easing Factors: Elevation, tylenol/ibuprofen    Previous Level of function Independent with ADLs    Current Level of Function Fear of walking, difficulty with writing and self care tasks, lifting items    Occupation:  [employer]  Working presently: Not working at moment due to injury  Duties: Typing, writing, hanging items, moving clothing    Past Medical History/Physical Systems Review:   Cristal Gonzalez  has a past medical history of GERD (gastroesophageal reflux disease), Hot flash, menopausal, Melanoma, and Shoulder fracture.    Cristal Gonzalez  has a past surgical history that includes Appendectomy (2011); Breast biopsy; Ankle fracture surgery; Colonoscopy (N/A, 06/25/2021); Lymph node biopsy (Right, 11/17/2022); Excision of melanoma (Right); Open reduction and internal fixation (ORIF) of fracture of distal radius (Right, 4/21/2023); and Closed reduction of injury of metacarpal bone (Right, 4/21/2023).    Cristal has a current medication list which includes the following prescription(s): acetaminophen, benzonatate, cetirizine, fluticasone propionate, glucosamine-chondroitn sulf.na, hydrocodone-acetaminophen, ibuprofen, intrarosa, lactulose, multivit-min36/iron/folic acid, oxycodone-acetaminophen, oxycodone-acetaminophen, pantoprazole, polyethylene glycol, and triamcinolone acetonide 0.1%, and the following Facility-Administered Medications: fentanyl and midazolam.    Review of patient's allergies indicates:   Allergen Reactions    Sulfa (sulfonamide antibiotics) Shortness Of Breath, Anxiety and Palpitations    Macrobid [nitrofurantoin monohyd/m-cryst] Nausea Only     Abd pain and nausea          Objective     Mental status: alert, oriented x3    Observation:   Incision healing well- surgical tape in  place. Percutaneous pinning at 5th metacarpal.    Sensation:    5/8/2023    Right   Monofilament Testing    Normal 1.65-2.83 X   Diminished Light Touch 3.22-3.61    Diminished Protective 3.84-4.31    Loss of Protective 4.56-6.65    Untestable >6.65      Edema: Circumferential measurements: In centimters    Range of Motion:   Right  Affected    Wrist AROM  WE  WF  UD  RD    17 26 15 4       Finger AROM   INDEX  LONG  RING  SMALL    MP  0/45 0/45 0/30 0/24   PIP  0/66 0/67 0/70 0/40   DIP  0/40 0/51 0/47 0/35        Forearm AROM  PRO  SUP    WFL     10      Unaffected Left    Wrist AROM  WE  WF  UD  RD    76 65 15 24        Finger AROM   INDEX  LONG  RING  SMALL    MP  0/89 0/91 0/88 0/85   PIP  0/108 0/98 0/100 0/85   DIP  0/76 0/85 0/93 0/92        Forearm AROM  PRO  SUP    WFL WFL           Strength: (MAZIN Dynamometer in psi.)      Left Right   Rung II 29.3 Deferred       Pinch Strength (Measured in psi)     Left Right   Key Pinch 12  Deferred    3pt Pinch 10  Deferred    2pt Pinch 8  Deferred        CMS Impairment/Limitation/Restriction for FOTO Hand/Wrist/Finger Survey    FOTO documents entered into [employer] - see Media section.    Limitation Score: 79%  Category: Self Care           Treatment     Treatment Time In: 2:55  Treatment Time Out: 3:15  Total Treatment time separate from Evaluation time:20 minutes    Cristal participated in dynamic functional therapeutic activities to improve functional performance for 20  minutes, including:  -HEP training  -Edu on physiology and anatomy    Home Exercise Program/Education:  Issued HEP (see patient instructions in EMR) and educated on modality use for pain management . Exercises were reviewed and Cristal was able to demonstrate them prior to the end of the session.   Pt received a written copy of exercises to perform at home. Cristal demonstrated good  understanding of the education provided.  Pt was advised to perform these exercises free of pain, and to stop performing them  if pain occurs.    Patient/Family Education: role of OT, goals for OT, scheduling/cancellations - pt verbalized understanding. Discussed insurance limitations with patient.    Additional Education provided: Use of heat, healing process, distal radius fx prognoses, anatomy and physiology of condition      Assessment     Cristal Gonzalez is a 62 y.o. female presents with limitations as described in problem list. Patient can benefit from Occupational Therapy services for Iontophoresis, ultrasound, moist heat, therapeutic exercises, home exercise program provied with written instructions, ice and strengthening and orthotics, if deemed necessary . The following goals were discussed with the patient and she is in agreement with them as to be addressed in the treatment plan. Pt with limited ROM and pain focused around ulnar wrist and dorsal wrist. Pt required therapeutic listening and encouragement about condition, benefiting from education on healing process.  The patient's rehab potential is Good.     Anticipated barriers to occupational therapy: None  Pt has no cultural, educational or language barriers to learning provided.    Profile and History Assessment of Occupational Performance Level of Clinical Decision Making Complexity Score   Occupational Profile:   Cristal Gonzalez is a 62 y.o. female who is  currently not working due to injury.  Cristal Gonzalez has difficulty with  ADLs and IADLs as listed previously, which  affecting his/her daily functional abilities.      Comorbidities:    has a past medical history of GERD (gastroesophageal reflux disease), Hot flash, menopausal, Melanoma, and Shoulder fracture.    Medical and Therapy History Review:   Brief               Performance Deficits    Physical:  Joint Mobility  Joint Stability  Muscle Power/Strength  Skin Integrity/Scar Formation  Edema   Strength  Pinch Strength  Pain    Cognitive:  No Deficits    Psychosocial:    No Deficits      Clinical Decision Making:  low    Modification/Need for Assistance:  Not Necessary    Intervention Selection:  Limited Treatment Options       low  Based on PMHX, co morbidities , data from assessments and functional level of assistance required with task and clinical presentation directly impacting function.         Goals:    LTG's ( 12 weeks):  1)   Increase ROM to WFL degrees in hand/wrist/FA to increase functional hand use for self care tasks.  2)   Increase  strength to 40 lbs. to grasp hair dryer.  3)   Increase lateral pinch to 14 psis for opening sauce packets while cooking.  4)   Decrease complaints of pain to  1 out of 10 at worst to increase functional hand use for ADL/work/leisure activities.  5)   Patient to score at 45% or less on FOTO to demonstrate improved perception of functional RUE use.  6)   Pt will return to near to prior level of function for ADLs and household management reporting I or Mod I with ADLs (dressing, feeding, grooming, toileting).     STG's (6 weeks)  1)   Patient to be IND with HEP and modalities for pain/edema managment.  2)   Increase ROM to 60% degrees in composite fist to increase functional hand use for ADLs/work/leisure activities.  3)   Increase ROM by 30 degrees in WF/WE to increase functional hand use for ADLs/work/leisure activities.  4)   Increase ROM by 40 degrees in FA supination to increase functional hand use for ADLs/work/leisure activities.  5)   Increase  strength to 20 lbs. to improve functional grasp for ADLs/work/leisure activities.   6)   Increase lateral pinch to 6 psi's to increase independence with button and FM Coordination.   7)   Patient to be IND with Orthotic use, wear and care precautions.   8)   Decrease complaints of pain to  4 out of 10 at worst to increase functional hand use for ADL/work/leisure activities.    Plan     Pt to be treated by Occupational Therapy 2 times per week for 12 weeks during the certification period from 5/8/2023  to 7/21/2023 to achieve the established goals.     Treatment to include: Paraffin, Fluidotherapy, Manual therapy/joint mobilizations, Modalities for pain management, Therapeutic exercises/activities., Iontophoresis with 2.0 cc Dexamethasone, Strengthening, Orthotic Fabrication/Fit/Training, Edema Control, Scar Management, and Joint Protection, as well as any other treatments deemed necessary based on the patient's needs or progress.     Jessica White, OTR/L, [facility]  Occupational Therapist

## 2023-05-08 NOTE — PATIENT INSTRUCTIONS
Heat 10-15 minutes before performing exercises. You can heat as much as you want throughout the day.    Edema Reduction (Retrograde Massage)        A. Enclose tip of finger with other hand and slide toward wrist.  B. For larger areas, massage toward the body in one direction only.  Perform 3-5 minutes. Do 3 sessions per day.      AROM: Forearm Pronation / Supination        With arm in handshake position, slowly rotate palm down until stretch is felt. Relax. Then rotate palm up until stretch is felt.  Repeat 20 times. Do 4 sessions per day.      Extension (Active With Finger Extension)        With forearm on table and wrist over edge, lift hand with fingers straight.   Repeat 20 times. Do 4 sessions per day.    Radial / Ulnar Deviation (Assistive)        Move hand side to side like a [employer] wiper. Do not move elbow.  Repeat 20 times. Do 4 sessions per day.    Circumduction (Active)        With fingers curled, move slowly at wrist in clock- wise circles 20 times. Repeat counterclockwise. Do not move elbow or shoulder.  Do 4 sessions per day.    MP Flexion (Active)        With back of hand on table, bend large knuckles as far as they will go, keeping small joints straight.  Repeat 20 times. Do 4 sessions per day.  Activity: Reach into a narrow container.*    Flexor Tendon Gliding (Active Full Fist)        Straighten all fingers, then make a fist, bending all joints.  Repeat 20 times. Do 4 sessions per day.        MP Extension (Active)        With palm on table, straighten fingers completely at large knuckles, and lift fingers individually off table.   Repeat 20 times. Do 4 sessions per day.      Abduction / Adduction (Active)        With hand flat on table, spread all fingers apart, then bring them together as close as possible.  Repeat 20 times. Do 4 sessions per day.     Tendon Glides         Start at position A and move through each position slowly attempting to achieve full glide.  A-E is ONE repetition.      Complete 10 reps 3 times per day.      Strengthening (Resistive Putty)        Squeeze putty using thumb and all fingers.  Repeat 10 times. Do 3 sessions per day.

## 2023-05-08 NOTE — PLAN OF CARE
[facility] Occupational Therapy  Initial Evaluation     Date: 5/8/2023  Patient: Cristal Gonzalez  Chart Number: [id number]  Referring Physician: Anand Geller MD  Therapy Diagnosis:   1. Other closed intra-articular fracture of distal end of right radius, initial encounter  Ambulatory referral/consult to Physical/Occupational Therapy      2. Right hand pain            Medical Diagnosis: S52.571A (ICD-10-CM) - Other closed intra-articular fracture of distal end of right radius, initial encounter  Physician Orders: Start OT for postop rehab. Focusing on motion of the fingers for now. NWB until 6 weeks.  Evaluation Date: 5/8/2023  Plan of Care Certification Date: 7/31/2023  Authorization Period: 12/29/2023  Surgery Date and Procedure: 4/21/2023  Date of Return to MD: 5/25/2023    Visit #: 1 of 1  Time In: 2:20 PM  Time Out: 3:00 PM  Total Billable Time: 40    Precautions: Standard, Fall and Weightbearing    Subjective     Involved Side: Right  Dominant Side: Right  Date of Onset: 4/14/2023  History of Current Condition: Patient is a 62 y.o. right hand dominant female who fell and landed on outstretched hand on 4/14/23, was seen in the ED same day. Ortho resident performed closed reduction and splinting of displaced distal radius fracture. Known 5th metacarpal base fracture and ulnar styloid fracture as well. The patient is now 2 weeks s/p Right ORIF distal radius fracture and CRPP 5th metacarpal base fracture with Dr. Geller on 4/21/23.   Imaging: Postop ORIF comminuted distal radial fracture that extends into the radiocarpal joint.  Surgical pin base of the 5th metacarpal though not convincingly traversing the proximal fracture fragment.  Probable chip fracture ulnar styloid.  Mild separation of the radial fracture fragments.  Previous Therapy: None    Patient's Goals for Therapy: To get hand back to normal, write again, and use hand again    Pain:  Functional Pain Scale Rating 0-10:   4/10  on average  2/10 at best  6/10 at worst  Location: Ulnar and dorsal side; incision site  Description: Constant, stiff, achy, tingling (incision site)  Aggravating Factors: supination  Easing Factors: Elevation, tylenol/ibuprofen    Previous Level of function Independent with ADLs    Current Level of Function Fear of walking, difficulty with writing and self care tasks, lifting items    Occupation:  [employer]  Working presently: Not working at moment due to injury  Duties: Typing, writing, hanging items, moving clothing    Past Medical History/Physical Systems Review:   Cristal Gonzalez  has a past medical history of GERD (gastroesophageal reflux disease), Hot flash, menopausal, Melanoma, and Shoulder fracture.    Cristal Gonzalez  has a past surgical history that includes Appendectomy (2011); Breast biopsy; Ankle fracture surgery; Colonoscopy (N/A, 06/25/2021); Lymph node biopsy (Right, 11/17/2022); Excision of melanoma (Right); Open reduction and internal fixation (ORIF) of fracture of distal radius (Right, 4/21/2023); and Closed reduction of injury of metacarpal bone (Right, 4/21/2023).    Cristal has a current medication list which includes the following prescription(s): acetaminophen, benzonatate, cetirizine, fluticasone propionate, glucosamine-chondroitn sulf.na, hydrocodone-acetaminophen, ibuprofen, intrarosa, lactulose, multivit-min36/iron/folic acid, oxycodone-acetaminophen, oxycodone-acetaminophen, pantoprazole, polyethylene glycol, and triamcinolone acetonide 0.1%, and the following Facility-Administered Medications: fentanyl and midazolam.    Review of patient's allergies indicates:   Allergen Reactions    Sulfa (sulfonamide antibiotics) Shortness Of Breath, Anxiety and Palpitations    Macrobid [nitrofurantoin monohyd/m-cryst] Nausea Only     Abd pain and nausea          Objective     Mental status: alert, oriented x3    Observation:   Incision healing well- surgical tape in  place. Percutaneous pinning at 5th metacarpal.    Sensation:    5/8/2023    Right   Monofilament Testing    Normal 1.65-2.83 X   Diminished Light Touch 3.22-3.61    Diminished Protective 3.84-4.31    Loss of Protective 4.56-6.65    Untestable >6.65      Edema: Circumferential measurements: In centimeters     Left Right   MPs 14.6  17.3    PWC (Proximal Wrist Crease) 17.8  19.0        Range of Motion:   Right  Affected    Wrist AROM  WE  WF  UD  RD    17 26 15 4       Finger AROM   INDEX  LONG  RING  SMALL    MP  0/45 0/45 0/30 0/24   PIP  0/66 0/67 0/70 0/40   DIP  0/40 0/51 0/47 0/35        Forearm AROM  PRO  SUP    WFL     10      Unaffected Left    Wrist AROM  WE  WF  UD  RD    76 65 15 24        Finger AROM   INDEX  LONG  RING  SMALL    MP  0/89 0/91 0/88 0/85   PIP  0/108 0/98 0/100 0/85   DIP  0/76 0/85 0/93 0/92        Forearm AROM  PRO  SUP    WFL WFL           Strength: (MAZIN Dynamometer in psi.)      Left Right   Rung II 29.3 Deferred       Pinch Strength (Measured in psi)     Left Right   Key Pinch 12  Deferred    3pt Pinch 10  Deferred    2pt Pinch 8  Deferred        CMS Impairment/Limitation/Restriction for FOTO Hand/Wrist/Finger Survey    FOTO documents entered into [employer] - see Media section.    Limitation Score: 79%  Category: Self Care           Treatment     Treatment Time In: 2:55  Treatment Time Out: 3:15  Total Treatment time separate from Evaluation time:20 minutes    Cristal participated in dynamic functional therapeutic activities to improve functional performance for 20  minutes, including:  -HEP training  -[facility] on physiology and anatomy    Home Exercise Program/Education:  Issued HEP (see patient instructions in EMR) and educated on modality use for pain management . Exercises were reviewed and Cristal was able to demonstrate them prior to the end of the session.   Pt received a written copy of exercises to perform at home. Cristal demonstrated good  understanding of the education provided.  Pt  was advised to perform these exercises free of pain, and to stop performing them if pain occurs.    Patient/Family Education: role of OT, goals for OT, scheduling/cancellations - pt verbalized understanding. Discussed insurance limitations with patient.    Additional Education provided: Use of heat, healing process, distal radius fx prognoses, anatomy and physiology of condition      Assessment     Cristal Gonzalez is a 62 y.o. female presents with limitations as described in problem list. Patient can benefit from Occupational Therapy services for Iontophoresis, ultrasound, moist heat, therapeutic exercises, home exercise program provied with written instructions, ice and strengthening and orthotics, if deemed necessary . The following goals were discussed with the patient and she is in agreement with them as to be addressed in the treatment plan. Pt with limited ROM and pain focused around ulnar wrist and dorsal wrist. Pt required therapeutic listening and encouragement about condition, benefiting from education on healing process.  The patient's rehab potential is Good.     Anticipated barriers to occupational therapy: None  Pt has no cultural, educational or language barriers to learning provided.    Profile and History Assessment of Occupational Performance Level of Clinical Decision Making Complexity Score   Occupational Profile:   Cristal Gonzalez is a 62 y.o. female who is currently not working due to injury. Cristal Gonzalez has difficulty with  ADLs and IADLs as listed previously, which  affecting his/her daily functional abilities.      Comorbidities:    has a past medical history of GERD (gastroesophageal reflux disease), Hot flash, menopausal, Melanoma, and Shoulder fracture.    Medical and Therapy History Review:   Brief               Performance Deficits    Physical:  Joint Mobility  Joint Stability  Muscle Power/Strength  Skin Integrity/Scar Formation  Edema   Strength  Pinch  Strength  Pain    Cognitive:  No Deficits    Psychosocial:    No Deficits     Clinical Decision Making:  low    Modification/Need for Assistance:  Not Necessary    Intervention Selection:  Limited Treatment Options       low  Based on PMHX, co morbidities , data from assessments and functional level of assistance required with task and clinical presentation directly impacting function.         Goals:    LTG's ( 12 weeks):  1)   Increase ROM to WFL degrees in hand/wrist/FA to increase functional hand use for self care tasks.  2)   Increase  strength to 40 lbs. to grasp hair dryer.  3)   Increase lateral pinch to 14 psis for opening sauce packets while cooking.  4)   Decrease complaints of pain to  1 out of 10 at worst to increase functional hand use for ADL/work/leisure activities.  5)   Patient to score at 45% or less on FOTO to demonstrate improved perception of functional RUE use.  6)   Pt will return to near to prior level of function for ADLs and household management reporting I or Mod I with ADLs (dressing, feeding, grooming, toileting).     STG's (6 weeks)  1)   Patient to be IND with HEP and modalities for pain/edema managment.  2)   Increase ROM to 60% degrees in composite fist to increase functional hand use for ADLs/work/leisure activities.  3)   Increase ROM by 30 degrees in WF/WE to increase functional hand use for ADLs/work/leisure activities.  4)   Increase ROM by 40 degrees in FA supination to increase functional hand use for ADLs/work/leisure activities.  5)   Increase  strength to 20 lbs. to improve functional grasp for ADLs/work/leisure activities.   6)   Increase lateral pinch to 6 psi's to increase independence with button and FM Coordination.   7)   Patient to be IND with Orthotic use, wear and care precautions.   8)   Decrease complaints of pain to  4 out of 10 at worst to increase functional hand use for ADL/work/leisure activities.    Plan     Pt to be treated by Occupational Therapy  2 times per week for 12 weeks during the certification period from 5/8/2023 to 7/21/2023 to achieve the established goals.     Treatment to include: Paraffin, Fluidotherapy, Manual therapy/joint mobilizations, Modalities for pain management, Therapeutic exercises/activities., Iontophoresis with 2.0 cc Dexamethasone, Strengthening, Orthotic Fabrication/Fit/Training, Edema Control, Scar Management, and Joint Protection, as well as any other treatments deemed necessary based on the patient's needs or progress.     Jessica White, OTR/L, [facility]  Occupational Therapist

## 2023-05-15 ENCOUNTER — PATIENT MESSAGE (OUTPATIENT)
Dept: ORTHOPEDICS | Facility: CLINIC | Age: 63
End: 2023-05-15
Payer: COMMERCIAL

## 2023-05-18 ENCOUNTER — CLINICAL SUPPORT (OUTPATIENT)
Dept: REHABILITATION | Facility: HOSPITAL | Age: 63
End: 2023-05-18
Attending: ORTHOPAEDIC SURGERY
Payer: COMMERCIAL

## 2023-05-18 ENCOUNTER — PATIENT MESSAGE (OUTPATIENT)
Dept: REHABILITATION | Facility: HOSPITAL | Age: 63
End: 2023-05-18

## 2023-05-18 DIAGNOSIS — M79.641 RIGHT HAND PAIN: Primary | ICD-10-CM

## 2023-05-18 PROCEDURE — 97110 THERAPEUTIC EXERCISES: CPT | Mod: PO

## 2023-05-18 NOTE — PROGRESS NOTES
"                            Occupational Therapy Daily Treatment Note       Date: 5/18/2023  Name: Cristal Gonzalez  Clinic Number: [id number]    Therapy Diagnosis:   Encounter Diagnosis   Name Primary?    Right hand pain Yes     Physician: Anand Geller MD    Medical Diagnosis: S52.571A (ICD-10-CM) - Other closed intra-articular fracture of distal end of right radius, initial encounter  Physician Orders: Start OT for postop rehab. Focusing on motion of the fingers for now. NWB until 6 weeks.  Evaluation Date: 5/8/2023  Plan of Care Certification Date: 7/31/2023  Authorization Period: 12/29/2023  Surgery Date and Procedure: 4/21/2023  Date of Return to MD: 5/25/2023     Visit #: 1 of 20  Time In: 345 PM  Time Out:  555  PM  Total Billable Time: 65     Precautions: Standard, Fall and Weightbearing    Subjective     Pt reports: " I have been having a hard time with my hand. I cannot move it. I t was stuck for weeks."    she was compliant with home exercise program given last session.   Response to previous treatment: slight improvement with flexion      Pain: 2/10  Location: right wrists      Objective    received the following supervised modalities after being cleared for contradictions for 10 minutes:     -  Cristal received moist heat  to (R) hand(s) for 10 minutes to increase blood flow, circulation, pain management and for tissue elasticity prior to therex.        Cristal   received the following manual therapy techniques to increase joint mobilization and soft tissue mobilization for 13 minutes:       -Performed scar massage to volar wrist  area for 3  minutes with Aquaphor to decrease dense adhesions and improve tensile glide.      -Performed manual therapy techniques to each joint in all digits  area including joint mobilizations, grade 2-3 for 10 minutes to increase joint mobility, ROM and for pain management.           Cristal  participated in dynamic functional therapeutic exercises to improve functional " performance while increasing strength, endurance, ROM,  and flexibility  for 45  minutes, including:    - fine motor with med pom pom pad and dab  - med pom pom    - thumb opp with med pom pom   - Patient performed yellow  clothespin for key, 3pt and 2pt pinch x 30  reps for pinch strengthening.   - wrist AROM with dowel in hand x 2 minutes each   - thumb abd using band x 30 reps  - sup and pro using dowel x 2 minutes each   - supplied medium glove        Home Exercises and Education Provided     Education provided:  - discussed insurance limitation  - Progress towards goals  - Pt also instructed on importance of attention to postural alignment during rest and activity to decrease compensatory movement patterns.       Written Home Exercises Provided: Patient instructed to cont prior HEP.  Exercises were reviewed and Cristal was able to demonstrate them prior to the end of the session.  Cristal demonstrated good  understanding of the HEP provided.   .   See EMR under Patient Instructions for exercises provided prior visit.       Assessment     Pt would continue to benefit from skilled OT. Pt with tight intrinsics muscles.  Used dowel for wrist , Very weak and limited hand muscles at this time. Performing HEP daily x 4 .  helping with all self care needs at this time.    Cristal is progressing well towards her goals and there are no updates to goals at this time. Pt prognosis is Good.       The patient demonstrated proper understanding  of each exercise.Pt required verbal and tactile cues for all new hand exercises to use dowel for HEP.  Pt continues to be limited in functional and leisurely pursuits. Pain limits patients participation in ADL's. Pt is not able to carryout necessary vocational tasks. Patient continues to requires cues and skilled supervision to complete HEP       Anticipated barriers to occupational therapy: guarded     Pt's spiritual, cultural and educational needs considered and pt agreeable to  plan of care and goals.    Goals:    LTG's ( 12 weeks):  1)   Increase ROM to WFL degrees in hand/wrist/FA to increase functional hand use for self care tasks.  2)   Increase  strength to 40 lbs. to grasp hair dryer.  3)   Increase lateral pinch to 14 psis for opening sauce packets while cooking.  4)   Decrease complaints of pain to  1 out of 10 at worst to increase functional hand use for ADL/work/leisure activities.  5)   Patient to score at 45% or less on FOTO to demonstrate improved perception of functional RUE use.  6)   Pt will return to near to prior level of function for ADLs and household management reporting I or Mod I with ADLs (dressing, feeding, grooming, toileting).      STG's (6 weeks)  1)   Patient to be IND with HEP and modalities for pain/edema managment.  2)   Increase ROM to 60% degrees in composite fist to increase functional hand use for ADLs/work/leisure activities.  3)   Increase ROM by 30 degrees in WF/WE to increase functional hand use for ADLs/work/leisure activities.  4)   Increase ROM by 40 degrees in FA supination to increase functional hand use for ADLs/work/leisure activities.  5)   Increase  strength to 20 lbs. to improve functional grasp for ADLs/work/leisure activities.   6)   Increase lateral pinch to 6 psi's to increase independence with button and FM Coordination.   7)   Patient to be IND with Orthotic use, wear and care precautions.   8)   Decrease complaints of pain to  4 out of 10 at worst to increase functional hand use for ADL/work/leisure activities.     Plan      Pt to be treated by Occupational Therapy 2 times per week for 12 weeks during the certification period from 5/8/2023 to 7/21/2023 to achieve the established goals.      Treatment to include: Paraffin, Fluidotherapy, Manual therapy/joint mobilizations, Modalities for pain management, Therapeutic exercises/activities., Iontophoresis with 2.0 cc Dexamethasone, Strengthening, Orthotic  Fabrication/Fit/Training, Edema Control, Scar Management, and Joint Protection, as well as any other treatments deemed necessary based on the patient's needs or progress.          Sola Hoff, MOT, OTR/L, CHT

## 2023-05-22 ENCOUNTER — CLINICAL SUPPORT (OUTPATIENT)
Dept: REHABILITATION | Facility: HOSPITAL | Age: 63
End: 2023-05-22
Payer: COMMERCIAL

## 2023-05-22 DIAGNOSIS — M79.641 RIGHT HAND PAIN: Primary | ICD-10-CM

## 2023-05-22 PROCEDURE — 97140 MANUAL THERAPY 1/> REGIONS: CPT | Mod: PO

## 2023-05-22 PROCEDURE — 97010 HOT OR COLD PACKS THERAPY: CPT | Mod: PO

## 2023-05-22 PROCEDURE — 97110 THERAPEUTIC EXERCISES: CPT | Mod: PO

## 2023-05-22 NOTE — PROGRESS NOTES
"                            Occupational Therapy Daily Treatment Note       Date: 5/22/2023  Name: Cristal Gonzalez  Clinic Number: [id number]    Therapy Diagnosis:   Encounter Diagnosis   Name Primary?    Right hand pain Yes       Physician: Anand Geller MD    Medical Diagnosis: S52.571A (ICD-10-CM) - Other closed intra-articular fracture of distal end of right radius, initial encounter  Physician Orders: Start OT for postop rehab. Focusing on motion of the fingers for now. NWB until 6 weeks.  Evaluation Date: 5/8/2023  Plan of Care Certification Date: 7/31/2023  Authorization Period: 12/29/2023  Surgery Date and Procedure: 4/21/2023  Date of Return to MD: 5/25/2023     Visit #: 2 of 20  Time In: 11:15 AM  Time Out: 12:00  PM  Total Billable Time: 45     Precautions: Standard, Fall and Weightbearing    Subjective     Pt reports: "I'm doing my exercises 4x/day and massaging it"    she was compliant with home exercise program given last session.   Response to previous treatment: yoseph well      Pain: Pain score not reported/10  Location: right wrists      Objective    received the following supervised modalities after being cleared for contradictions for 10 minutes:   -  Cristal received moist heat  to (R) hand(s) for 10 minutes to increase blood flow, circulation, pain management and for tissue elasticity prior to therex.      Cristal   received the following manual therapy techniques to increase joint mobilization and soft tissue mobilization for 12 minutes:  -Performed scar massage to volar wrist  area for 3  minutes with lotion to decrease dense adhesions and improve tensile glide.  -Performed manual therapy techniques to each joint in all digits  area including joint mobilizations, grade 2-3 for 10 minutes to increase joint mobility, ROM and for pain management.   -passive range of motion MP flexion  -passive range of motion hook flexion    Cristal  participated in dynamic functional therapeutic exercises to improve " functional performance while increasing strength, endurance, ROM,  and flexibility  for 23  minutes, including:  - fine motor with med pom pom pad and dab 2 containers  - thumb opp with med pom pom  2 containers  - wrist AROM with dowel in hand, WF/WE, UD/RD, 20x each  - active range of motion hook 1' with small dowel  - sup and pro using dowel x 2 minutes each     Home Exercises and Education Provided     Education provided:  - discussed insurance limitation  - Progress towards goals  - Pt also instructed on importance of attention to postural alignment during rest and activity to decrease compensatory movement patterns.     Written Home Exercises Provided: Patient instructed to cont prior HEP.  Exercises were reviewed and Cristal was able to demonstrate them prior to the end of the session.  Cristal demonstrated good  understanding of the HEP provided.   .   See EMR under Patient Instructions for exercises provided prior visit.       Assessment     Pt would continue to benefit from skilled OT. Pt with continued tightness in intrinsic muscles and demonstrates difficulty making a fist.     Cristal is progressing well towards her goals and there are no updates to goals at this time. Pt prognosis is Good.       The patient demonstrated proper understanding  of each exercise.Pt required verbal and tactile cues for all new hand exercises to use dowel for HEP.  Pt continues to be limited in functional and leisurely pursuits. Pain limits patients participation in ADL's. Pt is not able to carryout necessary vocational tasks. Patient continues to requires cues and skilled supervision to complete HEP       Anticipated barriers to occupational therapy: guarded     Pt's spiritual, cultural and educational needs considered and pt agreeable to plan of care and goals.    Goals:    LTG's ( 12 weeks):  1)   Increase ROM to WFL degrees in hand/wrist/FA to increase functional hand use for self care tasks.  2)   Increase  strength to 40  lbs. to grasp hair dryer.  3)   Increase lateral pinch to 14 psis for opening sauce packets while cooking.  4)   Decrease complaints of pain to  1 out of 10 at worst to increase functional hand use for ADL/work/leisure activities.  5)   Patient to score at 45% or less on FOTO to demonstrate improved perception of functional RUE use.  6)   Pt will return to near to prior level of function for ADLs and household management reporting I or Mod I with ADLs (dressing, feeding, grooming, toileting).      STG's (6 weeks)  1)   Patient to be IND with HEP and modalities for pain/edema managment.  2)   Increase ROM to 60% degrees in composite fist to increase functional hand use for ADLs/work/leisure activities.  3)   Increase ROM by 30 degrees in WF/WE to increase functional hand use for ADLs/work/leisure activities.  4)   Increase ROM by 40 degrees in FA supination to increase functional hand use for ADLs/work/leisure activities.  5)   Increase  strength to 20 lbs. to improve functional grasp for ADLs/work/leisure activities.   6)   Increase lateral pinch to 6 psi's to increase independence with button and FM Coordination.   7)   Patient to be IND with Orthotic use, wear and care precautions.   8)   Decrease complaints of pain to  4 out of 10 at worst to increase functional hand use for ADL/work/leisure activities.     Plan      Pt to be treated by Occupational Therapy 2 times per week for 12 weeks during the certification period from 5/8/2023 to 7/21/2023 to achieve the established goals.      Treatment to include: Paraffin, Fluidotherapy, Manual therapy/joint mobilizations, Modalities for pain management, Therapeutic exercises/activities., Iontophoresis with 2.0 cc Dexamethasone, Strengthening, Orthotic Fabrication/Fit/Training, Edema Control, Scar Management, and Joint Protection, as well as any other treatments deemed necessary based on the patient's needs or progress.      Jessica White, OTR/L,  MOT  Occupational Therapist

## 2023-05-24 ENCOUNTER — PATIENT MESSAGE (OUTPATIENT)
Dept: ORTHOPEDICS | Facility: CLINIC | Age: 63
End: 2023-05-24
Payer: COMMERCIAL

## 2023-05-24 ENCOUNTER — CLINICAL SUPPORT (OUTPATIENT)
Dept: REHABILITATION | Facility: HOSPITAL | Age: 63
End: 2023-05-24
Payer: COMMERCIAL

## 2023-05-24 DIAGNOSIS — M79.641 RIGHT HAND PAIN: Primary | ICD-10-CM

## 2023-05-24 PROCEDURE — 97110 THERAPEUTIC EXERCISES: CPT | Mod: PO

## 2023-05-24 PROCEDURE — 97140 MANUAL THERAPY 1/> REGIONS: CPT | Mod: PO

## 2023-05-24 PROCEDURE — 97035 APP MDLTY 1+ULTRASOUND EA 15: CPT | Mod: PO

## 2023-05-24 NOTE — PROGRESS NOTES
"                            Occupational Therapy Daily Treatment Note     Date: 5/24/2023  Name: Cristal Gonzalez  Clinic Number: [id number]    Therapy Diagnosis:   Encounter Diagnosis   Name Primary?    Right hand pain Yes     Physician: Anand Geller MD    Medical Diagnosis: S52.571A (ICD-10-CM) - Other closed intra-articular fracture of distal end of right radius, initial encounter  Physician Orders: Start OT for postop rehab. Focusing on motion of the fingers for now. NWB until 6 weeks.  Evaluation Date: 5/8/2023  Plan of Care Certification Date: 7/31/2023  Authorization Period: 12/29/2023  Surgery Date and Procedure: 4/21/2023  Date of Return to MD: 5/25/2023     Visit #: 3 of 20  Time In: 10:30 AM  Time Out: 11:15 AM  Total Billable Time: 45     Precautions: Standard, Fall and Weightbearing    Subjective     Pt reports: "It's feeling about the same"    she was compliant with home exercise program given last session.   Response to previous treatment: yoseph well      Pain: Pain score not reported/10  Location: right wrists      Objective    received the following supervised modalities after being cleared for contradictions for 10 minutes:   -  Cristal received moist heat  to (R) hand(s) for 10 minutes to increase blood flow, circulation, pain management and for tissue elasticity prior to therex.      Cristal   received the following manual therapy techniques to increase joint mobilization and soft tissue mobilization for 12 minutes:  -Performed scar massage to volar wrist  area for 3  minutes with lotion to decrease dense adhesions and improve tensile glide.  -Performed manual therapy techniques to each joint in all digits  area including joint mobilizations, grade 2-3 for 10 minutes to increase joint mobility, ROM and for pain management.   -passive range of motion MP flexion  -passive range of motion hook flexion  -massage vibrator to palm of the hand to decrease muscle tightness, massage vibrator to incision " site to decrease scar adhesions    Cristal  participated in dynamic functional therapeutic exercises to improve functional performance while increasing strength, endurance, ROM,  and flexibility  for 23  minutes, including:  - active range of motion opposition 5x  - active range of motion intrinsic hook 20x  - active range of motion thumb circles 20x both directions  - fine motor with med pom pom pad and dab 2 containers  - Yellow CP, 3 point pinch with small pom pom  2 containers  - wrist AROM with dowel in hand, WF/WE, UD/RD, 20x each  - active range of motion hook 1' with small dowel  - sup and pro using dowel x 1 minutes     Home Exercises and Education Provided     Education provided:  - Discussed insurance limitation  - Progress towards goals  - Pt also instructed on importance of attention to postural alignment during rest and activity to decrease compensatory movement patterns.     Written Home Exercises Provided: Patient instructed to cont prior HEP.  Exercises were reviewed and Cristal was able to demonstrate them prior to the end of the session.  Cristal demonstrated good  understanding of the HEP provided.   .   See EMR under Patient Instructions for exercises provided prior visit.       Assessment     Pt would continue to benefit from skilled OT. Pt with continued tightness in intrinsic muscles and demonstrates difficulty making a fist and benefited from massage vibrator to decrease muscle tightness.    Cristal is progressing well towards her goals and there are no updates to goals at this time. Pt prognosis is Good.       The patient demonstrated proper understanding  of each exercise.Pt required verbal and tactile cues for all new hand exercises to use dowel for HEP.  Pt continues to be limited in functional and leisurely pursuits. Pain limits patients participation in ADL's. Pt is not able to carryout necessary vocational tasks. Patient continues to requires cues and skilled supervision to complete HEP        Anticipated barriers to occupational therapy: guarded     Pt's spiritual, cultural and educational needs considered and pt agreeable to plan of care and goals.    Goals:    LTG's ( 12 weeks):  1)   Increase ROM to WFL degrees in hand/wrist/FA to increase functional hand use for self care tasks.  2)   Increase  strength to 40 lbs. to grasp hair dryer.  3)   Increase lateral pinch to 14 psis for opening sauce packets while cooking.  4)   Decrease complaints of pain to  1 out of 10 at worst to increase functional hand use for ADL/work/leisure activities.  5)   Patient to score at 45% or less on FOTO to demonstrate improved perception of functional RUE use.  6)   Pt will return to near to prior level of function for ADLs and household management reporting I or Mod I with ADLs (dressing, feeding, grooming, toileting).      STG's (6 weeks)  1)   Patient to be IND with HEP and modalities for pain/edema managment.  2)   Increase ROM to 60% degrees in composite fist to increase functional hand use for ADLs/work/leisure activities.  3)   Increase ROM by 30 degrees in WF/WE to increase functional hand use for ADLs/work/leisure activities.  4)   Increase ROM by 40 degrees in FA supination to increase functional hand use for ADLs/work/leisure activities.  5)   Increase  strength to 20 lbs. to improve functional grasp for ADLs/work/leisure activities.   6)   Increase lateral pinch to 6 psi's to increase independence with button and FM Coordination.   7)   Patient to be IND with Orthotic use, wear and care precautions.   8)   Decrease complaints of pain to  4 out of 10 at worst to increase functional hand use for ADL/work/leisure activities.     Plan      Pt to be treated by Occupational Therapy 2 times per week for 12 weeks during the certification period from 5/8/2023 to 7/21/2023 to achieve the established goals.      Treatment to include: Paraffin, Fluidotherapy, Manual therapy/joint mobilizations, Modalities for  pain management, Therapeutic exercises/activities., Iontophoresis with 2.0 cc Dexamethasone, Strengthening, Orthotic Fabrication/Fit/Training, Edema Control, Scar Management, and Joint Protection, as well as any other treatments deemed necessary based on the patient's needs or progress.      Jessica White, OTR/L, [facility]  Occupational Therapist

## 2023-05-25 ENCOUNTER — OFFICE VISIT (OUTPATIENT)
Dept: ORTHOPEDICS | Facility: CLINIC | Age: 63
End: 2023-05-25
Payer: COMMERCIAL

## 2023-05-25 ENCOUNTER — HOSPITAL ENCOUNTER (OUTPATIENT)
Dept: RADIOLOGY | Facility: HOSPITAL | Age: 63
Discharge: HOME | End: 2023-05-25
Attending: PHYSICIAN ASSISTANT
Payer: COMMERCIAL

## 2023-05-25 DIAGNOSIS — S62.316A CLOSED DISPLACED FRACTURE OF BASE OF FIFTH METACARPAL BONE OF RIGHT HAND, INITIAL ENCOUNTER: ICD-10-CM

## 2023-05-25 DIAGNOSIS — Z98.890 POSTOPERATIVE STATE: ICD-10-CM

## 2023-05-25 DIAGNOSIS — S52.571A OTHER CLOSED INTRA-ARTICULAR FRACTURE OF DISTAL END OF RIGHT RADIUS, INITIAL ENCOUNTER: Primary | ICD-10-CM

## 2023-05-25 DIAGNOSIS — S52.571A OTHER CLOSED INTRA-ARTICULAR FRACTURE OF DISTAL END OF RIGHT RADIUS, INITIAL ENCOUNTER: ICD-10-CM

## 2023-05-25 PROCEDURE — 3044F PR MOST RECENT HEMOGLOBIN A1C LEVEL <7.0%: ICD-10-PCS | Mod: CPTII,S$GLB,, | Performed by: PHYSICIAN ASSISTANT

## 2023-05-25 PROCEDURE — 99999 PR PBB SHADOW E&M-EST. PATIENT-LVL III: CPT | Mod: PBBFAC,,, | Performed by: PHYSICIAN ASSISTANT

## 2023-05-25 PROCEDURE — 73110 X-RAY EXAM OF WRIST: CPT | Mod: 26,RT,, | Performed by: RADIOLOGY

## 2023-05-25 PROCEDURE — 73110 XR WRIST COMPLETE 3 VIEWS RIGHT: ICD-10-PCS | Mod: 26,RT,, | Performed by: RADIOLOGY

## 2023-05-25 PROCEDURE — 99999 PR PBB SHADOW E&M-EST. PATIENT-LVL III: ICD-10-PCS | Mod: PBBFAC,,, | Performed by: PHYSICIAN ASSISTANT

## 2023-05-25 PROCEDURE — 3044F HG A1C LEVEL LT 7.0%: CPT | Mod: CPTII,S$GLB,, | Performed by: PHYSICIAN ASSISTANT

## 2023-05-25 PROCEDURE — 99024 PR POST-OP FOLLOW-UP VISIT: ICD-10-PCS | Mod: S$GLB,,, | Performed by: PHYSICIAN ASSISTANT

## 2023-05-25 PROCEDURE — 99024 POSTOP FOLLOW-UP VISIT: CPT | Mod: S$GLB,,, | Performed by: PHYSICIAN ASSISTANT

## 2023-05-25 PROCEDURE — 1159F PR MEDICATION LIST DOCUMENTED IN MEDICAL RECORD: ICD-10-PCS | Mod: CPTII,S$GLB,, | Performed by: PHYSICIAN ASSISTANT

## 2023-05-25 PROCEDURE — 73110 X-RAY EXAM OF WRIST: CPT | Mod: TC,PN,RT

## 2023-05-25 PROCEDURE — 1159F MED LIST DOCD IN RCRD: CPT | Mod: CPTII,S$GLB,, | Performed by: PHYSICIAN ASSISTANT

## 2023-05-25 NOTE — PROGRESS NOTES
Dr. Geller is the supervising physician for this encounter/patient    Cristal Gonzalez presents for post-operative evaluation.  The patient is now 5 weeks s/p Right ORIF distal radius fracture and CRPP 5th metacarpal base fracture with Dr. Geller on 4/21/23.  Overall the patient reports doing well.  She reports 1-2/10 pain, denies any f/c/s, she is taking Aleve as needed. She is working with OT and using wrist brace.    PE:    AA&O x 4.  NAD  HEENT:  NCAT, sclera nonicteric  Lungs:  Respirations are equal and unlabored.  CV:  2+ bilateral upper and lower extremity pulses.  MSK: The wound is well healed without any concerns for infection, pin site is healing without concerns for infection.  Mild TTP over the ulnar styloid. Decreased hand motion due to pain/stiffness. SILT. DNVI.    IMAGING - reviewed with patient  Stable appearance of the distal radius ORIF, no hardware complications. 5th metacarpal base fracture is healing well, no pin complications.    A/P: Status post above, doing well  1) Pin removed today without issues. Continue with brace for 1 more week, then can wear for comfort only. Strengthening to start in 1 week. Focus on motion.  2) Wound care and signs of infection discussed. Scar massage discussed.  3) F/U 4 weeks, repeat xrays   4) Call with any questions/concerns in the interim      Jamie Russo-DiGeorge PA-C

## 2023-05-29 ENCOUNTER — CLINICAL SUPPORT (OUTPATIENT)
Dept: REHABILITATION | Facility: HOSPITAL | Age: 63
End: 2023-05-29
Payer: COMMERCIAL

## 2023-05-29 DIAGNOSIS — M79.641 RIGHT HAND PAIN: Primary | ICD-10-CM

## 2023-05-29 PROCEDURE — 97140 MANUAL THERAPY 1/> REGIONS: CPT | Mod: PO

## 2023-05-29 PROCEDURE — 97530 THERAPEUTIC ACTIVITIES: CPT | Mod: PO

## 2023-05-29 PROCEDURE — 97022 WHIRLPOOL THERAPY: CPT | Mod: PO

## 2023-05-29 PROCEDURE — 97110 THERAPEUTIC EXERCISES: CPT | Mod: PO

## 2023-05-29 NOTE — PROGRESS NOTES
"                              Occupational Therapy Daily Treatment Note     Date: 5/29/2023  Name: Cristal Gonzalez  Clinic Number: [id number]    Therapy Diagnosis:   Encounter Diagnosis   Name Primary?    Right hand pain Yes     Physician: Anand Geller MD    Medical Diagnosis: S52.571A (ICD-10-CM) - Other closed intra-articular fracture of distal end of right radius, initial encounter  Physician Orders: Start OT for postop rehab. Focusing on motion of the fingers for now. NWB until 6 weeks.  Evaluation Date: 5/8/2023  Plan of Care Certification Date: 7/31/2023  Authorization Period: 12/29/2023  Surgery Date and Procedure: 4/21/2023  Date of Return to MD: 5/25/2023     Visit #: 5 of 20  Time In: 10:30 AM  Time Out: 11:30 AM  Total Billable Time: 60 min     Precautions: Standard, Fall and Weightbearing    Subjective     Pt reports: "I got my pins out!  I haven't made any ROM improvements in about 2 weeks."    she was compliant with home exercise program given last session.   Response to previous treatment: yoseph well      Pain: Pain score not reported/10  Location: right wrists      Objective    received the following supervised modalities after being cleared for contradictions for 15 minutes:   -  Cristal received moist heat  to (R) hand(s) for 15 minutes with 2-4 fingers wrapped in coban flexion to increase blood flow, circulation, pain management and for tissue elasticity prior to therex.      Cristal   received the following manual therapy techniques to increase joint mobilization and soft tissue mobilization for 25 minutes:  -Performed scar massage to volar wrist  area for 3  minutes with lotion to decrease dense adhesions and improve tensile glide.  -Performed manual therapy techniques to each joint in all digits  area including joint mobilizations, grade 2-3 for 10 minutes to increase joint mobility, ROM and for pain management.   -passive range of motion MP flexion  -passive range of motion hook " flexion  -massage vibrator to palm of the hand to decrease muscle tightness, massage vibrator to incision site to decrease scar adhesions    Cristal  participated in dynamic functional therapeutic exercises to improve functional performance while increasing strength, endurance, ROM,  and flexibility  for 10  minutes, including:  -Extrinsic Stretching: comp extension/flexion with all digits included; with and without the wrist included.  - AAROM opposition 5x ( 3 different positions)  -Place and holds comp. Fist 2 x 10 with 5 sec holds  - - - wrist AROM with dowel in hand, WF/WE, UD/RD, 20x each  -- sup and pro using dowel x 1 minutes     Cristal participated in dynamic functional activities of: x 10 min  - FM translation with medium pom pom 2 containers  - Blue Paper Towel Scrunches with active fist x 3 min  -Pt. Educated on edema glove use, edema management  -OTC brace adjusted to encourage more wrist extension    Home Exercises and Education Provided     Education provided:  - Discussed insurance limitation  - Progress towards goals  - Pt also instructed on importance of attention to postural alignment during rest and activity to decrease compensatory movement patterns.     Written Home Exercises Provided: Patient instructed to cont prior HEP.  Exercises were reviewed and Cristal was able to demonstrate them prior to the end of the session.  Cristal demonstrated good  understanding of the HEP provided.   .   See EMR under Patient Instructions for exercises provided prior visit.       Assessment     Pt would continue to benefit from skilled OT. Pt with continued tightness in intrinsic muscles and demonstrates difficulty making a fist and benefited from massage vibrator to decrease muscle tightness.    Cristal is progressing well towards her goals and there are no updates to goals at this time. Pt prognosis is Good.       The patient demonstrated proper understanding  of each exercise.Pt required verbal and tactile cues for all  new hand exercises to use dowel for HEP.  Pt continues to be limited in functional and leisurely pursuits. Pain limits patients participation in ADL's. Pt is not able to carryout necessary vocational tasks. Patient continues to requires cues and skilled supervision to complete HEP       Anticipated barriers to occupational therapy: guarded     Pt's spiritual, cultural and educational needs considered and pt agreeable to plan of care and goals.    Goals:    LTG's ( 12 weeks):  1)   Increase ROM to WFL degrees in hand/wrist/FA to increase functional hand use for self care tasks.  2)   Increase  strength to 40 lbs. to grasp hair dryer.  3)   Increase lateral pinch to 14 psis for opening sauce packets while cooking.  4)   Decrease complaints of pain to  1 out of 10 at worst to increase functional hand use for ADL/work/leisure activities.  5)   Patient to score at 45% or less on FOTO to demonstrate improved perception of functional RUE use.  6)   Pt will return to near to prior level of function for ADLs and household management reporting I or Mod I with ADLs (dressing, feeding, grooming, toileting).      STG's (6 weeks)  1)   Patient to be IND with HEP and modalities for pain/edema managment.  2)   Increase ROM to 60% degrees in composite fist to increase functional hand use for ADLs/work/leisure activities.  3)   Increase ROM by 30 degrees in WF/WE to increase functional hand use for ADLs/work/leisure activities.  4)   Increase ROM by 40 degrees in FA supination to increase functional hand use for ADLs/work/leisure activities.  5)   Increase  strength to 20 lbs. to improve functional grasp for ADLs/work/leisure activities.   6)   Increase lateral pinch to 6 psi's to increase independence with button and FM Coordination.   7)   Patient to be IND with Orthotic use, wear and care precautions.   8)   Decrease complaints of pain to  4 out of 10 at worst to increase functional hand use for ADL/work/leisure  activities.     Plan      Pt to be treated by Occupational Therapy 2 times per week for 12 weeks during the certification period from 5/8/2023 to 7/21/2023 to achieve the established goals.      Treatment to include: Paraffin, Fluidotherapy, Manual therapy/joint mobilizations, Modalities for pain management, Therapeutic exercises/activities., Iontophoresis with 2.0 cc Dexamethasone, Strengthening, Orthotic Fabrication/Fit/Training, Edema Control, Scar Management, and Joint Protection, as well as any other treatments deemed necessary based on the patient's needs or progress.      KANU Kinney, OTR/L, CHT  Occupational Therapist

## 2023-05-30 NOTE — PROGRESS NOTES
"                            Occupational Therapy Daily Treatment Note     Date: 5/31/2023  Name: Cristal Gonzalez  Clinic Number: [id number]    Therapy Diagnosis:   Encounter Diagnosis   Name Primary?    Right hand pain Yes       Physician: Anand Geller MD    Medical Diagnosis: S52.571A (ICD-10-CM) - Other closed intra-articular fracture of distal end of right radius, initial encounter  Physician Orders: Start OT for postop rehab. Focusing on motion of the fingers for now. NWB until 6 weeks.  Evaluation Date: 5/8/2023  Plan of Care Certification Date: 7/31/2023  Authorization Period: 12/29/2023  Surgery Date and Procedure: 4/21/2023  Date of Return to MD: 5/25/2023     Visit #: 5 of 20  Time In: 9:48 AM  Time Out: 10:41 AM  Total Billable Time: 53 min     Precautions: Standard, Fall and Weightbearing    Subjective     Pt reports: "I'm able to move my wrist a lot more with the pin out"    she was compliant with home exercise program given last session.   Response to previous treatment: yoseph well      Pain: Pain score not reported/10  Location: right wrists      Objective    received the following supervised modalities after being cleared for contradictions for 10 minutes:   -  Cristal received fluidotherapy  to (R) hand(s) for 10 minutes with 2-4 fingers wrapped in coban flexion to increase blood flow, circulation, pain management and for tissue elasticity prior to therex.      Cristal   received the following manual therapy techniques to increase joint mobilization and soft tissue mobilization for 25 minutes:  -Performed manual therapy techniques to each joint in all digits  area including joint mobilizations, grade 2-3 for 10 minutes to increase joint mobility, ROM and for pain management.   -passive range of motion MP flexion  -passive range of motion hook flexion  -passive range of motion composite fist  -passive range of motion WF/WE    Cristal  participated in dynamic functional therapeutic exercises to improve " functional performance while increasing strength, endurance, ROM,  and flexibility  for 13  minutes, including:  -Extrinsic Stretching: comp extension/flexion with all digits included; with and without the wrist included.  -Place and holds comp. Fist 2 x 10 with 5 sec holds  - wrist AROM with dowel in hand, WF/WE, UD/RD, 20x each  - sup and pro using dowel x 1 minutes     Cristal participated in dynamic functional activities of: x 5 min  - cup up sponges with opposition to each finger    Home Exercises and Education Provided     Education provided:  - Discussed insurance limitation  - Progress towards goals  - Pt also instructed on importance of attention to postural alignment during rest and activity to decrease compensatory movement patterns.     Written Home Exercises Provided: Patient instructed to cont prior HEP.  Exercises were reviewed and Cristal was able to demonstrate them prior to the end of the session.  Cristal demonstrated good  understanding of the HEP provided.   .   See EMR under Patient Instructions for exercises provided prior visit.       Assessment     Pt would continue to benefit from skilled OT. Pt with extrinsic muscle tightness limiting wrist mobility, but demonstrates improved motion as compared to last session.    Cristal is progressing well towards her goals and there are no updates to goals at this time. Pt prognosis is Good.       The patient demonstrated proper understanding  of each exercise.Pt required verbal and tactile cues for all new hand exercises to use dowel for HEP.  Pt continues to be limited in functional and leisurely pursuits. Pain limits patients participation in ADL's. Pt is not able to carryout necessary vocational tasks. Patient continues to requires cues and skilled supervision to complete HEP       Anticipated barriers to occupational therapy: guarded     Pt's spiritual, cultural and educational needs considered and pt agreeable to plan of care and goals.    Goals:    LTG's (  12 weeks):  1)   Increase ROM to WFL degrees in hand/wrist/FA to increase functional hand use for self care tasks.  2)   Increase  strength to 40 lbs. to grasp hair dryer.  3)   Increase lateral pinch to 14 psis for opening sauce packets while cooking.  4)   Decrease complaints of pain to  1 out of 10 at worst to increase functional hand use for ADL/work/leisure activities.  5)   Patient to score at 45% or less on FOTO to demonstrate improved perception of functional RUE use.  6)   Pt will return to near to prior level of function for ADLs and household management reporting I or Mod I with ADLs (dressing, feeding, grooming, toileting).      STG's (6 weeks)  1)   Patient to be IND with HEP and modalities for pain/edema managment.  2)   Increase ROM to 60% degrees in composite fist to increase functional hand use for ADLs/work/leisure activities.  3)   Increase ROM by 30 degrees in WF/WE to increase functional hand use for ADLs/work/leisure activities.  4)   Increase ROM by 40 degrees in FA supination to increase functional hand use for ADLs/work/leisure activities.  5)   Increase  strength to 20 lbs. to improve functional grasp for ADLs/work/leisure activities.   6)   Increase lateral pinch to 6 psi's to increase independence with button and FM Coordination.   7)   Patient to be IND with Orthotic use, wear and care precautions.   8)   Decrease complaints of pain to  4 out of 10 at worst to increase functional hand use for ADL/work/leisure activities.     Plan      Pt to be treated by Occupational Therapy 2 times per week for 12 weeks during the certification period from 5/8/2023 to 7/21/2023 to achieve the established goals.      Treatment to include: Paraffin, Fluidotherapy, Manual therapy/joint mobilizations, Modalities for pain management, Therapeutic exercises/activities., Iontophoresis with 2.0 cc Dexamethasone, Strengthening, Orthotic Fabrication/Fit/Training, Edema Control, Scar Management, and Joint  Protection, as well as any other treatments deemed necessary based on the patient's needs or progress.      Jessica White, OTR/L, [facility]  Occupational Therapist

## 2023-05-31 ENCOUNTER — CLINICAL SUPPORT (OUTPATIENT)
Dept: REHABILITATION | Facility: HOSPITAL | Age: 63
End: 2023-05-31
Payer: COMMERCIAL

## 2023-05-31 DIAGNOSIS — M79.641 RIGHT HAND PAIN: Primary | ICD-10-CM

## 2023-05-31 PROCEDURE — 97022 WHIRLPOOL THERAPY: CPT | Mod: PO

## 2023-05-31 PROCEDURE — 97110 THERAPEUTIC EXERCISES: CPT | Mod: PO

## 2023-05-31 PROCEDURE — 97140 MANUAL THERAPY 1/> REGIONS: CPT | Mod: PO

## 2023-06-06 ENCOUNTER — CLINICAL SUPPORT (OUTPATIENT)
Dept: REHABILITATION | Facility: HOSPITAL | Age: 63
End: 2023-06-06
Payer: COMMERCIAL

## 2023-06-06 DIAGNOSIS — M79.641 RIGHT HAND PAIN: Primary | ICD-10-CM

## 2023-06-06 PROCEDURE — 97110 THERAPEUTIC EXERCISES: CPT | Mod: PO

## 2023-06-06 PROCEDURE — 97022 WHIRLPOOL THERAPY: CPT | Mod: PO

## 2023-06-06 PROCEDURE — 97530 THERAPEUTIC ACTIVITIES: CPT | Mod: PO

## 2023-06-06 NOTE — PROGRESS NOTES
"                            Occupational Therapy Daily Treatment Note     Date: 6/6/2023  Name: Cristal Gonzalez  Clinic Number: [id number]    Therapy Diagnosis:   Encounter Diagnosis   Name Primary?    Right hand pain Yes       Physician: Anand Geller MD    Medical Diagnosis: S52.571A (ICD-10-CM) - Other closed intra-articular fracture of distal end of right radius, initial encounter  Physician Orders: Start OT for postop rehab. Focusing on motion of the fingers for now. NWB until 6 weeks.  Evaluation Date: 5/8/2023  Plan of Care Certification Date: 7/31/2023  Authorization Period: 12/29/2023  Surgery Date and Procedure: 4/21/2023  Date of Return to MD: 6/22/2023     Visit #: 6 of 20  Time In: 9:52 AM  Time Out: 10:37 AM  Total Billable Time: 45 min     Precautions: Standard, Fall and Weightbearing    Subjective     Pt reports: "My arm felt like it was burning last night"    she was compliant with home exercise program given last session.   Response to previous treatment: yoseph well    Pain: Pain score not reported/10  Location: right wrists      Objective    received the following supervised modalities after being cleared for contradictions for 10 minutes:   -  Cristal received fluidotherapy  to (R) hand(s) for 10 minutes with 2-4 fingers wrapped in coban flexion to increase blood flow, circulation, pain management and for tissue elasticity prior to therex.     Cristal  participated in dynamic functional therapeutic exercises to improve functional performance while increasing strength, endurance, ROM,  and flexibility  for 12  minutes, including:  -active range of motion WF/WE, UD/RD, circles both ways 20x each with small dowel  -Intrinsic minus hook 30x    Cristal participated in dynamic functional activities of: x 25 min  - cut up sponges with opposition to each finger 2 containers  - cut up sponges with small finger  composite fist 2 containers  - Yellow CP cut up sponges 2 containers    Home Exercises and " Education Provided     Education provided:  - Discussed insurance limitation  - Progress towards goals    Written Home Exercises Provided: Patient instructed to cont prior HEP.  Exercises were reviewed and Cristal was able to demonstrate them prior to the end of the session.  Cristal demonstrated good  understanding of the HEP provided.   .   See EMR under Patient Instructions for exercises provided prior visit.       Assessment     Pt would continue to benefit from skilled OT. Pt with continued difficulty with wrist mobility due to extrinsic tightness. Pt with minimally improved intrinsic tightness    Cristal is progressing well towards her goals and there are no updates to goals at this time. Pt prognosis is Good.       The patient demonstrated proper understanding  of each exercise.Pt required verbal and tactile cues for all new hand exercises to use dowel for HEP.  Pt continues to be limited in functional and leisurely pursuits. Pain limits patients participation in ADL's. Pt is not able to carryout necessary vocational tasks. Patient continues to requires cues and skilled supervision to complete HEP       Anticipated barriers to occupational therapy: guarded     Pt's spiritual, cultural and educational needs considered and pt agreeable to plan of care and goals.    Goals:    LTG's ( 12 weeks):  1)   Increase ROM to WFL degrees in hand/wrist/FA to increase functional hand use for self care tasks.  2)   Increase  strength to 40 lbs. to grasp hair dryer.  3)   Increase lateral pinch to 14 psis for opening sauce packets while cooking.  4)   Decrease complaints of pain to  1 out of 10 at worst to increase functional hand use for ADL/work/leisure activities.  5)   Patient to score at 45% or less on FOTO to demonstrate improved perception of functional RUE use.  6)   Pt will return to near to prior level of function for ADLs and household management reporting I or Mod I with ADLs (dressing, feeding, grooming, toileting).       STG's (6 weeks)  1)   Patient to be IND with HEP and modalities for pain/edema managment.  2)   Increase ROM to 60% degrees in composite fist to increase functional hand use for ADLs/work/leisure activities.  3)   Increase ROM by 30 degrees in WF/WE to increase functional hand use for ADLs/work/leisure activities.  4)   Increase ROM by 40 degrees in FA supination to increase functional hand use for ADLs/work/leisure activities.  5)   Increase  strength to 20 lbs. to improve functional grasp for ADLs/work/leisure activities.   6)   Increase lateral pinch to 6 psi's to increase independence with button and FM Coordination.   7)   Patient to be IND with Orthotic use, wear and care precautions.   8)   Decrease complaints of pain to  4 out of 10 at worst to increase functional hand use for ADL/work/leisure activities.     Plan      Pt to be treated by Occupational Therapy 2 times per week for 12 weeks during the certification period from 5/8/2023 to 7/21/2023 to achieve the established goals.      Treatment to include: Paraffin, Fluidotherapy, Manual therapy/joint mobilizations, Modalities for pain management, Therapeutic exercises/activities., Iontophoresis with 2.0 cc Dexamethasone, Strengthening, Orthotic Fabrication/Fit/Training, Edema Control, Scar Management, and Joint Protection, as well as any other treatments deemed necessary based on the patient's needs or progress.      Jessica White, OTR/L, [facility]  Occupational Therapist

## 2023-06-08 ENCOUNTER — CLINICAL SUPPORT (OUTPATIENT)
Dept: REHABILITATION | Facility: HOSPITAL | Age: 63
End: 2023-06-08
Payer: COMMERCIAL

## 2023-06-08 DIAGNOSIS — M79.641 RIGHT HAND PAIN: Primary | ICD-10-CM

## 2023-06-08 PROCEDURE — 97110 THERAPEUTIC EXERCISES: CPT | Mod: PO

## 2023-06-08 PROCEDURE — 97140 MANUAL THERAPY 1/> REGIONS: CPT | Mod: PO

## 2023-06-08 PROCEDURE — 97022 WHIRLPOOL THERAPY: CPT | Mod: PO

## 2023-06-08 NOTE — PROGRESS NOTES
"                            Occupational Therapy Daily Treatment Note     Date: 6/8/2023  Name: Cristal Gonzalez  Clinic Number: [id number]    Therapy Diagnosis:   Encounter Diagnosis   Name Primary?    Right hand pain Yes       Physician: Anand Geller MD    Medical Diagnosis: S52.571A (ICD-10-CM) - Other closed intra-articular fracture of distal end of right radius, initial encounter  Physician Orders: Start OT for postop rehab. Focusing on motion of the fingers for now. NWB until 6 weeks.  Evaluation Date: 5/8/2023  Plan of Care Certification Date: 7/31/2023  Authorization Period: 12/29/2023  Surgery Date and Procedure: 4/21/2023  Date of Return to MD: 6/22/2023     Visit #: 6 of 20  Time In: 9:45 AM  Time Out: 10:40 AM  Total Billable Time: 55 min     Precautions: Standard, Fall and Weightbearing    Subjective     Pt reports: "My arm feels much better. I've been wrapping my hand in a fist at home while heating it"    she was compliant with home exercise program given last session.   Response to previous treatment: yoseph well    Pain: 1/10  Location: right wrists      Objective    received the following supervised modalities after being cleared for contradictions for 10 minutes:   -  Cristal received fluidotherapy  to (R) hand(s) for 10 minutes with 2-4 fingers wrapped in coban flexion to increase blood flow, circulation, pain management and for tissue elasticity prior to therex.     Cristal received the following manual therapy techniques for 25 minutes in order to maximize tissue function and/or decrease pain:   -gentle passive range of motion wrist flexion/extension  -gentle passive range of motion MP flexion  -gentle passive range of motion intrinsic minus  -vibrating massager to palmar and dorsal aspect of hand, webspace of thumb, and forearm to decrease muscle tightness for increased mobility    Cristal  participated in dynamic functional therapeutic exercises to improve functional performance while increasing " strength, endurance, ROM,  and flexibility for 20  minutes, including:  -active range of motion WF/WE, UD/RD, circles both ways 20x each with small dowel  -active range of motion abduction/adduction 20x  -Intrinsic minus hook into fist with small dowel 1'  -Intrinsic minus low load prolonged stretch with B compression sleeve and MH 3'  -1 page fine motor pen grasp to practice fx writing skills    Home Exercises and Education Provided     Education provided:  - Discussed insurance limitation  - Progress towards goals    Written Home Exercises Provided: Patient instructed to cont prior HEP.  Exercises were reviewed and Cristal was able to demonstrate them prior to the end of the session.  Cristal demonstrated good  understanding of the HEP provided.   .   See EMR under Patient Instructions for exercises provided prior visit.     Assessment     Pt would continue to benefit from skilled OT to maximize right upper extremity functioning. Pt with continued tightness in intrinsic plus and minus position with some improvements after massage vibrator and gentle passive range of motion.    Cristal is progressing well towards her goals and there are no updates to goals at this time. Pt prognosis is Good.     The patient demonstrated proper understanding  of each exercise.Pt required verbal and tactile cues for all new hand exercises to use dowel for HEP.  Pt continues to be limited in functional and leisurely pursuits. Pain limits patients participation in ADL's. Pt is not able to carryout necessary vocational tasks. Patient continues to requires cues and skilled supervision to complete HEP       Anticipated barriers to occupational therapy: guarded     Pt's spiritual, cultural and educational needs considered and pt agreeable to plan of care and goals.    Goals:    LTG's ( 12 weeks):  1)   Increase ROM to WFL degrees in hand/wrist/FA to increase functional hand use for self care tasks.  2)   Increase  strength to 40 lbs. to grasp  hair dryer.  3)   Increase lateral pinch to 14 psis for opening sauce packets while cooking.  4)   Decrease complaints of pain to  1 out of 10 at worst to increase functional hand use for ADL/work/leisure activities.  5)   Patient to score at 45% or less on FOTO to demonstrate improved perception of functional RUE use.  6)   Pt will return to near to prior level of function for ADLs and household management reporting I or Mod I with ADLs (dressing, feeding, grooming, toileting).      STG's (6 weeks) 6/19/2023  1)   Patient to be IND with HEP and modalities for pain/edema managment.  2)   Increase ROM to 60% degrees in composite fist to increase functional hand use for ADLs/work/leisure activities.  3)   Increase ROM by 30 degrees in WF/WE to increase functional hand use for ADLs/work/leisure activities.  4)   Increase ROM by 40 degrees in FA supination to increase functional hand use for ADLs/work/leisure activities.  5)   Increase  strength to 20 lbs. to improve functional grasp for ADLs/work/leisure activities.   6)   Increase lateral pinch to 6 psi's to increase independence with button and FM Coordination.   7)   Patient to be IND with Orthotic use, wear and care precautions.   8)   Decrease complaints of pain to  4 out of 10 at worst to increase functional hand use for ADL/work/leisure activities.     Plan      Pt to be treated by Occupational Therapy 2 times per week for 12 weeks during the certification period from 5/8/2023 to 7/21/2023 to achieve the established goals.      Treatment to include: Paraffin, Fluidotherapy, Manual therapy/joint mobilizations, Modalities for pain management, Therapeutic exercises/activities., Iontophoresis with 2.0 cc Dexamethasone, Strengthening, Orthotic Fabrication/Fit/Training, Edema Control, Scar Management, and Joint Protection, as well as any other treatments deemed necessary based on the patient's needs or progress.      Jessica White, OTR/L, MOT  Occupational  Therapist

## 2023-06-09 ENCOUNTER — TELEPHONE (OUTPATIENT)
Dept: DERMATOLOGY | Facility: CLINIC | Age: 63
End: 2023-06-09
Payer: COMMERCIAL

## 2023-06-13 ENCOUNTER — CLINICAL SUPPORT (OUTPATIENT)
Dept: REHABILITATION | Facility: HOSPITAL | Age: 63
End: 2023-06-13
Payer: COMMERCIAL

## 2023-06-13 DIAGNOSIS — M79.641 RIGHT HAND PAIN: Primary | ICD-10-CM

## 2023-06-13 PROCEDURE — 97140 MANUAL THERAPY 1/> REGIONS: CPT | Mod: PO

## 2023-06-13 PROCEDURE — 97110 THERAPEUTIC EXERCISES: CPT | Mod: PO

## 2023-06-13 PROCEDURE — 97022 WHIRLPOOL THERAPY: CPT | Mod: PO

## 2023-06-13 NOTE — PATIENT INSTRUCTIONS
MP Flexion (Passive)        Use other hand to gently bend all finger at large knuckle. Hold 10 seconds.  Repeat 10 times. Do 3 sessions per day.     PIP Flexion (Passive)        Use other hand to bend the middle joint of all finger down as far as possible. Hold 10 seconds.  Repeat 10 times. Do 3 sessions per day.     DIP Flexion (Passive)        Use other hand to gently bend tip joint of all finger. Hold 10 seconds.  Repeat 10 times. Do 3 sessions per day.       MP / PIP / DIP Composite Flexion (Passive Stretch)        Use other hand to bend all finger at all three joints. Hold 10 seconds.  Repeat 10 times. Do 3 sessions per day.     Extension (Passive)        Keep palm on table, using other hand on top to assist. Raise elbow. Hold 10 seconds.  Repeat 10 times. Do 3 sessions per day.  Activity: Resting hand with palm on hip, move elbow out to side.*       Flexion (Passive)        With palm on table near edge, hold steady with other hand on top. Lower elbow. Hold 10 seconds.  Repeat 10 times. Do 3 sessions per day.    Ulnar Deviation (Passive)        With palm and wrist on table, place other hand on top to steady while bringing elbow outward. Hold 10 seconds.  Repeat 10 times. Do 3 sessions per day.    Radial Deviation (Passive)        With wrist and palm on table, place other hand on top to keep steady while bringing elbow inward. Hold 10 seconds.  Repeat 10 times. Do 3 sessions per day.

## 2023-06-13 NOTE — PROGRESS NOTES
"                            Occupational Therapy Daily Treatment Note     Date: 6/13/2023  Name: Cristal Gonzalez  Clinic Number: [id number]    Therapy Diagnosis:   Encounter Diagnosis   Name Primary?    Right hand pain Yes       Physician: Anand Geller MD    Medical Diagnosis: S52.571A (ICD-10-CM) - Other closed intra-articular fracture of distal end of right radius, initial encounter  Physician Orders: Start OT for postop rehab. Focusing on motion of the fingers for now. NWB until 6 weeks.  Evaluation Date: 5/8/2023  Plan of Care Certification Date: 7/31/2023  Authorization Period: 12/29/2023  Surgery Date and Procedure: 4/21/2023  Date of Return to MD: 6/22/2023     Visit #: 8 of 20  Time In: 9:45 AM  Time Out: 10:30 AM  Total Billable Time: 45 min     Precautions: Standard, Weightbearing (6 weeks)    Subjective     Pt reports: "I did something to my arm on Sunday; It was really hurting so I've worn the brace since them and it's helped"    she was compliant with home exercise program given last session.   Response to previous treatment: yoseph well    Pain: 3/10  Location: right wrists      Objective    received the following supervised modalities after being cleared for contradictions for 10 minutes:   -  Cristal received fluidotherapy  to (R) hand(s) for 10 minutes to increase blood flow, circulation, pain management and for tissue elasticity prior to therex.     Cristal received the following manual therapy techniques for 24 minutes in order to maximize tissue function and/or decrease pain:   -gentle passive range of motion wrist flexion/extension  -gentle passive range of motion MP flexion  -gentle passive range of motion intrinsic minus  -soft tissue mobilization to forearm muscles to decrease muscle tightness while pt in  for intrinsic minus stretch    Cristal  participated in dynamic functional therapeutic exercises to improve functional performance while increasing strength, endurance, ROM,  and flexibility " for 15  minutes, including:  -active range of motion WF/WE, UD/RD, circles both ways 20x each with small dowel  -active range of motion abduction/adduction 20x  -Intrinsic minus hook into fist with small dowel 1'  -Intrinsic minus low load prolonged stretch with B compression sleeve and MH 4'    Home Exercises and Education Provided     Education provided:  - Discussed insurance limitation  - Progress towards goals  - Upgraded HEP    Written Home Exercises Provided: yes.  Exercises were reviewed and Cristal was able to demonstrate them prior to the end of the session.  Cristal demonstrated good  understanding of the HEP provided.   .   See EMR under Patient Instructions for exercises provided  6/13/2023 .     Assessment     Pt would continue to benefit from skilled OT to maximize right upper extremity functioning. Pt with irritation and swelling in wrist due to irritation on Sunday. Pt benefited from low load prolonged stretch into intrinsic hook with MH for increased ROM.     Cristal is progressing well towards her goals and there are no updates to goals at this time. Pt prognosis is Good.     The patient demonstrated proper understanding  of each exercise.Pt required verbal and tactile cues for all new hand exercises to use dowel for HEP.  Pt continues to be limited in functional and leisurely pursuits. Pain limits patients participation in ADL's. Pt is not able to carryout necessary vocational tasks. Patient continues to requires cues and skilled supervision to complete HEP       Anticipated barriers to occupational therapy: guarded     Pt's spiritual, cultural and educational needs considered and pt agreeable to plan of care and goals.    Goals:    LTG's ( 12 weeks):  7/31/2023  1)   Increase ROM to WFL degrees in hand/wrist/FA to increase functional hand use for self care tasks.  2)   Increase  strength to 40 lbs. to grasp hair dryer.  3)   Increase lateral pinch to 14 psis for opening sauce packets while  cooking.  4)   Decrease complaints of pain to  1 out of 10 at worst to increase functional hand use for ADL/work/leisure activities.  5)   Patient to score at 45% or less on FOTO to demonstrate improved perception of functional RUE use.  6)   Pt will return to near to prior level of function for ADLs and household management reporting I or Mod I with ADLs (dressing, feeding, grooming, toileting).      STG's (6 weeks) 6/19/2023  1)   Patient to be IND with HEP and modalities for pain/edema managment.  2)   Increase ROM to 60% degrees in composite fist to increase functional hand use for ADLs/work/leisure activities.  3)   Increase ROM by 30 degrees in WF/WE to increase functional hand use for ADLs/work/leisure activities.  4)   Increase ROM by 40 degrees in FA supination to increase functional hand use for ADLs/work/leisure activities.  5)   Increase  strength to 20 lbs. to improve functional grasp for ADLs/work/leisure activities.   6)   Increase lateral pinch to 6 psi's to increase independence with button and FM Coordination.   7)   Patient to be IND with Orthotic use, wear and care precautions.   8)   Decrease complaints of pain to  4 out of 10 at worst to increase functional hand use for ADL/work/leisure activities.     Plan      Pt to be treated by Occupational Therapy 2 times per week for 12 weeks during the certification period from 5/8/2023 to 7/21/2023 to achieve the established goals.      Treatment to include: Paraffin, Fluidotherapy, Manual therapy/joint mobilizations, Modalities for pain management, Therapeutic exercises/activities., Iontophoresis with 2.0 cc Dexamethasone, Strengthening, Orthotic Fabrication/Fit/Training, Edema Control, Scar Management, and Joint Protection, as well as any other treatments deemed necessary based on the patient's needs or progress.      Jessica White, OTR/L, [facility]  Occupational Therapist

## 2023-06-16 ENCOUNTER — CLINICAL SUPPORT (OUTPATIENT)
Dept: REHABILITATION | Facility: HOSPITAL | Age: 63
End: 2023-06-16
Payer: COMMERCIAL

## 2023-06-16 DIAGNOSIS — M79.641 RIGHT HAND PAIN: Primary | ICD-10-CM

## 2023-06-16 PROCEDURE — 97140 MANUAL THERAPY 1/> REGIONS: CPT | Mod: PO

## 2023-06-16 PROCEDURE — 97110 THERAPEUTIC EXERCISES: CPT | Mod: PO

## 2023-06-16 PROCEDURE — 97018 PARAFFIN BATH THERAPY: CPT | Mod: PO

## 2023-06-16 NOTE — PROGRESS NOTES
"                            Occupational Therapy Daily Treatment Note     Date: 6/16/2023  Name: Cristal Gonzalez  Clinic Number: [id number]    Therapy Diagnosis:   Encounter Diagnosis   Name Primary?    Right hand pain Yes       Physician: Anand Geller MD    Medical Diagnosis: S52.571A (ICD-10-CM) - Other closed intra-articular fracture of distal end of right radius, initial encounter  Physician Orders: Start OT for postop rehab. Focusing on motion of the fingers for now. NWB until 6 weeks.  Evaluation Date: 5/8/2023  Plan of Care Certification Date: 7/31/2023  Authorization Period: 12/29/2023  Surgery Date and Procedure: 4/21/2023  Date of Return to MD: 6/22/2023     Visit #: 9 of 20  Time In: 10:30 AM  Time Out: 11:17 AM  Total Billable Time: 47 min     Precautions: Standard, Weightbearing (6 weeks)    Subjective     Pt reports: "My fingers got a lot better"    she was compliant with home exercise program given last session.   Response to previous treatment: yoseph well    Pain: 0/10  Location: right wrists      Objective    received the following supervised modalities after being cleared for contradictions for 10 minutes:   -  Cristal received paraffin   to (R) hand(s) for 10 minutes to increase blood flow, circulation, pain management and for tissue elasticity with fist wrapped in coband to increase finger flexion for composite flexion    Cristal received the following manual therapy techniques for 24 minutes in order to maximize tissue function and/or decrease pain:   -gentle passive range of motion wrist flexion/extension  -gentle passive range of motion MP flexion  -gentle passive range of motion intrinsic minus  -soft tissue mobilization to forearm muscles to decrease muscle tightness while pt in  for intrinsic minus stretch  -Massage vibrator to incision site to decrease scar tissue adhesions and to palm to decrease tightness  -myofascial scraping to forearm muscles and scar site to decrease adhesion/muscle " tightness    Cristal  participated in dynamic functional therapeutic exercises to improve functional performance while increasing strength, endurance, ROM,  and flexibility for 13  minutes, including:  -active range of motion WF/WE, UD/RD, circles both ways 20x each with small dowel  -active range of motion abduction/adduction 20x  -low load stretch into composite flexion with paraffin 10'  -low load stretch into WF/WE with MH 4' each    Home Exercises and Education Provided     Education provided:  - Discussed insurance limitation  - Progress towards goals  - Upgraded HEP    Written Home Exercises Provided: yes.  Exercises were reviewed and Cristal was able to demonstrate them prior to the end of the session.  Cristal demonstrated good  understanding of the HEP provided.   .   See EMR under Patient Instructions for exercises provided  6/13/2023 .     Assessment     Pt would continue to benefit from skilled OT to maximize right upper extremity functioning. Pt with improved IP flexion with MP flexion this session.    Cristal is progressing well towards her goals and there are no updates to goals at this time. Pt prognosis is Good.     The patient demonstrated proper understanding  of each exercise.Pt required verbal and tactile cues for all new hand exercises to use dowel for HEP.  Pt continues to be limited in functional and leisurely pursuits. Pain limits patients participation in ADL's. Pt is not able to carryout necessary vocational tasks. Patient continues to requires cues and skilled supervision to complete HEP       Anticipated barriers to occupational therapy: guarded     Pt's spiritual, cultural and educational needs considered and pt agreeable to plan of care and goals.    Goals:    LTG's ( 12 weeks):  7/31/2023  1)   Increase ROM to WFL degrees in hand/wrist/FA to increase functional hand use for self care tasks.  2)   Increase  strength to 40 lbs. to grasp hair dryer.  3)   Increase lateral pinch to 14 psis for  opening sauce packets while cooking.  4)   Decrease complaints of pain to  1 out of 10 at worst to increase functional hand use for ADL/work/leisure activities.  5)   Patient to score at 45% or less on FOTO to demonstrate improved perception of functional RUE use.  6)   Pt will return to near to prior level of function for ADLs and household management reporting I or Mod I with ADLs (dressing, feeding, grooming, toileting).      STG's (6 weeks) 6/19/2023  1)   Patient to be IND with HEP and modalities for pain/edema managment.  2)   Increase ROM to 60% degrees in composite fist to increase functional hand use for ADLs/work/leisure activities.  3)   Increase ROM by 30 degrees in WF/WE to increase functional hand use for ADLs/work/leisure activities.  4)   Increase ROM by 40 degrees in FA supination to increase functional hand use for ADLs/work/leisure activities.  5)   Increase  strength to 20 lbs. to improve functional grasp for ADLs/work/leisure activities.   6)   Increase lateral pinch to 6 psi's to increase independence with button and FM Coordination.   7)   Patient to be IND with Orthotic use, wear and care precautions.   8)   Decrease complaints of pain to  4 out of 10 at worst to increase functional hand use for ADL/work/leisure activities.     Plan      Pt to be treated by Occupational Therapy 2 times per week for 12 weeks during the certification period from 5/8/2023 to 7/21/2023 to achieve the established goals.      Treatment to include: Paraffin, Fluidotherapy, Manual therapy/joint mobilizations, Modalities for pain management, Therapeutic exercises/activities., Iontophoresis with 2.0 cc Dexamethasone, Strengthening, Orthotic Fabrication/Fit/Training, Edema Control, Scar Management, and Joint Protection, as well as any other treatments deemed necessary based on the patient's needs or progress.      Jessica White, OTR/L, [facility]  Occupational Therapist

## 2023-06-20 ENCOUNTER — CLINICAL SUPPORT (OUTPATIENT)
Dept: REHABILITATION | Facility: HOSPITAL | Age: 63
End: 2023-06-20
Payer: COMMERCIAL

## 2023-06-20 DIAGNOSIS — M79.641 RIGHT HAND PAIN: Primary | ICD-10-CM

## 2023-06-20 PROCEDURE — 97140 MANUAL THERAPY 1/> REGIONS: CPT | Mod: PO

## 2023-06-20 PROCEDURE — 97110 THERAPEUTIC EXERCISES: CPT | Mod: PO

## 2023-06-20 PROCEDURE — 97022 WHIRLPOOL THERAPY: CPT | Mod: PO

## 2023-06-20 NOTE — PROGRESS NOTES
"                            Occupational Therapy Daily Treatment Note     Date: 6/20/2023  Name: Cristal Gonzalez  Clinic Number: [id number]    Therapy Diagnosis:   Encounter Diagnosis   Name Primary?    Right hand pain Yes     Physician: Anand Geller MD    Medical Diagnosis: S52.571A (ICD-10-CM) - Other closed intra-articular fracture of distal end of right radius, initial encounter  Physician Orders: Start OT for postop rehab. Focusing on motion of the fingers for now. NWB until 6 weeks.  Evaluation Date: 5/8/2023  Plan of Care Certification Date: 7/31/2023  Authorization Period: 12/29/2023  Surgery Date and Procedure: 4/21/2023  Date of Return to MD: 6/22/2023     Visit #: 10 of 20  Time In: 10:30 AM  Time Out: 11:15 AM  Total Billable Time: 45 min     Precautions: Standard, Weightbearing (6 weeks)    Subjective     Pt reports: "It feels like it got a good workout"    she was compliant with home exercise program given last session.   Response to previous treatment: yoseph well    Pain: 0/10  Location: right wrists      Objective    received the following supervised modalities after being cleared for contradictions for 10 minutes:   - Patient received fluidotherapy to RUE hand(s) for 10 minutes to increase blood flow, circulation, desensitization, sensory re-education and for pain management.      Cristal received the following manual therapy techniques for 24 minutes in order to maximize tissue function and/or decrease pain:   -passive range of motion wrist flexion/extension  -passive range of motion sup/pro   -soft tissue mobilization to forearm muscles to decrease muscle tightness while pt in  for intrinsic minus stretch  -myofascial scraping to forearm muscles and scar site to decrease adhesion/muscle tightness    Cristal  participated in dynamic functional therapeutic exercises to improve functional performance while increasing strength, endurance, ROM,  and flexibility for 11  minutes, including:  -active " range of motion WF/WE, UD/RD, circles both ways 20x each with small dowel  -active range of motion abduction/adduction 20x  -low load stretch into WF/WE with MH 4' each    Edema: Circumferential measurements: In centimeters      5/8/2023 5/8/2023 6/20/23     Left Right Right   MPs 14.6  17.3  18.0   PWC (Proximal Wrist Crease) 17.8  19.0  16.9        Range of Motion:   Right  Affected     Wrist AROM   WE  WF  UD  RD    5/8/23 17 26 15 4   6/20/23 35 30 15 12         Finger active range of motion 5/8/2023    INDEX  LONG  RING  SMALL    MP  0/45 0/45 0/30 0/24   PIP  0/66 0/67 0/70 0/40   DIP  0/40 0/51 0/47 0/35       Finger active range of motion 6/20/23    INDEX  LONG  RING  SMALL    MP  0/81 0/81 0/65 0/52   PIP  0/85 0/85 0/86 0/94   DIP  0/46 0/60 0/67 0/65      Forearm AROM   PRO  SUP    5/8/23 WFL     10   6/20/23 WFL 44          Strength: (MAZIN Dynamometer in psi.)       5/8/23 5/8/23 6/20/23     Left Right Right   Rung II 29.3 Deferred 10.6         Pinch Strength (Measured in psi)      5/8/23 5/8/23 6/20/23     Left Right Right   Key Pinch 12  Deferred  9   3pt Pinch 10  Deferred  6   2pt Pinch 8  Deferred  5        Home Exercises and Education Provided     Education provided:  - Discussed insurance limitation  - Progress towards goals  - Upgraded HEP    Written Home Exercises Provided: yes.  Exercises were reviewed and Cristal was able to demonstrate them prior to the end of the session.  Cristal demonstrated good  understanding of the HEP provided.   .   See EMR under Patient Instructions for exercises provided  6/13/2023 .     Assessment     Pt would continue to benefit from skilled OT to maximize right upper extremity functioning. Pt with improved stretch into IP flexion of digits 2-5 this session with compression sleeve.    Cristal is progressing well towards her goals and there are no updates to goals at this time. Pt prognosis is Good.     The patient demonstrated proper understanding  of each exercise.Pt  required verbal and tactile cues for all new hand exercises to use dowel for HEP.  Pt continues to be limited in functional and leisurely pursuits. Pain limits patients participation in ADL's. Pt is not able to carryout necessary vocational tasks. Patient continues to requires cues and skilled supervision to complete HEP       Anticipated barriers to occupational therapy: guarded     Pt's spiritual, cultural and educational needs considered and pt agreeable to plan of care and goals.    Goals:    LTG's ( 12 weeks):  7/31/2023  1)   Increase ROM to WFL degrees in hand/wrist/FA to increase functional hand use for self care tasks. Progressing 6/20/2023  2)   Increase  strength to 40 lbs. to grasp hair dryer. Progressing 6/20/2023  3)   Increase lateral pinch to 14 psis for opening sauce packets while cooking. Progressing 6/20/2023  4)   Decrease complaints of pain to  1 out of 10 at worst to increase functional hand use for ADL/work/leisure activities. Progressing 6/20/2023  5)   Patient to score at 45% or less on FOTO to demonstrate improved perception of functional RUE use. Progressing 6/20/2023  6)   Pt will return to near to prior level of function for ADLs and household management reporting I or Mod I with ADLs (dressing, feeding, grooming, toileting).  Progressing 6/20/2023     STG's (6 weeks) 6/19/2023  1)   Patient to be IND with HEP and modalities for pain/edema managment. Met 6/20/2023  2)   Increase ROM to 60% degrees in composite fist to increase functional hand use for ADLs/work/leisure activities. Met 6/20/2023  3)   Increase ROM by 30 degrees in WF/WE to increase functional hand use for ADLs/work/leisure activities. Progressing 6/20/2023  4)   Increase ROM by 40 degrees in FA supination to increase functional hand use for ADLs/work/leisure activities. Met 6/20/2023  5)   Increase  strength to 20 lbs. to improve functional grasp for ADLs/work/leisure activities. Progressing 6/20/2023  6)    Increase lateral pinch to 6 psi's to increase independence with button and FM Coordination. Met 6/20/2023  7)   Patient to be IND with Orthotic use, wear and care precautions. Met 6/20/2023  8)   Decrease complaints of pain to  4 out of 10 at worst to increase functional hand use for ADL/work/leisure activities.     Plan      Pt to be treated by Occupational Therapy 2 times per week for 12 weeks during the certification period from 5/8/2023 to 7/21/2023 to achieve the established goals.      Treatment to include: Paraffin, Fluidotherapy, Manual therapy/joint mobilizations, Modalities for pain management, Therapeutic exercises/activities., Iontophoresis with 2.0 cc Dexamethasone, Strengthening, Orthotic Fabrication/Fit/Training, Edema Control, Scar Management, and Joint Protection, as well as any other treatments deemed necessary based on the patient's needs or progress.      Jessica White, OTR/L, [facility]  Occupational Therapist

## 2023-06-21 ENCOUNTER — PATIENT MESSAGE (OUTPATIENT)
Dept: ORTHOPEDICS | Facility: CLINIC | Age: 63
End: 2023-06-21
Payer: COMMERCIAL

## 2023-06-22 ENCOUNTER — OFFICE VISIT (OUTPATIENT)
Dept: ORTHOPEDICS | Facility: CLINIC | Age: 63
End: 2023-06-22
Payer: COMMERCIAL

## 2023-06-22 ENCOUNTER — HOSPITAL ENCOUNTER (OUTPATIENT)
Dept: RADIOLOGY | Facility: HOSPITAL | Age: 63
Discharge: HOME | End: 2023-06-22
Attending: PHYSICIAN ASSISTANT
Payer: COMMERCIAL

## 2023-06-22 DIAGNOSIS — S52.571A OTHER CLOSED INTRA-ARTICULAR FRACTURE OF DISTAL END OF RIGHT RADIUS, INITIAL ENCOUNTER: Primary | ICD-10-CM

## 2023-06-22 DIAGNOSIS — Z98.890 POSTOPERATIVE STATE: ICD-10-CM

## 2023-06-22 DIAGNOSIS — S52.571A OTHER CLOSED INTRA-ARTICULAR FRACTURE OF DISTAL END OF RIGHT RADIUS, INITIAL ENCOUNTER: ICD-10-CM

## 2023-06-22 PROCEDURE — 99999 PR PBB SHADOW E&M-EST. PATIENT-LVL III: ICD-10-PCS | Mod: PBBFAC,,, | Performed by: PHYSICIAN ASSISTANT

## 2023-06-22 PROCEDURE — 3044F PR MOST RECENT HEMOGLOBIN A1C LEVEL <7.0%: ICD-10-PCS | Mod: CPTII,S$GLB,, | Performed by: PHYSICIAN ASSISTANT

## 2023-06-22 PROCEDURE — 73110 XR WRIST COMPLETE 3 VIEWS RIGHT: ICD-10-PCS | Mod: 26,RT,, | Performed by: RADIOLOGY

## 2023-06-22 PROCEDURE — 3044F HG A1C LEVEL LT 7.0%: CPT | Mod: CPTII,S$GLB,, | Performed by: PHYSICIAN ASSISTANT

## 2023-06-22 PROCEDURE — 99024 PR POST-OP FOLLOW-UP VISIT: ICD-10-PCS | Mod: S$GLB,,, | Performed by: PHYSICIAN ASSISTANT

## 2023-06-22 PROCEDURE — 1159F MED LIST DOCD IN RCRD: CPT | Mod: CPTII,S$GLB,, | Performed by: PHYSICIAN ASSISTANT

## 2023-06-22 PROCEDURE — 99024 POSTOP FOLLOW-UP VISIT: CPT | Mod: S$GLB,,, | Performed by: PHYSICIAN ASSISTANT

## 2023-06-22 PROCEDURE — 1159F PR MEDICATION LIST DOCUMENTED IN MEDICAL RECORD: ICD-10-PCS | Mod: CPTII,S$GLB,, | Performed by: PHYSICIAN ASSISTANT

## 2023-06-22 PROCEDURE — 73110 X-RAY EXAM OF WRIST: CPT | Mod: TC,PN,RT

## 2023-06-22 PROCEDURE — 99999 PR PBB SHADOW E&M-EST. PATIENT-LVL III: CPT | Mod: PBBFAC,,, | Performed by: PHYSICIAN ASSISTANT

## 2023-06-22 PROCEDURE — 73110 X-RAY EXAM OF WRIST: CPT | Mod: 26,RT,, | Performed by: RADIOLOGY

## 2023-06-22 NOTE — PROGRESS NOTES
Dr. Geller is the supervising physician for this encounter/patient    Cristal Gonzalez presents for post-operative evaluation.  The patient is now 9 weeks s/p Right ORIF distal radius fracture and CRPP 5th metacarpal base fracture with Dr. Geller on 4/21/23.  Overall the patient reports doing well.  She reports 0/10 pain, denies any f/c/s, she is taking Aleve as needed. She is working with OT and has been focusing on ROM which is improving. She does not have a full fist yet, but finds this is slowly getting better.    PE:    AA&O x 4.  NAD  HEENT:  NCAT, sclera nonicteric  Lungs:  Respirations are equal and unlabored.  CV:  2+ bilateral upper and lower extremity pulses.  MSK: The wound is well healed without any concerns for infection. Mild TTP over the ulnar styloid. Completely NTTP over the distal radius. Near full fist present today, wrist flexion/extension about 10-15 degrees each. SILT. DNVI.    IMAGING - reviewed with patient  Stable appearance of the distal radius ORIF, no hardware complications. 5th metacarpal base fracture is healing well    A/P: Status post above, doing well  1) Continue with OT for aggressive ROM, ok to start strengthening program per tolerance.  2) Scar massage discussed.  3) F/U 4-6 weeks, repeat xrays   4) Call with any questions/concerns in the interim      Jamie Russo-DiGeorge PA-C

## 2023-06-23 ENCOUNTER — CLINICAL SUPPORT (OUTPATIENT)
Dept: REHABILITATION | Facility: HOSPITAL | Age: 63
End: 2023-06-23
Payer: COMMERCIAL

## 2023-06-23 DIAGNOSIS — M79.641 RIGHT HAND PAIN: Primary | ICD-10-CM

## 2023-06-23 PROCEDURE — 97140 MANUAL THERAPY 1/> REGIONS: CPT | Mod: PO

## 2023-06-23 PROCEDURE — 97110 THERAPEUTIC EXERCISES: CPT | Mod: PO

## 2023-06-23 PROCEDURE — 97022 WHIRLPOOL THERAPY: CPT | Mod: PO

## 2023-06-23 NOTE — PROGRESS NOTES
"                            Occupational Therapy Daily Treatment Note     Date: 6/23/2023  Name: Cristal Gonzalez  Clinic Number: [id number]    Therapy Diagnosis:   Encounter Diagnosis   Name Primary?    Right hand pain Yes     Physician: Anand Geller MD    Medical Diagnosis: S52.571A (ICD-10-CM) - Other closed intra-articular fracture of distal end of right radius, initial encounter  Physician Orders: Start OT for postop rehab. Focusing on motion of the fingers for now. NWB until 6 weeks.  Evaluation Date: 5/8/2023  Plan of Care Certification Date: 7/31/2023  Authorization Period: 12/29/2023  Surgery Date and Procedure: 4/21/2023  Date of Return to MD: 6/22/2023     Visit #: 10 of 20  Time In: 10:34 AM  Time Out: 11:35 AM  Total Billable Time: 61 min     Precautions: Standard, Weightbearing (6 weeks)    Subjective     Pt reports: "It feels like it got a good workout"    she was compliant with home exercise program given last session.   Response to previous treatment: yoseph well    Pain: 0/10  Location: right wrists      Objective    received the following supervised modalities after being cleared for contradictions for 10 minutes:   - Patient received fluidotherapy to RUE hand(s) for 10 minutes to increase blood flow, circulation, desensitization, sensory re-education and for pain management.      Cristal received the following manual therapy techniques for 15 minutes in order to maximize tissue function and/or decrease pain:   -passive range of motion wrist flexion/extension  -Massage vibrator to palm, volar/dorsal wrist to decrease muscle tightness for increased ROM    Cristal  participated in dynamic functional therapeutic exercises to improve functional performance while increasing strength, endurance, ROM,  and flexibility for 36 minutes, including:  -active range of motion WF/WE  -AAROM WF/WE 10x  -Yellow Flexbar WF/WE, Pro/sup 10x each  -Yellow putty gripping 1'  -low load stretch into intrinsic minus with B " compression sleeve and MH for 6'     Home Exercises and Education Provided     Education provided:  - Discussed insurance limitation  - Progress towards goals  - Upgraded HEP    Written Home Exercises Provided: yes.  Exercises were reviewed and Cristal was able to demonstrate them prior to the end of the session.  Cristal demonstrated good  understanding of the HEP provided.   .   See EMR under Patient Instructions for exercises provided  6/13/2023 .     Assessment     Pt would continue to benefit from skilled OT to maximize right upper extremity functioning. Pt with continued intrinsic and extrinsic tightness. Progressed with aggressive passive range of motion per MD order with pt achieving increased WF. Pt with good tolerance for introduction to strengthening.    Cristal is progressing well towards her goals and there are no updates to goals at this time. Pt prognosis is Good.     The patient demonstrated proper understanding  of each exercise.Pt required verbal and tactile cues for all new hand exercises to use dowel for HEP.  Pt continues to be limited in functional and leisurely pursuits. Pain limits patients participation in ADL's. Pt is not able to carryout necessary vocational tasks. Patient continues to requires cues and skilled supervision to complete HEP       Anticipated barriers to occupational therapy: guarded     Pt's spiritual, cultural and educational needs considered and pt agreeable to plan of care and goals.    Goals:    LTG's ( 12 weeks):  7/31/2023  1)   Increase ROM to WFL degrees in hand/wrist/FA to increase functional hand use for self care tasks. Progressing 6/20/2023  2)   Increase  strength to 40 lbs. to grasp hair dryer. Progressing 6/20/2023  3)   Increase lateral pinch to 14 psis for opening sauce packets while cooking. Progressing 6/20/2023  4)   Decrease complaints of pain to  1 out of 10 at worst to increase functional hand use for ADL/work/leisure activities. Progressing 6/20/2023  5)    Patient to score at 45% or less on FOTO to demonstrate improved perception of functional RUE use. Progressing 6/20/2023  6)   Pt will return to near to prior level of function for ADLs and household management reporting I or Mod I with ADLs (dressing, feeding, grooming, toileting).  Progressing 6/20/2023     STG's (6 weeks) 6/19/2023  1)   Patient to be IND with HEP and modalities for pain/edema managment. Met 6/20/2023  2)   Increase ROM to 60% degrees in composite fist to increase functional hand use for ADLs/work/leisure activities. Met 6/20/2023  3)   Increase ROM by 30 degrees in WF/WE to increase functional hand use for ADLs/work/leisure activities. Progressing 6/20/2023  4)   Increase ROM by 40 degrees in FA supination to increase functional hand use for ADLs/work/leisure activities. Met 6/20/2023  5)   Increase  strength to 20 lbs. to improve functional grasp for ADLs/work/leisure activities. Progressing 6/20/2023  6)   Increase lateral pinch to 6 psi's to increase independence with button and FM Coordination. Met 6/20/2023  7)   Patient to be IND with Orthotic use, wear and care precautions. Met 6/20/2023  8)   Decrease complaints of pain to  4 out of 10 at worst to increase functional hand use for ADL/work/leisure activities.     Plan      Pt to be treated by Occupational Therapy 2 times per week for 12 weeks during the certification period from 5/8/2023 to 7/21/2023 to achieve the established goals.      Treatment to include: Paraffin, Fluidotherapy, Manual therapy/joint mobilizations, Modalities for pain management, Therapeutic exercises/activities., Iontophoresis with 2.0 cc Dexamethasone, Strengthening, Orthotic Fabrication/Fit/Training, Edema Control, Scar Management, and Joint Protection, as well as any other treatments deemed necessary based on the patient's needs or progress.      Jessica White, OTR/L, [facility]  Occupational Therapist

## 2023-06-23 NOTE — PATIENT INSTRUCTIONS
" Strengthening (Resistive Putty)        Squeeze putty using thumb and all fingers.  Perform for 1 minute intervals 2x, 3x every other day.       Palmar Pinch Strengthening (Resistive Putty)        Pinch putty between thumb and index finger and middle fingertip.  Repeat 2 logs. Do 3 sessions every other day.       Lateral Pinch Strengthening (Resistive Putty)        Squeeze between thumb and side of index finger.  Repeat 2 logs. Do 3 sessions every other day.    Flexion (Resistive Putty)        Keeping fingertips straight, press putty towards base of palm.  Perform for 1 minute intervals 2x, 3x every other day.     KEEP END JOINTS STRAIGHT    Opposition (Active)        Touch tip of thumb to nail tip of each finger in turn, making an "O" shape.  Repeat 20 times. Do 5 sessions per day.      Composite Extension (Active)        Bring thumb up and out in hitchhiker position. Repeat 20 times. Do 5 sessions per day.    Composite Movement Circumduction (Active)        Make circles with thumb clockwise and counter-clockwise.  Repeat 20 times each direction. Do 5 sessions per day.         "

## 2023-06-27 ENCOUNTER — CLINICAL SUPPORT (OUTPATIENT)
Dept: REHABILITATION | Facility: HOSPITAL | Age: 63
End: 2023-06-27
Payer: COMMERCIAL

## 2023-06-27 DIAGNOSIS — M79.641 RIGHT HAND PAIN: Primary | ICD-10-CM

## 2023-06-27 PROCEDURE — 97110 THERAPEUTIC EXERCISES: CPT | Mod: PO

## 2023-06-27 PROCEDURE — 97018 PARAFFIN BATH THERAPY: CPT | Mod: PO

## 2023-06-27 PROCEDURE — 97140 MANUAL THERAPY 1/> REGIONS: CPT | Mod: PO

## 2023-06-27 NOTE — PROGRESS NOTES
"                            Occupational Therapy Daily Treatment Note     Date: 6/27/2023  Name: Cristal Gonzalez  Clinic Number: [id number]    Therapy Diagnosis:   Encounter Diagnosis   Name Primary?    Right hand pain Yes       Physician: Anand Geller MD    Medical Diagnosis: S52.571A (ICD-10-CM) - Other closed intra-articular fracture of distal end of right radius, initial encounter  Physician Orders: Start OT for postop rehab. Focusing on motion of the fingers for now. NWB until 6 weeks.  Evaluation Date: 5/8/2023  Plan of Care Certification Date: 7/31/2023  Authorization Period: 12/29/2023  Surgery Date and Procedure: 4/21/2023  Date of Return to MD: 6/22/2023     Visit #: 10 of 20  Time In: 10:36 AM  Time Out: 11:35 AM  Total Billable Time: 59 min     Precautions: Standard, Weightbearing (6 weeks)    Subjective     Pt reports: "It just feels stuck"    she was compliant with home exercise program given last session.   Response to previous treatment: yoseph well    Pain: 0/10  Location: right wrists      Objective    received the following supervised modalities after being cleared for contradictions for 10 minutes:   - Patient received paraffin to RUE hand(s) for 10 minutes with hand wrapped in composite IP and MP flexion with coband to increase blood flow, circulation, and for pain management.      Cristal received the following manual therapy techniques for 12 minutes in order to maximize tissue function and/or decrease pain:   -passive range of motion wrist flexion/extension  -massage vibrator to volar and dorsal hand to decrease muscle tightness and improve mobility    Cristal  participated in dynamic functional therapeutic exercises to improve functional performance while increasing strength, endurance, ROM,  and flexibility for 37 minutes, including:  -active range of motion WF/WE with dowel 20x each  -Yellow Flexbar WF/WE, Pro/sup 10x each  -Yellow putty gripping, MP flexion 2' each  -LLPS into WF/WE with "     Home Exercises and Education Provided     Education provided:  - Discussed insurance limitation  - Progress towards goals  - Upgraded HEP    Written Home Exercises Provided: yes.  Exercises were reviewed and Cristal was able to demonstrate them prior to the end of the session.  Cristal demonstrated good  understanding of the HEP provided.   .   See EMR under Patient Instructions for exercises provided  6/13/2023 .     Assessment     Pt would continue to benefit from skilled OT to maximize right upper extremity functioning. Pt with improved passive motion into wrist flexion but continues to demonstrate stiffness into wrist extension with passive motion and LLPS. Pt with improved intrinsic minus flexion after LLPS with paraffin. Pt overall progressing well with improved ROM and introduction to strengthening.    Cristal is progressing well towards her goals and there are no updates to goals at this time. Pt prognosis is Good.     The patient demonstrated proper understanding  of each exercise.Pt required verbal and tactile cues for all new hand exercises to use dowel for HEP.  Pt continues to be limited in functional and leisurely pursuits. Pain limits patients participation in ADL's. Pt is not able to carryout necessary vocational tasks. Patient continues to requires cues and skilled supervision to complete HEP       Anticipated barriers to occupational therapy: guarded     Pt's spiritual, cultural and educational needs considered and pt agreeable to plan of care and goals.    Goals:    LTG's ( 12 weeks):  7/31/2023  1)   Increase ROM to WFL degrees in hand/wrist/FA to increase functional hand use for self care tasks. Progressing 6/20/2023  2)   Increase  strength to 40 lbs. to grasp hair dryer. Progressing 6/20/2023  3)   Increase lateral pinch to 14 psis for opening sauce packets while cooking. Progressing 6/20/2023  4)   Decrease complaints of pain to  1 out of 10 at worst to increase functional hand use for  ADL/work/leisure activities. Progressing 6/20/2023  5)   Patient to score at 45% or less on FOTO to demonstrate improved perception of functional RUE use. Progressing 6/20/2023  6)   Pt will return to near to prior level of function for ADLs and household management reporting I or Mod I with ADLs (dressing, feeding, grooming, toileting).  Progressing 6/20/2023     STG's (6 weeks) 6/19/2023  1)   Patient to be IND with HEP and modalities for pain/edema managment. Met 6/20/2023  2)   Increase ROM to 60% degrees in composite fist to increase functional hand use for ADLs/work/leisure activities. Met 6/20/2023  3)   Increase ROM by 30 degrees in WF/WE to increase functional hand use for ADLs/work/leisure activities. Progressing 6/20/2023  4)   Increase ROM by 40 degrees in FA supination to increase functional hand use for ADLs/work/leisure activities. Met 6/20/2023  5)   Increase  strength to 20 lbs. to improve functional grasp for ADLs/work/leisure activities. Progressing 6/20/2023  6)   Increase lateral pinch to 6 psi's to increase independence with button and FM Coordination. Met 6/20/2023  7)   Patient to be IND with Orthotic use, wear and care precautions. Met 6/20/2023  8)   Decrease complaints of pain to  4 out of 10 at worst to increase functional hand use for ADL/work/leisure activities.     Plan      Pt to be treated by Occupational Therapy 2 times per week for 12 weeks during the certification period from 5/8/2023 to 7/21/2023 to achieve the established goals.      Treatment to include: Paraffin, Fluidotherapy, Manual therapy/joint mobilizations, Modalities for pain management, Therapeutic exercises/activities., Iontophoresis with 2.0 cc Dexamethasone, Strengthening, Orthotic Fabrication/Fit/Training, Edema Control, Scar Management, and Joint Protection, as well as any other treatments deemed necessary based on the patient's needs or progress.      Jessica White, OTR/L, [facility]  Occupational Therapist

## 2023-06-29 ENCOUNTER — CLINICAL SUPPORT (OUTPATIENT)
Dept: REHABILITATION | Facility: HOSPITAL | Age: 63
End: 2023-06-29
Payer: COMMERCIAL

## 2023-06-29 DIAGNOSIS — M79.641 RIGHT HAND PAIN: Primary | ICD-10-CM

## 2023-06-29 PROCEDURE — 97140 MANUAL THERAPY 1/> REGIONS: CPT | Mod: PO

## 2023-06-29 PROCEDURE — 97110 THERAPEUTIC EXERCISES: CPT | Mod: PO

## 2023-06-29 PROCEDURE — 97018 PARAFFIN BATH THERAPY: CPT | Mod: PO

## 2023-06-29 NOTE — PROGRESS NOTES
"                            Occupational Therapy Daily Treatment Note     Date: 6/29/2023  Name: Cristal Gonzalez  Clinic Number: [id number]    Therapy Diagnosis:   Encounter Diagnosis   Name Primary?    Right hand pain Yes       Physician: Anand Geller MD    Medical Diagnosis: S52.571A (ICD-10-CM) - Other closed intra-articular fracture of distal end of right radius, initial encounter  Physician Orders: Start OT for postop rehab. Focusing on motion of the fingers for now. NWB until 6 weeks.  Evaluation Date: 5/8/2023  Plan of Care Certification Date: 7/31/2023  Authorization Period: 12/29/2023  Surgery Date and Procedure: 4/21/2023  Date of Return to MD: 6/22/2023     Visit #: 13 of 20  Time In: 10:36 AM  Time Out: 11:16 AM  Total Billable Time: 40 min     Precautions: Standard    Subjective     Pt reports: "It just feels stuck"    she was compliant with home exercise program given last session.   Response to previous treatment: yoseph well    Pain: 0/10  Location: right wrists      Objective    received the following supervised modalities after being cleared for contradictions for 8 minutes:   - Patient received paraffin to RUE hand(s) for 8 minutes with hand wrapped in composite IP and MP flexion with coband to increase blood flow, circulation, and for pain management.      Cristal received the following manual therapy techniques for 10 minutes in order to maximize tissue function and/or decrease pain:   -passive range of motion wrist flexion/extension, composite fist    Cristal  participated in dynamic functional therapeutic exercises to improve functional performance while increasing strength, endurance, ROM,  and flexibility for 22 minutes, including:  -AAROM WE/WF  -AAROM composite fist  -active range of motion WF/WE with dowel 20x each  -Red Flexbar WF/WE, Pro/sup 10x each    6/29/2023  Full RD/UD    70/36 WE/WF    Home Exercises and Education Provided     Education provided:  - Discussed insurance " limitation  - Progress towards goals  - Upgraded HEP    Written Home Exercises Provided: yes.  Exercises were reviewed and Cristal was able to demonstrate them prior to the end of the session.  Cristal demonstrated good  understanding of the HEP provided.   .   See EMR under Patient Instructions for exercises provided  6/13/2023 .     Assessment     Pt would continue to benefit from skilled OT to maximize right upper extremity functioning. Pt continues to improve with composite fisting but is limited in WF/WE due to extrinsic tightness.    Cristal is progressing well towards her goals and there are no updates to goals at this time. Pt prognosis is Good.     The patient demonstrated proper understanding  of each exercise.Pt required verbal and tactile cues for all new hand exercises to use dowel for HEP.  Pt continues to be limited in functional and leisurely pursuits. Pain limits patients participation in ADL's. Pt is not able to carryout necessary vocational tasks. Patient continues to requires cues and skilled supervision to complete HEP       Anticipated barriers to occupational therapy: guarded     Pt's spiritual, cultural and educational needs considered and pt agreeable to plan of care and goals.    Goals:    LTG's ( 12 weeks):  7/31/2023  1)   Increase ROM to WFL degrees in hand/wrist/FA to increase functional hand use for self care tasks. Progressing 6/20/2023  2)   Increase  strength to 40 lbs. to grasp hair dryer. Progressing 6/20/2023  3)   Increase lateral pinch to 14 psis for opening sauce packets while cooking. Progressing 6/20/2023  4)   Decrease complaints of pain to  1 out of 10 at worst to increase functional hand use for ADL/work/leisure activities. Progressing 6/20/2023  5)   Patient to score at 45% or less on FOTO to demonstrate improved perception of functional RUE use. Progressing 6/20/2023  6)   Pt will return to near to prior level of function for ADLs and household management reporting I or  Mod I with ADLs (dressing, feeding, grooming, toileting).  Progressing 6/20/2023     STG's (6 weeks) 6/19/2023  1)   Patient to be IND with HEP and modalities for pain/edema managment. Met 6/20/2023  2)   Increase ROM to 60% degrees in composite fist to increase functional hand use for ADLs/work/leisure activities. Met 6/20/2023  3)   Increase ROM by 30 degrees in WF/WE to increase functional hand use for ADLs/work/leisure activities. Progressing 6/20/2023  4)   Increase ROM by 40 degrees in FA supination to increase functional hand use for ADLs/work/leisure activities. Met 6/20/2023  5)   Increase  strength to 20 lbs. to improve functional grasp for ADLs/work/leisure activities. Progressing 6/20/2023  6)   Increase lateral pinch to 6 psi's to increase independence with button and FM Coordination. Met 6/20/2023  7)   Patient to be IND with Orthotic use, wear and care precautions. Met 6/20/2023  8)   Decrease complaints of pain to  4 out of 10 at worst to increase functional hand use for ADL/work/leisure activities.     Plan      Pt to be treated by Occupational Therapy 2 times per week for 12 weeks during the certification period from 5/8/2023 to 7/21/2023 to achieve the established goals.      Treatment to include: Paraffin, Fluidotherapy, Manual therapy/joint mobilizations, Modalities for pain management, Therapeutic exercises/activities., Iontophoresis with 2.0 cc Dexamethasone, Strengthening, Orthotic Fabrication/Fit/Training, Edema Control, Scar Management, and Joint Protection, as well as any other treatments deemed necessary based on the patient's needs or progress.      Jessica White, OTR/L, [facility]  Occupational Therapist

## 2023-07-05 ENCOUNTER — CLINICAL SUPPORT (OUTPATIENT)
Dept: REHABILITATION | Facility: HOSPITAL | Age: 63
End: 2023-07-05
Payer: COMMERCIAL

## 2023-07-05 DIAGNOSIS — M79.641 RIGHT HAND PAIN: Primary | ICD-10-CM

## 2023-07-05 PROCEDURE — 97110 THERAPEUTIC EXERCISES: CPT | Mod: PO

## 2023-07-05 PROCEDURE — 97018 PARAFFIN BATH THERAPY: CPT | Mod: PO

## 2023-07-05 PROCEDURE — 97140 MANUAL THERAPY 1/> REGIONS: CPT | Mod: PO

## 2023-07-05 NOTE — PROGRESS NOTES
"                            Occupational Therapy Daily Treatment Note     Date: 7/5/2023  Name: Cristal Gonzalez  Clinic Number: [id number]    Therapy Diagnosis:   No diagnosis found.      Physician: Anand Geller MD    Medical Diagnosis: S52.571A (ICD-10-CM) - Other closed intra-articular fracture of distal end of right radius, initial encounter  Physician Orders: Start OT for postop rehab. Focusing on motion of the fingers for now. NWB until 6 weeks.  Evaluation Date: 5/8/2023  Plan of Care Certification Date: 7/31/2023  Authorization Period: 12/29/2023  Surgery Date and Procedure: 4/21/2023  Date of Return to MD: 6/22/2023     Visit #: 14 of 20  Time In: 9:00 AM  Time Out: 9:45 AM  Total Billable Time: 45 min     Precautions: Standard    Subjective     Pt reports: "I've been stretching a lot at home but I feel like it wont bend more"    she was compliant with home exercise program given last session.   Response to previous treatment: yoseph well    Pain: 0/10  Location: right wrists      Objective    received the following supervised modalities after being cleared for contradictions for 8 minutes:   - Patient received paraffin to RUE hand(s) for 8 minutes with hand wrapped in composite IP and MP flexion with coband to increase blood flow, circulation, and for pain management.      Cristal received the following manual therapy techniques for 24 minutes in order to maximize tissue function and/or decrease pain:   -passive range of motion wrist flexion/extension, composite fist  -Traction to wrist and all fingers    Cristal  participated in dynamic functional therapeutic exercises to improve functional performance while increasing strength, endurance, ROM,  and flexibility for 13 minutes, including:  -#2 dumbbell WF/WE, UD/D, pro/sup 10x each  -Pink putty gripping, WE stretch- 2' each    6/29/2023  Full RD/UD    51/31 WE/WF    Home Exercises and Education Provided     Education provided:  - Discussed insurance " limitation  - Progress towards goals  - Upgraded HEP    Written Home Exercises Provided: yes.  Exercises were reviewed and Cristal was able to demonstrate them prior to the end of the session.  Cristal demonstrated good  understanding of the HEP provided.   .   See EMR under Patient Instructions for exercises provided  6/13/2023 .     Assessment     Pt would continue to benefit from skilled OT to maximize right upper extremity functioning. Pt with difficulty extending fingers into stretched position and instructed to perform at home to continue reducing extrinsic tightness. Pt with reduced intrinsic tightness this session.     Cristal is progressing well towards her goals and there are no updates to goals at this time. Pt prognosis is Good.     The patient demonstrated proper understanding  of each exercise.Pt required verbal and tactile cues for all new hand exercises to use dowel for HEP.  Pt continues to be limited in functional and leisurely pursuits. Pain limits patients participation in ADL's. Pt is not able to carryout necessary vocational tasks. Patient continues to requires cues and skilled supervision to complete HEP       Anticipated barriers to occupational therapy: guarded     Pt's spiritual, cultural and educational needs considered and pt agreeable to plan of care and goals.    Goals:    LTG's ( 12 weeks):  7/31/2023  1)   Increase ROM to WFL degrees in hand/wrist/FA to increase functional hand use for self care tasks. Progressing 6/20/2023  2)   Increase  strength to 40 lbs. to grasp hair dryer. Progressing 6/20/2023  3)   Increase lateral pinch to 14 psis for opening sauce packets while cooking. Progressing 6/20/2023  4)   Decrease complaints of pain to  1 out of 10 at worst to increase functional hand use for ADL/work/leisure activities. Progressing 6/20/2023  5)   Patient to score at 45% or less on FOTO to demonstrate improved perception of functional RUE use. Progressing 6/20/2023  6)   Pt will  return to near to prior level of function for ADLs and household management reporting I or Mod I with ADLs (dressing, feeding, grooming, toileting).  Progressing 6/20/2023     STG's (6 weeks) 6/19/2023  1)   Patient to be IND with HEP and modalities for pain/edema managment. Met 6/20/2023  2)   Increase ROM to 60% degrees in composite fist to increase functional hand use for ADLs/work/leisure activities. Met 6/20/2023  3)   Increase ROM by 30 degrees in WF/WE to increase functional hand use for ADLs/work/leisure activities. Progressing 6/20/2023  4)   Increase ROM by 40 degrees in FA supination to increase functional hand use for ADLs/work/leisure activities. Met 6/20/2023  5)   Increase  strength to 20 lbs. to improve functional grasp for ADLs/work/leisure activities. Progressing 6/20/2023  6)   Increase lateral pinch to 6 psi's to increase independence with button and FM Coordination. Met 6/20/2023  7)   Patient to be IND with Orthotic use, wear and care precautions. Met 6/20/2023  8)   Decrease complaints of pain to  4 out of 10 at worst to increase functional hand use for ADL/work/leisure activities.     Plan      Pt to be treated by Occupational Therapy 2 times per week for 12 weeks during the certification period from 5/8/2023 to 7/21/2023 to achieve the established goals.      Treatment to include: Paraffin, Fluidotherapy, Manual therapy/joint mobilizations, Modalities for pain management, Therapeutic exercises/activities., Iontophoresis with 2.0 cc Dexamethasone, Strengthening, Orthotic Fabrication/Fit/Training, Edema Control, Scar Management, and Joint Protection, as well as any other treatments deemed necessary based on the patient's needs or progress.      Jessica White, OTR/L, MOT  Occupational Therapist

## 2023-07-07 ENCOUNTER — OFFICE VISIT (OUTPATIENT)
Dept: PRIMARY CARE CLINIC | Facility: CLINIC | Age: 63
End: 2023-07-07
Payer: COMMERCIAL

## 2023-07-07 ENCOUNTER — CLINICAL SUPPORT (OUTPATIENT)
Dept: REHABILITATION | Facility: HOSPITAL | Age: 63
End: 2023-07-07
Payer: COMMERCIAL

## 2023-07-07 ENCOUNTER — PATIENT MESSAGE (OUTPATIENT)
Dept: PHARMACY | Facility: CLINIC | Age: 63
End: 2023-07-07
Payer: COMMERCIAL

## 2023-07-07 VITALS
DIASTOLIC BLOOD PRESSURE: 70 MMHG | HEIGHT: 62 IN | OXYGEN SATURATION: 98 % | BODY MASS INDEX: 27.63 KG/M2 | SYSTOLIC BLOOD PRESSURE: 122 MMHG | HEART RATE: 71 BPM | TEMPERATURE: 98 F | WEIGHT: 150.13 LBS

## 2023-07-07 DIAGNOSIS — Z85.820 HISTORY OF MELANOMA: ICD-10-CM

## 2023-07-07 DIAGNOSIS — M79.641 RIGHT HAND PAIN: Primary | ICD-10-CM

## 2023-07-07 DIAGNOSIS — Z00.00 GENERAL MEDICAL EXAM: Primary | ICD-10-CM

## 2023-07-07 DIAGNOSIS — L57.0 AK (ACTINIC KERATOSIS): ICD-10-CM

## 2023-07-07 DIAGNOSIS — K30 NON-ULCER DYSPEPSIA: ICD-10-CM

## 2023-07-07 PROCEDURE — 3074F SYST BP LT 130 MM HG: CPT | Mod: CPTII,S$GLB,, | Performed by: FAMILY MEDICINE

## 2023-07-07 PROCEDURE — 3078F DIAST BP <80 MM HG: CPT | Mod: CPTII,S$GLB,, | Performed by: FAMILY MEDICINE

## 2023-07-07 PROCEDURE — 99999 PR PBB SHADOW E&M-EST. PATIENT-LVL IV: ICD-10-PCS | Mod: PBBFAC,,, | Performed by: FAMILY MEDICINE

## 2023-07-07 PROCEDURE — 97110 THERAPEUTIC EXERCISES: CPT | Mod: PO

## 2023-07-07 PROCEDURE — 3044F HG A1C LEVEL LT 7.0%: CPT | Mod: CPTII,S$GLB,, | Performed by: FAMILY MEDICINE

## 2023-07-07 PROCEDURE — 3044F PR MOST RECENT HEMOGLOBIN A1C LEVEL <7.0%: ICD-10-PCS | Mod: CPTII,S$GLB,, | Performed by: FAMILY MEDICINE

## 2023-07-07 PROCEDURE — 3078F PR MOST RECENT DIASTOLIC BLOOD PRESSURE < 80 MM HG: ICD-10-PCS | Mod: CPTII,S$GLB,, | Performed by: FAMILY MEDICINE

## 2023-07-07 PROCEDURE — 3008F BODY MASS INDEX DOCD: CPT | Mod: CPTII,S$GLB,, | Performed by: FAMILY MEDICINE

## 2023-07-07 PROCEDURE — 97018 PARAFFIN BATH THERAPY: CPT | Mod: PO,59

## 2023-07-07 PROCEDURE — 97140 MANUAL THERAPY 1/> REGIONS: CPT | Mod: PO

## 2023-07-07 PROCEDURE — 99999 PR PBB SHADOW E&M-EST. PATIENT-LVL IV: CPT | Mod: PBBFAC,,, | Performed by: FAMILY MEDICINE

## 2023-07-07 PROCEDURE — 99396 PR PREVENTIVE VISIT,EST,40-64: ICD-10-PCS | Mod: S$GLB,,, | Performed by: FAMILY MEDICINE

## 2023-07-07 PROCEDURE — 3008F PR BODY MASS INDEX (BMI) DOCUMENTED: ICD-10-PCS | Mod: CPTII,S$GLB,, | Performed by: FAMILY MEDICINE

## 2023-07-07 PROCEDURE — 99396 PREV VISIT EST AGE 40-64: CPT | Mod: S$GLB,,, | Performed by: FAMILY MEDICINE

## 2023-07-07 PROCEDURE — 3074F PR MOST RECENT SYSTOLIC BLOOD PRESSURE < 130 MM HG: ICD-10-PCS | Mod: CPTII,S$GLB,, | Performed by: FAMILY MEDICINE

## 2023-07-07 RX ORDER — ONDANSETRON 8 MG/1
8 TABLET, ORALLY DISINTEGRATING ORAL EVERY 6 HOURS PRN
Qty: 30 TABLET | Refills: 1 | Status: SHIPPED | OUTPATIENT
Start: 2023-07-07 | End: 2023-08-29

## 2023-07-07 RX ORDER — TRIAMCINOLONE ACETONIDE 1 MG/G
CREAM TOPICAL
Qty: 80 G | Refills: 2 | Status: SHIPPED | OUTPATIENT
Start: 2023-07-07 | End: 2023-08-29

## 2023-07-07 RX ORDER — DOCUSATE SODIUM 100 MG/1
100 CAPSULE, LIQUID FILLED ORAL 2 TIMES DAILY
Qty: 90 CAPSULE | Refills: 3 | Status: SHIPPED | OUTPATIENT
Start: 2023-07-07

## 2023-07-07 RX ORDER — PANTOPRAZOLE SODIUM 40 MG/1
40 TABLET, DELAYED RELEASE ORAL DAILY
Qty: 90 TABLET | Refills: 3 | Status: SHIPPED | OUTPATIENT
Start: 2023-07-07 | End: 2024-07-06

## 2023-07-07 NOTE — PROGRESS NOTES
"                            Occupational Therapy Daily Treatment Note     Date: 7/7/2023  Name: Cristal Gonzalez  Clinic Number: [id number]    Therapy Diagnosis:   Encounter Diagnosis   Name Primary?    Right hand pain Yes         Physician: Anand Geller MD    Medical Diagnosis: S52.571A (ICD-10-CM) - Other closed intra-articular fracture of distal end of right radius, initial encounter  Physician Orders: Start OT for postop rehab. Focusing on motion of the fingers for now. NWB until 6 weeks.  Evaluation Date: 5/8/2023  Plan of Care Certification Date: 7/31/2023  Authorization Period: 12/29/2023  Surgery Date and Procedure: 4/21/2023  Date of Return to MD: 6/22/2023     Visit #: 14 of 20  Time In: 9:05 AM  Time Out: 9:45 AM  Total Billable Time: 40 min     Precautions: Standard    Subjective     Pt reports: "I've been stretching a lot at home but I feel like it wont bend more"    she was compliant with home exercise program given last session.   Response to previous treatment: yoseph well    Pain: 0/10  Location: right wrists      Objective    received the following supervised modalities after being cleared for contradictions for 8 minutes:   - Patient received paraffin to RUE hand(s) for 8 minutes with hand wrapped in composite IP and MP flexion with coband to increase blood flow, circulation, and for pain management.      Cristal received the following manual therapy techniques for 9 minutes in order to maximize tissue function and/or decrease pain:   -passive range of motion wrist flexion/extension, composite fist  -Traction to wrist and all fingers    Cristal  participated in dynamic functional therapeutic exercises to improve functional performance while increasing strength, endurance, ROM,  and flexibility for 23 minutes, including:  -#2 dumbbell WF/WE 20x each  -Red flexbar WF/WE, UD/RD, pro/sup 20x each  -Finger extension stretch 10x  -Prayer stretch 10x  -Octi 2'    6/29/2023  Full RD/UD    51/31 WE/WF    Home " Exercises and Education Provided     Education provided:  - Discussed insurance limitation  - Progress towards goals  - Upgraded HEP- strength and stretching    Written Home Exercises Provided: yes.  Exercises were reviewed and Cristal was able to demonstrate them prior to the end of the session.  Cristal demonstrated good  understanding of the HEP provided.   .   See EMR under Patient Instructions for exercises provided  6/13/2023 .     Assessment     Pt would continue to benefit from skilled OT to maximize right upper extremity functioning. Pt with improved extrinsic and intrinsic mobility this session with decreased tightness.      Cristal is progressing well towards her goals and there are no updates to goals at this time. Pt prognosis is Good.     The patient demonstrated proper understanding  of each exercise.Pt required verbal and tactile cues for all new hand exercises to use dowel for HEP.  Pt continues to be limited in functional and leisurely pursuits. Pain limits patients participation in ADL's. Pt is not able to carryout necessary vocational tasks. Patient continues to requires cues and skilled supervision to complete HEP       Anticipated barriers to occupational therapy: guarded     Pt's spiritual, cultural and educational needs considered and pt agreeable to plan of care and goals.    Goals:    LTG's ( 12 weeks):  7/31/2023  1)   Increase ROM to WFL degrees in hand/wrist/FA to increase functional hand use for self care tasks. Progressing 6/20/2023  2)   Increase  strength to 40 lbs. to grasp hair dryer. Progressing 6/20/2023  3)   Increase lateral pinch to 14 psis for opening sauce packets while cooking. Progressing 6/20/2023  4)   Decrease complaints of pain to  1 out of 10 at worst to increase functional hand use for ADL/work/leisure activities. Progressing 6/20/2023  5)   Patient to score at 45% or less on FOTO to demonstrate improved perception of functional RUE use. Progressing 6/20/2023  6)   Pt  will return to near to prior level of function for ADLs and household management reporting I or Mod I with ADLs (dressing, feeding, grooming, toileting).  Progressing 6/20/2023     STG's (6 weeks) 6/19/2023  1)   Patient to be IND with HEP and modalities for pain/edema managment. Met 6/20/2023  2)   Increase ROM to 60% degrees in composite fist to increase functional hand use for ADLs/work/leisure activities. Met 6/20/2023  3)   Increase ROM by 30 degrees in WF/WE to increase functional hand use for ADLs/work/leisure activities. Progressing 6/20/2023  4)   Increase ROM by 40 degrees in FA supination to increase functional hand use for ADLs/work/leisure activities. Met 6/20/2023  5)   Increase  strength to 20 lbs. to improve functional grasp for ADLs/work/leisure activities. Progressing 6/20/2023  6)   Increase lateral pinch to 6 psi's to increase independence with button and FM Coordination. Met 6/20/2023  7)   Patient to be IND with Orthotic use, wear and care precautions. Met 6/20/2023  8)   Decrease complaints of pain to  4 out of 10 at worst to increase functional hand use for ADL/work/leisure activities.     Plan      Pt to be treated by Occupational Therapy 2 times per week for 12 weeks during the certification period from 5/8/2023 to 7/21/2023 to achieve the established goals.      Treatment to include: Paraffin, Fluidotherapy, Manual therapy/joint mobilizations, Modalities for pain management, Therapeutic exercises/activities., Iontophoresis with 2.0 cc Dexamethasone, Strengthening, Orthotic Fabrication/Fit/Training, Edema Control, Scar Management, and Joint Protection, as well as any other treatments deemed necessary based on the patient's needs or progress.      Jessica White, OTR/L, MOT  Occupational Therapist

## 2023-07-07 NOTE — PATIENT INSTRUCTIONS
Flexion / Extension (Resistive)        Wring a towel, using wrist movements only. Do not move other hand.  Flexion: Twist downward. Extension: Twist upward. Twist left. Twist right.  Repeat 20 times. Do 3 sessions per day.     Extension (Resistive)        With wrist over edge of table, lift ____ ounces, keeping arm on table surface.   Repeat 20 times. Do 3 sessions per day.    Flexion (Resistive)        With hand palm-up and holding ____ ounces, bend hand toward you at wrist.   Repeat 20 times. Do 3 sessions per day.        Pronation / Supination (Resistive)        Hold hammer weighing 12-16 ounces and rotate palm up and down. Keep elbow flexed at side and wrist straight.  Repeat 10 times. Do 1-3 sets per session. Do 1 sessions per day.

## 2023-07-07 NOTE — PROGRESS NOTES
"    /70 (BP Location: Right arm, Patient Position: Sitting, BP Method: Medium (Manual))   Pulse 71   Temp 98.1 °F (36.7 °C) (Oral)   Ht 5' 2" (1.575 m)   Wt 68.1 kg (150 lb 2.1 oz)   SpO2 98%   BMI 27.46 kg/m²       ===========    Chief Complaint: No chief complaint on file.          HPI    Cristal Gonzalez is a 63 y.o. female     here for    Annual Wellness/Preventative Exam.  Health maintenance reviewed with patient in detail inc any recent labs and studies and needs for future screening labs.  Age-appropriate vaccines and other age-appropriate screening studies reviewed with patient in detail.  Sleep health reviewed with patient.  Skin health regarding possible skin cancer screening reviewed with patient.  General regularity of bowel movements and urinations reviewed with patient including any possibility of urine leakage.  Vision screening reviewed with patient.    She had two falls over the past year. One time she slipped on toy  Another time       Patient queried and denies any further complaints    Patient has MEDICAL HISTORY OF  Patient Active Problem List   Diagnosis    Closed trimalleolar fracture of right ankle    Closed nondisplaced fracture of fifth left metatarsal bone with routine healing    Dorsalgia, unspecified    Closed displaced fracture of greater tuberosity of right humerus    AK (actinic keratosis)    Melanoma of upper arm, right    Closed fracture of right distal radius    Right hand pain    History of melanoma    Non-ulcer dyspepsia       SURGICAL AND MEDICAL HISTORY: updated and reviewed.  Past Surgical History:   Procedure Laterality Date    ANKLE FRACTURE SURGERY      APPENDECTOMY  2011    BREAST BIOPSY      benign    CLOSED REDUCTION OF INJURY OF METACARPAL BONE Right 4/21/2023    Procedure: CLOSED REDUCTION, FRACTURE, METACARPAL BONE - possible CRPP 5th metacarpal base;  Surgeon: Anand Geller MD;  Location: [facility] OR;  Service: Orthopedics;  Laterality: Right;    " COLONOSCOPY N/A 06/25/2021    Procedure: COLONOSCOPY;  Surgeon: Nohemy Parra MD;  Location: [facility] ENDO (4TH FLR);  Service: Endoscopy;  Laterality: N/A;  Fully vacc Covid-19 - pg    EXCISION OF MELANOMA Right     arm    LYMPH NODE BIOPSY Right 11/17/2022    Procedure: BIOPSY, LYMPH NODE with mapping;  Surgeon: Ben Stallworth MD;  Location: [facility] OR 2ND FLR;  Service: General;  Laterality: Right;    OPEN REDUCTION AND INTERNAL FIXATION (ORIF) OF FRACTURE OF DISTAL RADIUS Right 4/21/2023    Procedure: ORIF, FRACTURE, RADIUS, DISTAL - RIGHT poss bridge plate;  Surgeon: Anand Geller MD;  Location: [facility] OR;  Service: Orthopedics;  Laterality: Right;     ALLERGIES updated and reviewed.  Review of patient's allergies indicates:   Allergen Reactions    Sulfa (sulfonamide antibiotics) Shortness Of Breath, Anxiety and Palpitations    Macrobid [nitrofurantoin monohyd/m-cryst] Nausea Only     Abd pain and nausea       CURRENT OUTPATIENT MEDICATIONS updated and reviewed    Current Outpatient Medications:     fluticasone propionate (FLONASE) 50 mcg/actuation nasal spray, 1 spray (50 mcg total) by Each Nostril route once daily., Disp: 15.8 mL, Rfl: 0    GLUCOSAM SUL NA/CHONDR THURSTON A NA (GLUCOSAMINE & CHONDROIT SUL.NA ORAL), Take by mouth., Disp: , Rfl:     INTRAROSA 6.5 mg Inst, INSERT ONE APPLICATION INTO THE VAGINA QHS, Disp: , Rfl: 12    MV, MIN #36/IRON,CARBONYL/FA (GERITOL COMPLETE ORAL), Take by mouth., Disp: , Rfl:     docusate sodium (COLACE) 100 MG capsule, Take 1 capsule (100 mg total) by mouth 2 (two) times daily., Disp: 90 capsule, Rfl: 3    ondansetron (ZOFRAN-ODT) 8 MG TbDL, Take 1 tablet (8 mg total) by mouth every 6 (six) hours as needed (nausea)., Disp: 30 tablet, Rfl: 1    pantoprazole (PROTONIX) 40 MG tablet, Take 1 tablet (40 mg total) by mouth once daily. For 30d, Disp: 90 tablet, Rfl: 3    triamcinolone acetonide 0.1% (KENALOG) 0.1 % cream, APPLY TOPICALLY TO THE AFFECTED AREA TWICE DAILY FOR 1 TO  "2 WEEKS AS NEEDED FOR RASH, Disp: 80 g, Rfl: 2    Review of Systems   Constitutional:  Negative for activity change, appetite change, chills, diaphoresis, fatigue, fever and unexpected weight change.   HENT:  Negative for congestion, ear discharge, ear pain, facial swelling, hearing loss, nosebleeds, postnasal drip, rhinorrhea, sinus pressure, sneezing, sore throat, tinnitus, trouble swallowing and voice change.    Eyes:  Negative for photophobia, pain, discharge, redness, itching and visual disturbance.   Respiratory:  Negative for cough, chest tightness, shortness of breath and wheezing.    Cardiovascular:  Negative for chest pain, palpitations and leg swelling.   Gastrointestinal:  Negative for abdominal distention, abdominal pain, anal bleeding, blood in stool, constipation, diarrhea, nausea, rectal pain and vomiting.   Endocrine: Negative for cold intolerance, heat intolerance, polydipsia, polyphagia and polyuria.   Genitourinary:  Negative for difficulty urinating, dysuria and flank pain.   Musculoskeletal:  Negative for arthralgias, back pain, joint swelling, myalgias and neck pain.   Skin:  Negative for rash.   Neurological:  Negative for dizziness, tremors, seizures, syncope, speech difficulty, weakness, light-headedness, numbness and headaches.   Psychiatric/Behavioral:  Negative for behavioral problems, confusion, decreased concentration, dysphoric mood, sleep disturbance and suicidal ideas. The patient is not nervous/anxious and is not hyperactive.      /70 (BP Location: Right arm, Patient Position: Sitting, BP Method: Medium (Manual))   Pulse 71   Temp 98.1 °F (36.7 °C) (Oral)   Ht 5' 2" (1.575 m)   Wt 68.1 kg (150 lb 2.1 oz)   SpO2 98%   BMI 27.46 kg/m²   Physical Exam  Vitals and nursing note reviewed.   Constitutional:       General: She is not in acute distress.     Appearance: Normal appearance. She is well-developed. She is not ill-appearing or toxic-appearing.   HENT:      Head: " Normocephalic and atraumatic.      Right Ear: Tympanic membrane, ear canal and external ear normal.      Left Ear: Tympanic membrane, ear canal and external ear normal.      Nose: Nose normal.      Mouth/Throat:      Lips: Pink.      Mouth: Mucous membranes are moist.      Pharynx: No oropharyngeal exudate or posterior oropharyngeal erythema.   Eyes:      General: No scleral icterus.        Right eye: No discharge.         Left eye: No discharge.      Extraocular Movements: Extraocular movements intact.      Conjunctiva/sclera: Conjunctivae normal.   Cardiovascular:      Rate and Rhythm: Normal rate and regular rhythm.      Pulses: Normal pulses.      Heart sounds: Normal heart sounds. No murmur heard.  Pulmonary:      Effort: Pulmonary effort is normal. No respiratory distress.      Breath sounds: Normal breath sounds. No wheezing or rales.   Abdominal:      General: Bowel sounds are normal. There is no distension.      Palpations: Abdomen is soft. There is no mass.      Tenderness: There is no abdominal tenderness. There is no right CVA tenderness, left CVA tenderness, guarding or rebound.      Hernia: No hernia is present.   Musculoskeletal:      Cervical back: Normal range of motion and neck supple. No rigidity or tenderness.   Lymphadenopathy:      Cervical: No cervical adenopathy.   Skin:     General: Skin is warm and dry.   Neurological:      General: No focal deficit present.      Mental Status: She is alert. Mental status is at baseline.   Psychiatric:         Mood and Affect: Mood normal.         Behavior: Behavior normal. Behavior is cooperative.       ASSESSMENT/PLAN  Diagnoses and all orders for this visit:    General medical exam  -     CBC Without Differential; Future  -     Comprehensive Metabolic Panel; Future  -     Lipid Panel; Future  -     TSH; Future  -     Hemoglobin A1C; Future    History of melanoma    AK (actinic keratosis)    Non-ulcer dyspepsia    Other orders  -     pantoprazole  (PROTONIX) 40 MG tablet; Take 1 tablet (40 mg total) by mouth once daily. For 30d  -     docusate sodium (COLACE) 100 MG capsule; Take 1 capsule (100 mg total) by mouth 2 (two) times daily.  -     ondansetron (ZOFRAN-ODT) 8 MG TbDL; Take 1 tablet (8 mg total) by mouth every 6 (six) hours as needed (nausea).  -     triamcinolone acetonide 0.1% (KENALOG) 0.1 % cream; APPLY TOPICALLY TO THE AFFECTED AREA TWICE DAILY FOR 1 TO 2 WEEKS AS NEEDED FOR RASH            All lab results over past 2 years available reviewed inc labs and any cardiology or radiology studies.  Any new prescription medications gone over in detail including reason for taking the medication, the general mechanism of action, most common possible side effects and possible costs, etcetera.    Chronic conditions updated. Other than changes or additions as above, cont current medications and maintain follow-up with specialists if indicated.     Steve Morin MD  A dictation device was used to produce this document. Use of such devices sometimes results in grammatical errors or replacement of words that sound similarly.

## 2023-07-11 ENCOUNTER — CLINICAL SUPPORT (OUTPATIENT)
Dept: REHABILITATION | Facility: HOSPITAL | Age: 63
End: 2023-07-11
Payer: COMMERCIAL

## 2023-07-11 DIAGNOSIS — M79.641 RIGHT HAND PAIN: Primary | ICD-10-CM

## 2023-07-11 PROCEDURE — 97110 THERAPEUTIC EXERCISES: CPT | Mod: PO,CO

## 2023-07-11 PROCEDURE — 97018 PARAFFIN BATH THERAPY: CPT | Mod: PO,CO,59

## 2023-07-11 PROCEDURE — 97140 MANUAL THERAPY 1/> REGIONS: CPT | Mod: PO,CO

## 2023-07-11 NOTE — PROGRESS NOTES
Occupational Therapy Daily Treatment Note     Date: 7/11/2023  Name: Cristal Gonzalez  Clinic Number: [id number]    Therapy Diagnosis:   Encounter Diagnosis   Name Primary?    Right hand pain Yes           Physician: Anand Geller MD    Medical Diagnosis: S52.571A (ICD-10-CM) - Other closed intra-articular fracture of distal end of right radius, initial encounter  Physician Orders: Start OT for postop rehab. Focusing on motion of the fingers for now. NWB until 6 weeks.  Evaluation Date: 5/8/2023  Plan of Care Certification Date: 7/31/2023  Authorization Period: 12/29/2023  Surgery Date and Procedure: 4/21/2023  Date of Return to MD: 6/22/2023     Visit #: 16 of 20  Time In: 10:30 AM  Time Out: 11:15 AM  Total Billable Time: 45 min     Precautions: Standard    Subjective     Pt reports: concerns over slow progress of ROM     she was compliant with home exercise program given last session.   Response to previous treatment: yoseph well    Pain: 0/10  Location: right wrists      Objective    received the following supervised modalities after being cleared for contradictions for 10 minutes:   - Patient received paraffin to RUE hand(s) for 10 minutes with hand wrapped in composite IP and MP flexion with coband to increase blood flow, circulation, and for pain management.      Cristal received the following manual therapy techniques for 8 minutes in order to maximize tissue function and/or decrease pain:   -passive range of motion wrist flexion/extension, composite fist  -Traction to wrist and all fingers    Cristal  participated in dynamic functional therapeutic exercises to improve functional performance while increasing strength, endurance, ROM,  and flexibility for 27 minutes, including:  -#2 dumbbell WF/WE 20x each  -Red flexbar WF/WE, UD/RD, pro/sup 20x each  -digit lifts   -Prayer stretch   -Octi 2'  -true balance x 2 min     6/29/2023  Full RD/UD    51/31 WE/WF    Home Exercises and  Education Provided     Education provided:  - Discussed insurance limitation  - Progress towards goals  - Upgraded HEP- strength and stretching    Written Home Exercises Provided: yes.  Exercises were reviewed and Cristal was able to demonstrate them prior to the end of the session.  Cristal demonstrated good  understanding of the HEP provided.   .   See EMR under Patient Instructions for exercises provided  6/13/2023 .     Assessment     Pt would continue to benefit from skilled OT to maximize right upper extremity functioning. Continues to demo wrist and digit stiffness. Improved post heat and PROM. Difficulty with stability exercise today.      Cristal is progressing well towards her goals and there are no updates to goals at this time. Pt prognosis is Good.     The patient demonstrated proper understanding  of each exercise.Pt required verbal and tactile cues for all new hand exercises.  Pt continues to be limited in functional and leisurely pursuits. Pain limits patients participation in ADL's. Pt is not able to carryout necessary vocational tasks. Patient continues to requires cues and skilled supervision to complete HEP       Anticipated barriers to occupational therapy: guarded     Pt's spiritual, cultural and educational needs considered and pt agreeable to plan of care and goals.    Goals:    LTG's ( 12 weeks):  7/31/2023  1)   Increase ROM to WFL degrees in hand/wrist/FA to increase functional hand use for self care tasks. Progressing 6/20/2023  2)   Increase  strength to 40 lbs. to grasp hair dryer. Progressing 6/20/2023  3)   Increase lateral pinch to 14 psis for opening sauce packets while cooking. Progressing 6/20/2023  4)   Decrease complaints of pain to  1 out of 10 at worst to increase functional hand use for ADL/work/leisure activities. Progressing 6/20/2023  5)   Patient to score at 45% or less on FOTO to demonstrate improved perception of functional RUE use. Progressing 6/20/2023  6)   Pt will  return to near to prior level of function for ADLs and household management reporting I or Mod I with ADLs (dressing, feeding, grooming, toileting).  Progressing 6/20/2023     STG's (6 weeks) 6/19/2023  1)   Patient to be IND with HEP and modalities for pain/edema managment. Met 6/20/2023  2)   Increase ROM to 60% degrees in composite fist to increase functional hand use for ADLs/work/leisure activities. Met 6/20/2023  3)   Increase ROM by 30 degrees in WF/WE to increase functional hand use for ADLs/work/leisure activities. Progressing 6/20/2023  4)   Increase ROM by 40 degrees in FA supination to increase functional hand use for ADLs/work/leisure activities. Met 6/20/2023  5)   Increase  strength to 20 lbs. to improve functional grasp for ADLs/work/leisure activities. Progressing 6/20/2023  6)   Increase lateral pinch to 6 psi's to increase independence with button and FM Coordination. Met 6/20/2023  7)   Patient to be IND with Orthotic use, wear and care precautions. Met 6/20/2023  8)   Decrease complaints of pain to  4 out of 10 at worst to increase functional hand use for ADL/work/leisure activities.     Plan      Pt to be treated by Occupational Therapy 2 times per week for 12 weeks during the certification period from 5/8/2023 to 7/21/2023 to achieve the established goals.      Treatment to include: Paraffin, Fluidotherapy, Manual therapy/joint mobilizations, Modalities for pain management, Therapeutic exercises/activities., Iontophoresis with 2.0 cc Dexamethasone, Strengthening, Orthotic Fabrication/Fit/Training, Edema Control, Scar Management, and Joint Protection, as well as any other treatments deemed necessary based on the patient's needs or progress.        DOMINIC Ingram/L  Client Care conference completed with evaluating therapist in regards to this patients POC as evidenced by co signature of supervising therapist.

## 2023-07-12 ENCOUNTER — OFFICE VISIT (OUTPATIENT)
Dept: PRIMARY CARE CLINIC | Facility: CLINIC | Age: 63
End: 2023-07-12
Payer: COMMERCIAL

## 2023-07-12 VITALS
WEIGHT: 151.69 LBS | BODY MASS INDEX: 27.92 KG/M2 | SYSTOLIC BLOOD PRESSURE: 104 MMHG | OXYGEN SATURATION: 98 % | HEIGHT: 62 IN | DIASTOLIC BLOOD PRESSURE: 74 MMHG | HEART RATE: 70 BPM

## 2023-07-12 DIAGNOSIS — Z85.820 HISTORY OF MELANOMA: ICD-10-CM

## 2023-07-12 DIAGNOSIS — Z20.818 EXPOSURE TO STREPTOCOCCAL PHARYNGITIS: ICD-10-CM

## 2023-07-12 DIAGNOSIS — K30 NON-ULCER DYSPEPSIA: ICD-10-CM

## 2023-07-12 DIAGNOSIS — J02.0 ACUTE STREPTOCOCCAL PHARYNGITIS: Primary | ICD-10-CM

## 2023-07-12 PROCEDURE — 3078F PR MOST RECENT DIASTOLIC BLOOD PRESSURE < 80 MM HG: ICD-10-PCS | Mod: CPTII,S$GLB,, | Performed by: FAMILY MEDICINE

## 2023-07-12 PROCEDURE — 3044F HG A1C LEVEL LT 7.0%: CPT | Mod: CPTII,S$GLB,, | Performed by: FAMILY MEDICINE

## 2023-07-12 PROCEDURE — 99999 PR PBB SHADOW E&M-EST. PATIENT-LVL IV: ICD-10-PCS | Mod: PBBFAC,,, | Performed by: FAMILY MEDICINE

## 2023-07-12 PROCEDURE — 1159F PR MEDICATION LIST DOCUMENTED IN MEDICAL RECORD: ICD-10-PCS | Mod: CPTII,S$GLB,, | Performed by: FAMILY MEDICINE

## 2023-07-12 PROCEDURE — 1160F PR REVIEW ALL MEDS BY PRESCRIBER/CLIN PHARMACIST DOCUMENTED: ICD-10-PCS | Mod: CPTII,S$GLB,, | Performed by: FAMILY MEDICINE

## 2023-07-12 PROCEDURE — 1160F RVW MEDS BY RX/DR IN RCRD: CPT | Mod: CPTII,S$GLB,, | Performed by: FAMILY MEDICINE

## 2023-07-12 PROCEDURE — 3008F PR BODY MASS INDEX (BMI) DOCUMENTED: ICD-10-PCS | Mod: CPTII,S$GLB,, | Performed by: FAMILY MEDICINE

## 2023-07-12 PROCEDURE — 99999 PR PBB SHADOW E&M-EST. PATIENT-LVL IV: CPT | Mod: PBBFAC,,, | Performed by: FAMILY MEDICINE

## 2023-07-12 PROCEDURE — 3074F SYST BP LT 130 MM HG: CPT | Mod: CPTII,S$GLB,, | Performed by: FAMILY MEDICINE

## 2023-07-12 PROCEDURE — 99213 PR OFFICE/OUTPT VISIT, EST, LEVL III, 20-29 MIN: ICD-10-PCS | Mod: S$GLB,,, | Performed by: FAMILY MEDICINE

## 2023-07-12 PROCEDURE — 1159F MED LIST DOCD IN RCRD: CPT | Mod: CPTII,S$GLB,, | Performed by: FAMILY MEDICINE

## 2023-07-12 PROCEDURE — 3078F DIAST BP <80 MM HG: CPT | Mod: CPTII,S$GLB,, | Performed by: FAMILY MEDICINE

## 2023-07-12 PROCEDURE — 99213 OFFICE O/P EST LOW 20 MIN: CPT | Mod: S$GLB,,, | Performed by: FAMILY MEDICINE

## 2023-07-12 PROCEDURE — 3074F PR MOST RECENT SYSTOLIC BLOOD PRESSURE < 130 MM HG: ICD-10-PCS | Mod: CPTII,S$GLB,, | Performed by: FAMILY MEDICINE

## 2023-07-12 PROCEDURE — 3044F PR MOST RECENT HEMOGLOBIN A1C LEVEL <7.0%: ICD-10-PCS | Mod: CPTII,S$GLB,, | Performed by: FAMILY MEDICINE

## 2023-07-12 PROCEDURE — 3008F BODY MASS INDEX DOCD: CPT | Mod: CPTII,S$GLB,, | Performed by: FAMILY MEDICINE

## 2023-07-12 RX ORDER — AMOXICILLIN 500 MG/1
500 TABLET, FILM COATED ORAL EVERY 12 HOURS
Qty: 14 TABLET | Refills: 0 | Status: SHIPPED | OUTPATIENT
Start: 2023-07-12 | End: 2023-07-19

## 2023-07-12 RX ORDER — FLUCONAZOLE 150 MG/1
150 TABLET ORAL DAILY
Qty: 1 TABLET | Refills: 0 | Status: SHIPPED | OUTPATIENT
Start: 2023-07-12 | End: 2023-07-13

## 2023-07-12 NOTE — PROGRESS NOTES
"    /74 (BP Location: Right arm, Patient Position: Sitting)   Pulse 70   Ht 5' 2" (1.575 m)   Wt 68.8 kg (151 lb 10.8 oz)   SpO2 98%   BMI 27.74 kg/m²       ===========    Chief Complaint: No chief complaint on file.          HPI    Cristal Gonzalez is a 63 y.o. female     here for sore throat.  Has significant exposure to grandchild who diagnosed with strep.  Patient with sensation of some warmth but no real significant sensation of fevers or chills.  No documented fevers.  No cough.  Throat is scratchy and often quite painful.  Not much improvement with over-the-counter medications.  No nausea vomiting diarrhea.  No loss of sense of taste or smell.            Patient queried and denies any further complaints    Patient has MEDICAL HISTORY OF  Patient Active Problem List   Diagnosis    Closed trimalleolar fracture of right ankle    Closed nondisplaced fracture of fifth left metatarsal bone with routine healing    Dorsalgia, unspecified    Hematochezia    Closed displaced fracture of greater tuberosity of right humerus    AK (actinic keratosis)    Melanoma of upper arm, right    Closed fracture of right distal radius    Right hand pain    History of melanoma    Non-ulcer dyspepsia       SURGICAL AND MEDICAL HISTORY: updated and reviewed.  Past Surgical History:   Procedure Laterality Date    ANKLE FRACTURE SURGERY      APPENDECTOMY  2011    BREAST BIOPSY      benign    CLOSED REDUCTION OF INJURY OF METACARPAL BONE Right 4/21/2023    Procedure: CLOSED REDUCTION, FRACTURE, METACARPAL BONE - possible CRPP 5th metacarpal base;  Surgeon: Anand Geller MD;  Location: [facility];  Service: Orthopedics;  Laterality: Right;    COLONOSCOPY N/A 06/25/2021    Procedure: COLONOSCOPY;  Surgeon: Nohemy Parra MD;  Location: [address]);  Service: Endoscopy;  Laterality: N/A;  Fully vacc Covid-19 - pg    EXCISION OF MELANOMA Right     arm    LYMPH NODE BIOPSY Right 11/17/2022    Procedure: BIOPSY, LYMPH " NODE with mapping;  Surgeon: Ben Stallworth MD;  Location: [facility] OR 2ND FLR;  Service: General;  Laterality: Right;    OPEN REDUCTION AND INTERNAL FIXATION (ORIF) OF FRACTURE OF DISTAL RADIUS Right 4/21/2023    Procedure: ORIF, FRACTURE, RADIUS, DISTAL - RIGHT poss bridge plate;  Surgeon: Anand Geller MD;  Location: [facility] OR;  Service: Orthopedics;  Laterality: Right;     ALLERGIES updated and reviewed.  Review of patient's allergies indicates:   Allergen Reactions    Sulfa (sulfonamide antibiotics) Shortness Of Breath, Anxiety and Palpitations    Macrobid [nitrofurantoin monohyd/m-cryst] Nausea Only     Abd pain and nausea       CURRENT OUTPATIENT MEDICATIONS updated and reviewed    Current Outpatient Medications:     docusate sodium (COLACE) 100 MG capsule, Take 1 capsule (100 mg total) by mouth 2 (two) times daily., Disp: 90 capsule, Rfl: 3    fluticasone propionate (FLONASE) 50 mcg/actuation nasal spray, 1 spray (50 mcg total) by Each Nostril route once daily., Disp: 15.8 mL, Rfl: 0    ondansetron (ZOFRAN-ODT) 8 MG TbDL, Take 1 tablet (8 mg total) by mouth every 6 (six) hours as needed (nausea)., Disp: 30 tablet, Rfl: 1    pantoprazole (PROTONIX) 40 MG tablet, Take 1 tablet (40 mg total) by mouth once daily. For 30d, Disp: 90 tablet, Rfl: 3    triamcinolone acetonide 0.1% (KENALOG) 0.1 % cream, APPLY TOPICALLY TO THE AFFECTED AREA TWICE DAILY FOR 1 TO 2 WEEKS AS NEEDED FOR RASH, Disp: 80 g, Rfl: 2    amoxicillin (AMOXIL) 500 MG Tab, Take 1 tablet (500 mg total) by mouth every 12 (twelve) hours. for 7 days, Disp: 14 tablet, Rfl: 0    fluconazole (DIFLUCAN) 150 MG Tab, Take 1 tablet (150 mg total) by mouth once daily. AS NEEDED FOR YEAST VAGINITIS for 1 day, Disp: 1 tablet, Rfl: 0    GLUCOSAM SUL NA/CHONDR THURSTON A NA (GLUCOSAMINE & CHONDROIT SUL.NA ORAL), Take by mouth., Disp: , Rfl:     INTRAROSA 6.5 mg Inst, INSERT ONE APPLICATION INTO THE VAGINA QHS, Disp: , Rfl: 12    MV, MIN #36/IRON,CARBONYL/FA  "(GERITOL COMPLETE ORAL), Take by mouth., Disp: , Rfl:     Review of Systems   Constitutional:  Negative for activity change, appetite change, chills, diaphoresis, fatigue, fever and unexpected weight change.   HENT:  Positive for sore throat. Negative for congestion, ear discharge, ear pain, facial swelling, hearing loss, nosebleeds, postnasal drip, rhinorrhea, sinus pressure, sneezing, tinnitus, trouble swallowing and voice change.    Eyes:  Negative for photophobia, pain, discharge, redness, itching and visual disturbance.   Respiratory:  Negative for cough, chest tightness, shortness of breath and wheezing.    Cardiovascular:  Negative for chest pain, palpitations and leg swelling.   Gastrointestinal:  Negative for abdominal distention, abdominal pain, anal bleeding, blood in stool, constipation, diarrhea, nausea, rectal pain and vomiting.   Endocrine: Negative for cold intolerance, heat intolerance, polydipsia, polyphagia and polyuria.   Genitourinary:  Negative for difficulty urinating, dysuria and flank pain.   Musculoskeletal:  Negative for arthralgias, back pain, joint swelling, myalgias and neck pain.   Skin:  Negative for rash.   Neurological:  Negative for dizziness, tremors, seizures, syncope, speech difficulty, weakness, light-headedness, numbness and headaches.   Psychiatric/Behavioral:  Negative for behavioral problems, confusion, decreased concentration, dysphoric mood, sleep disturbance and suicidal ideas. The patient is not nervous/anxious and is not hyperactive.      /74 (BP Location: Right arm, Patient Position: Sitting)   Pulse 70   Ht 5' 2" (1.575 m)   Wt 68.8 kg (151 lb 10.8 oz)   SpO2 98%   BMI 27.74 kg/m²   Physical Exam  Vitals and nursing note reviewed.   Constitutional:       General: She is not in acute distress.     Appearance: Normal appearance. She is well-developed. She is not ill-appearing or toxic-appearing.   HENT:      Head: Normocephalic and atraumatic.      Right " Ear: Tympanic membrane, ear canal and external ear normal.      Left Ear: Tympanic membrane, ear canal and external ear normal.      Nose: Nose normal.      Mouth/Throat:      Lips: Pink.      Mouth: Mucous membranes are moist.      Pharynx: Oropharyngeal exudate and posterior oropharyngeal erythema present.     Eyes:      General: No scleral icterus.        Right eye: No discharge.         Left eye: No discharge.      Extraocular Movements: Extraocular movements intact.      Conjunctiva/sclera: Conjunctivae normal.   Cardiovascular:      Rate and Rhythm: Normal rate and regular rhythm.      Pulses: Normal pulses.      Heart sounds: Normal heart sounds. No murmur heard.  Pulmonary:      Effort: Pulmonary effort is normal. No respiratory distress.      Breath sounds: Normal breath sounds. No wheezing or rales.   Abdominal:      General: Bowel sounds are normal. There is no distension.      Palpations: Abdomen is soft. There is no mass.      Tenderness: There is no abdominal tenderness. There is no right CVA tenderness, left CVA tenderness, guarding or rebound.      Hernia: No hernia is present.   Musculoskeletal:      Cervical back: Normal range of motion and neck supple. No rigidity or tenderness.   Lymphadenopathy:      Cervical: No cervical adenopathy.   Skin:     General: Skin is warm and dry.   Neurological:      General: No focal deficit present.      Mental Status: She is alert. Mental status is at baseline.   Psychiatric:         Mood and Affect: Mood normal.         Behavior: Behavior normal. Behavior is cooperative.       ASSESSMENT/PLAN  Diagnoses and all orders for this visit:    Acute streptococcal pharyngitis    Exposure to Streptococcal pharyngitis    History of melanoma    Non-ulcer dyspepsia    Other orders  -     amoxicillin (AMOXIL) 500 MG Tab; Take 1 tablet (500 mg total) by mouth every 12 (twelve) hours. for 7 days  -     fluconazole (DIFLUCAN) 150 MG Tab; Take 1 tablet (150 mg total) by mouth  once daily. AS NEEDED FOR YEAST VAGINITIS for 1 day      Some significant peritonsillar erythema and exudate on the left with close exposure to strep pharyngitis.  Reasonable to treat.  See above.  Nasal saline, Flonase, Mucinex expectorant if starts to develop significant mucus.  Allegra, Tylenol     She asks about corticosteroids.  No indication for such at this time.  They will decrease inflammation but at the cost of diminishing immune system slightly.  If no improvement in a week then we will consider.    All lab results over past 2 years available reviewed inc labs and any cardiology or radiology studies.  Any new prescription medications gone over in detail including reason for taking the medication, the general mechanism of action, most common possible side effects and possible costs, etcetera.    Chronic conditions updated. Other than changes or additions as above, cont current medications and maintain follow-up with specialists if indicated.     Steve Morin MD  A dictation device was used to produce this document. Use of such devices sometimes results in grammatical errors or replacement of words that sound similarly.

## 2023-07-12 NOTE — PATIENT INSTRUCTIONS
Hydrate, rest, Mucinex Expectorant as directed for thinning out the mucus; or MucinexDM if with significant cough and mucus.  Zyrtec, Xyzal, Allegra or Claritin. (Would lean towards Allegra which may be a bit more effective than claritin and will not cause sedation as Xyzal or Zyrtec may.)  Nasal saline spray such as [place] brand as needed.  Flonase or Nasonex nasal spray after the nasal saline.  Warm salt water gargles and warm liquids may help soothe a sore throat better than cool liquids.  Tylenol as directed as needed for fever, body aches, headaches.   All are OTC  Most of all, stay well-hydrated.

## 2023-07-13 ENCOUNTER — TELEPHONE (OUTPATIENT)
Dept: ORTHOPEDICS | Facility: CLINIC | Age: 63
End: 2023-07-13
Payer: COMMERCIAL

## 2023-07-13 NOTE — TELEPHONE ENCOUNTER
----- Message from Myles Gabriel sent at 7/13/2023 10:40 AM CDT -----  Regarding: POST OP APPOINTMENT  Contact: Self  Pt stated she is going out of town and need to reschedule her post op appointment, ask for a call back      Contact info   [phone] (home)

## 2023-07-13 NOTE — TELEPHONE ENCOUNTER
Called and spoke with the pt and rescheduled her appt to the next availability as per pt's request and informed pt.

## 2023-07-18 ENCOUNTER — CLINICAL SUPPORT (OUTPATIENT)
Dept: REHABILITATION | Facility: HOSPITAL | Age: 63
End: 2023-07-18
Payer: COMMERCIAL

## 2023-07-18 DIAGNOSIS — M79.641 RIGHT HAND PAIN: Primary | ICD-10-CM

## 2023-07-18 PROCEDURE — 97110 THERAPEUTIC EXERCISES: CPT | Mod: PO

## 2023-07-18 PROCEDURE — 97018 PARAFFIN BATH THERAPY: CPT | Mod: PO,59

## 2023-07-18 PROCEDURE — 97140 MANUAL THERAPY 1/> REGIONS: CPT | Mod: PO

## 2023-07-18 NOTE — PROGRESS NOTES
Occupational Therapy Daily Treatment Note     Date: 7/18/2023  Name: Cristal Gonzalez  Clinic Number: [id number]    Therapy Diagnosis:   Encounter Diagnosis   Name Primary?    Right hand pain Yes       Physician: Anand Geller MD    Medical Diagnosis: S52.571A (ICD-10-CM) - Other closed intra-articular fracture of distal end of right radius, initial encounter  Physician Orders: Start OT for postop rehab. Focusing on motion of the fingers for now. NWB until 6 weeks.  Evaluation Date: 5/8/2023  Plan of Care Certification Date: 7/31/2023  Authorization Period: 12/29/2023  Surgery Date and Procedure: 4/21/2023  Date of Return to MD: 6/22/2023     Visit #: 16 of 20  Time In: 10:34 AM  Time Out: 11:20 AM  Total Billable Time: 46 min     Precautions: Standard    Subjective     Pt reports: concerns over slow progress of ROM     she was compliant with home exercise program given last session.   Response to previous treatment: yoseph well    Pain: 0/10  Location: right wrists      Objective    received the following supervised modalities after being cleared for contradictions for 8 minutes:   - Patient received paraffin to RUE hand(s) for 8 minutes with hand wrapped in composite IP and MP flexion with coband to increase blood flow, circulation, and for pain management.      Cristal received the following manual therapy techniques for 10 minutes in order to maximize tissue function and/or decrease pain:   -passive range of motion wrist flexion/extension, composite fist  -Traction to wrist and all fingers    Cristal  participated in dynamic functional therapeutic exercises to improve functional performance while increasing strength, endurance, ROM,  and flexibility for 28 minutes, including:  -#2 dumbbell WF/WE 20x each  -Hammer UD/RD, pro/sup 20x each  -digit lifts   -Prayer stretch 10x   -true balance x 2 min       Edema: Circumferential measurements: In centimeters       5/8/2023 5/8/2023 6/20/23  7/18/23     Left Right Right Right   MPs 14.6  17.3  18.0 18.0   PWC (Proximal Wrist Crease) 17.8  19.0  16.9 16.2         Range of Motion:   Right  Affected     Wrist AROM    WE  WF  UD  RD    5/8/23 17 26 15 4   6/20/23 35 30 15 12   7/18/23 55 35 16 25         Finger active range of motion 5/8/2023    INDEX  LONG  RING  SMALL    MP  0/45 0/45 0/30 0/24   PIP  0/66 0/67 0/70 0/40   DIP  0/40 0/51 0/47 0/35       Finger active range of motion 6/20/23    INDEX  LONG  RING  SMALL    MP  0/81 0/81 0/65 0/52   PIP  0/85 0/85 0/86 0/94   DIP  0/46 0/60 0/67 0/65       Finger active range of motion 6/20/23    INDEX  LONG  RING  SMALL    MP  0/86 0/87 0/80 0/65   PIP  0/91 0/96 0/95 0/91   DIP  0/59 0/70 0/65      0/79      Forearm AROM    PRO  SUP    5/8/23 WFL     10   6/20/23 WFL     44   7/18/23 WFL     65          Strength: (MAZIN Dynamometer in psi.)        5/8/23 5/8/23 6/20/23 7/18/23     Left Right Right Right   Rung II 29.3 Deferred 10.6 19.7         Pinch Strength (Measured in psi)       5/8/23 5/8/23 6/20/23 7/18/23     Left Right Right Right   Key Pinch 12  Deferred  9 10   3pt Pinch 10  Deferred  6 8   2pt Pinch 8  Deferred  5 9           Home Exercises and Education Provided     Education provided:  - Discussed insurance limitation  - Progress towards goals  - Upgraded HEP- strength and stretching    Written Home Exercises Provided: yes.  Exercises were reviewed and Cristal was able to demonstrate them prior to the end of the session.  Cristal demonstrated good  understanding of the HEP provided.   .   See EMR under Patient Instructions for exercises provided  6/13/2023 .     Assessment     Pt would continue to benefit from skilled OT to maximize right upper extremity functioning. Pt with continued stiffness but demonstrates improvement in ROM of fist/wrist.     Cristal is progressing well towards her goals and there are no updates to goals at this time. Pt prognosis is Good.     The patient demonstrated proper  understanding  of each exercise.Pt required verbal and tactile cues for all new hand exercises.  Pt continues to be limited in functional and leisurely pursuits. Pain limits patients participation in ADL's. Pt is not able to carryout necessary vocational tasks. Patient continues to requires cues and skilled supervision to complete HEP       Anticipated barriers to occupational therapy: guarded     Pt's spiritual, cultural and educational needs considered and pt agreeable to plan of care and goals.    Goals:    LTG's ( 12 weeks):  7/31/2023  1)   Increase ROM to WFL degrees in hand/wrist/FA to increase functional hand use for self care tasks. Progressing 6/20/2023  2)   Increase  strength to 40 lbs. to grasp hair dryer. Progressing 6/20/2023  3)   Increase lateral pinch to 14 psis for opening sauce packets while cooking. Progressing 6/20/2023  4)   Decrease complaints of pain to  1 out of 10 at worst to increase functional hand use for ADL/work/leisure activities. Progressing 6/20/2023  5)   Patient to score at 45% or less on FOTO to demonstrate improved perception of functional RUE use. Progressing 6/20/2023  6)   Pt will return to near to prior level of function for ADLs and household management reporting I or Mod I with ADLs (dressing, feeding, grooming, toileting).  Progressing 6/20/2023     STG's (6 weeks) 6/19/2023  1)   Patient to be IND with HEP and modalities for pain/edema managment. Met 6/20/2023  2)   Increase ROM to 60% degrees in composite fist to increase functional hand use for ADLs/work/leisure activities. Met 6/20/2023  3)   Increase ROM by 30 degrees in WF/WE to increase functional hand use for ADLs/work/leisure activities. Progressing 6/20/2023  4)   Increase ROM by 40 degrees in FA supination to increase functional hand use for ADLs/work/leisure activities. Met 6/20/2023  5)   Increase  strength to 20 lbs. to improve functional grasp for ADLs/work/leisure activities. Progressing  6/20/2023  6)   Increase lateral pinch to 6 psi's to increase independence with button and FM Coordination. Met 6/20/2023  7)   Patient to be IND with Orthotic use, wear and care precautions. Met 6/20/2023  8)   Decrease complaints of pain to  4 out of 10 at worst to increase functional hand use for ADL/work/leisure activities.     Plan      Pt to be treated by Occupational Therapy 2 times per week for 12 weeks during the certification period from 5/8/2023 to 7/21/2023 to achieve the established goals.      Treatment to include: Paraffin, Fluidotherapy, Manual therapy/joint mobilizations, Modalities for pain management, Therapeutic exercises/activities., Iontophoresis with 2.0 cc Dexamethasone, Strengthening, Orthotic Fabrication/Fit/Training, Edema Control, Scar Management, and Joint Protection, as well as any other treatments deemed necessary based on the patient's needs or progress.        Jessica White, OT

## 2023-07-20 ENCOUNTER — CLINICAL SUPPORT (OUTPATIENT)
Dept: REHABILITATION | Facility: HOSPITAL | Age: 63
End: 2023-07-20
Payer: COMMERCIAL

## 2023-07-20 DIAGNOSIS — M79.641 RIGHT HAND PAIN: Primary | ICD-10-CM

## 2023-07-20 PROCEDURE — 97140 MANUAL THERAPY 1/> REGIONS: CPT | Mod: PO

## 2023-07-20 PROCEDURE — 97018 PARAFFIN BATH THERAPY: CPT | Mod: PO

## 2023-07-20 PROCEDURE — 97110 THERAPEUTIC EXERCISES: CPT | Mod: PO

## 2023-07-20 NOTE — PROGRESS NOTES
Occupational Therapy Daily Treatment Note     Date: 7/20/2023  Name: Cristal Gonzalez  Clinic Number: [id number]    Therapy Diagnosis:   Encounter Diagnosis   Name Primary?    Right hand pain Yes       Physician: Anand Geller MD    Medical Diagnosis: S52.571A (ICD-10-CM) - Other closed intra-articular fracture of distal end of right radius, initial encounter  Physician Orders: Start OT for postop rehab. Focusing on motion of the fingers for now. NWB until 6 weeks.  Evaluation Date: 5/8/2023  Plan of Care Certification Date: 7/31/2023  Authorization Period: 12/29/2023  Surgery Date and Procedure: 4/21/2023  Date of Return to MD: 6/22/2023     Visit #: 18 of 20  Time In: 10:34 AM  Time Out: 11:20 AM  Total Billable Time: 46 min     Precautions: Standard    Subjective     Pt reports: Can hold and manipulate hair styling tools now    she was compliant with home exercise program given last session.   Response to previous treatment: yoseph well    Pain: 0/10  Location: right wrists      Objective    received the following supervised modalities after being cleared for contradictions for 8 minutes:   - Patient received paraffin to RUE hand(s) for 8 minutes with hand wrapped in composite IP and MP flexion of digits 2-5 with coband to increase blood flow, circulation, and for pain management.    -MH to increase WF/WE in composite fist while heating     Cristal received the following manual therapy techniques for 10 minutes in order to maximize tissue function and/or decrease pain:   -passive range of motion wrist flexion/extension, composite fist  -Traction to wrist and all fingers  -Myofascial scraping to dorsal forearm and volar ulnar side of wrist for decreased pain and tightness    Cristal  participated in dynamic functional therapeutic exercises to improve functional performance while increasing strength, endurance, ROM,  and flexibility for 28 minutes, including:  -#2 dumbbell WF/WE 20x  each  -Powerweb red/blue stretches 4 directions into WE 10x each  -Hammer UD/RD, pro/sup 20x each  -digit lifts 20x each finger  -Prayer stretch 10x       Edema: Circumferential measurements: In centimeters       5/8/2023 5/8/2023 6/20/23 7/18/23     Left Right Right Right   MPs 14.6  17.3  18.0 18.0   PWC (Proximal Wrist Crease) 17.8  19.0  16.9 16.2         Range of Motion:   Right  Affected     Wrist AROM    WE  WF  UD  RD    5/8/23 17 26 15 4   6/20/23 35 30 15 12   7/18/23 55 35 16 25         Finger active range of motion 5/8/2023    INDEX  LONG  RING  SMALL    MP  0/45 0/45 0/30 0/24   PIP  0/66 0/67 0/70 0/40   DIP  0/40 0/51 0/47 0/35       Finger active range of motion 6/20/23    INDEX  LONG  RING  SMALL    MP  0/81 0/81 0/65 0/52   PIP  0/85 0/85 0/86 0/94   DIP  0/46 0/60 0/67 0/65       Finger active range of motion 6/20/23    INDEX  LONG  RING  SMALL    MP  0/86 0/87 0/80 0/65   PIP  0/91 0/96 0/95 0/91   DIP  0/59 0/70 0/65      0/79      Forearm AROM    PRO  SUP    5/8/23 WFL     10   6/20/23 WFL     44   7/18/23 WFL     65          Strength: (MAZIN Dynamometer in psi.)        5/8/23 5/8/23 6/20/23 7/18/23     Left Right Right Right   Rung II 29.3 Deferred 10.6 19.7         Pinch Strength (Measured in psi)       5/8/23 5/8/23 6/20/23 7/18/23     Left Right Right Right   Key Pinch 12  Deferred  9 10   3pt Pinch 10  Deferred  6 8   2pt Pinch 8  Deferred  5 9         Home Exercises and Education Provided     Education provided:  - Discussed insurance limitation  - Progress towards goals  - Upgraded HEP- strength and stretching    Written Home Exercises Provided: yes.  Exercises were reviewed and Cristal was able to demonstrate them prior to the end of the session.  Cristal demonstrated good  understanding of the HEP provided.   .   See EMR under Patient Instructions for exercises provided  6/13/2023 .     Assessment     Pt would continue to benefit from skilled OT to maximize right upper extremity  functioning. Pt with improvement in WE this session with some tightness in WF. Pt remains limited in full functional mobility in WF/WE/RD/UD as well as fx fist and continues to benefit from OT for increased mobility for fx tasks.    Cristal is progressing well towards her goals and there are no updates to goals at this time. Pt prognosis is Good.     The patient demonstrated proper understanding  of each exercise.Pt required verbal and tactile cues for all new hand exercises.  Pt continues to be limited in functional and leisurely pursuits. Pain limits patients participation in ADL's. Pt is not able to carryout necessary vocational tasks. Patient continues to requires cues and skilled supervision to complete HEP       Anticipated barriers to occupational therapy: guarded     Pt's spiritual, cultural and educational needs considered and pt agreeable to plan of care and goals.    Goals:    LTG's ( 12 weeks):  7/31/2023  1)   Increase ROM to WFL degrees in hand/wrist/FA to increase functional hand use for self care tasks. Progressing 6/20/2023  2)   Increase  strength to 40 lbs. to grasp hair dryer. Progressing 6/20/2023  3)   Increase lateral pinch to 14 psis for opening sauce packets while cooking. Progressing 6/20/2023  4)   Decrease complaints of pain to  1 out of 10 at worst to increase functional hand use for ADL/work/leisure activities. Progressing 6/20/2023  5)   Patient to score at 45% or less on FOTO to demonstrate improved perception of functional RUE use. Progressing 6/20/2023  6)   Pt will return to near to prior level of function for ADLs and household management reporting I or Mod I with ADLs (dressing, feeding, grooming, toileting).  Progressing 6/20/2023     STG's (6 weeks) 6/19/2023  1)   Patient to be IND with HEP and modalities for pain/edema managment. Met 6/20/2023  2)   Increase ROM to 60% degrees in composite fist to increase functional hand use for ADLs/work/leisure activities. Met  6/20/2023  3)   Increase ROM by 30 degrees in WF/WE to increase functional hand use for ADLs/work/leisure activities. Progressing 6/20/2023  4)   Increase ROM by 40 degrees in FA supination to increase functional hand use for ADLs/work/leisure activities. Met 6/20/2023  5)   Increase  strength to 20 lbs. to improve functional grasp for ADLs/work/leisure activities. Progressing 6/20/2023  6)   Increase lateral pinch to 6 psi's to increase independence with button and FM Coordination. Met 6/20/2023  7)   Patient to be IND with Orthotic use, wear and care precautions. Met 6/20/2023  8)   Decrease complaints of pain to  4 out of 10 at worst to increase functional hand use for ADL/work/leisure activities.     Plan      Pt to be treated by Occupational Therapy 2 times per week for 12 weeks during the certification period from 5/8/2023 to 7/21/2023 to achieve the established goals.      Treatment to include: Paraffin, Fluidotherapy, Manual therapy/joint mobilizations, Modalities for pain management, Therapeutic exercises/activities., Iontophoresis with 2.0 cc Dexamethasone, Strengthening, Orthotic Fabrication/Fit/Training, Edema Control, Scar Management, and Joint Protection, as well as any other treatments deemed necessary based on the patient's needs or progress.      Jessica White, OT

## 2023-07-21 PROBLEM — K92.1 HEMATOCHEZIA: Status: RESOLVED | Noted: 2021-06-25 | Resolved: 2023-07-21

## 2023-07-25 ENCOUNTER — PATIENT MESSAGE (OUTPATIENT)
Dept: REHABILITATION | Facility: HOSPITAL | Age: 63
End: 2023-07-25

## 2023-07-25 ENCOUNTER — CLINICAL SUPPORT (OUTPATIENT)
Dept: REHABILITATION | Facility: HOSPITAL | Age: 63
End: 2023-07-25
Payer: COMMERCIAL

## 2023-07-25 DIAGNOSIS — M79.641 RIGHT HAND PAIN: Primary | ICD-10-CM

## 2023-07-25 PROCEDURE — 97140 MANUAL THERAPY 1/> REGIONS: CPT | Mod: PO

## 2023-07-25 PROCEDURE — 97018 PARAFFIN BATH THERAPY: CPT | Mod: PO,59

## 2023-07-25 PROCEDURE — 97110 THERAPEUTIC EXERCISES: CPT | Mod: PO

## 2023-07-28 ENCOUNTER — LAB VISIT (OUTPATIENT)
Dept: LAB | Facility: HOSPITAL | Age: 63
End: 2023-07-28
Attending: FAMILY MEDICINE
Payer: COMMERCIAL

## 2023-07-28 DIAGNOSIS — Z00.00 GENERAL MEDICAL EXAM: ICD-10-CM

## 2023-07-28 LAB
ALBUMIN SERPL BCP-MCNC: 3.9 G/DL (ref 3.5–5.2)
ALP SERPL-CCNC: 54 U/L (ref 55–135)
ALT SERPL W/O P-5'-P-CCNC: 15 U/L (ref 10–44)
ANION GAP SERPL CALC-SCNC: 12 MMOL/L (ref 8–16)
AST SERPL-CCNC: 20 U/L (ref 10–40)
BILIRUB SERPL-MCNC: 0.4 MG/DL (ref 0.1–1)
BUN SERPL-MCNC: 18 MG/DL (ref 8–23)
CALCIUM SERPL-MCNC: 9.6 MG/DL (ref 8.7–10.5)
CHLORIDE SERPL-SCNC: 107 MMOL/L (ref 95–110)
CHOLEST SERPL-MCNC: 160 MG/DL (ref 120–199)
CHOLEST/HDLC SERPL: 1.7 {RATIO} (ref 2–5)
CO2 SERPL-SCNC: 22 MMOL/L (ref 23–29)
CREAT SERPL-MCNC: 0.7 MG/DL (ref 0.5–1.4)
ERYTHROCYTE [DISTWIDTH] IN BLOOD BY AUTOMATED COUNT: 13.3 % (ref 11.5–14.5)
EST. GFR  (NO RACE VARIABLE): >60 ML/MIN/1.73 M^2
ESTIMATED AVG GLUCOSE: 111 MG/DL (ref 68–131)
GLUCOSE SERPL-MCNC: 96 MG/DL (ref 70–110)
HBA1C MFR BLD: 5.5 % (ref 4–5.6)
HCT VFR BLD AUTO: 42.3 % (ref 37–48.5)
HDLC SERPL-MCNC: 95 MG/DL (ref 40–75)
HDLC SERPL: 59.4 % (ref 20–50)
HGB BLD-MCNC: 13.9 G/DL (ref 12–16)
LDLC SERPL CALC-MCNC: 59 MG/DL (ref 63–159)
MCH RBC QN AUTO: 32.6 PG (ref 27–31)
MCHC RBC AUTO-ENTMCNC: 32.9 G/DL (ref 32–36)
MCV RBC AUTO: 99 FL (ref 82–98)
NONHDLC SERPL-MCNC: 65 MG/DL
PLATELET # BLD AUTO: 405 K/UL (ref 150–450)
PMV BLD AUTO: 10.6 FL (ref 9.2–12.9)
POTASSIUM SERPL-SCNC: 4.7 MMOL/L (ref 3.5–5.1)
PROT SERPL-MCNC: 7 G/DL (ref 6–8.4)
RBC # BLD AUTO: 4.27 M/UL (ref 4–5.4)
SODIUM SERPL-SCNC: 141 MMOL/L (ref 136–145)
TRIGL SERPL-MCNC: 30 MG/DL (ref 30–150)
TSH SERPL DL<=0.005 MIU/L-ACNC: 1.42 UIU/ML (ref 0.4–4)
WBC # BLD AUTO: 6.33 K/UL (ref 3.9–12.7)

## 2023-07-28 PROCEDURE — 80053 COMPREHEN METABOLIC PANEL: CPT | Performed by: FAMILY MEDICINE

## 2023-07-28 PROCEDURE — 80061 LIPID PANEL: CPT | Performed by: FAMILY MEDICINE

## 2023-07-28 PROCEDURE — 85027 COMPLETE CBC AUTOMATED: CPT | Performed by: FAMILY MEDICINE

## 2023-07-28 PROCEDURE — 84443 ASSAY THYROID STIM HORMONE: CPT | Performed by: FAMILY MEDICINE

## 2023-07-28 PROCEDURE — 83036 HEMOGLOBIN GLYCOSYLATED A1C: CPT | Performed by: FAMILY MEDICINE

## 2023-07-28 PROCEDURE — 36415 COLL VENOUS BLD VENIPUNCTURE: CPT | Mod: PN | Performed by: FAMILY MEDICINE

## 2023-08-01 ENCOUNTER — CLINICAL SUPPORT (OUTPATIENT)
Dept: REHABILITATION | Facility: HOSPITAL | Age: 63
End: 2023-08-01
Payer: COMMERCIAL

## 2023-08-01 DIAGNOSIS — M79.641 RIGHT HAND PAIN: Primary | ICD-10-CM

## 2023-08-01 PROCEDURE — 97110 THERAPEUTIC EXERCISES: CPT | Mod: PO

## 2023-08-01 PROCEDURE — 97022 WHIRLPOOL THERAPY: CPT | Mod: PO

## 2023-08-01 NOTE — PROGRESS NOTES
Occupational Therapy Daily Treatment Note     Date: 8/1/2023  Name: Cristal Gonzalez  Clinic Number: [id number]    Therapy Diagnosis:   Encounter Diagnosis   Name Primary?    Right hand pain Yes         Physician: Anand Geller MD    Medical Diagnosis: S52.571A (ICD-10-CM) - Other closed intra-articular fracture of distal end of right radius, initial encounter  Physician Orders: Start OT for postop rehab. Focusing on motion of the fingers for now. NWB until 6 weeks.  Evaluation Date: 5/8/2023  Plan of Care Certification Date: Extend to 8/29/23  Authorization Period: 12/29/2023  Surgery Date and Procedure: 4/21/2023  Date of Return to MD: 6/22/2023     Visit #: 20 of 30  Time In: 10:30 AM  Time Out: 11:15 AM  Total Billable Time: 45 min     Precautions: Standard    Subjective     Pt reports: It's feeling good     she was compliant with home exercise program given last session.   Response to previous treatment: yoseph well    Pain: 0/10  Location: right wrists      Objective    received the following supervised modalities after being cleared for contradictions for 8 minutes:   - Patient received fluidotherapy to RUE hand(s) for 8 minutes with hand wrapped in composite IP and MP flexion of digits 2-5 with coband to increase blood flow, circulation, and for pain management.      Cristal  participated in dynamic functional therapeutic exercises to improve functional performance while increasing strength, endurance, ROM,  and flexibility for 35 minutes, including:  -Green flexbar pro, WF/WE, UD/RD 20x   -Red flexbar sup 20x  -Powerweb red/blue grasp/pulls 20x each  -pink putty #5 tool rotations to simulate doorknob twists 2'    Edema: Circumferential measurements: In centimeters       5/8/2023 5/8/2023 6/20/23 7/18/23     Left Right Right Right   MPs 14.6  17.3  18.0 18.0   PWC (Proximal Wrist Crease) 17.8  19.0  16.9 16.2         Range of Motion:   Right  Affected     Wrist AROM    WE  WF  UD   RD    5/8/23 17 26 15 4   6/20/23 35 30 15 12   7/18/23 55 35 16 25   7/25/23 55 45 16 25         Finger active range of motion 5/8/2023    INDEX  LONG  RING  SMALL    MP  0/45 0/45 0/30 0/24   PIP  0/66 0/67 0/70 0/40   DIP  0/40 0/51 0/47 0/35       Finger active range of motion 6/20/23    INDEX  LONG  RING  SMALL    MP  0/81 0/81 0/65 0/52   PIP  0/85 0/85 0/86 0/94   DIP  0/46 0/60 0/67      0/65       Finger active range of motion 7/18/23    INDEX  LONG  RING  SMALL    MP  0/86 0/87 0/80 0/65   PIP  0/91 0/96 0/95 0/91   DIP  0/59 0/70 0/65      0/79         Finger active range of motion 7/25/23 **After heat**    INDEX  LONG  RING  SMALL    MP  0/92 0/85 0/80 0/80   PIP  0/96 0/95 0/96 0/91   DIP  0/65 0/73 0/70      0/88      Forearm AROM    PRO  SUP    5/8/23 WFL     10   6/20/23 WFL     44   7/18/23 WFL     65   7/25/23 WFL WFL       Strength: (MAZIN Dynamometer in psi.)        5/8/23 5/8/23 6/20/23 7/18/23 7/25/23     Left Right Right Right Right   Rung II 29.3 Deferred 10.6 19.7 25.5      Pinch Strength (Measured in psi)       5/8/23 5/8/23 6/20/23 7/18/23 7/25/23     Left Right Right Right Right   Key Pinch 12  Deferred  9 10 10   3pt Pinch 10  Deferred  6 8 8   2pt Pinch 8  Deferred  5 9 9        Home Exercises and Education Provided     Education provided:  - Discussed insurance limitation  - Progress towards goals  - Upgraded HEP- strength and stretching    Written Home Exercises Provided: yes.  Exercises were reviewed and Cristal was able to demonstrate them prior to the end of the session.  Cristal demonstrated good  understanding of the HEP provided.   .   See EMR under Patient Instructions for exercises provided  6/13/2023 .     Assessment     Pt would continue to benefit from skilled OT to maximize right upper extremity functioning. Pt with continued endurance difficulties with ADLs due to wrist mobility and strength. Pt nearing d/c.    Cristal is progressing well towards her goals and there are no  updates to goals at this time. Pt prognosis is Good.     The patient demonstrated proper understanding  of each exercise.Pt required verbal and tactile cues for all new hand exercises.    Pt continues to be limited in functional and leisurely pursuits. Pain limits patients participation in ADL's. Pt is not able to carryout necessary vocational tasks. Patient continues to requires cues and skilled supervision to complete HEP       Anticipated barriers to occupational therapy: guarded     Pt's spiritual, cultural and educational needs considered and pt agreeable to plan of care and goals.    Goals:    LTG's ( 12+ weeks):  8/29/23  1)   Increase ROM to WFL degrees in hand/wrist/FA to increase functional hand use for self care tasks. Progressing 7/25/2023  2)   Increase  strength to 40 lbs. to grasp hair dryer. Progressing 7/25/2023  3)   Increase lateral pinch to 14 psis for opening sauce packets while cooking. Progressing 7/25/2023  4)   Decrease complaints of pain to  1 out of 10 at worst to increase functional hand use for ADL/work/leisure activities. Met 7/25/23  5)   Patient to score at 45% or less on FOTO to demonstrate improved perception of functional RUE use. Progressing 7/25/2023  6)   Pt will return to near to prior level of function for ADLs and household management reporting I or Mod I with ADLs (dressing, feeding, grooming, toileting).  Progressing 7/25/2023     STG's (6 weeks) 6/19/2023  1)   Patient to be IND with HEP and modalities for pain/edema managment. Met 6/20/2023  2)   Increase ROM to 60% degrees in composite fist to increase functional hand use for ADLs/work/leisure activities. Met 6/20/2023  3)   Increase ROM by 30 degrees in WF/WE to increase functional hand use for ADLs/work/leisure activities. Met 7/25/23  4)   Increase ROM by 40 degrees in FA supination to increase functional hand use for ADLs/work/leisure activities. Met 6/20/2023  5)   Increase  strength to 20 lbs. to improve  functional grasp for ADLs/work/leisure activities. Met 7/25/2023  6)   Increase lateral pinch to 6 psi's to increase independence with button and FM Coordination. Met 6/20/2023  7)   Patient to be IND with Orthotic use, wear and care precautions. Met 6/20/2023  8)   Decrease complaints of pain to  4 out of 10 at worst to increase functional hand use for ADL/work/leisure activities. Met 7/25/23     Plan      Extend POC to 8/29/23 to address remaining deficits with skilled occupational therapy.      Treatment to include: Paraffin, Fluidotherapy, Manual therapy/joint mobilizations, Modalities for pain management, Therapeutic exercises/activities., Iontophoresis with 2.0 cc Dexamethasone, Strengthening, Orthotic Fabrication/Fit/Training, Edema Control, Scar Management, and Joint Protection, as well as any other treatments deemed necessary based on the patient's needs or progress.      Jessica White, OT

## 2023-08-02 ENCOUNTER — PATIENT MESSAGE (OUTPATIENT)
Dept: ORTHOPEDICS | Facility: CLINIC | Age: 63
End: 2023-08-02
Payer: COMMERCIAL

## 2023-08-02 DIAGNOSIS — S52.571A OTHER CLOSED INTRA-ARTICULAR FRACTURE OF DISTAL END OF RIGHT RADIUS, INITIAL ENCOUNTER: Primary | ICD-10-CM

## 2023-08-03 ENCOUNTER — OFFICE VISIT (OUTPATIENT)
Dept: ORTHOPEDICS | Facility: CLINIC | Age: 63
End: 2023-08-03
Payer: COMMERCIAL

## 2023-08-03 ENCOUNTER — HOSPITAL ENCOUNTER (OUTPATIENT)
Dept: RADIOLOGY | Facility: HOSPITAL | Age: 63
Discharge: HOME | End: 2023-08-03
Attending: PHYSICIAN ASSISTANT
Payer: COMMERCIAL

## 2023-08-03 DIAGNOSIS — S62.316A CLOSED DISPLACED FRACTURE OF BASE OF FIFTH METACARPAL BONE OF RIGHT HAND, INITIAL ENCOUNTER: ICD-10-CM

## 2023-08-03 DIAGNOSIS — S52.571A OTHER CLOSED INTRA-ARTICULAR FRACTURE OF DISTAL END OF RIGHT RADIUS, INITIAL ENCOUNTER: Primary | ICD-10-CM

## 2023-08-03 DIAGNOSIS — S52.571A OTHER CLOSED INTRA-ARTICULAR FRACTURE OF DISTAL END OF RIGHT RADIUS, INITIAL ENCOUNTER: ICD-10-CM

## 2023-08-03 DIAGNOSIS — Z98.890 POSTOPERATIVE STATE: ICD-10-CM

## 2023-08-03 PROCEDURE — 99213 OFFICE O/P EST LOW 20 MIN: CPT | Mod: S$GLB,,, | Performed by: PHYSICIAN ASSISTANT

## 2023-08-03 PROCEDURE — 3044F HG A1C LEVEL LT 7.0%: CPT | Mod: CPTII,S$GLB,, | Performed by: PHYSICIAN ASSISTANT

## 2023-08-03 PROCEDURE — 1159F MED LIST DOCD IN RCRD: CPT | Mod: CPTII,S$GLB,, | Performed by: PHYSICIAN ASSISTANT

## 2023-08-03 PROCEDURE — 73110 X-RAY EXAM OF WRIST: CPT | Mod: 26,RT,, | Performed by: RADIOLOGY

## 2023-08-03 PROCEDURE — 1159F PR MEDICATION LIST DOCUMENTED IN MEDICAL RECORD: ICD-10-PCS | Mod: CPTII,S$GLB,, | Performed by: PHYSICIAN ASSISTANT

## 2023-08-03 PROCEDURE — 99213 PR OFFICE/OUTPT VISIT, EST, LEVL III, 20-29 MIN: ICD-10-PCS | Mod: S$GLB,,, | Performed by: PHYSICIAN ASSISTANT

## 2023-08-03 PROCEDURE — 73110 XR WRIST COMPLETE 3 VIEWS RIGHT: ICD-10-PCS | Mod: 26,RT,, | Performed by: RADIOLOGY

## 2023-08-03 PROCEDURE — 3044F PR MOST RECENT HEMOGLOBIN A1C LEVEL <7.0%: ICD-10-PCS | Mod: CPTII,S$GLB,, | Performed by: PHYSICIAN ASSISTANT

## 2023-08-03 PROCEDURE — 99999 PR PBB SHADOW E&M-EST. PATIENT-LVL III: CPT | Mod: PBBFAC,,, | Performed by: PHYSICIAN ASSISTANT

## 2023-08-03 PROCEDURE — 73110 X-RAY EXAM OF WRIST: CPT | Mod: TC,PN,RT

## 2023-08-03 PROCEDURE — 99999 PR PBB SHADOW E&M-EST. PATIENT-LVL III: ICD-10-PCS | Mod: PBBFAC,,, | Performed by: PHYSICIAN ASSISTANT

## 2023-08-03 NOTE — PROGRESS NOTES
Dr. Geller is the supervising physician for this encounter/patient    Cristal Gonzalez presents for post-operative evaluation.  The patient is now 15 weeks s/p Right ORIF distal radius fracture and CRPP 5th metacarpal base fracture with Dr. Geller on 4/21/23.  Overall the patient reports doing well.  She reports 0/10 pain, does have some soreness with rotation of the wrist. She is working with OT and reports good improvement. She tells me she is able to do everything she needs to.    PE:    AA&O x 4.  NAD  HEENT:  NCAT, sclera nonicteric  Lungs:  Respirations are equal and unlabored.  CV:  2+ bilateral upper and lower extremity pulses.  MSK: The wound is well healed without any concerns for infection. NTTP over the ulnar styloid. Completely NTTP over the distal radius. full fist present today, wrist flexion/extension about 20 degrees each. SILT. DNVI.    IMAGING - reviewed with patient  Stable appearance of the distal radius ORIF, no hardware complications. 5th metacarpal base fracture is healing well    A/P: Status post above, doing well  1) Continue with OT for aggressive ROM until goals are met, activity as tolerated.  2) Scar massage discussed.  3) F/U as needed.  4) Call with any questions/concerns in the interim      Jamie Russo-DiGeorge PA-C

## 2023-08-04 ENCOUNTER — CLINICAL SUPPORT (OUTPATIENT)
Dept: REHABILITATION | Facility: HOSPITAL | Age: 63
End: 2023-08-04
Payer: COMMERCIAL

## 2023-08-04 DIAGNOSIS — M79.641 RIGHT HAND PAIN: Primary | ICD-10-CM

## 2023-08-04 PROCEDURE — 97530 THERAPEUTIC ACTIVITIES: CPT | Mod: PO

## 2023-08-04 PROCEDURE — 97022 WHIRLPOOL THERAPY: CPT | Mod: PO

## 2023-08-04 NOTE — PROGRESS NOTES
Occupational Therapy Daily Treatment Note     Date: 8/4/2023  Name: Cristal Gonzalez  Clinic Number: [id number]    Therapy Diagnosis:   Encounter Diagnosis   Name Primary?    Right hand pain Yes         Physician: Anand Geller MD    Medical Diagnosis: S52.571A (ICD-10-CM) - Other closed intra-articular fracture of distal end of right radius, initial encounter  Physician Orders: Start OT for postop rehab. Focusing on motion of the fingers for now. NWB until 6 weeks.  Evaluation Date: 5/8/2023  Plan of Care Certification Date: Extend to 8/29/23  Authorization Period: 12/29/2023  Surgery Date and Procedure: 4/21/2023  Date of Return to MD: 6/22/2023     Visit #: 22 of 30  Time In: 10:30 AM  Time Out: 11:15 AM  Total Billable Time: 45 min     Precautions: Standard    Subjective     Pt reports: It's feeling good     she was compliant with home exercise program given last session.   Response to previous treatment: yoseph well    Pain: 0/10  Location: right wrists      Objective    received the following supervised modalities after being cleared for contradictions for 8 minutes:     - Patient received fluidotherapy to RUE hand(s) for 8 minutes with hand wrapped in composite IP and MP flexion of digits 2-5 with coband to increase blood flow, circulation, and for pain management.      Cristal  participated in dynamic functional therapeutic exercises to improve functional performance while increasing strength, endurance, ROM,  and flexibility for 35 minutes, including:    -LLPS x 20 minutes in wrist flexion   -Went over doing LLPS in flex and extension x twice daily 20 minutes at a time.        Edema: Circumferential measurements: In centimeters       5/8/2023 5/8/2023 6/20/23 7/18/23     Left Right Right Right   MPs 14.6  17.3  18.0 18.0   PWC (Proximal Wrist Crease) 17.8  19.0  16.9 16.2         Range of Motion:   Right  Affected     Wrist AROM    WE  WF  UD  RD    5/8/23 17 26 15 4   6/20/23 35  30 15 12   7/18/23 55 35 16 25   7/25/23 55 45 16 25         Finger active range of motion 5/8/2023    INDEX  LONG  RING  SMALL    MP  0/45 0/45 0/30 0/24   PIP  0/66 0/67 0/70 0/40   DIP  0/40 0/51 0/47 0/35       Finger active range of motion 6/20/23    INDEX  LONG  RING  SMALL    MP  0/81 0/81 0/65 0/52   PIP  0/85 0/85 0/86 0/94   DIP  0/46 0/60 0/67      0/65       Finger active range of motion 7/18/23    INDEX  LONG  RING  SMALL    MP  0/86 0/87 0/80 0/65   PIP  0/91 0/96 0/95 0/91   DIP  0/59 0/70 0/65      0/79         Finger active range of motion 7/25/23 **After heat**    INDEX  LONG  RING  SMALL    MP  0/92 0/85 0/80 0/80   PIP  0/96 0/95 0/96 0/91   DIP  0/65 0/73 0/70      0/88      Forearm AROM    PRO  SUP    5/8/23 WFL     10   6/20/23 WFL     44   7/18/23 WFL     65   7/25/23 WFL WFL       Strength: (MAZIN Dynamometer in psi.)        5/8/23 5/8/23 6/20/23 7/18/23 7/25/23     Left Right Right Right Right   Rung II 29.3 Deferred 10.6 19.7 25.5      Pinch Strength (Measured in psi)       5/8/23 5/8/23 6/20/23 7/18/23 7/25/23     Left Right Right Right Right   Key Pinch 12  Deferred  9 10 10   3pt Pinch 10  Deferred  6 8 8   2pt Pinch 8  Deferred  5 9 9        Home Exercises and Education Provided     Education provided:  - Discussed insurance limitation  - Progress towards goals  - Upgraded HEP- strength and stretching    Written Home Exercises Provided: yes.  Exercises were reviewed and Cristal was able to demonstrate them prior to the end of the session.  Cristal demonstrated good  understanding of the HEP provided.   .   See EMR under Patient Instructions for exercises provided  6/13/2023 .     Assessment      Pt would continue to benefit from skilled OT to maximize right upper extremity functioning. Pt will be reassessed next session prior to heat . She will would like to     Cristal is progressing well towards her goals and there are no updates to goals at this time. Pt prognosis is Good.     The  patient demonstrated proper understanding  of each exercise.Pt required verbal and tactile cues for all new hand exercises.    Pt continues to be limited in functional and leisurely pursuits. Pain limits patients participation in ADL's. Pt is not able to carryout necessary vocational tasks. Patient continues to requires cues and skilled supervision to complete HEP       Anticipated barriers to occupational therapy: guarded     Pt's spiritual, cultural and educational needs considered and pt agreeable to plan of care and goals.    Goals:    LTG's ( 12+ weeks):  8/29/23  1)   Increase ROM to WFL degrees in hand/wrist/FA to increase functional hand use for self care tasks. Progressing 7/25/2023  2)   Increase  strength to 40 lbs. to grasp hair dryer. Progressing 7/25/2023  3)   Increase lateral pinch to 14 psis for opening sauce packets while cooking. Progressing 7/25/2023  4)   Decrease complaints of pain to  1 out of 10 at worst to increase functional hand use for ADL/work/leisure activities. Met 7/25/23  5)   Patient to score at 45% or less on FOTO to demonstrate improved perception of functional RUE use. Progressing 7/25/2023  6)   Pt will return to near to prior level of function for ADLs and household management reporting I or Mod I with ADLs (dressing, feeding, grooming, toileting).  Progressing 7/25/2023     STG's (6 weeks) 6/19/2023  1)   Patient to be IND with HEP and modalities for pain/edema managment. Met 6/20/2023  2)   Increase ROM to 60% degrees in composite fist to increase functional hand use for ADLs/work/leisure activities. Met 6/20/2023  3)   Increase ROM by 30 degrees in WF/WE to increase functional hand use for ADLs/work/leisure activities. Met 7/25/23  4)   Increase ROM by 40 degrees in FA supination to increase functional hand use for ADLs/work/leisure activities. Met 6/20/2023  5)   Increase  strength to 20 lbs. to improve functional grasp for ADLs/work/leisure activities. Met  7/25/2023  6)   Increase lateral pinch to 6 psi's to increase independence with button and FM Coordination. Met 6/20/2023  7)   Patient to be IND with Orthotic use, wear and care precautions. Met 6/20/2023  8)   Decrease complaints of pain to  4 out of 10 at worst to increase functional hand use for ADL/work/leisure activities. Met 7/25/23     Plan      Extend POC to 8/29/23 to address remaining deficits with skilled occupational therapy.      Treatment to include: Paraffin, Fluidotherapy, Manual therapy/joint mobilizations, Modalities for pain management, Therapeutic exercises/activities., Iontophoresis with 2.0 cc Dexamethasone, Strengthening, Orthotic Fabrication/Fit/Training, Edema Control, Scar Management, and Joint Protection, as well as any other treatments deemed necessary based on the patient's needs or progress.      Sola Hoff, OT

## 2023-08-17 ENCOUNTER — CLINICAL SUPPORT (OUTPATIENT)
Dept: REHABILITATION | Facility: HOSPITAL | Age: 63
End: 2023-08-17
Payer: COMMERCIAL

## 2023-08-17 DIAGNOSIS — M79.641 RIGHT HAND PAIN: Primary | ICD-10-CM

## 2023-08-17 PROCEDURE — 97110 THERAPEUTIC EXERCISES: CPT | Mod: PO

## 2023-08-17 PROCEDURE — 97018 PARAFFIN BATH THERAPY: CPT | Mod: PO

## 2023-08-17 PROCEDURE — 97140 MANUAL THERAPY 1/> REGIONS: CPT | Mod: PO

## 2023-08-17 NOTE — PROGRESS NOTES
Occupational Therapy Daily Treatment Note/Discharge Note     Date: 8/17/2023  Name: Cristal Gonzalez  Clinic Number: [id number]    Therapy Diagnosis:   Encounter Diagnosis   Name Primary?    Right hand pain Yes       Physician: Anand Geller MD    Medical Diagnosis: S52.571A (ICD-10-CM) - Other closed intra-articular fracture of distal end of right radius, initial encounter  Physician Orders: Start OT for postop rehab. Focusing on motion of the fingers for now. NWB until 6 weeks.  Evaluation Date: 5/8/2023  Plan of Care Certification Date: Extend to 8/29/23  Authorization Period: 12/29/2023  Surgery Date and Procedure: 4/21/2023  Date of Return to MD: 6/22/2023     Visit #: 22 of 30  Time In: 11:17 AM  Time Out: 12:03 PM  Total Billable Time: 46 min     Precautions: Standard    Subjective     Pt reports: I think I can do my exercises at home now    she was compliant with home exercise program given last session.   Response to previous treatment: yoseph well    Pain: 0/10  Location: right wrists      Objective    Pt received the following supervised modalities after being cleared for contradictions for 8 minutes:     -Patient received paraffin to RUE hand(s) for 8 minutes with hand wrapped in composite IP and MP flexion of digits 2-5 with coband to increase blood flow, circulation, and for pain management.  Pt also with LLPS in wrist flexion.    Cristal  participated in dynamic functional therapeutic exercises to improve functional performance while increasing strength, endurance, ROM,  and flexibility for 29 minutes, including:    LLPS in wrist flexion with #3 wrist weight and MH  -#2 dumbbell WF/WE, UD/RD, pro/sup 20x each  Red flexbar pro/sup, WF/WE, UD/RD 20x each  Pink putty presses weighbearing 2;  Ring transfer from thumb to pinky and back - 4 rings    Cristal received the following manual therapy techniques for 9 minutes in order to maximize tissue function and/or decrease pain:    -Soft tissue mob to wrist extensors  -passive range of motion into WF/WE    Edema: Circumferential measurements: In centimeters       5/8/2023 5/8/2023 6/20/23 7/18/23     Left Right Right Right   MPs 14.6  17.3  18.0 18.0   PWC (Proximal Wrist Crease) 17.8  19.0  16.9 16.2         Range of Motion:   Right  Affected     Wrist AROM    WE  WF  UD  RD    5/8/23 17 26 15 4   6/20/23 35 30 15 12   7/18/23 55 35 16 25   7/25/23 55 45 16 25   9/17/23 52 30 15 15         Finger active range of motion 5/8/2023    INDEX  LONG  RING  SMALL    MP  0/45 0/45 0/30 0/24   PIP  0/66 0/67 0/70 0/40   DIP  0/40 0/51 0/47 0/35       Finger active range of motion 6/20/23    INDEX  LONG  RING  SMALL    MP  0/81 0/81 0/65 0/52   PIP  0/85 0/85 0/86 0/94   DIP  0/46 0/60 0/67      0/65       Finger active range of motion 7/18/23    INDEX  LONG  RING  SMALL    MP  0/86 0/87 0/80 0/65   PIP  0/91 0/96 0/95 0/91   DIP  0/59 0/70 0/65      0/79         Finger active range of motion 7/25/23 **After heat**    INDEX  LONG  RING  SMALL    MP  0/92 0/85 0/80 0/80   PIP  0/96 0/95 0/96 0/91   DIP  0/65 0/73 0/70      0/88        Finger active range of motion 7/25/23 **After heat**    INDEX  LONG  RING  SMALL    MP  0/83 0/90 0/78 0/73   PIP  0/95 0/93 0/96 0/95   DIP  0/60 0/74 0/69      0/79        Forearm AROM    PRO  SUP    5/8/23 WFL     10   6/20/23 WFL     44   7/18/23 WFL     65   7/25/23 WFL WFL       Strength: (MAZIN Dynamometer in psi.)        5/8/23 5/8/23 6/20/23 7/18/23 7/25/23 8/17/23     Left Right Right Right Right Right   Rung II 29.3 Deferred 10.6 19.7 25.5 32.6      Pinch Strength (Measured in psi)       5/8/23 5/8/23 6/20/23 7/18/23 7/25/23 8/17/23     Left Right Right Right Right Right   Key Pinch 12  Deferred  9 10 10 11   3pt Pinch 10  Deferred  6 8 8 8   2pt Pinch 8  Deferred  5 9 9 8        Home Exercises and Education Provided     Education provided:  - Discussed insurance limitation  - Progress towards goals  -  Upgraded HEP- strength and stretching    Written Home Exercises Provided: yes.  Exercises were reviewed and Cristal was able to demonstrate them prior to the end of the session.  Cristal demonstrated good  understanding of the HEP provided.   .   See EMR under Patient Instructions for exercises provided  6/13/2023 .     Assessment      While pt remains with stiffness at wrist, pt has gained motion and strength over time with therapy and feels confident in performing stretching and strengthening exercises at home. Reviewed HEP and exercise process with patient with pt demonstrating understanding. Pt safe to d/c from skilled OT services.    Anticipated barriers to occupational therapy: guarded     Pt's spiritual, cultural and educational needs considered and pt agreeable to plan of care and goals.    Goals:    LTG's ( 12+ weeks):  8/29/23  1)   Increase ROM to WFL degrees in hand/wrist/FA to increase functional hand use for self care tasks. Met 8/17/2023  2)   Increase  strength to 40 lbs. to grasp hair dryer. Met 8/17/2023  3)   Increase lateral pinch to 14 psis for opening sauce packets while cooking. Met 8/17/2023  4)   Decrease complaints of pain to  1 out of 10 at worst to increase functional hand use for ADL/work/leisure activities. Met 7/25/23  5)   Patient to score at 45% or less on FOTO to demonstrate improved perception of functional RUE use. Met 8/17/2023  6)   Pt will return to near to prior level of function for ADLs and household management reporting I or Mod I with ADLs (dressing, feeding, grooming, toileting).  Met 8/17/2023     STG's (6 weeks) 6/19/2023  1)   Patient to be IND with HEP and modalities for pain/edema managment. Met 6/20/2023  2)   Increase ROM to 60% degrees in composite fist to increase functional hand use for ADLs/work/leisure activities. Met 6/20/2023  3)   Increase ROM by 30 degrees in WF/WE to increase functional hand use for ADLs/work/leisure activities. Met 7/25/23  4)   Increase  ROM by 40 degrees in FA supination to increase functional hand use for ADLs/work/leisure activities. Met 6/20/2023  5)   Increase  strength to 20 lbs. to improve functional grasp for ADLs/work/leisure activities. Met 7/25/2023  6)   Increase lateral pinch to 6 psi's to increase independence with button and FM Coordination. Met 6/20/2023  7)   Patient to be IND with Orthotic use, wear and care precautions. Met 6/20/2023  8)   Decrease complaints of pain to  4 out of 10 at worst to increase functional hand use for ADL/work/leisure activities. Met 7/25/23     Plan      D/C from skilled OT services.     Jessica White, OT

## 2023-08-29 ENCOUNTER — OFFICE VISIT (OUTPATIENT)
Dept: URGENT CARE | Facility: CLINIC | Age: 63
End: 2023-08-29
Payer: COMMERCIAL

## 2023-08-29 VITALS
RESPIRATION RATE: 16 BRPM | BODY MASS INDEX: 27.79 KG/M2 | HEIGHT: 62 IN | SYSTOLIC BLOOD PRESSURE: 102 MMHG | OXYGEN SATURATION: 98 % | WEIGHT: 151 LBS | TEMPERATURE: 97 F | DIASTOLIC BLOOD PRESSURE: 71 MMHG | HEART RATE: 78 BPM

## 2023-08-29 DIAGNOSIS — N30.91 CYSTITIS WITH HEMATURIA: ICD-10-CM

## 2023-08-29 DIAGNOSIS — R30.0 DYSURIA: Primary | ICD-10-CM

## 2023-08-29 LAB
BILIRUB UR QL STRIP: NEGATIVE
GLUCOSE UR QL STRIP: NEGATIVE
KETONES UR QL STRIP: NEGATIVE
LEUKOCYTE ESTERASE UR QL STRIP: NEGATIVE
PH, POC UA: 5.5 (ref 5–8)
POC BLOOD, URINE: POSITIVE
POC NITRATES, URINE: NEGATIVE
PROT UR QL STRIP: NEGATIVE
SP GR UR STRIP: 1.01 (ref 1–1.03)
UROBILINOGEN UR STRIP-ACNC: NORMAL (ref 0.1–1.1)

## 2023-08-29 PROCEDURE — 99213 PR OFFICE/OUTPT VISIT, EST, LEVL III, 20-29 MIN: ICD-10-PCS | Mod: S$GLB,,, | Performed by: FAMILY MEDICINE

## 2023-08-29 PROCEDURE — 99213 OFFICE O/P EST LOW 20 MIN: CPT | Mod: S$GLB,,, | Performed by: FAMILY MEDICINE

## 2023-08-29 PROCEDURE — 81003 URINALYSIS AUTO W/O SCOPE: CPT | Mod: QW,S$GLB,, | Performed by: FAMILY MEDICINE

## 2023-08-29 PROCEDURE — 81003 POCT URINALYSIS, DIPSTICK, AUTOMATED, W/O SCOPE: ICD-10-PCS | Mod: QW,S$GLB,, | Performed by: FAMILY MEDICINE

## 2023-08-29 RX ORDER — CIPROFLOXACIN 500 MG/1
500 TABLET ORAL EVERY 12 HOURS
Qty: 10 TABLET | Refills: 0 | Status: SHIPPED | OUTPATIENT
Start: 2023-08-29 | End: 2023-09-03

## 2023-08-29 RX ORDER — PHENAZOPYRIDINE HYDROCHLORIDE 100 MG/1
100 TABLET, FILM COATED ORAL 3 TIMES DAILY PRN
Qty: 6 TABLET | Refills: 0 | Status: SHIPPED | OUTPATIENT
Start: 2023-08-29 | End: 2023-08-31

## 2023-08-29 NOTE — PROGRESS NOTES
"Subjective:      Patient ID: Cristal Gonzalez is a 63 y.o. female.    Vitals:  height is 5' 2" (1.575 m) and weight is 68.5 kg (151 lb). Her oral temperature is 97.4 °F (36.3 °C). Her blood pressure is 102/71 and her pulse is 78. Her respiration is 16 and oxygen saturation is 98%.     Chief Complaint: No chief complaint on file.    Patient reports UTI symptoms started 2 days ago.Patient reports that she took left over amoxicillin antibiotics for her symptoms.    Pt is a 62 yo F who presents with dysuria, frequency and urgency for 2 days.  She tried taking a dose of amoxicillin but this did not alleviate her symptoms.  Denies fever, chills, back pain, abdominal pain and nausea    Dysuria   This is a new problem. The current episode started in the past 7 days (2 days). The quality of the pain is described as burning. The pain is at a severity of 1/10. There has been no fever. She is Sexually active. There is No history of pyelonephritis. Associated symptoms include frequency and hematuria. Pertinent negatives include no chills or urgency. She has tried antibiotics (Amoxicillin) for the symptoms. The treatment provided no relief. Her past medical history is significant for kidney stones.       Constitution: Negative for chills and fever.   Genitourinary:  Positive for dysuria, frequency and hematuria. Negative for urgency.   Musculoskeletal:  Negative for back pain and muscle ache.      Objective:     Physical Exam   Constitutional: She is oriented to person, place, and time. She appears well-developed.   HENT:   Head: Normocephalic and atraumatic.   Ears:   Right Ear: External ear normal.   Left Ear: External ear normal.   Nose: Nose normal. No nasal deformity. No epistaxis.   Mouth/Throat: Oropharynx is clear and moist and mucous membranes are normal.   Eyes: Lids are normal.   Neck: Trachea normal and phonation normal. Neck supple.   Cardiovascular: Normal pulses.   Pulmonary/Chest: Effort normal.   Abdominal: " Normal appearance and bowel sounds are normal. She exhibits no distension. Soft. There is no abdominal tenderness. There is no left CVA tenderness and no right CVA tenderness.   Neurological: She is alert and oriented to person, place, and time.   Skin: Skin is warm, dry and intact.   Psychiatric: Her speech is normal and behavior is normal.   Nursing note and vitals reviewed.    Results for orders placed or performed in visit on 08/29/23   POCT Urinalysis, Dipstick, Automated, W/O Scope   Result Value Ref Range    POC Blood, Urine Positive (A) Negative    POC Bilirubin, Urine Negative Negative    POC Urobilinogen, Urine Normal 0.1 - 1.1    POC Ketones, Urine Negative Negative    POC Protein, Urine Negative Negative    POC Nitrates, Urine Negative Negative    POC Glucose, Urine Negative Negative    pH, UA 5.5 5 - 8    POC Specific Gravity, Urine 1.015 1.003 - 1.029    POC Leukocytes, Urine Negative Negative         Assessment:     1. Dysuria    2. Cystitis with hematuria        Plan:       Dysuria  -     POCT Urinalysis, Dipstick, Automated, W/O Scope    Cystitis with hematuria  -     ciprofloxacin HCl (CIPRO) 500 MG tablet; Take 1 tablet (500 mg total) by mouth every 12 (twelve) hours. for 5 days  Dispense: 10 tablet; Refill: 0  -     phenazopyridine (PYRIDIUM) 100 MG tablet; Take 1 tablet (100 mg total) by mouth 3 (three) times daily as needed for Pain.  Dispense: 6 tablet; Refill: 0        Patient Instructions   Drink plenty of fluids  Do not take pyridium for more than 2 days  If no improvement, follow-up with your PCP      You must understand that you have received treatment at an Urgent Care facility only, and that you may be  released before all of your medical problems are known or treated. Urgent Care facilities are not equipped to  handle life threatening emergencies. It is recommended that you seek care at an Emergency Department for  further evaluation of worsening or concerning symptoms, or possibly  life threatening conditions as  discussed.      If you develop chest pain, shortness of breath, throat swelling, tongue swelling, lightheadedness or any other causes for concern, proceed to ER.

## 2023-08-29 NOTE — PATIENT INSTRUCTIONS
Drink plenty of fluids  Do not take pyridium for more than 2 days  If no improvement, follow-up with your PCP      You must understand that you have received treatment at an Urgent Care facility only, and that you may be  released before all of your medical problems are known or treated. Urgent Care facilities are not equipped to  handle life threatening emergencies. It is recommended that you seek care at an Emergency Department for  further evaluation of worsening or concerning symptoms, or possibly life threatening conditions as  discussed.      If you develop chest pain, shortness of breath, throat swelling, tongue swelling, lightheadedness or any other causes for concern, proceed to ER.

## 2023-09-14 ENCOUNTER — OFFICE VISIT (OUTPATIENT)
Dept: DERMATOLOGY | Facility: CLINIC | Age: 63
End: 2023-09-14
Payer: COMMERCIAL

## 2023-09-14 DIAGNOSIS — L82.1 SK (SEBORRHEIC KERATOSIS): ICD-10-CM

## 2023-09-14 DIAGNOSIS — D18.01 CHERRY ANGIOMA: ICD-10-CM

## 2023-09-14 DIAGNOSIS — D22.9 MULTIPLE BENIGN NEVI: ICD-10-CM

## 2023-09-14 DIAGNOSIS — L81.4 LENTIGO: ICD-10-CM

## 2023-09-14 DIAGNOSIS — L57.0 AK (ACTINIC KERATOSIS): Primary | ICD-10-CM

## 2023-09-14 DIAGNOSIS — Z85.820 HISTORY OF MELANOMA: ICD-10-CM

## 2023-09-14 PROCEDURE — 3044F HG A1C LEVEL LT 7.0%: CPT | Mod: CPTII,S$GLB,, | Performed by: DERMATOLOGY

## 2023-09-14 PROCEDURE — 3044F PR MOST RECENT HEMOGLOBIN A1C LEVEL <7.0%: ICD-10-PCS | Mod: CPTII,S$GLB,, | Performed by: DERMATOLOGY

## 2023-09-14 PROCEDURE — 1159F PR MEDICATION LIST DOCUMENTED IN MEDICAL RECORD: ICD-10-PCS | Mod: CPTII,S$GLB,, | Performed by: DERMATOLOGY

## 2023-09-14 PROCEDURE — 17000 PR DESTRUCTION(LASER SURGERY,CRYOSURGERY,CHEMOSURGERY),PREMALIGNANT LESIONS,FIRST LESION: ICD-10-PCS | Mod: S$GLB,,, | Performed by: DERMATOLOGY

## 2023-09-14 PROCEDURE — 1160F RVW MEDS BY RX/DR IN RCRD: CPT | Mod: CPTII,S$GLB,, | Performed by: DERMATOLOGY

## 2023-09-14 PROCEDURE — 1160F PR REVIEW ALL MEDS BY PRESCRIBER/CLIN PHARMACIST DOCUMENTED: ICD-10-PCS | Mod: CPTII,S$GLB,, | Performed by: DERMATOLOGY

## 2023-09-14 PROCEDURE — 99999 PR PBB SHADOW E&M-EST. PATIENT-LVL III: CPT | Mod: PBBFAC,,, | Performed by: DERMATOLOGY

## 2023-09-14 PROCEDURE — 99213 PR OFFICE/OUTPT VISIT, EST, LEVL III, 20-29 MIN: ICD-10-PCS | Mod: 25,S$GLB,, | Performed by: DERMATOLOGY

## 2023-09-14 PROCEDURE — 17003 DESTRUCT PREMALG LES 2-14: CPT | Mod: S$GLB,,, | Performed by: DERMATOLOGY

## 2023-09-14 PROCEDURE — 17003 DESTRUCTION, PREMALIGNANT LESIONS; SECOND THROUGH 14 LESIONS: ICD-10-PCS | Mod: S$GLB,,, | Performed by: DERMATOLOGY

## 2023-09-14 PROCEDURE — 17000 DESTRUCT PREMALG LESION: CPT | Mod: S$GLB,,, | Performed by: DERMATOLOGY

## 2023-09-14 PROCEDURE — 1159F MED LIST DOCD IN RCRD: CPT | Mod: CPTII,S$GLB,, | Performed by: DERMATOLOGY

## 2023-09-14 PROCEDURE — 99999 PR PBB SHADOW E&M-EST. PATIENT-LVL III: ICD-10-PCS | Mod: PBBFAC,,, | Performed by: DERMATOLOGY

## 2023-09-14 PROCEDURE — 99213 OFFICE O/P EST LOW 20 MIN: CPT | Mod: 25,S$GLB,, | Performed by: DERMATOLOGY

## 2023-09-14 NOTE — PROGRESS NOTES
Subjective:      Patient ID:  Cristal Gonzalez is a 63 y.o. female who presents for   Chief Complaint   Patient presents with    Skin Check     TBSE      Date of biopsy: 10/17/2022  Brusett test  not performed  Location: right upper arm  Depth: 0.9 mm  LN: n/a  Date of excision: 11/17/2022 by Dr. Stallworth    Pt has a history of  moderate sun exposure in the past.   Pt recalls several blistering sunburns in the past:  never  Pt has history of tanning bed use: never  Pt has had atypical moles removed in the past: no  Pt has personal history of breast cancer: no  Pt has history of melanoma in first degree relatives:  no  Pt has family history of pancreatic cancer: no  Pt is currently immunosuppressed: no    Andres Type: I    Last dental exam: 10/2022  Last eye exam: 8/2022  Last GYN exam: 8/2022    Patient with new complaint of lesion  Location: nose  Duration: 2 month   Symptoms: scabs   Relieving factors/Previous treatments: none    Patient with new complaint of lesion(s)  Location: [facility]   Duration: 20 yrs   Symptoms: none   Relieving factors/Previous treatments: none   Pt also has lesion on right inner forearm; getting larger. No tx. Interested in possible removal        Review of Systems   Skin:  Positive for daily sunscreen use (face and arms), activity-related sunscreen use and wears hat (beach). Negative for recent sunburn.   Hematologic/Lymphatic: Bruises/bleeds easily (Bruises).       Objective:   Physical Exam   Constitutional: She appears well-developed and well-nourished. No distress.   Neurological: She is alert and oriented to person, place, and time. She is not disoriented.   Psychiatric: She has a normal mood and affect.   Skin:   Areas Examined (abnormalities noted in diagram):   Scalp / Hair Palpated and Inspected  Head / Face Inspection Performed  Neck Inspection Performed  Chest / Axilla Inspection Performed  Abdomen Inspection Performed  Back Inspection Performed  RUE  Inspected  LUE Inspection Performed  Nails and Digits Inspection Performed                 Diagram Legend     Erythematous scaling macule/papule c/w actinic keratosis       Vascular papule c/w angioma      Pigmented verrucoid papule/plaque c/w seborrheic keratosis      Yellow umbilicated papule c/w sebaceous hyperplasia      Irregularly shaped tan macule c/w lentigo     1-2 mm smooth white papules consistent with Milia      Movable subcutaneous cyst with punctum c/w epidermal inclusion cyst      Subcutaneous movable cyst c/w pilar cyst      Firm pink to brown papule c/w dermatofibroma      Pedunculated fleshy papule(s) c/w skin tag(s)      Evenly pigmented macule c/w junctional nevus     Mildly variegated pigmented, slightly irregular-bordered macule c/w mildly atypical nevus      Flesh colored to evenly pigmented papule c/w intradermal nevus       Pink pearly papule/plaque c/w basal cell carcinoma      Erythematous hyperkeratotic cursted plaque c/w SCC      Surgical scar with no sign of skin cancer recurrence      Open and closed comedones      Inflammatory papules and pustules      Verrucoid papule consistent consistent with wart     Erythematous eczematous patches and plaques     Dystrophic onycholytic nail with subungual debris c/w onychomycosis     Umbilicated papule    Erythematous-base heme-crusted tan verrucoid plaque consistent with inflamed seborrheic keratosis     Erythematous Silvery Scaling Plaque c/w Psoriasis     See annotation      Assessment / Plan:        AK (actinic keratosis)  Cryosurgery Procedure Note    Verbal consent from the patient is obtained including, but not limited to, risk of hypopigmentation/hyperpigmentation, scar, recurrence of lesion. The patient is aware of the precancerous quality and need for treatment of these lesions. Liquid nitrogen cryosurgery is applied to the 5 actinic keratoses, as detailed in the physical exam, to produce a freeze injury. The patient is aware that  blisters may form and is instructed on wound care with gentle cleansing and use of vaseline ointment to keep moist until healed. The patient is supplied a handout on cryosurgery and is instructed to call if lesions do not completely resolve.    Lentigo  This is a benign hyperpigmented sun induced lesion. Recommend daily sun protection/avoidance and use of at least SPF 30, broad spectrum sunscreen (OTC drug) will reduce the number of new lesions. Treatment of these benign lesions are considered cosmetic.    SK (seborrheic keratosis)  These are benign inherited growths without a malignant potential. Reassurance given to patient. No treatment is necessary.     Cherry angioma  This is a benign vascular lesion. Reassurance given. No treatment required.     Multiple benign nevi   - minor problem and chronic.   Reassurance given to patient. No treatment necessary.     History of melanoma  Area(s) of previous MM evaluated with no signs of recurrence.    Total body skin examination performed today including at least 12 points as noted in physical examination. No lesions suspicious for malignancy noted.    Recommend daily sun protection/avoidance and use of at least SPF 30, broad spectrum sunscreen (OTC drug).                No follow-ups on file.

## 2023-09-14 NOTE — PATIENT INSTRUCTIONS

## 2023-09-15 DIAGNOSIS — Z12.31 OTHER SCREENING MAMMOGRAM: ICD-10-CM

## 2023-09-25 ENCOUNTER — OFFICE VISIT (OUTPATIENT)
Dept: PRIMARY CARE CLINIC | Facility: CLINIC | Age: 63
End: 2023-09-25
Payer: COMMERCIAL

## 2023-09-25 VITALS
HEIGHT: 62 IN | SYSTOLIC BLOOD PRESSURE: 110 MMHG | HEART RATE: 76 BPM | DIASTOLIC BLOOD PRESSURE: 72 MMHG | OXYGEN SATURATION: 98 % | BODY MASS INDEX: 28.64 KG/M2 | WEIGHT: 155.63 LBS | TEMPERATURE: 98 F

## 2023-09-25 DIAGNOSIS — H10.33 ACUTE BACTERIAL CONJUNCTIVITIS OF BOTH EYES: Primary | ICD-10-CM

## 2023-09-25 PROCEDURE — 3078F PR MOST RECENT DIASTOLIC BLOOD PRESSURE < 80 MM HG: ICD-10-PCS | Mod: CPTII,S$GLB,, | Performed by: FAMILY MEDICINE

## 2023-09-25 PROCEDURE — 3074F PR MOST RECENT SYSTOLIC BLOOD PRESSURE < 130 MM HG: ICD-10-PCS | Mod: CPTII,S$GLB,, | Performed by: FAMILY MEDICINE

## 2023-09-25 PROCEDURE — 3008F PR BODY MASS INDEX (BMI) DOCUMENTED: ICD-10-PCS | Mod: CPTII,S$GLB,, | Performed by: FAMILY MEDICINE

## 2023-09-25 PROCEDURE — 3078F DIAST BP <80 MM HG: CPT | Mod: CPTII,S$GLB,, | Performed by: FAMILY MEDICINE

## 2023-09-25 PROCEDURE — 3044F PR MOST RECENT HEMOGLOBIN A1C LEVEL <7.0%: ICD-10-PCS | Mod: CPTII,S$GLB,, | Performed by: FAMILY MEDICINE

## 2023-09-25 PROCEDURE — 3008F BODY MASS INDEX DOCD: CPT | Mod: CPTII,S$GLB,, | Performed by: FAMILY MEDICINE

## 2023-09-25 PROCEDURE — 99213 PR OFFICE/OUTPT VISIT, EST, LEVL III, 20-29 MIN: ICD-10-PCS | Mod: S$GLB,,, | Performed by: FAMILY MEDICINE

## 2023-09-25 PROCEDURE — 3074F SYST BP LT 130 MM HG: CPT | Mod: CPTII,S$GLB,, | Performed by: FAMILY MEDICINE

## 2023-09-25 PROCEDURE — 3044F HG A1C LEVEL LT 7.0%: CPT | Mod: CPTII,S$GLB,, | Performed by: FAMILY MEDICINE

## 2023-09-25 PROCEDURE — 99999 PR PBB SHADOW E&M-EST. PATIENT-LVL III: CPT | Mod: PBBFAC,,, | Performed by: FAMILY MEDICINE

## 2023-09-25 PROCEDURE — 99999 PR PBB SHADOW E&M-EST. PATIENT-LVL III: ICD-10-PCS | Mod: PBBFAC,,, | Performed by: FAMILY MEDICINE

## 2023-09-25 PROCEDURE — 99213 OFFICE O/P EST LOW 20 MIN: CPT | Mod: S$GLB,,, | Performed by: FAMILY MEDICINE

## 2023-09-25 RX ORDER — ONDANSETRON 4 MG/1
4 TABLET, ORALLY DISINTEGRATING ORAL EVERY 6 HOURS PRN
COMMUNITY
End: 2024-02-15

## 2023-09-25 RX ORDER — TOBRAMYCIN AND DEXAMETHASONE 3; 1 MG/ML; MG/ML
1 SUSPENSION/ DROPS OPHTHALMIC EVERY 6 HOURS
Qty: 5 ML | Refills: 0 | Status: SHIPPED | OUTPATIENT
Start: 2023-09-25 | End: 2023-09-30

## 2023-09-25 NOTE — PROGRESS NOTES
"    /72 (BP Location: Right arm, Patient Position: Sitting, BP Method: Medium (Manual))   Pulse 76   Temp 98.3 °F (36.8 °C) (Oral)   Ht 5' 2" (1.575 m)   Wt 70.6 kg (155 lb 10.3 oz)   SpO2 98%   BMI 28.47 kg/m²       ===========    Chief Complaint: No chief complaint on file.          LATONYA Gonzalez is a 63 y.o. female     here for    Bilateral eye redness, irritation and eye discharge. No vision change. No severe pain but just irritation. She typically wears contact lenses.          Patient queried and denies any further complaints    Patient has MEDICAL HISTORY OF  Patient Active Problem List   Diagnosis    Closed trimalleolar fracture of right ankle    Closed nondisplaced fracture of fifth left metatarsal bone with routine healing    Dorsalgia, unspecified    Closed displaced fracture of greater tuberosity of right humerus    AK (actinic keratosis)    Melanoma of upper arm, right    Closed fracture of right distal radius    Right hand pain    History of melanoma    Non-ulcer dyspepsia       SURGICAL AND MEDICAL HISTORY: updated and reviewed.  Past Surgical History:   Procedure Laterality Date    ANKLE FRACTURE SURGERY      APPENDECTOMY  2011    BREAST BIOPSY      benign    CLOSED REDUCTION OF INJURY OF METACARPAL BONE Right 4/21/2023    Procedure: CLOSED REDUCTION, FRACTURE, METACARPAL BONE - possible CRPP 5th metacarpal base;  Surgeon: Anand Geller MD;  Location: [facility];  Service: Orthopedics;  Laterality: Right;    COLONOSCOPY N/A 06/25/2021    Procedure: COLONOSCOPY;  Surgeon: Nohemy Parra MD;  Location: [facility] (4TH FLR);  Service: Endoscopy;  Laterality: N/A;  Fully vacc Covid-19 - pg    EXCISION OF MELANOMA Right     arm    LYMPH NODE BIOPSY Right 11/17/2022    Procedure: BIOPSY, LYMPH NODE with mapping;  Surgeon: Ben Stalwlorth MD;  Location: [facility] OR 2ND FLR;  Service: General;  Laterality: Right;    OPEN REDUCTION AND INTERNAL FIXATION (ORIF) OF FRACTURE OF " DISTAL RADIUS Right 4/21/2023    Procedure: ORIF, FRACTURE, RADIUS, DISTAL - RIGHT poss bridge plate;  Surgeon: Anand Geller MD;  Location: [facility];  Service: Orthopedics;  Laterality: Right;     ALLERGIES updated and reviewed.  Review of patient's allergies indicates:   Allergen Reactions    Sulfa (sulfonamide antibiotics) Shortness Of Breath, Anxiety and Palpitations    Macrobid [nitrofurantoin monohyd/m-cryst] Nausea Only     Abd pain and nausea       CURRENT OUTPATIENT MEDICATIONS updated and reviewed    Current Outpatient Medications:     docusate sodium (COLACE) 100 MG capsule, Take 1 capsule (100 mg total) by mouth 2 (two) times daily., Disp: 90 capsule, Rfl: 3    fluticasone propionate (FLONASE) 50 mcg/actuation nasal spray, 1 spray (50 mcg total) by Each Nostril route once daily., Disp: 15.8 mL, Rfl: 0    ondansetron (ZOFRAN-ODT) 4 MG TbDL, Take 4 mg by mouth every 6 (six) hours as needed., Disp: , Rfl:     pantoprazole (PROTONIX) 40 MG tablet, Take 1 tablet (40 mg total) by mouth once daily. For 30d, Disp: 90 tablet, Rfl: 3    tobramycin-dexAMETHasone 0.3-0.1% (TOBRADEX) 0.3-0.1 % DrpS, Place 1 drop into both eyes every 6 (six) hours. for 5 days, Disp: 5 mL, Rfl: 0    Review of Systems   Constitutional:  Negative for activity change, appetite change, chills, diaphoresis, fatigue, fever and unexpected weight change.   HENT:  Negative for congestion, ear discharge, ear pain, facial swelling, hearing loss, nosebleeds, postnasal drip, rhinorrhea, sinus pressure, sneezing, sore throat, tinnitus, trouble swallowing and voice change.    Eyes:  Positive for discharge. Negative for photophobia, pain, redness, itching and visual disturbance.   Respiratory:  Negative for cough, chest tightness, shortness of breath and wheezing.    Cardiovascular:  Negative for chest pain, palpitations and leg swelling.   Gastrointestinal:  Negative for abdominal distention, abdominal pain, anal bleeding, blood in stool,  "constipation, diarrhea, nausea, rectal pain and vomiting.   Endocrine: Negative for cold intolerance, heat intolerance, polydipsia, polyphagia and polyuria.   Genitourinary:  Negative for difficulty urinating, dysuria, flank pain, hematuria and menstrual problem.   Musculoskeletal:  Negative for arthralgias, back pain, joint swelling, myalgias and neck pain.   Skin:  Negative for rash.   Neurological:  Negative for dizziness, tremors, seizures, syncope, speech difficulty, weakness, light-headedness, numbness and headaches.   Psychiatric/Behavioral:  Negative for behavioral problems, confusion, decreased concentration, dysphoric mood, sleep disturbance and suicidal ideas. The patient is not nervous/anxious and is not hyperactive.        /72 (BP Location: Right arm, Patient Position: Sitting, BP Method: Medium (Manual))   Pulse 76   Temp 98.3 °F (36.8 °C) (Oral)   Ht 5' 2" (1.575 m)   Wt 70.6 kg (155 lb 10.3 oz)   SpO2 98%   BMI 28.47 kg/m²   Physical Exam  Eyes:      General: Lids are normal. Vision grossly intact. No visual field deficit or scleral icterus.        Right eye: Discharge present. No foreign body or hordeolum.         Left eye: Discharge present.No foreign body or hordeolum.      Conjunctiva/sclera:      Right eye: Right conjunctiva is injected.      Left eye: Left conjunctiva is injected.         ASSESSMENT/PLAN  Diagnoses and all orders for this visit:    Acute bacterial conjunctivitis of both eyes    Other orders  -     tobramycin-dexAMETHasone 0.3-0.1% (TOBRADEX) 0.3-0.1 % DrpS; Place 1 drop into both eyes every 6 (six) hours. for 5 days      Avoid wearing her hard lenses for at least 10 days--preferably 2 weeks.  Follow-up with her optometrist on how to sterilize her hard lenses.  Drops as above.  If no improvement then refer to ophthalmology/optometry      All lab results over past 2 years available reviewed inc labs and any cardiology or radiology studies.  Any new prescription " medications gone over in detail including reason for taking the medication, the general mechanism of action, most common possible side effects and possible costs, etcetera.    Chronic conditions updated. Other than changes or additions as above, cont current medications and maintain follow-up with specialists if indicated.     Steve Morin MD  A dictation device was used to produce this document. Use of such devices sometimes results in grammatical errors or replacement of words that sound similarly.

## 2023-09-26 ENCOUNTER — PROCEDURE VISIT (OUTPATIENT)
Dept: DERMATOLOGY | Facility: CLINIC | Age: 63
End: 2023-09-26
Payer: COMMERCIAL

## 2023-09-26 DIAGNOSIS — D17.1 LIPOMA OF TORSO: Primary | ICD-10-CM

## 2023-09-26 PROCEDURE — 88304 TISSUE EXAM BY PATHOLOGIST: CPT | Performed by: PATHOLOGY

## 2023-09-26 PROCEDURE — 88304 TISSUE EXAM BY PATHOLOGIST: CPT | Mod: 26,,, | Performed by: PATHOLOGY

## 2023-09-26 PROCEDURE — 99499 UNLISTED E&M SERVICE: CPT | Mod: S$GLB,,, | Performed by: DERMATOLOGY

## 2023-09-26 PROCEDURE — 88304 PR  SURG PATH,LEVEL III: ICD-10-PCS | Mod: 26,,, | Performed by: PATHOLOGY

## 2023-09-26 PROCEDURE — 12032 INTMD RPR S/A/T/EXT 2.6-7.5: CPT | Mod: 51,S$GLB,, | Performed by: DERMATOLOGY

## 2023-09-26 PROCEDURE — 11402 PR EXC SKIN BENIG 1.1-2 CM TRUNK,ARM,LEG: ICD-10-PCS | Mod: S$GLB,,, | Performed by: DERMATOLOGY

## 2023-09-26 PROCEDURE — 12032 PR LAYR CLOS WND TRUNK,ARM,LEG 2.6-7.5 CM: ICD-10-PCS | Mod: 51,S$GLB,, | Performed by: DERMATOLOGY

## 2023-09-26 PROCEDURE — 99499 NO LOS: ICD-10-PCS | Mod: S$GLB,,, | Performed by: DERMATOLOGY

## 2023-09-26 PROCEDURE — 11402 EXC TR-EXT B9+MARG 1.1-2 CM: CPT | Mod: S$GLB,,, | Performed by: DERMATOLOGY

## 2023-09-26 NOTE — PATIENT INSTRUCTIONS

## 2023-09-26 NOTE — PROGRESS NOTES
EXCISION- (DERM-EXCISION)  PLAN NOTES:  CONSENT: The purpose of the excision as well as potential risks were explained to the patient and written informed consent was obtained.   INDICATION and LOCATION:  right arm (anticubital fossa), soft tissue/dermal lesion  PRE-OP LESION SIZE: 1.2 cm  The site was prepped with chlorhexidine 4% and draped in a sterile fashion. Local anesthesia was achieved using lidocaine 1% with epinephrine. A fusiform figure was drawn around the lesion with minimal borders of 4 mm and oriented in a manner appropriate to skin tension lines. An incision was then made along the marked lines and carried down to subcutaneous tissue. The lesion was excised along this plane, the 12 o'clock position on the specimen marked with suture and the specimen submitted for histologic examination of the surgical margins. Wound edges were undermined by sharp dissection. Hemostasis was achieved by electrosurgical cautery. Because of wound tension and the size, depth and location of the defect, a layered closure (intermediate repair) was required. This was accomplished with inverted, buried sutures of 4-0 Moncryl. Wound edges were approximated by a running suture of 4-0 Prolene. A sterile dressing was applied. The patient tolerated the procedure well and there were no complications. Blood loss was not significant.  POST-OP SIZE: 3.0 cm   DISCHARGE INSTRUCTIONS: The patient was instructed to keep the pressure bandage in place for 24 hours.  After 24 hours the area can be cleaned with soap and water.  Apply petrolatum-based ointment and bandage daily until suture removal visit.  The patient was instructed to call the clinic if any signs of infection develop, such as enlarging area of redness, swelling, increasing pain or purulent drainage. The patient was given an appointment to return to clinic for suture removal and pathology report.

## 2023-09-29 LAB
FINAL PATHOLOGIC DIAGNOSIS: NORMAL
GROSS: NORMAL
Lab: NORMAL
MICROSCOPIC EXAM: NORMAL

## 2023-10-10 ENCOUNTER — CLINICAL SUPPORT (OUTPATIENT)
Dept: DERMATOLOGY | Facility: CLINIC | Age: 63
End: 2023-10-10
Payer: COMMERCIAL

## 2023-10-10 DIAGNOSIS — Z48.02 VISIT FOR SUTURE REMOVAL: Primary | ICD-10-CM

## 2023-10-10 NOTE — PROGRESS NOTES
Subjective:      Patient ID:  Cristal Gonzalez is a 63 y.o. female who presents for No chief complaint on file.    CC: 63 y.o.female patient is here for suture removal.     HPI: Patient is s/p punch biopsy/excision of lipoma on 09/26/2023.  Patient reports no problems.    WOUND PE:  Sutures intact.  Wound healing well.  Good approximation of skin edges.  No signs or symptoms of infection.    IMPRESSION:  margins are clear     PLAN:  Sutures removed today.  Continue wound care.    RTC: In 6 month(s) for skin check or sooner should any new concerns arise        Review of Systems    Objective:   Physical Exam     Diagram Legend     Erythematous scaling macule/papule c/w actinic keratosis       Vascular papule c/w angioma      Pigmented verrucoid papule/plaque c/w seborrheic keratosis      Yellow umbilicated papule c/w sebaceous hyperplasia      Irregularly shaped tan macule c/w lentigo     1-2 mm smooth white papules consistent with Milia      Movable subcutaneous cyst with punctum c/w epidermal inclusion cyst      Subcutaneous movable cyst c/w pilar cyst      Firm pink to brown papule c/w dermatofibroma      Pedunculated fleshy papule(s) c/w skin tag(s)      Evenly pigmented macule c/w junctional nevus     Mildly variegated pigmented, slightly irregular-bordered macule c/w mildly atypical nevus      Flesh colored to evenly pigmented papule c/w intradermal nevus       Pink pearly papule/plaque c/w basal cell carcinoma      Erythematous hyperkeratotic cursted plaque c/w SCC      Surgical scar with no sign of skin cancer recurrence      Open and closed comedones      Inflammatory papules and pustules      Verrucoid papule consistent consistent with wart     Erythematous eczematous patches and plaques     Dystrophic onycholytic nail with subungual debris c/w onychomycosis     Umbilicated papule    Erythematous-base heme-crusted tan verrucoid plaque consistent with inflamed seborrheic keratosis     Erythematous  Silvery Scaling Plaque c/w Psoriasis     See annotation      Assessment / Plan:        There are no diagnoses linked to this encounter.         No follow-ups on file.

## 2023-10-20 ENCOUNTER — TELEPHONE (OUTPATIENT)
Dept: DERMATOLOGY | Facility: CLINIC | Age: 63
End: 2023-10-20
Payer: COMMERCIAL

## 2023-10-20 NOTE — TELEPHONE ENCOUNTER
I spoke with Mrs. Gonzalez about a message in my in-basket that she has concerns about her stitches.  She informed me she is seeing clear stitches come out from suture site.  I told her those are the dissolvable stiches and they will dissolve and fall off with time.  She sounded reassured.  I suggested reaching back out to us if they are presented more than three weeks.

## 2023-10-31 ENCOUNTER — HOSPITAL ENCOUNTER (OUTPATIENT)
Dept: RADIOLOGY | Facility: HOSPITAL | Age: 63
Discharge: HOME | End: 2023-10-31
Attending: FAMILY MEDICINE
Payer: COMMERCIAL

## 2023-10-31 VITALS — BODY MASS INDEX: 27.44 KG/M2 | WEIGHT: 150 LBS

## 2023-10-31 DIAGNOSIS — Z12.31 OTHER SCREENING MAMMOGRAM: ICD-10-CM

## 2023-10-31 PROCEDURE — 77067 MAMMO DIGITAL SCREENING BILAT WITH TOMO: ICD-10-PCS | Mod: 26,,, | Performed by: RADIOLOGY

## 2023-10-31 PROCEDURE — 77063 MAMMO DIGITAL SCREENING BILAT WITH TOMO: ICD-10-PCS | Mod: 26,,, | Performed by: RADIOLOGY

## 2023-10-31 PROCEDURE — 77067 SCR MAMMO BI INCL CAD: CPT | Mod: 26,,, | Performed by: RADIOLOGY

## 2023-10-31 PROCEDURE — 77063 BREAST TOMOSYNTHESIS BI: CPT | Mod: 26,,, | Performed by: RADIOLOGY

## 2023-10-31 PROCEDURE — 77067 SCR MAMMO BI INCL CAD: CPT | Mod: TC

## 2023-11-10 DIAGNOSIS — R92.8 ABNORMAL FINDING ON BREAST IMAGING: Primary | ICD-10-CM

## 2023-11-13 ENCOUNTER — PATIENT MESSAGE (OUTPATIENT)
Dept: PRIMARY CARE CLINIC | Facility: CLINIC | Age: 63
End: 2023-11-13
Payer: COMMERCIAL

## 2023-11-21 ENCOUNTER — HOSPITAL ENCOUNTER (OUTPATIENT)
Dept: RADIOLOGY | Facility: HOSPITAL | Age: 63
Discharge: HOME | End: 2023-11-21
Attending: FAMILY MEDICINE
Payer: COMMERCIAL

## 2023-11-21 DIAGNOSIS — R92.8 ABNORMAL FINDING ON BREAST IMAGING: ICD-10-CM

## 2023-11-21 PROCEDURE — 77065 DX MAMMO INCL CAD UNI: CPT | Mod: TC,RT

## 2023-11-21 PROCEDURE — 77061 BREAST TOMOSYNTHESIS UNI: CPT | Mod: 26,RT,, | Performed by: RADIOLOGY

## 2023-11-21 PROCEDURE — 77061 MAMMO DIGITAL DIAGNOSTIC RIGHT WITH TOMO: ICD-10-PCS | Mod: 26,RT,, | Performed by: RADIOLOGY

## 2023-11-21 PROCEDURE — 77065 MAMMO DIGITAL DIAGNOSTIC RIGHT WITH TOMO: ICD-10-PCS | Mod: 26,RT,, | Performed by: RADIOLOGY

## 2023-11-21 PROCEDURE — 77065 DX MAMMO INCL CAD UNI: CPT | Mod: 26,RT,, | Performed by: RADIOLOGY

## 2023-11-22 DIAGNOSIS — R92.8 ABNORMAL MAMMOGRAM OF RIGHT BREAST: Primary | ICD-10-CM

## 2023-12-07 ENCOUNTER — OFFICE VISIT (OUTPATIENT)
Dept: DERMATOLOGY | Facility: CLINIC | Age: 63
End: 2023-12-07
Payer: COMMERCIAL

## 2023-12-07 DIAGNOSIS — Z85.820 HISTORY OF MELANOMA: ICD-10-CM

## 2023-12-07 DIAGNOSIS — Z12.83 SCREENING EXAM FOR SKIN CANCER: ICD-10-CM

## 2023-12-07 DIAGNOSIS — D22.9 MULTIPLE BENIGN NEVI: Primary | ICD-10-CM

## 2023-12-07 DIAGNOSIS — L81.4 LENTIGO: ICD-10-CM

## 2023-12-07 DIAGNOSIS — L82.1 SK (SEBORRHEIC KERATOSIS): ICD-10-CM

## 2023-12-07 DIAGNOSIS — B37.2 EROSIO INTERDIGITALIS BLASTOMYCETICA: ICD-10-CM

## 2023-12-07 DIAGNOSIS — D18.01 CHERRY ANGIOMA: ICD-10-CM

## 2023-12-07 DIAGNOSIS — D22.9 NEVUS: ICD-10-CM

## 2023-12-07 DIAGNOSIS — D23.30 FIBROUS PAPULE OF FACE: ICD-10-CM

## 2023-12-07 PROCEDURE — 3044F HG A1C LEVEL LT 7.0%: CPT | Mod: CPTII,S$GLB,, | Performed by: DERMATOLOGY

## 2023-12-07 PROCEDURE — 3044F PR MOST RECENT HEMOGLOBIN A1C LEVEL <7.0%: ICD-10-PCS | Mod: CPTII,S$GLB,, | Performed by: DERMATOLOGY

## 2023-12-07 PROCEDURE — 99213 PR OFFICE/OUTPT VISIT, EST, LEVL III, 20-29 MIN: ICD-10-PCS | Mod: S$GLB,,, | Performed by: DERMATOLOGY

## 2023-12-07 PROCEDURE — 99999 PR PBB SHADOW E&M-EST. PATIENT-LVL III: ICD-10-PCS | Mod: PBBFAC,,, | Performed by: DERMATOLOGY

## 2023-12-07 PROCEDURE — 99999 PR PBB SHADOW E&M-EST. PATIENT-LVL III: CPT | Mod: PBBFAC,,, | Performed by: DERMATOLOGY

## 2023-12-07 PROCEDURE — 99213 OFFICE O/P EST LOW 20 MIN: CPT | Mod: S$GLB,,, | Performed by: DERMATOLOGY

## 2023-12-07 PROCEDURE — 1160F PR REVIEW ALL MEDS BY PRESCRIBER/CLIN PHARMACIST DOCUMENTED: ICD-10-PCS | Mod: CPTII,S$GLB,, | Performed by: DERMATOLOGY

## 2023-12-07 PROCEDURE — 1159F PR MEDICATION LIST DOCUMENTED IN MEDICAL RECORD: ICD-10-PCS | Mod: CPTII,S$GLB,, | Performed by: DERMATOLOGY

## 2023-12-07 PROCEDURE — 1160F RVW MEDS BY RX/DR IN RCRD: CPT | Mod: CPTII,S$GLB,, | Performed by: DERMATOLOGY

## 2023-12-07 PROCEDURE — 1159F MED LIST DOCD IN RCRD: CPT | Mod: CPTII,S$GLB,, | Performed by: DERMATOLOGY

## 2023-12-07 RX ORDER — KETOCONAZOLE 20 MG/G
CREAM TOPICAL
Qty: 60 G | Refills: 3 | Status: SHIPPED | OUTPATIENT
Start: 2023-12-07

## 2023-12-07 NOTE — PROGRESS NOTES
Subjective:      Patient ID:  Cristal Gonzalez is a 63 y.o. female who presents for   Chief Complaint   Patient presents with    Skin Check     TBSE      Pt is a 64 y/o female presenting to the clinic s/p right upper arm bx and skin check.     Date of biopsy: 10/17/2022  Hanna test  not performed  Location: right upper arm  Depth: 0.9 mm  LN: n/a  Date of excision: 11/17/2022 by Dr. Stallworth    Pt has a history of  moderate sun exposure in the past.   Pt recalls several blistering sunburns in the past:  never  Pt has history of tanning bed use: never  Pt has had atypical moles removed in the past: no  Pt has personal history of breast cancer: no  Pt has history of melanoma in first degree relatives:  no  Pt has family history of pancreatic cancer: no  Pt is currently immunosuppressed: no    Andres Type: I    Last dental exam: 10/2022  Last eye exam: 8/2022  Last GYN exam: 8/2022    Patient with new complaint of lesion  Location: nose  Duration: 2 month   Symptoms: scabs   Relieving factors/Previous treatments: none    Patient with new complaint of lesion(s)  Location: R-antecubital   Duration: 20 yrs   Symptoms: none   Relieving factors/Previous treatments: none   Pt also has lesion on right inner forearm; getting larger. No tx. Interested in possible removal      Review of Systems   Skin:  Positive for daily sunscreen use (face and arms), activity-related sunscreen use and wears hat (beach). Negative for recent sunburn.   Hematologic/Lymphatic: Bruises/bleeds easily (Bruises).       Objective:   Physical Exam   Constitutional: She appears well-developed and well-nourished. No distress.   Neurological: She is alert and oriented to person, place, and time. She is not disoriented.   Psychiatric: She has a normal mood and affect.   Skin:   Areas Examined (abnormalities noted in diagram):   Scalp / Hair Palpated and Inspected  Head / Face Inspection Performed  Neck Inspection Performed  Chest / Axilla  Inspection Performed  Abdomen Inspection Performed  Back Inspection Performed  RUE Inspected  LUE Inspection Performed  Nails and Digits Inspection Performed                 Diagram Legend     Erythematous scaling macule/papule c/w actinic keratosis       Vascular papule c/w angioma      Pigmented verrucoid papule/plaque c/w seborrheic keratosis      Yellow umbilicated papule c/w sebaceous hyperplasia      Irregularly shaped tan macule c/w lentigo     1-2 mm smooth white papules consistent with Milia      Movable subcutaneous cyst with punctum c/w epidermal inclusion cyst      Subcutaneous movable cyst c/w pilar cyst      Firm pink to brown papule c/w dermatofibroma      Pedunculated fleshy papule(s) c/w skin tag(s)      Evenly pigmented macule c/w junctional nevus     Mildly variegated pigmented, slightly irregular-bordered macule c/w mildly atypical nevus      Flesh colored to evenly pigmented papule c/w intradermal nevus       Pink pearly papule/plaque c/w basal cell carcinoma      Erythematous hyperkeratotic cursted plaque c/w SCC      Surgical scar with no sign of skin cancer recurrence      Open and closed comedones      Inflammatory papules and pustules      Verrucoid papule consistent consistent with wart     Erythematous eczematous patches and plaques     Dystrophic onycholytic nail with subungual debris c/w onychomycosis     Umbilicated papule    Erythematous-base heme-crusted tan verrucoid plaque consistent with inflamed seborrheic keratosis     Erythematous Silvery Scaling Plaque c/w Psoriasis     See annotation      Assessment / Plan:        Multiple benign nevi  TBSE body skin examination performed today including at least 12 points as noted in physical examination. No lesions suspicious for malignancy noted.  Reassurance provided.  Instructed patient to observe lesion(s) for changes and follow up in clinic if changes are noted. Discussed ABCDE's of moles and brochure provided.    SK (seborrheic  keratosis)  These are benign inherited growths without a malignant potential. Reassurance given to patient. No treatment is necessary.     History of melanoma  Previous scar examined without signs of reoccurrence of malignancy. Continue to monitor.     Cherry angioma  This is a benign vascular lesion. Reassurance given. No treatment required.         Lentigo  This is a benign hyperpigmented sun induced lesion. Recommend daily sun protection/avoidance and use of at least SPF 30, broad spectrum sunscreen (OTC drug) will reduce the number of new lesions. Treatment of these benign lesions are considered cosmetic.    Screening exam for skin cancer  Patient instructed in importance in daily sun protection of at least spf 30. Sun avoidance and topical protection discussed.     Recommend  for daily use on face and neck.    Patient encouraged to wear hat for all outdoor exposure.     Also discussed sun protective clothing. [employer] or [employer] are good brands, UPF 50 or above. Recommend long sleeves when out in the sun.     Fibrous papule of face  This is a benign vascular lesion. Reassurance given. No treatment required.     F/u 1 year              No follow-ups on file.

## 2024-01-16 ENCOUNTER — TELEPHONE (OUTPATIENT)
Dept: DERMATOLOGY | Facility: CLINIC | Age: 64
End: 2024-01-16
Payer: COMMERCIAL

## 2024-01-16 NOTE — TELEPHONE ENCOUNTER
Spoke to patient about scheduling a appt. with Dr Sequeira but patient wanted something sooner. I scheduled her with the acute clinic for next week, she was happy with that arrangement.        ----- Message from Pati Pascual sent at 1/16/2024  8:35 AM CST -----  Regarding: Appt  Contact: pt [phone]  Pt is calling to schedule appt for spot on leg, pt states she is available any day but Wednesday, Please call pt @[phone]

## 2024-01-23 ENCOUNTER — OFFICE VISIT (OUTPATIENT)
Dept: DERMATOLOGY | Facility: CLINIC | Age: 64
End: 2024-01-23
Payer: COMMERCIAL

## 2024-01-23 DIAGNOSIS — L30.9 ECZEMA OF LOWER EXTREMITY: Primary | ICD-10-CM

## 2024-01-23 PROCEDURE — 1160F RVW MEDS BY RX/DR IN RCRD: CPT | Mod: CPTII,S$GLB,, | Performed by: DERMATOLOGY

## 2024-01-23 PROCEDURE — 99213 OFFICE O/P EST LOW 20 MIN: CPT | Mod: S$GLB,,, | Performed by: DERMATOLOGY

## 2024-01-23 PROCEDURE — 1159F MED LIST DOCD IN RCRD: CPT | Mod: CPTII,S$GLB,, | Performed by: DERMATOLOGY

## 2024-01-23 PROCEDURE — 99999 PR PBB SHADOW E&M-EST. PATIENT-LVL II: CPT | Mod: PBBFAC,,, | Performed by: DERMATOLOGY

## 2024-01-23 RX ORDER — CLOBETASOL PROPIONATE 0.5 MG/G
CREAM TOPICAL 2 TIMES DAILY
Qty: 30 G | Refills: 0 | Status: SHIPPED | OUTPATIENT
Start: 2024-01-23 | End: 2024-02-27 | Stop reason: SDUPTHER

## 2024-01-23 NOTE — PROGRESS NOTES
I have seen the patient, reviewed the Resident's progress note. I have personally interviewed and examined the patient at bedside and agree with the findings.     Jennyfer Carney MD  [facility]

## 2024-01-23 NOTE — PROGRESS NOTES
Subjective:      Patient ID:  Cristal Gonzalez is a 63 y.o. female who presents for   Chief Complaint   Patient presents with    Lesion     Mrs Gonzalez has a history of melanoma of right upper arm, and she was recently seen by Dr Coronado for TBSE in Dec 2023. She presents today due to new lesions on her right lower extremity. She noticed a new red bump on her right outer shin about 1 month ago. It grew in size over a few weeks, but is now decreasing in size. It is not pruritic or tender. She reports that within the last several days, she has developed new scaley patches on her right lower leg too. These spots worry her because her melanoma initially looked like a scaley spot. No other skin complaints per patient.       Review of Systems   Skin:  Positive for dry skin. Negative for itching.     Objective:   Physical Exam   Constitutional: She appears well-developed and well-nourished. No distress.   Neurological: She is alert and oriented to person, place, and time. She is not disoriented.   Psychiatric: She has a normal mood and affect.   Skin:   Areas Examined (abnormalities noted in diagram):   RLE Inspected  LLE Inspection Performed           Diagram Legend     Erythematous scaling macule/papule c/w actinic keratosis       Vascular papule c/w angioma      Pigmented verrucoid papule/plaque c/w seborrheic keratosis      Yellow umbilicated papule c/w sebaceous hyperplasia      Irregularly shaped tan macule c/w lentigo     1-2 mm smooth white papules consistent with Milia      Movable subcutaneous cyst with punctum c/w epidermal inclusion cyst      Subcutaneous movable cyst c/w pilar cyst      Firm pink to brown papule c/w dermatofibroma      Pedunculated fleshy papule(s) c/w skin tag(s)      Evenly pigmented macule c/w junctional nevus     Mildly variegated pigmented, slightly irregular-bordered macule c/w mildly atypical nevus      Flesh colored to evenly pigmented papule c/w intradermal nevus       Pink  pearly papule/plaque c/w basal cell carcinoma      Erythematous hyperkeratotic cursted plaque c/w SCC      Surgical scar with no sign of skin cancer recurrence      Open and closed comedones      Inflammatory papules and pustules      Verrucoid papule consistent consistent with wart     Erythematous eczematous patches and plaques     Dystrophic onycholytic nail with subungual debris c/w onychomycosis     Umbilicated papule    Erythematous-base heme-crusted tan verrucoid plaque consistent with inflamed seborrheic keratosis     Erythematous Silvery Scaling Plaque c/w Psoriasis     See annotation            Assessment / Plan:      Mrs Gonzalez is a 63F with history of malignant melanoma (dx 10/17/2022) who presents for new lesions on her lower extremity. Clinically, these lesions are not concerning today, appear consistent with plaques of eczematous dermatitis or an arthropod bite. Discussed option of biopsy with patient. For now, plan to treat with clobetasol 0.05% cream BID for 3-4 weeks and close follow up with patient to ensure resolution of the lesions. If there is no change at the next visit, we will perform biopsy to further investigate and rule out cutaneous neoplasm.    Eczema of lower extremity  -     clobetasoL (TEMOVATE) 0.05 % cream; Apply topically 2 (two) times daily.  Dispense: 30 g; Refill: 0           Follow up in about 4 weeks (around 2/20/2024).

## 2024-02-15 RX ORDER — ONDANSETRON 4 MG/1
TABLET, ORALLY DISINTEGRATING ORAL
Qty: 30 TABLET | Refills: 0 | Status: SHIPPED | OUTPATIENT
Start: 2024-02-15

## 2024-02-15 NOTE — TELEPHONE ENCOUNTER
No care due was identified.  [facility] Embedded Care Due Messages. Reference number: [id number].   2/15/2024 1:06:39 PM CST

## 2024-02-15 NOTE — TELEPHONE ENCOUNTER
Spoke to the patient. NP Neli Up was able to refill her medication. Also, she should be taking her pantoprazole daily as directed not PRN. The patient gave a verbal understanding.

## 2024-02-27 ENCOUNTER — OFFICE VISIT (OUTPATIENT)
Dept: DERMATOLOGY | Facility: CLINIC | Age: 64
End: 2024-02-27
Payer: COMMERCIAL

## 2024-02-27 DIAGNOSIS — L30.9 ECZEMA OF LOWER EXTREMITY: ICD-10-CM

## 2024-02-27 PROCEDURE — 1159F MED LIST DOCD IN RCRD: CPT | Mod: CPTII,S$GLB,, | Performed by: DERMATOLOGY

## 2024-02-27 PROCEDURE — 1160F RVW MEDS BY RX/DR IN RCRD: CPT | Mod: CPTII,S$GLB,, | Performed by: DERMATOLOGY

## 2024-02-27 PROCEDURE — 99999 PR PBB SHADOW E&M-EST. PATIENT-LVL II: CPT | Mod: PBBFAC,,, | Performed by: DERMATOLOGY

## 2024-02-27 PROCEDURE — 99213 OFFICE O/P EST LOW 20 MIN: CPT | Mod: S$GLB,,, | Performed by: DERMATOLOGY

## 2024-02-27 RX ORDER — CLOBETASOL PROPIONATE 0.5 MG/G
CREAM TOPICAL 2 TIMES DAILY
Qty: 30 G | Refills: 0 | Status: SHIPPED | OUTPATIENT
Start: 2024-02-27 | End: 2024-03-28

## 2024-02-27 NOTE — PROGRESS NOTES
Subjective:      Patient ID:  Cristal Gonzalez is a 63 y.o. female with a history of melanoma of the right upper arm who presents to the acute care dermatology clinic for   Chief Complaint   Patient presents with    Follow-up   She was recently seen by Dr Coronado for TBSE in Dec 2023 and is due for TBSE in June 2024.     At last visit 1 month ago, patient reported two new spots on her right lower leg. They were scaley and slightly itchy. She has a history of melanoma that initially looked like a scaley spot, so these were concerning to her. The lesions appeared more eczematous than neoplastic, so she was recommended to use clobetasol 0.05% cream BID to the lesions and follow up.   Today, pt reports she used the cream BID for 2 weeks. The spots have flattened but not resolved. She has noticed a new raised spot on her left lower leg.       Review of Systems   Skin:  Positive for dry skin. Negative for itching and rash.       Objective:   Physical Exam   Skin:            Diagram Legend     Erythematous scaling macule/papule c/w actinic keratosis       Vascular papule c/w angioma      Pigmented verrucoid papule/plaque c/w seborrheic keratosis      Yellow umbilicated papule c/w sebaceous hyperplasia      Irregularly shaped tan macule c/w lentigo     1-2 mm smooth white papules consistent with Milia      Movable subcutaneous cyst with punctum c/w epidermal inclusion cyst      Subcutaneous movable cyst c/w pilar cyst      Firm pink to brown papule c/w dermatofibroma      Pedunculated fleshy papule(s) c/w skin tag(s)      Evenly pigmented macule c/w junctional nevus     Mildly variegated pigmented, slightly irregular-bordered macule c/w mildly atypical nevus      Flesh colored to evenly pigmented papule c/w intradermal nevus       Pink pearly papule/plaque c/w basal cell carcinoma      Erythematous hyperkeratotic cursted plaque c/w SCC      Surgical scar with no sign of skin cancer recurrence      Open and closed  comedones      Inflammatory papules and pustules      Verrucoid papule consistent consistent with wart     Erythematous eczematous patches and plaques     Dystrophic onycholytic nail with subungual debris c/w onychomycosis     Umbilicated papule    Erythematous-base heme-crusted tan verrucoid plaque consistent with inflamed seborrheic keratosis     Erythematous Silvery Scaling Plaque c/w Psoriasis     See annotation      Photo from last visit Jan 2024:      Assessment / Plan:      Mrs Gonzalez is a 63F with history of malignant melanoma (dx 10/17/2022) who presents to the acute care dermatology clinic for follow up of lesions on her right lower extremity, now with a new erythematous scaley papule on the left lower leg. The lesions have improved with topical steroids but not resolved. They are still clinically consistent with eczematous papules or early nummular dermatitis or arthropod bite as opposed to a neoplastic process. Given reassuring exam, we will continue to monitor these areas at follow up visit in acute care derm in 1 month.   - continue clobetasol 0.05% cream BID to raised red areas for 1 month- refilled today   - f/u in 1 month  - f/u for TBSE in June 2024 with Dr Angelika Verma MD  [facility] PGY-II

## 2024-03-26 ENCOUNTER — TELEPHONE (OUTPATIENT)
Dept: DERMATOLOGY | Facility: CLINIC | Age: 64
End: 2024-03-26
Payer: COMMERCIAL

## 2024-03-26 NOTE — TELEPHONE ENCOUNTER
Spoke w/ pt, scheduled rash f/u for April. Pt will keep skin check in June. Pt's condition is improving. Pt thanked me for call.     ----- Message from Elda Mendieta sent at 3/26/2024 11:19 AM CDT -----  Contact: Self [phone]  Would like to receive medical advice.    Would they like a call back or a response via [facility]:  portal    Additional information:  Calling to say that she canceled her appt bc her mother went into the ER. Pt does not  think that she need to reschedule.

## 2024-04-16 ENCOUNTER — OFFICE VISIT (OUTPATIENT)
Dept: URGENT CARE | Facility: CLINIC | Age: 64
End: 2024-04-16
Payer: COMMERCIAL

## 2024-04-16 ENCOUNTER — OFFICE VISIT (OUTPATIENT)
Dept: DERMATOLOGY | Facility: CLINIC | Age: 64
End: 2024-04-16
Payer: COMMERCIAL

## 2024-04-16 VITALS
OXYGEN SATURATION: 97 % | HEART RATE: 80 BPM | TEMPERATURE: 98 F | WEIGHT: 150 LBS | BODY MASS INDEX: 27.6 KG/M2 | RESPIRATION RATE: 14 BRPM | DIASTOLIC BLOOD PRESSURE: 79 MMHG | HEIGHT: 62 IN | SYSTOLIC BLOOD PRESSURE: 137 MMHG

## 2024-04-16 DIAGNOSIS — L73.9 FOLLICULITIS: ICD-10-CM

## 2024-04-16 DIAGNOSIS — L30.9 ECZEMA OF LOWER EXTREMITY: ICD-10-CM

## 2024-04-16 DIAGNOSIS — R30.0 DYSURIA: Primary | ICD-10-CM

## 2024-04-16 DIAGNOSIS — R35.0 URINARY FREQUENCY: ICD-10-CM

## 2024-04-16 DIAGNOSIS — L57.0 AK (ACTINIC KERATOSIS): ICD-10-CM

## 2024-04-16 DIAGNOSIS — D48.5 NEOPLASM OF UNCERTAIN BEHAVIOR OF SKIN: Primary | ICD-10-CM

## 2024-04-16 PROCEDURE — 99213 OFFICE O/P EST LOW 20 MIN: CPT | Mod: S$GLB,,, | Performed by: FAMILY MEDICINE

## 2024-04-16 PROCEDURE — 17000 DESTRUCT PREMALG LESION: CPT | Mod: XS,S$GLB,, | Performed by: DERMATOLOGY

## 2024-04-16 PROCEDURE — 88305 TISSUE EXAM BY PATHOLOGIST: CPT | Mod: 26,,, | Performed by: DERMATOLOGY

## 2024-04-16 PROCEDURE — 99999 PR PBB SHADOW E&M-EST. PATIENT-LVL II: CPT | Mod: PBBFAC,,, | Performed by: DERMATOLOGY

## 2024-04-16 PROCEDURE — 87086 URINE CULTURE/COLONY COUNT: CPT | Performed by: FAMILY MEDICINE

## 2024-04-16 PROCEDURE — 99214 OFFICE O/P EST MOD 30 MIN: CPT | Mod: 25,S$GLB,, | Performed by: DERMATOLOGY

## 2024-04-16 PROCEDURE — 88305 TISSUE EXAM BY PATHOLOGIST: CPT | Performed by: DERMATOLOGY

## 2024-04-16 PROCEDURE — 81003 URINALYSIS AUTO W/O SCOPE: CPT | Mod: QW,S$GLB,, | Performed by: FAMILY MEDICINE

## 2024-04-16 PROCEDURE — 11102 TANGNTL BX SKIN SINGLE LES: CPT | Mod: S$GLB,,, | Performed by: DERMATOLOGY

## 2024-04-16 RX ORDER — MUPIROCIN 20 MG/G
OINTMENT TOPICAL 3 TIMES DAILY
Qty: 30 G | Refills: 1 | Status: SHIPPED | OUTPATIENT
Start: 2024-04-16

## 2024-04-16 NOTE — PROGRESS NOTES
Subjective:      Patient ID:  Cristal Gonzalez is a 63 y.o. female who presents for   Chief Complaint   Patient presents with    Rash    Lesion     Mrs Gonzalez is a 63F with history of malignant melanoma (dx 10/17/2022) who presents to the acute care dermatology clinic for 4 week follow-up an eczematous rash on the lower legs. Patient last seen 2/27/2024 during which the lesions were noted to have improved with topical steroids. Patient was continued on clobetasol 0.05% cream BID.    Today, patient notes the rash/spots on her lower legs have significantly improved. She is using the clobetasol just to the affected areas/spots once to twice daily. Most of the spots have resolved. She is applying cetaphil cream to her legs daily. She does not use any scented lotions.    She is concerned about two new spots: one on her right chest that popped up about a month ago - she has been applying the clobetasol to it and it has gone down in size a little. She is also worried about a spot on her right inner thigh/near her knee that popped up two days ago, has pus inside of it and is now healing.           Review of Systems   Constitutional: Negative.  Negative for fever and chills.   Skin:  Positive for dry skin. Negative for itching and rash.       Objective:   Physical Exam   Constitutional: She appears well-developed and well-nourished. No distress.   Neurological: She is alert and oriented to person, place, and time. She is not disoriented.   Psychiatric: She has a normal mood and affect.   Skin:   Areas Examined (abnormalities noted in diagram):   Head / Face Inspection Performed  Chest / Axilla Inspection Performed  RLE Inspected  LLE Inspection Performed                 Diagram Legend     Erythematous scaling macule/papule c/w actinic keratosis       Vascular papule c/w angioma      Pigmented verrucoid papule/plaque c/w seborrheic keratosis      Yellow umbilicated papule c/w sebaceous hyperplasia      Irregularly  shaped tan macule c/w lentigo     1-2 mm smooth white papules consistent with Milia      Movable subcutaneous cyst with punctum c/w epidermal inclusion cyst      Subcutaneous movable cyst c/w pilar cyst      Firm pink to brown papule c/w dermatofibroma      Pedunculated fleshy papule(s) c/w skin tag(s)      Evenly pigmented macule c/w junctional nevus     Mildly variegated pigmented, slightly irregular-bordered macule c/w mildly atypical nevus      Flesh colored to evenly pigmented papule c/w intradermal nevus       Pink pearly papule/plaque c/w basal cell carcinoma      Erythematous hyperkeratotic cursted plaque c/w SCC      Surgical scar with no sign of skin cancer recurrence      Open and closed comedones      Inflammatory papules and pustules      Verrucoid papule consistent consistent with wart     Erythematous eczematous patches and plaques     Dystrophic onycholytic nail with subungual debris c/w onychomycosis     Umbilicated papule    Erythematous-base heme-crusted tan verrucoid plaque consistent with inflamed seborrheic keratosis     Erythematous Silvery Scaling Plaque c/w Psoriasis     See annotation                                2/27/2024:              Assessment / Plan:      Pathology Orders:       Normal Orders This Visit    Specimen to Pathology, Dermatology     Questions:    Procedure Type: Dermatology and skin neoplasms    Number of Specimens: 1    ------------------------: -------------------------    Spec 1 Procedure: Biopsy    Spec 1 Clinical Impression: Ddx BCC vs sebaceous hyperplasia    Spec 1 Source: Right chest    Clinical Information: 63 y.o. F w/ hx malignant melanoma (dx 10/2022) who presents with pearly telangiectatic papule on right chest present for about a month.    Release to patient: Jose Bee was seen today for rash and lesion.    Diagnoses and all orders for this visit:    Neoplasm of uncertain behavior of skin  0.5 x 0.5 cm erythematous pearly telangiectatic papule  on right chest. Present for about a month. Hx of melanoma. Ddx BCC vs sebaceous hyperplasia.  Plan:  - shave biopsy performed; see procedure note below; will call patient with results and plan  -     Specimen to Pathology, Dermatology    Shave biopsy procedure note:    Shave biopsy performed after verbal consent including risk of infection, scar, recurrence, need for additional treatment of site. Area prepped with alcohol, anesthetized with approximately 1.0cc of 1% lidocaine with epinephrine. Lesional tissue shaved with razor blade. Hemostasis achieved with application of aluminum chloride. No complications. Dressing applied. Wound care explained.    Eczema of lower extremity  Significantly improved - mostly resolved.   Plan:  - continue clobetasol PRN for red, itchy, scaly spots  - discussed importance of daily moisturizer for prevention of new spots    Folliculitis  New spot on right inner thigh - photo above. Discussed likely folliculitis lesions. Notes it has pus in it and she scratched it. Discussed likely diagnosis of folliculitis. Discussed avoiding shaving or using more gentle shaving products to prevent new folliculitis lesions.   - start mupirocin ointment TID to pustules until clear  -     mupirocin (BACTROBAN) 2 % ointment; Apply topically 3 (three) times daily. To pustules or pus-filled lesions.    AK (actinic keratosis)  Noted on the right nasal bridge.  - treated with cryodestruction as below  - f/u FBSE with Dr. Carney in June 2024    Cryosurgery Procedure Note    Verbal consent from the patient is obtained including, but not limited to, risk of hypopigmentation/hyperpigmentation, scar, recurrence of lesion. The patient is aware of the precancerous quality and need for treatment of these lesions. Liquid nitrogen cryosurgery is applied to the 1 actinic keratoses, as detailed in the physical exam, to produce a freeze injury. The patient is aware that blisters may form and is instructed on wound care  with gentle cleansing and use of vaseline ointment to keep moist until healed. The patient is supplied a handout on cryosurgery and is instructed to call if lesions do not completely resolve.             No follow-ups on file.

## 2024-04-16 NOTE — PATIENT INSTRUCTIONS
Drink plenty of water  Reduce bladder irritants such as coffee and black tea  Follow-up with your PCP  Will call with culture results    You must understand that you have received treatment at an Urgent Care facility only, and that you may be  released before all of your medical problems are known or treated. Urgent Care facilities are not equipped to  handle life threatening emergencies. It is recommended that you seek care at an Emergency Department for  further evaluation of worsening or concerning symptoms, or possibly life threatening conditions as  discussed.      If you develop chest pain, shortness of breath, throat swelling, tongue swelling, lightheadedness or any other causes for concern, proceed to ER.

## 2024-04-16 NOTE — PROGRESS NOTES
"Subjective:      Patient ID: Cristal Gonzalez is a 63 y.o. female.    Vitals:  height is 5' 2" (1.575 m) and weight is 68 kg (150 lb). Her tympanic temperature is 97.9 °F (36.6 °C). Her blood pressure is 137/79 and her pulse is 80. Her respiration is 14 and oxygen saturation is 97%.     Chief Complaint: Dysuria    Pressure when urinating x 1 week ago  Frequent urination x 1 week, denies blood in urine  Just  a little of burning when urinating, pt did not take any otc for this problem  Pt states that macrobid gives her diarrhea and prefers not to get that med.    Pt is a 62 yo F who presents with urinary urgency and frequency.  She also reports slight burning at times.. symptoms have been present for a week.  She denies fever, chills, body aches, nausea or vomiting    Dysuria   This is a new problem. The current episode started 1 to 4 weeks ago. The problem occurs intermittently. The problem has been waxing and waning. The quality of the pain is described as burning and aching. The pain is at a severity of 0/10. The patient is experiencing no pain. There has been no fever. She is Sexually active. There is No history of pyelonephritis. Associated symptoms include frequency and urgency. She has tried nothing for the symptoms. The treatment provided no relief.       Genitourinary:  Positive for dysuria, frequency and urgency.      Objective:     Physical Exam   Constitutional: She is oriented to person, place, and time. She appears well-developed.   HENT:   Head: Normocephalic and atraumatic.   Ears:   Right Ear: External ear normal.   Left Ear: External ear normal.   Nose: Nose normal. No nasal deformity. No epistaxis.   Mouth/Throat: Oropharynx is clear and moist and mucous membranes are normal.   Eyes: Lids are normal.   Neck: Trachea normal and phonation normal. Neck supple.   Cardiovascular: Normal pulses.   Pulmonary/Chest: Effort normal.   Abdominal: Normal appearance and bowel sounds are normal. She " exhibits no distension. Soft. There is no abdominal tenderness. There is no left CVA tenderness and no right CVA tenderness.   Neurological: She is alert and oriented to person, place, and time.   Skin: Skin is warm, dry and intact.   Psychiatric: Her speech is normal and behavior is normal.   Nursing note and vitals reviewed.    Results for orders placed or performed in visit on 04/16/24   POCT Urinalysis, Dipstick, Automated, W/O Scope   Result Value Ref Range    POC Blood, Urine Negative Negative    POC Bilirubin, Urine Negative Negative    POC Urobilinogen, Urine norm 0.1 - 1.1    POC Ketones, Urine Negative Negative    POC Protein, Urine Negative Negative    POC Nitrates, Urine Negative Negative    POC Glucose, Urine Negative Negative    pH, UA 6.0 5 - 8    POC Specific Gravity, Urine 1.025 1.003 - 1.029    POC Leukocytes, Urine Negative Negative         Assessment:     1. Dysuria    2. Urinary frequency        Plan:       Dysuria  -     POCT Urinalysis, Dipstick, Automated, W/O Scope  -     Urine culture    Urinary frequency  -     Urine culture        If patient's urine culture is positive and sensitivity shows that bacteria is sensitive to cipro, will send in cipro.  Urine culture sent in since patient has a history of UTI's and concerned because she is going out of town.          Patient Instructions   Drink plenty of water  Reduce bladder irritants such as coffee and black tea  Follow-up with your PCP  Will call with culture results    You must understand that you have received treatment at an Urgent Care facility only, and that you may be  released before all of your medical problems are known or treated. Urgent Care facilities are not equipped to  handle life threatening emergencies. It is recommended that you seek care at an Emergency Department for  further evaluation of worsening or concerning symptoms, or possibly life threatening conditions as  discussed.      If you develop chest pain, shortness of  breath, throat swelling, tongue swelling, lightheadedness or any other causes for concern, proceed to ER.

## 2024-04-18 LAB — BACTERIA UR CULT: NO GROWTH

## 2024-04-22 LAB
FINAL PATHOLOGIC DIAGNOSIS: NORMAL
GROSS: NORMAL
Lab: NORMAL
MICROSCOPIC EXAM: NORMAL

## 2024-04-23 NOTE — PROGRESS NOTES
1. Skin,  Right chest,  shave biopsy:   - BASAL CELL CARCINOMA, NODULAR TYPE   - The tumor involves the deep tissue edge.  The peripheral tissue edge is uninvolved by tumor.    Please notify patient of results and schedule for excision.

## 2024-05-03 ENCOUNTER — TELEPHONE (OUTPATIENT)
Dept: DERMATOLOGY | Facility: CLINIC | Age: 64
End: 2024-05-03
Payer: COMMERCIAL

## 2024-05-03 NOTE — TELEPHONE ENCOUNTER
Returned call, nothing avail at this time other than 5/15.     ----- Message from Bibi Coates sent at 5/3/2024 12:47 PM CDT -----  Sooner Apoointment Request    Caller is requesting a sooner appointment.  Caller has first available appointment listed below.  Caller will accept being placed on the waitlist and is requesting a message be sent to doctor.  Name of Caller:CARA DURAN [[id number]]  When is the first available appointment?5/24  Symptoms:Procedure  Would the patient rather a call back or a response via [facility]? CALL  Best Call Back Number:.Telephone Information:  Mobile          [phone]      Additional Information: Pt requesting sooner appt if possible, Pt not available on Wednesday. Please advise thank you

## 2024-05-24 ENCOUNTER — PROCEDURE VISIT (OUTPATIENT)
Dept: DERMATOLOGY | Facility: CLINIC | Age: 64
End: 2024-05-24
Payer: COMMERCIAL

## 2024-05-24 DIAGNOSIS — C44.91 BASAL CELL CARCINOMA (BCC), UNSPECIFIED SITE: Primary | ICD-10-CM

## 2024-05-24 PROCEDURE — 88305 TISSUE EXAM BY PATHOLOGIST: CPT | Mod: 26,,, | Performed by: PATHOLOGY

## 2024-05-24 PROCEDURE — 11104 PUNCH BX SKIN SINGLE LESION: CPT | Mod: S$GLB,,, | Performed by: DERMATOLOGY

## 2024-05-24 PROCEDURE — 88305 TISSUE EXAM BY PATHOLOGIST: CPT | Performed by: PATHOLOGY

## 2024-05-24 PROCEDURE — 99499 UNLISTED E&M SERVICE: CPT | Mod: S$GLB,,, | Performed by: DERMATOLOGY

## 2024-05-24 NOTE — PROGRESS NOTES
Final Pathologic Diagnosis   Date Value Ref Range Status   04/16/2024   Final    1. Skin,  Right chest,  shave biopsy:   - BASAL CELL CARCINOMA, NODULAR TYPE   - The tumor involves the deep tissue edge.  The peripheral tissue edge is uninvolved by tumor.    This lesion is skin cancer. You will be contacted regarding treatment.         Comment:     Interp By Bita Rogers MD., Signed on 04/22/2024 at 16:48       Here for definitive removal.  8 mm punch excision performed.    Punch biopsy procedure note:  Punch biopsy performed after verbal consent obtained. Area marked and prepped with alcohol. Approximately 1cc of 1% lidocaine with epinephrine injected. 8 mm disposable punch used to remove lesion. Hemostasis obtained and biopsy site closed with 2 dissolving Monocryl sutures. Wound care instructions reviewed with patient and handout given.    All sutures dissolving; mastisol and steri strips applied.

## 2024-05-28 LAB
FINAL PATHOLOGIC DIAGNOSIS: NORMAL
GROSS: NORMAL
Lab: NORMAL
MICROSCOPIC EXAM: NORMAL

## 2024-05-28 NOTE — PROGRESS NOTES
Skin, right chest, punch re-biopsy:  -SCAR (POST-SURGICAL) AND INCIDENTAL ACTINIC CHANGES  -NEGATIVE FOR RESIDUAL / RECURRENT BASAL CELL CARCINOMA

## 2024-06-24 ENCOUNTER — OFFICE VISIT (OUTPATIENT)
Dept: DERMATOLOGY | Facility: CLINIC | Age: 64
End: 2024-06-24
Payer: COMMERCIAL

## 2024-06-24 DIAGNOSIS — L57.0 AK (ACTINIC KERATOSIS): ICD-10-CM

## 2024-06-24 DIAGNOSIS — L30.9 ECZEMA OF LOWER EXTREMITY: ICD-10-CM

## 2024-06-24 DIAGNOSIS — D18.01 CHERRY ANGIOMA: ICD-10-CM

## 2024-06-24 DIAGNOSIS — L81.4 LENTIGO: ICD-10-CM

## 2024-06-24 DIAGNOSIS — Z85.820 HISTORY OF MELANOMA: ICD-10-CM

## 2024-06-24 DIAGNOSIS — Z12.83 SKIN CANCER SCREENING: Primary | ICD-10-CM

## 2024-06-24 PROCEDURE — 99213 OFFICE O/P EST LOW 20 MIN: CPT | Mod: 25,S$GLB,, | Performed by: DERMATOLOGY

## 2024-06-24 PROCEDURE — 1160F RVW MEDS BY RX/DR IN RCRD: CPT | Mod: CPTII,S$GLB,, | Performed by: DERMATOLOGY

## 2024-06-24 PROCEDURE — 1159F MED LIST DOCD IN RCRD: CPT | Mod: CPTII,S$GLB,, | Performed by: DERMATOLOGY

## 2024-06-24 PROCEDURE — 17000 DESTRUCT PREMALG LESION: CPT | Mod: S$GLB,,, | Performed by: DERMATOLOGY

## 2024-06-24 PROCEDURE — 99999 PR PBB SHADOW E&M-EST. PATIENT-LVL II: CPT | Mod: PBBFAC,,, | Performed by: DERMATOLOGY

## 2024-06-24 NOTE — PROGRESS NOTES
Subjective:      Patient ID:  Cristal Gonzalez is a 64 y.o. female who presents for   Chief Complaint   Patient presents with    Skin Check     HPI  History of Present Illness: The patient presents for follow up of skin check.  The patient was last seen on: 4-16-24 for bx to right chest which returned as BCC, removed with punch excision, healing well.    Pt has hx of malignant melanoma  Date of biopsy: 10/17/2022  [place] test  not performed  Location: right upper arm  Depth: 0.9 mm  LN: n/a  Date of excision: 11/17/2022 by Dr. Stallworth    This is a high risk patient here to check for the development of new lesions.   The patient denies any moles or growths of the skin that are rapidly growing, hurting, itching, bleeding, or changing colors.      Review of Systems   Skin:  Positive for daily sunscreen use, activity-related sunscreen use and wears hat. Negative for recent sunburn.   Hematologic/Lymphatic: Bruises/bleeds easily.       Objective:   Physical Exam   Constitutional: She appears well-developed and well-nourished. No distress.   Musculoskeletal: Lymphadenopathy:      Cervical: No cervical adenopathy.      Upper Body:      Right upper body: No axillary adenopathy.      Left upper body: No axillary adenopathy.      Lower Body: No right inguinal adenopathy. No left inguinal adenopathy.     Lymphadenopathy:     She has no cervical adenopathy. No inguinal adenopathy noted on the right or left side.   Neurological: She is alert and oriented to person, place, and time. She is not disoriented.   Psychiatric: She has a normal mood and affect.   Skin:   Areas Examined (abnormalities noted in diagram):   Scalp / Hair Palpated and Inspected  Head / Face Inspection Performed  Neck Inspection Performed  Chest / Axilla Inspection Performed  Abdomen Inspection Performed  Genitals / Buttocks / Groin Inspection Performed  Back Inspection Performed  RUE Inspected  LUE Inspection Performed  RLE Inspected  LLE Inspection  Performed  Nails and Digits Inspection Performed  Gland Inspection Performed            Diagram Legend     Erythematous scaling macule/papule c/w actinic keratosis       Vascular papule c/w angioma      Pigmented verrucoid papule/plaque c/w seborrheic keratosis      Yellow umbilicated papule c/w sebaceous hyperplasia      Irregularly shaped tan macule c/w lentigo     1-2 mm smooth white papules consistent with Milia      Movable subcutaneous cyst with punctum c/w epidermal inclusion cyst      Subcutaneous movable cyst c/w pilar cyst      Firm pink to brown papule c/w dermatofibroma      Pedunculated fleshy papule(s) c/w skin tag(s)      Evenly pigmented macule c/w junctional nevus     Mildly variegated pigmented, slightly irregular-bordered macule c/w mildly atypical nevus      Flesh colored to evenly pigmented papule c/w intradermal nevus       Pink pearly papule/plaque c/w basal cell carcinoma      Erythematous hyperkeratotic cursted plaque c/w SCC      Surgical scar with no sign of skin cancer recurrence      Open and closed comedones      Inflammatory papules and pustules      Verrucoid papule consistent consistent with wart     Erythematous eczematous patches and plaques     Dystrophic onycholytic nail with subungual debris c/w onychomycosis     Umbilicated papule    Erythematous-base heme-crusted tan verrucoid plaque consistent with inflamed seborrheic keratosis     Erythematous Silvery Scaling Plaque c/w Psoriasis     See annotation      Assessment / Plan:        Skin cancer screening  Total body skin examination performed today including at least 12 points as noted in physical examination. No lesions suspicious for malignancy noted.    Recommend daily sun protection/avoidance, use of at least SPF 30, broad spectrum sunscreen (OTC drug), skin self examinations, and routine physician surveillance to optimize early detection     AK (actinic keratosis)  Cryosurgery Procedure Note    Verbal consent from the  patient is obtained including, but not limited to, risk of hypopigmentation/hyperpigmentation, scar, recurrence of lesion. The patient is aware of the precancerous quality and need for treatment of these lesions. Liquid nitrogen cryosurgery is applied to the 1 actinic keratoses, as detailed in the physical exam, to produce a freeze injury. The patient is aware that blisters may form and is instructed on wound care with gentle cleansing and use of vaseline ointment to keep moist until healed. The patient is supplied a handout on cryosurgery and is instructed to call if lesions do not completely resolve.     Eczema of lower extremity  Reassurance given to patient. No treatment is necessary.   Treatment of benign, asymptomatic lesions may be considered cosmetic.     Cherry angioma  This is a benign vascular lesion. Reassurance given. No treatment required.      Lentigo  This is a benign hyperpigmented sun induced lesion. Recommend daily sun protection/avoidance and use of at least SPF 30, broad spectrum sunscreen (OTC drug) will reduce the number of new lesions. Treatment of these benign lesions are considered cosmetic.     Recommended strivectin for skin laxity on neck OTC    Hx of MM  Q6 mo skin exams  No evidence recurrence nor LAD             Follow up in about 6 months (around 12/24/2024).

## 2024-06-24 NOTE — PATIENT INSTRUCTIONS

## 2024-06-25 ENCOUNTER — HOSPITAL ENCOUNTER (OUTPATIENT)
Dept: RADIOLOGY | Facility: HOSPITAL | Age: 64
Discharge: HOME | End: 2024-06-25
Attending: NURSE PRACTITIONER
Payer: COMMERCIAL

## 2024-06-25 DIAGNOSIS — R92.8 ABNORMAL MAMMOGRAM OF RIGHT BREAST: ICD-10-CM

## 2024-06-25 DIAGNOSIS — R92.8 ABNORMAL MAMMOGRAM OF RIGHT BREAST: Primary | ICD-10-CM

## 2024-06-25 PROCEDURE — 77061 BREAST TOMOSYNTHESIS UNI: CPT | Mod: TC,RT

## 2024-06-25 PROCEDURE — 77061 BREAST TOMOSYNTHESIS UNI: CPT | Mod: 26,RT,, | Performed by: RADIOLOGY

## 2024-06-25 PROCEDURE — 77065 DX MAMMO INCL CAD UNI: CPT | Mod: 26,RT,, | Performed by: RADIOLOGY

## 2024-08-12 ENCOUNTER — OFFICE VISIT (OUTPATIENT)
Dept: PRIMARY CARE CLINIC | Facility: CLINIC | Age: 64
End: 2024-08-12
Payer: COMMERCIAL

## 2024-08-12 ENCOUNTER — TELEPHONE (OUTPATIENT)
Dept: PRIMARY CARE CLINIC | Facility: CLINIC | Age: 64
End: 2024-08-12
Payer: COMMERCIAL

## 2024-08-12 VITALS
HEIGHT: 62 IN | RESPIRATION RATE: 18 BRPM | SYSTOLIC BLOOD PRESSURE: 112 MMHG | OXYGEN SATURATION: 97 % | HEART RATE: 42 BPM | DIASTOLIC BLOOD PRESSURE: 72 MMHG | BODY MASS INDEX: 28.12 KG/M2 | WEIGHT: 152.81 LBS

## 2024-08-12 DIAGNOSIS — L30.9 DERMATITIS: ICD-10-CM

## 2024-08-12 DIAGNOSIS — R11.0 NAUSEA: ICD-10-CM

## 2024-08-12 DIAGNOSIS — K21.9 GASTROESOPHAGEAL REFLUX DISEASE, UNSPECIFIED WHETHER ESOPHAGITIS PRESENT: ICD-10-CM

## 2024-08-12 DIAGNOSIS — Z00.00 WELL ADULT EXAM: Primary | ICD-10-CM

## 2024-08-12 DIAGNOSIS — R00.1 SINUS BRADYCARDIA: ICD-10-CM

## 2024-08-12 DIAGNOSIS — Z85.820 HISTORY OF MELANOMA: ICD-10-CM

## 2024-08-12 PROCEDURE — 3008F BODY MASS INDEX DOCD: CPT | Mod: CPTII,S$GLB,, | Performed by: FAMILY MEDICINE

## 2024-08-12 PROCEDURE — 1159F MED LIST DOCD IN RCRD: CPT | Mod: CPTII,S$GLB,, | Performed by: FAMILY MEDICINE

## 2024-08-12 PROCEDURE — 1160F RVW MEDS BY RX/DR IN RCRD: CPT | Mod: CPTII,S$GLB,, | Performed by: FAMILY MEDICINE

## 2024-08-12 PROCEDURE — 3078F DIAST BP <80 MM HG: CPT | Mod: CPTII,S$GLB,, | Performed by: FAMILY MEDICINE

## 2024-08-12 PROCEDURE — 93010 ELECTROCARDIOGRAM REPORT: CPT | Mod: S$GLB,,, | Performed by: INTERNAL MEDICINE

## 2024-08-12 PROCEDURE — 93005 ELECTROCARDIOGRAM TRACING: CPT | Mod: S$GLB,,, | Performed by: FAMILY MEDICINE

## 2024-08-12 PROCEDURE — 99999 PR PBB SHADOW E&M-EST. PATIENT-LVL V: CPT | Mod: PBBFAC,,, | Performed by: FAMILY MEDICINE

## 2024-08-12 PROCEDURE — 3074F SYST BP LT 130 MM HG: CPT | Mod: CPTII,S$GLB,, | Performed by: FAMILY MEDICINE

## 2024-08-12 PROCEDURE — 99396 PREV VISIT EST AGE 40-64: CPT | Mod: S$GLB,,, | Performed by: FAMILY MEDICINE

## 2024-08-12 RX ORDER — PROGESTERONE 200 MG/1
CAPSULE ORAL
COMMUNITY
Start: 2024-05-07

## 2024-08-12 RX ORDER — ONDANSETRON 8 MG/1
8 TABLET, ORALLY DISINTEGRATING ORAL
COMMUNITY

## 2024-08-12 RX ORDER — TRIAMCINOLONE ACETONIDE 1 MG/G
CREAM TOPICAL 2 TIMES DAILY
Qty: 80 G | Refills: 5 | Status: SHIPPED | OUTPATIENT
Start: 2024-08-12

## 2024-08-12 RX ORDER — ESTRADIOL AND NORETHINDRONE ACETATE .5; .1 MG/1; MG/1
TABLET ORAL
COMMUNITY
Start: 2024-05-07 | End: 2024-08-12

## 2024-08-12 RX ORDER — NITROFURANTOIN 25; 75 MG/1; MG/1
100 CAPSULE ORAL 2 TIMES DAILY
COMMUNITY
Start: 2024-03-07 | End: 2024-08-12

## 2024-08-12 RX ORDER — ONDANSETRON 8 MG/1
8 TABLET, ORALLY DISINTEGRATING ORAL EVERY 6 HOURS PRN
Qty: 30 TABLET | Refills: 0 | Status: SHIPPED | OUTPATIENT
Start: 2024-08-12

## 2024-08-12 RX ORDER — TRIAMCINOLONE ACETONIDE 1 MG/G
0.1 CREAM TOPICAL
COMMUNITY

## 2024-08-12 RX ORDER — PANTOPRAZOLE SODIUM 40 MG/1
40 TABLET, DELAYED RELEASE ORAL DAILY
Qty: 90 TABLET | Refills: 3 | Status: SHIPPED | OUTPATIENT
Start: 2024-08-12 | End: 2025-08-12

## 2024-08-12 RX ORDER — ESTRADIOL 0.5 MG/1
TABLET ORAL
COMMUNITY
Start: 2024-03-07

## 2024-08-12 RX ORDER — PANTOPRAZOLE SODIUM 40 MG/1
40 TABLET, DELAYED RELEASE ORAL DAILY
Qty: 90 TABLET | Refills: 3 | Status: SHIPPED | OUTPATIENT
Start: 2024-08-12 | End: 2024-08-12 | Stop reason: SDUPTHER

## 2024-08-12 NOTE — PROGRESS NOTES
"      /72 (BP Location: Left arm, Patient Position: Sitting, BP Method: Medium (Manual))   Pulse (!) 42   Resp 18   Ht 5' 2" (1.575 m)   Wt 69.3 kg (152 lb 13.2 oz)   SpO2 97%   BMI 27.95 kg/m²       ===========              Cristal Gonzalez is a 64 y.o. female     here for    Annual exam.    Health maintenance reviewed with patient in detail inc any recent labs and studies and needs for future screening labs.  Age-appropriate vaccines and other age-appropriate screening studies reviewed with patient in detail.  Sleep health reviewed with patient.  Skin health regarding possible skin cancer screening reviewed with patient.  General regularity of bowel movements and urinations reviewed with patient including any possibility of urine leakage.  Vision screening reviewed with patient.      Patient queried and denies any further complaints      Patient Active Problem List   Diagnosis    Closed trimalleolar fracture of right ankle    Closed nondisplaced fracture of fifth left metatarsal bone with routine healing    Dorsalgia, unspecified    Closed displaced fracture of greater tuberosity of right humerus    AK (actinic keratosis)    Melanoma of upper arm, right    Closed fracture of right distal radius    Right hand pain    History of melanoma    Non-ulcer dyspepsia    Gastroesophageal reflux disease    Nausea    Dermatitis       SURGICAL AND MEDICAL HISTORY: updated and reviewed.  Past Surgical History:   Procedure Laterality Date    ANKLE FRACTURE SURGERY      APPENDECTOMY  2011    BREAST BIOPSY      benign    CLOSED REDUCTION OF INJURY OF METACARPAL BONE Right 4/21/2023    Procedure: CLOSED REDUCTION, FRACTURE, METACARPAL BONE - possible CRPP 5th metacarpal base;  Surgeon: Anand Geller MD;  Location: [facility];  Service: Orthopedics;  Laterality: Right;    COLONOSCOPY N/A 06/25/2021    Procedure: COLONOSCOPY;  Surgeon: Nohemy Parra MD;  Location: [facility] ([address] [place] [address]);  Service: Endoscopy;  " Laterality: N/A;  Fully vacc Covid-19 - pg    EXCISION OF MELANOMA Right     arm    LYMPH NODE BIOPSY Right 11/17/2022    Procedure: BIOPSY, LYMPH NODE with mapping;  Surgeon: Ben Stallworth MD;  Location: [facility] OR [address] [place] [address];  Service: General;  Laterality: Right;    OPEN REDUCTION AND INTERNAL FIXATION (ORIF) OF FRACTURE OF DISTAL RADIUS Right 4/21/2023    Procedure: ORIF, FRACTURE, RADIUS, DISTAL - RIGHT poss bridge plate;  Surgeon: Anand Geller MD;  Location: [facility];  Service: Orthopedics;  Laterality: Right;     ALLERGIES updated and reviewed.  Review of patient's allergies indicates:   Allergen Reactions    Sulfa (sulfonamide antibiotics) Shortness Of Breath, Anxiety and Palpitations    Macrobid [nitrofurantoin monohyd/m-cryst] Nausea Only     Abd pain and nausea       CURRENT OUTPATIENT MEDICATIONS updated and reviewed    Current Outpatient Medications:     estradioL (ESTRACE) 0.5 MG tablet, Take by mouth., Disp: , Rfl:     ondansetron (ZOFRAN-ODT) 8 MG TbDL, Take 8 mg by mouth as needed., Disp: , Rfl:     progesterone (PROMETRIUM) 200 MG capsule, , Disp: , Rfl:     triamcinolone acetonide 0.1% (KENALOG) 0.1 % cream, Apply 0.1 g topically as needed., Disp: , Rfl:     mupirocin (BACTROBAN) 2 % ointment, Apply topically 3 (three) times daily. To pustules or pus-filled lesions. (Patient not taking: Reported on 8/12/2024), Disp: 30 g, Rfl: 1    ondansetron (ZOFRAN-ODT) 8 MG TbDL, Take 1 tablet (8 mg total) by mouth every 6 (six) hours as needed (nausea)., Disp: 30 tablet, Rfl: 0    pantoprazole (PROTONIX) 40 MG tablet, Take 1 tablet (40 mg total) by mouth once daily., Disp: 90 tablet, Rfl: 3    triamcinolone acetonide 0.1% (KENALOG) 0.1 % cream, Apply topically 2 (two) times daily. As needed, Disp: 80 g, Rfl: 5    Review of Systems   Constitutional:  Negative for activity change, appetite change, chills, diaphoresis, fatigue, fever and unexpected weight change.   HENT:  Negative for congestion, ear  "discharge, ear pain, facial swelling, hearing loss, nosebleeds, postnasal drip, rhinorrhea, sinus pressure, sneezing, sore throat, tinnitus, trouble swallowing and voice change.    Eyes:  Negative for photophobia, pain, discharge, redness, itching and visual disturbance.   Respiratory:  Negative for cough, chest tightness, shortness of breath and wheezing.    Cardiovascular:  Negative for chest pain, palpitations and leg swelling.   Gastrointestinal:  Negative for abdominal distention, abdominal pain, anal bleeding, blood in stool, constipation, diarrhea, nausea, rectal pain and vomiting.   Endocrine: Negative for cold intolerance, heat intolerance, polydipsia, polyphagia and polyuria.   Genitourinary:  Negative for difficulty urinating, dysuria, flank pain, hematuria and menstrual problem.   Musculoskeletal:  Negative for arthralgias, back pain, joint swelling, myalgias and neck pain.   Skin:  Negative for rash.   Neurological:  Negative for dizziness, tremors, seizures, syncope, speech difficulty, weakness, light-headedness, numbness and headaches.   Psychiatric/Behavioral:  Negative for behavioral problems, confusion, decreased concentration, dysphoric mood, sleep disturbance and suicidal ideas. The patient is not nervous/anxious and is not hyperactive.        /72 (BP Location: Left arm, Patient Position: Sitting, BP Method: Medium (Manual))   Pulse (!) 42   Resp 18   Ht 5' 2" (1.575 m)   Wt 69.3 kg (152 lb 13.2 oz)   SpO2 97%   BMI 27.95 kg/m²   Physical Exam  Vitals and nursing note reviewed.   Constitutional:       General: She is not in acute distress.     Appearance: Normal appearance. She is well-developed. She is not ill-appearing or toxic-appearing.   HENT:      Head: Normocephalic and atraumatic.      Right Ear: Tympanic membrane, ear canal and external ear normal.      Left Ear: Tympanic membrane, ear canal and external ear normal.      Nose: Nose normal.      Mouth/Throat:      Lips: Pink. "      Mouth: Mucous membranes are moist.      Pharynx: No oropharyngeal exudate or posterior oropharyngeal erythema.   Eyes:      General: No scleral icterus.        Right eye: No discharge.         Left eye: No discharge.      Extraocular Movements: Extraocular movements intact.      Conjunctiva/sclera: Conjunctivae normal.   Cardiovascular:      Rate and Rhythm: Normal rate and regular rhythm.      Pulses: Normal pulses.      Heart sounds: Normal heart sounds. No murmur heard.  Pulmonary:      Effort: Pulmonary effort is normal. No respiratory distress.      Breath sounds: Normal breath sounds. No wheezing or rales.   Abdominal:      General: Bowel sounds are normal. There is no distension.      Palpations: Abdomen is soft. There is no mass.      Tenderness: There is no abdominal tenderness. There is no right CVA tenderness, left CVA tenderness, guarding or rebound.      Hernia: No hernia is present.   Musculoskeletal:      Cervical back: Normal range of motion and neck supple. No rigidity or tenderness.   Lymphadenopathy:      Cervical: No cervical adenopathy.   Skin:     General: Skin is warm and dry.   Neurological:      General: No focal deficit present.      Mental Status: She is alert. Mental status is at baseline.   Psychiatric:         Mood and Affect: Mood normal.         Behavior: Behavior normal. Behavior is cooperative.         ASSESSMENT/PLAN    Cristal was seen today for annual exam.    Diagnoses and all orders for this visit:    Well adult exam  -     CBC Without Differential; Future  -     Comprehensive Metabolic Panel; Future  -     Lipid Panel; Future  -     TSH; Future  -     Hemoglobin A1C; Future    History of melanoma    Sinus bradycardia  -     SCHEDULED EKG 12-LEAD (to Muse); Future  -     Ambulatory referral/consult to Cardiology; Future  Incidental finding today.  Rate initially 37 via MA reading via pulse ox.  Via my manual count over 30 seconds, heart rate for 1 minute was 42.  EKG showed  rate some 57 and 60.  Asymptomatic.  Check EKG.  Check labs.  Monitor.    Nausea.  Episodic.  None now.  Prn Zofran.  Monitor     Dermatitis     Nonspecific.  No issues now.  She has had some triamcinolone she is kept on hand.  Will refill     -     triamcinolone acetonide 0.1% (KENALOG) 0.1 % cream; Apply topically 2 (two) times daily. As needed      GERD.  GERD hygiene and continue Protonix 40 mg daily.  Monitor.      Most recent some lab results reviewed with patient.  Any new prescription medications gone over in detail including reason for taking the medication, most common possible side effects and possible costs, etcetera.    Chronic conditions updated. Other than changes or additions as above, cont current medications and maintain follow-up with specialists if indicated.     Steve Morin MD  A dictation device was used to produce this document. Use of such devices sometimes results in grammatical errors or replacement of words that sound similarly.

## 2024-08-12 NOTE — TELEPHONE ENCOUNTER
----- Message from Cleo Ayala sent at 8/12/2024  2:26 PM CDT -----  Pt is scheduled to see Cardiology on 9/6/2024 with Dr. Beck Morales due to Dr. Moreno not having anything available since November. Pt is wanting to know if it is ok for her to wait until September to see Cardiology.    Thanks

## 2024-08-13 ENCOUNTER — LAB VISIT (OUTPATIENT)
Dept: LAB | Facility: HOSPITAL | Age: 64
End: 2024-08-13
Attending: FAMILY MEDICINE
Payer: COMMERCIAL

## 2024-08-13 ENCOUNTER — PATIENT MESSAGE (OUTPATIENT)
Dept: PRIMARY CARE CLINIC | Facility: CLINIC | Age: 64
End: 2024-08-13
Payer: COMMERCIAL

## 2024-08-13 DIAGNOSIS — Z00.00 WELL ADULT EXAM: ICD-10-CM

## 2024-08-13 DIAGNOSIS — R94.31 ABNORMAL EKG: Primary | ICD-10-CM

## 2024-08-13 PROBLEM — R11.0 NAUSEA: Status: ACTIVE | Noted: 2024-08-13

## 2024-08-13 PROBLEM — L30.9 DERMATITIS: Status: ACTIVE | Noted: 2024-08-13

## 2024-08-13 PROBLEM — K21.9 GASTROESOPHAGEAL REFLUX DISEASE: Status: ACTIVE | Noted: 2024-08-13

## 2024-08-13 LAB
ALBUMIN SERPL BCP-MCNC: 3.9 G/DL (ref 3.5–5.2)
ALP SERPL-CCNC: 43 U/L (ref 55–135)
ALT SERPL W/O P-5'-P-CCNC: 13 U/L (ref 10–44)
ANION GAP SERPL CALC-SCNC: 9 MMOL/L (ref 8–16)
AST SERPL-CCNC: 17 U/L (ref 10–40)
BILIRUB SERPL-MCNC: 0.6 MG/DL (ref 0.1–1)
BUN SERPL-MCNC: 19 MG/DL (ref 8–23)
CALCIUM SERPL-MCNC: 9.2 MG/DL (ref 8.7–10.5)
CHLORIDE SERPL-SCNC: 107 MMOL/L (ref 95–110)
CHOLEST SERPL-MCNC: 161 MG/DL (ref 120–199)
CHOLEST/HDLC SERPL: 1.8 {RATIO} (ref 2–5)
CO2 SERPL-SCNC: 23 MMOL/L (ref 23–29)
CREAT SERPL-MCNC: 0.8 MG/DL (ref 0.5–1.4)
ERYTHROCYTE [DISTWIDTH] IN BLOOD BY AUTOMATED COUNT: 12.5 % (ref 11.5–14.5)
EST. GFR  (NO RACE VARIABLE): >60 ML/MIN/1.73 M^2
ESTIMATED AVG GLUCOSE: 111 MG/DL (ref 68–131)
GLUCOSE SERPL-MCNC: 107 MG/DL (ref 70–110)
HBA1C MFR BLD: 5.5 % (ref 4–5.6)
HCT VFR BLD AUTO: 43.7 % (ref 37–48.5)
HDLC SERPL-MCNC: 88 MG/DL (ref 40–75)
HDLC SERPL: 54.7 % (ref 20–50)
HGB BLD-MCNC: 14.3 G/DL (ref 12–16)
LDLC SERPL CALC-MCNC: 66.8 MG/DL (ref 63–159)
MCH RBC QN AUTO: 32.1 PG (ref 27–31)
MCHC RBC AUTO-ENTMCNC: 32.7 G/DL (ref 32–36)
MCV RBC AUTO: 98 FL (ref 82–98)
NONHDLC SERPL-MCNC: 73 MG/DL
OHS QRS DURATION: 74 MS
OHS QTC CALCULATION: 428 MS
PLATELET # BLD AUTO: 382 K/UL (ref 150–450)
PMV BLD AUTO: 10.3 FL (ref 9.2–12.9)
POTASSIUM SERPL-SCNC: 4.7 MMOL/L (ref 3.5–5.1)
PROT SERPL-MCNC: 7 G/DL (ref 6–8.4)
RBC # BLD AUTO: 4.46 M/UL (ref 4–5.4)
SODIUM SERPL-SCNC: 139 MMOL/L (ref 136–145)
TRIGL SERPL-MCNC: 31 MG/DL (ref 30–150)
TSH SERPL DL<=0.005 MIU/L-ACNC: 1.98 UIU/ML (ref 0.4–4)
WBC # BLD AUTO: 6.42 K/UL (ref 3.9–12.7)

## 2024-08-13 PROCEDURE — 80053 COMPREHEN METABOLIC PANEL: CPT | Performed by: FAMILY MEDICINE

## 2024-08-13 PROCEDURE — 36415 COLL VENOUS BLD VENIPUNCTURE: CPT | Mod: PN | Performed by: FAMILY MEDICINE

## 2024-08-13 PROCEDURE — 84443 ASSAY THYROID STIM HORMONE: CPT | Performed by: FAMILY MEDICINE

## 2024-08-13 PROCEDURE — 83036 HEMOGLOBIN GLYCOSYLATED A1C: CPT | Performed by: FAMILY MEDICINE

## 2024-08-13 PROCEDURE — 85027 COMPLETE CBC AUTOMATED: CPT | Performed by: FAMILY MEDICINE

## 2024-08-13 PROCEDURE — 80061 LIPID PANEL: CPT | Performed by: FAMILY MEDICINE

## 2024-08-19 ENCOUNTER — OFFICE VISIT (OUTPATIENT)
Dept: PRIMARY CARE CLINIC | Facility: CLINIC | Age: 64
End: 2024-08-19
Payer: COMMERCIAL

## 2024-08-19 VITALS
SYSTOLIC BLOOD PRESSURE: 124 MMHG | BODY MASS INDEX: 27.92 KG/M2 | OXYGEN SATURATION: 98 % | DIASTOLIC BLOOD PRESSURE: 74 MMHG | TEMPERATURE: 99 F | RESPIRATION RATE: 18 BRPM | HEART RATE: 89 BPM | HEIGHT: 62 IN | WEIGHT: 151.69 LBS

## 2024-08-19 DIAGNOSIS — J20.8 ACUTE BACTERIAL BRONCHITIS: Primary | ICD-10-CM

## 2024-08-19 DIAGNOSIS — B96.89 ACUTE BACTERIAL BRONCHITIS: Primary | ICD-10-CM

## 2024-08-19 DIAGNOSIS — R50.9 FEVER, UNSPECIFIED FEVER CAUSE: ICD-10-CM

## 2024-08-19 LAB
CTP QC/QA: YES
CTP QC/QA: YES
POC MOLECULAR INFLUENZA A AGN: NEGATIVE
POC MOLECULAR INFLUENZA B AGN: NEGATIVE
SARS-COV-2 RDRP RESP QL NAA+PROBE: NEGATIVE

## 2024-08-19 PROCEDURE — 3078F DIAST BP <80 MM HG: CPT | Mod: CPTII,S$GLB,, | Performed by: FAMILY MEDICINE

## 2024-08-19 PROCEDURE — 3008F BODY MASS INDEX DOCD: CPT | Mod: CPTII,S$GLB,, | Performed by: FAMILY MEDICINE

## 2024-08-19 PROCEDURE — 1160F RVW MEDS BY RX/DR IN RCRD: CPT | Mod: CPTII,S$GLB,, | Performed by: FAMILY MEDICINE

## 2024-08-19 PROCEDURE — 87635 SARS-COV-2 COVID-19 AMP PRB: CPT | Mod: QW,S$GLB,, | Performed by: FAMILY MEDICINE

## 2024-08-19 PROCEDURE — 99999 PR PBB SHADOW E&M-EST. PATIENT-LVL V: CPT | Mod: PBBFAC,,, | Performed by: FAMILY MEDICINE

## 2024-08-19 PROCEDURE — 3044F HG A1C LEVEL LT 7.0%: CPT | Mod: CPTII,S$GLB,, | Performed by: FAMILY MEDICINE

## 2024-08-19 PROCEDURE — 99214 OFFICE O/P EST MOD 30 MIN: CPT | Mod: S$GLB,,, | Performed by: FAMILY MEDICINE

## 2024-08-19 PROCEDURE — 3074F SYST BP LT 130 MM HG: CPT | Mod: CPTII,S$GLB,, | Performed by: FAMILY MEDICINE

## 2024-08-19 PROCEDURE — 87502 INFLUENZA DNA AMP PROBE: CPT | Mod: QW,S$GLB,, | Performed by: FAMILY MEDICINE

## 2024-08-19 PROCEDURE — 1159F MED LIST DOCD IN RCRD: CPT | Mod: CPTII,S$GLB,, | Performed by: FAMILY MEDICINE

## 2024-08-19 RX ORDER — ALBUTEROL SULFATE 90 UG/1
2 INHALANT RESPIRATORY (INHALATION) EVERY 4 HOURS PRN
Qty: 18 G | Refills: 1 | Status: SHIPPED | OUTPATIENT
Start: 2024-08-19 | End: 2025-08-19

## 2024-08-19 RX ORDER — PROMETHAZINE HYDROCHLORIDE AND DEXTROMETHORPHAN HYDROBROMIDE 6.25; 15 MG/5ML; MG/5ML
5 SYRUP ORAL NIGHTLY PRN
Qty: 118 ML | Refills: 0 | Status: SHIPPED | OUTPATIENT
Start: 2024-08-19

## 2024-08-19 RX ORDER — AZITHROMYCIN 250 MG/1
TABLET, FILM COATED ORAL
Qty: 6 TABLET | Refills: 0 | Status: SHIPPED | OUTPATIENT
Start: 2024-08-19 | End: 2024-08-24

## 2024-08-19 NOTE — PATIENT INSTRUCTIONS
Hydrate, rest, Mucinex Expectorant (or Robitussin liquid) as directed for thinning out the mucus; or MucinexDM if with significant cough and mucus.  Zyrtec, Xyzal, Allegra or Claritin. (Would lean towards Allegra which may be a bit more effective than claritin and will not cause sedation as Xyzal or Zyrtec may.)  Nasal saline spray such as [employer] brand as needed.  Flonase or Nasonex nasal spray after the nasal saline.  Warm salt water gargles and warm liquids may help soothe a sore throat better than cool liquids.  Tylenol as directed as needed for fever, body aches, headaches.   All are OTC  Most of all, stay well-hydrated.

## 2024-08-19 NOTE — PROGRESS NOTES
"      /74 (BP Location: Right arm, Patient Position: Sitting, BP Method: Medium (Manual))   Pulse 89   Temp 99.1 °F (37.3 °C)   Resp 18   Ht 5' 2" (1.575 m)   Wt 68.8 kg (151 lb 10.8 oz)   SpO2 98%   BMI 27.74 kg/m²       ===========              Cristal Gonzalez is a 64 y.o. female     here for    Cough that is often productive as well as some nausea occasionally without emesis and some loose bowel movements.    Symptoms started yesterday.    Covid test at home was negative.          Patient queried and denies any further complaints      Patient Active Problem List   Diagnosis    Closed trimalleolar fracture of right ankle    Closed nondisplaced fracture of fifth left metatarsal bone with routine healing    Dorsalgia, unspecified    Closed displaced fracture of greater tuberosity of right humerus    AK (actinic keratosis)    Melanoma of upper arm, right    Closed fracture of right distal radius    Right hand pain    History of melanoma    Non-ulcer dyspepsia    Gastroesophageal reflux disease    Nausea    Dermatitis       SURGICAL AND MEDICAL HISTORY: updated and reviewed.  Past Surgical History:   Procedure Laterality Date    ANKLE FRACTURE SURGERY      APPENDECTOMY  2011    BREAST BIOPSY      benign    CLOSED REDUCTION OF INJURY OF METACARPAL BONE Right 4/21/2023    Procedure: CLOSED REDUCTION, FRACTURE, METACARPAL BONE - possible CRPP 5th metacarpal base;  Surgeon: Anand Geller MD;  Location: [facility];  Service: Orthopedics;  Laterality: Right;    COLONOSCOPY N/A 06/25/2021    Procedure: COLONOSCOPY;  Surgeon: Nohemy Parra MD;  Location: [facility] (4TH FLR);  Service: Endoscopy;  Laterality: N/A;  Fully vacc Covid-19 - pg    EXCISION OF MELANOMA Right     arm    LYMPH NODE BIOPSY Right 11/17/2022    Procedure: BIOPSY, LYMPH NODE with mapping;  Surgeon: Ben Stallworth MD;  Location: [facility] OR 2ND FLR;  Service: General;  Laterality: Right;    OPEN REDUCTION AND INTERNAL FIXATION " (ORIF) OF FRACTURE OF DISTAL RADIUS Right 4/21/2023    Procedure: ORIF, FRACTURE, RADIUS, DISTAL - RIGHT poss bridge plate;  Surgeon: Anand Geller MD;  Location: [facility];  Service: Orthopedics;  Laterality: Right;     ALLERGIES updated and reviewed.  Review of patient's allergies indicates:   Allergen Reactions    Sulfa (sulfonamide antibiotics) Shortness Of Breath, Anxiety and Palpitations    Macrobid [nitrofurantoin monohyd/m-cryst] Nausea Only     Abd pain and nausea       CURRENT OUTPATIENT MEDICATIONS updated and reviewed    Current Outpatient Medications:     estradioL (ESTRACE) 0.5 MG tablet, Take by mouth., Disp: , Rfl:     ondansetron (ZOFRAN-ODT) 8 MG TbDL, Take 1 tablet (8 mg total) by mouth every 6 (six) hours as needed (nausea)., Disp: 30 tablet, Rfl: 0    pantoprazole (PROTONIX) 40 MG tablet, Take 1 tablet (40 mg total) by mouth once daily., Disp: 90 tablet, Rfl: 3    progesterone (PROMETRIUM) 200 MG capsule, , Disp: , Rfl:     triamcinolone acetonide 0.1% (KENALOG) 0.1 % cream, Apply 0.1 g topically as needed., Disp: , Rfl:     triamcinolone acetonide 0.1% (KENALOG) 0.1 % cream, Apply topically 2 (two) times daily. As needed, Disp: 80 g, Rfl: 5    albuterol (PROVENTIL/VENTOLIN HFA) 90 mcg/actuation inhaler, Inhale 2 puffs into the lungs every 4 (four) hours as needed for Wheezing or Shortness of Breath., Disp: 18 g, Rfl: 1    azithromycin (Z-KAYLEE) 250 MG tablet, Take 2 tablets by mouth on day 1; Take 1 tablet by mouth on days 2-5, Disp: 6 tablet, Rfl: 0    mupirocin (BACTROBAN) 2 % ointment, Apply topically 3 (three) times daily. To pustules or pus-filled lesions. (Patient not taking: Reported on 8/12/2024), Disp: 30 g, Rfl: 1    ondansetron (ZOFRAN-ODT) 8 MG TbDL, Take 8 mg by mouth as needed. (Patient not taking: Reported on 8/19/2024), Disp: , Rfl:     promethazine-dextromethorphan (PROMETHAZINE-DM) 6.25-15 mg/5 mL Syrp, Take 5 mLs by mouth nightly as needed (cough)., Disp: 118 mL, Rfl:  "0    Review of Systems   Constitutional:  Positive for fatigue. Negative for activity change, appetite change, chills, diaphoresis, fever and unexpected weight change.   HENT:  Positive for congestion. Negative for ear discharge, ear pain, facial swelling, hearing loss, nosebleeds, postnasal drip, rhinorrhea, sinus pressure, sneezing, sore throat, tinnitus, trouble swallowing and voice change.    Eyes:  Negative for photophobia, pain, discharge, redness, itching and visual disturbance.   Respiratory:  Positive for cough. Negative for chest tightness, shortness of breath and wheezing.    Cardiovascular:  Negative for chest pain, palpitations and leg swelling.   Gastrointestinal:  Positive for diarrhea. Negative for abdominal distention, abdominal pain, anal bleeding, blood in stool, constipation, nausea, rectal pain and vomiting.   Endocrine: Negative for cold intolerance, heat intolerance, polydipsia, polyphagia and polyuria.   Genitourinary:  Negative for difficulty urinating, dysuria and flank pain.   Musculoskeletal:  Negative for arthralgias, back pain, joint swelling, myalgias and neck pain.   Skin:  Negative for rash.   Allergic/Immunologic: Negative for environmental allergies.   Neurological:  Negative for dizziness, tremors, seizures, syncope, speech difficulty, weakness, light-headedness, numbness and headaches.   Psychiatric/Behavioral:  Negative for behavioral problems, confusion, decreased concentration, dysphoric mood, sleep disturbance and suicidal ideas. The patient is not nervous/anxious and is not hyperactive.        /74 (BP Location: Right arm, Patient Position: Sitting, BP Method: Medium (Manual))   Pulse 89   Temp 99.1 °F (37.3 °C)   Resp 18   Ht 5' 2" (1.575 m)   Wt 68.8 kg (151 lb 10.8 oz)   SpO2 98%   BMI 27.74 kg/m²   Physical Exam  Vitals and nursing note reviewed.   Constitutional:       General: She is not in acute distress.     Appearance: Normal appearance. She is " well-developed. She is not ill-appearing or toxic-appearing.   HENT:      Head: Normocephalic and atraumatic.      Right Ear: Tympanic membrane, ear canal and external ear normal.      Left Ear: Tympanic membrane, ear canal and external ear normal.      Nose: Nose normal.      Mouth/Throat:      Lips: Pink.      Mouth: Mucous membranes are moist.      Pharynx: No oropharyngeal exudate or posterior oropharyngeal erythema.   Eyes:      General: No scleral icterus.        Right eye: No discharge.         Left eye: No discharge.      Extraocular Movements: Extraocular movements intact.      Conjunctiva/sclera: Conjunctivae normal.   Cardiovascular:      Rate and Rhythm: Normal rate and regular rhythm.      Pulses: Normal pulses.      Heart sounds: Normal heart sounds. No murmur heard.  Pulmonary:      Effort: Pulmonary effort is normal. No respiratory distress.      Breath sounds: Normal breath sounds. No wheezing or rales.   Abdominal:      General: Bowel sounds are normal. There is no distension.      Palpations: Abdomen is soft. There is no mass.      Tenderness: There is no abdominal tenderness. There is no right CVA tenderness, left CVA tenderness, guarding or rebound.      Hernia: No hernia is present.   Musculoskeletal:      Cervical back: Normal range of motion and neck supple. No rigidity or tenderness.   Lymphadenopathy:      Cervical: No cervical adenopathy.   Skin:     General: Skin is warm and dry.   Neurological:      General: No focal deficit present.      Mental Status: She is alert. Mental status is at baseline.   Psychiatric:         Mood and Affect: Mood normal.         Behavior: Behavior normal. Behavior is cooperative.         ASSESSMENT/PLAN    1. Acute bacterial bronchitis    2. Fever, unspecified fever cause  -     POCT COVID-19 Rapid Screening  -     POCT Influenza A/B Molecular    Other orders  -     promethazine-dextromethorphan (PROMETHAZINE-DM) 6.25-15 mg/5 mL Syrp; Take 5 mLs by mouth  nightly as needed (cough).  Dispense: 118 mL; Refill: 0  -     albuterol (PROVENTIL/VENTOLIN HFA) 90 mcg/actuation inhaler; Inhale 2 puffs into the lungs every 4 (four) hours as needed for Wheezing or Shortness of Breath.  Dispense: 18 g; Refill: 1  -     azithromycin (Z-KAYLEE) 250 MG tablet; Take 2 tablets by mouth on day 1; Take 1 tablet by mouth on days 2-5  Dispense: 6 tablet; Refill: 0    COVID and flu tests normal.    Hydrate, rest, Mucinex Expectorant as directed for thinning out the mucus; or MucinexDM if with significant cough and mucus.  Zyrtec, Xyzal, Allegra or Claritin. (Would lean towards Allegra which may be a bit more effective than claritin and will not cause sedation as Xyzal or Zyrtec may.)  Nasal saline spray such as [place] brand as needed.  Flonase or Nasonex nasal spray after the nasal saline.  Warm salt water gargles and warm liquids may help soothe a sore throat better than cool liquids.  Tylenol as directed as needed for fever, body aches, headaches.   All are OTC  Most of all, stay well-hydrated.            Most recent some lab results reviewed with patient.  Any new prescription medications gone over in detail including reason for taking the medication, most common possible side effects and possible costs, etcetera.    Chronic conditions updated. Other than changes or additions as above, cont current medications and maintain follow-up with specialists if indicated.     Steve Morin MD  A dictation device was used to produce this document. Use of such devices sometimes results in grammatical errors or replacement of words that sound similarly.

## 2024-09-05 ENCOUNTER — PATIENT MESSAGE (OUTPATIENT)
Dept: PRIMARY CARE CLINIC | Facility: CLINIC | Age: 64
End: 2024-09-05

## 2024-09-05 ENCOUNTER — OFFICE VISIT (OUTPATIENT)
Dept: PRIMARY CARE CLINIC | Facility: CLINIC | Age: 64
End: 2024-09-05
Payer: COMMERCIAL

## 2024-09-05 VITALS
SYSTOLIC BLOOD PRESSURE: 124 MMHG | HEIGHT: 62 IN | RESPIRATION RATE: 18 BRPM | BODY MASS INDEX: 27.7 KG/M2 | OXYGEN SATURATION: 98 % | DIASTOLIC BLOOD PRESSURE: 70 MMHG | HEART RATE: 63 BPM | WEIGHT: 150.56 LBS

## 2024-09-05 DIAGNOSIS — J20.8 ACUTE BACTERIAL BRONCHITIS: Primary | ICD-10-CM

## 2024-09-05 DIAGNOSIS — B96.89 ACUTE BACTERIAL BRONCHITIS: Primary | ICD-10-CM

## 2024-09-05 PROCEDURE — 3008F BODY MASS INDEX DOCD: CPT | Mod: CPTII,S$GLB,, | Performed by: FAMILY MEDICINE

## 2024-09-05 PROCEDURE — 1159F MED LIST DOCD IN RCRD: CPT | Mod: CPTII,S$GLB,, | Performed by: FAMILY MEDICINE

## 2024-09-05 PROCEDURE — 3078F DIAST BP <80 MM HG: CPT | Mod: CPTII,S$GLB,, | Performed by: FAMILY MEDICINE

## 2024-09-05 PROCEDURE — 3074F SYST BP LT 130 MM HG: CPT | Mod: CPTII,S$GLB,, | Performed by: FAMILY MEDICINE

## 2024-09-05 PROCEDURE — 1160F RVW MEDS BY RX/DR IN RCRD: CPT | Mod: CPTII,S$GLB,, | Performed by: FAMILY MEDICINE

## 2024-09-05 PROCEDURE — 99999 PR PBB SHADOW E&M-EST. PATIENT-LVL IV: CPT | Mod: PBBFAC,,, | Performed by: FAMILY MEDICINE

## 2024-09-05 PROCEDURE — 3044F HG A1C LEVEL LT 7.0%: CPT | Mod: CPTII,S$GLB,, | Performed by: FAMILY MEDICINE

## 2024-09-05 PROCEDURE — 99214 OFFICE O/P EST MOD 30 MIN: CPT | Mod: S$GLB,,, | Performed by: FAMILY MEDICINE

## 2024-09-05 RX ORDER — PROMETHAZINE HYDROCHLORIDE AND DEXTROMETHORPHAN HYDROBROMIDE 6.25; 15 MG/5ML; MG/5ML
5 SYRUP ORAL NIGHTLY PRN
Qty: 118 ML | Refills: 0 | Status: SHIPPED | OUTPATIENT
Start: 2024-09-05

## 2024-09-05 RX ORDER — AZITHROMYCIN 250 MG/1
TABLET, FILM COATED ORAL
Qty: 6 TABLET | Refills: 0 | Status: SHIPPED | OUTPATIENT
Start: 2024-09-05 | End: 2024-09-10

## 2024-09-05 RX ORDER — PROGESTERONE 100 MG/1
100 CAPSULE ORAL
COMMUNITY
Start: 2024-08-21

## 2024-09-05 RX ORDER — METHYLPREDNISOLONE 4 MG/1
TABLET ORAL
Qty: 1 EACH | Refills: 0 | Status: SHIPPED | OUTPATIENT
Start: 2024-09-05 | End: 2024-09-26

## 2024-09-05 NOTE — PROGRESS NOTES
"      /70 (BP Location: Left arm, Patient Position: Sitting, BP Method: Medium (Manual))   Pulse 63   Resp 18   Ht 5' 2" (1.575 m)   Wt 68.3 kg (150 lb 9.2 oz)   SpO2 98%   BMI 27.54 kg/m²       ===========              Cristal Gonzalez is a 64 y.o. female     here for    3 day h/o cough, fatigue, rhinorrhea.  Some myalgias arthralgias.  No nausea vomiting diarrhea.  Hydrating well.  No relief with OTC meds.      Patient queried and denies any further complaints      Patient Active Problem List   Diagnosis    Closed trimalleolar fracture of right ankle    Closed nondisplaced fracture of fifth left metatarsal bone with routine healing    Dorsalgia, unspecified    Closed displaced fracture of greater tuberosity of right humerus    AK (actinic keratosis)    Melanoma of upper arm, right    Closed fracture of right distal radius    Right hand pain    History of melanoma    Non-ulcer dyspepsia    Gastroesophageal reflux disease    Nausea    Dermatitis       SURGICAL AND MEDICAL HISTORY: updated and reviewed.  Past Surgical History:   Procedure Laterality Date    ANKLE FRACTURE SURGERY      APPENDECTOMY  2011    BREAST BIOPSY      benign    CLOSED REDUCTION OF INJURY OF METACARPAL BONE Right 4/21/2023    Procedure: CLOSED REDUCTION, FRACTURE, METACARPAL BONE - possible CRPP 5th metacarpal base;  Surgeon: Anand Geller MD;  Location: [facility];  Service: Orthopedics;  Laterality: Right;    COLONOSCOPY N/A 06/25/2021    Procedure: COLONOSCOPY;  Surgeon: Nohemy Parra MD;  Location: [facility] (4TH FLR);  Service: Endoscopy;  Laterality: N/A;  Fully vacc Covid-19 - pg    EXCISION OF MELANOMA Right     arm    LYMPH NODE BIOPSY Right 11/17/2022    Procedure: BIOPSY, LYMPH NODE with mapping;  Surgeon: Ben Stallworth MD;  Location: [facility] 2ND FLR;  Service: General;  Laterality: Right;    OPEN REDUCTION AND INTERNAL FIXATION (ORIF) OF FRACTURE OF DISTAL RADIUS Right 4/21/2023    Procedure: ORIF, " FRACTURE, RADIUS, DISTAL - RIGHT poss bridge plate;  Surgeon: Anand Geller MD;  Location: [facility];  Service: Orthopedics;  Laterality: Right;     ALLERGIES updated and reviewed.  Review of patient's allergies indicates:   Allergen Reactions    Sulfa (sulfonamide antibiotics) Shortness Of Breath, Anxiety and Palpitations    Macrobid [nitrofurantoin monohyd/m-cryst] Nausea Only     Abd pain and nausea       CURRENT OUTPATIENT MEDICATIONS updated and reviewed    Current Outpatient Medications:     albuterol (PROVENTIL/VENTOLIN HFA) 90 mcg/actuation inhaler, Inhale 2 puffs into the lungs every 4 (four) hours as needed for Wheezing or Shortness of Breath., Disp: 18 g, Rfl: 1    estradioL (ESTRACE) 0.5 MG tablet, Take by mouth., Disp: , Rfl:     ondansetron (ZOFRAN-ODT) 8 MG TbDL, Take 1 tablet (8 mg total) by mouth every 6 (six) hours as needed (nausea)., Disp: 30 tablet, Rfl: 0    pantoprazole (PROTONIX) 40 MG tablet, Take 1 tablet (40 mg total) by mouth once daily., Disp: 90 tablet, Rfl: 3    progesterone (PROMETRIUM) 100 MG capsule, Take 100 mg by mouth., Disp: , Rfl:     triamcinolone acetonide 0.1% (KENALOG) 0.1 % cream, Apply topically 2 (two) times daily. As needed, Disp: 80 g, Rfl: 5    azithromycin (Z-KAYLEE) 250 MG tablet, Take 2 tablets by mouth on day 1; Take 1 tablet by mouth on days 2-5, Disp: 6 tablet, Rfl: 0    methylPREDNISolone (MEDROL DOSEPACK) 4 mg tablet, use as directed, Disp: 1 each, Rfl: 0    mupirocin (BACTROBAN) 2 % ointment, Apply topically 3 (three) times daily. To pustules or pus-filled lesions. (Patient not taking: Reported on 8/12/2024), Disp: 30 g, Rfl: 1    ondansetron (ZOFRAN-ODT) 8 MG TbDL, Take 8 mg by mouth as needed. (Patient not taking: Reported on 8/19/2024), Disp: , Rfl:     progesterone (PROMETRIUM) 200 MG capsule, , Disp: , Rfl:     promethazine-dextromethorphan (PROMETHAZINE-DM) 6.25-15 mg/5 mL Syrp, Take 5 mLs by mouth nightly as needed (cough)., Disp: 118 mL, Rfl: 0     "triamcinolone acetonide 0.1% (KENALOG) 0.1 % cream, Apply 0.1 g topically as needed. (Patient not taking: Reported on 9/5/2024), Disp: , Rfl:     Review of Systems   Constitutional:  Negative for activity change, appetite change, chills, diaphoresis, fatigue, fever and unexpected weight change.   HENT:  Negative for congestion, ear discharge, ear pain, facial swelling, hearing loss, nosebleeds, postnasal drip, rhinorrhea, sinus pressure, sneezing, sore throat, tinnitus, trouble swallowing and voice change.    Eyes:  Negative for photophobia, pain, discharge, redness, itching and visual disturbance.   Respiratory:  Positive for cough. Negative for chest tightness, shortness of breath and wheezing.    Cardiovascular:  Negative for chest pain, palpitations and leg swelling.   Gastrointestinal:  Negative for abdominal distention, abdominal pain, anal bleeding, blood in stool, constipation, diarrhea, nausea, rectal pain and vomiting.   Endocrine: Negative for cold intolerance, heat intolerance, polydipsia, polyphagia and polyuria.   Genitourinary:  Negative for difficulty urinating, dysuria and flank pain.   Musculoskeletal:  Negative for arthralgias, back pain, joint swelling, myalgias and neck pain.   Skin:  Negative for rash.   Allergic/Immunologic: Negative for environmental allergies.   Neurological:  Negative for dizziness, tremors, seizures, syncope, speech difficulty, weakness, light-headedness, numbness and headaches.   Psychiatric/Behavioral:  Negative for behavioral problems, confusion, decreased concentration, dysphoric mood, sleep disturbance and suicidal ideas. The patient is not nervous/anxious and is not hyperactive.        /70 (BP Location: Left arm, Patient Position: Sitting, BP Method: Medium (Manual))   Pulse 63   Resp 18   Ht 5' 2" (1.575 m)   Wt 68.3 kg (150 lb 9.2 oz)   SpO2 98%   BMI 27.54 kg/m²   Physical Exam  Vitals and nursing note reviewed.   Constitutional:       General: She is " not in acute distress.     Appearance: Normal appearance. She is well-developed. She is not ill-appearing or toxic-appearing.   HENT:      Head: Normocephalic and atraumatic.      Right Ear: Tympanic membrane, ear canal and external ear normal.      Left Ear: Tympanic membrane, ear canal and external ear normal.      Nose: Nose normal.      Mouth/Throat:      Lips: Pink.      Mouth: Mucous membranes are moist.      Pharynx: Posterior oropharyngeal erythema present. No oropharyngeal exudate.   Eyes:      General: No scleral icterus.        Right eye: No discharge.         Left eye: No discharge.      Extraocular Movements: Extraocular movements intact.      Conjunctiva/sclera: Conjunctivae normal.   Cardiovascular:      Rate and Rhythm: Normal rate and regular rhythm.      Pulses: Normal pulses.      Heart sounds: Normal heart sounds. No murmur heard.  Pulmonary:      Effort: Pulmonary effort is normal. No respiratory distress.      Breath sounds: Normal breath sounds. No wheezing or rales.   Musculoskeletal:      Cervical back: Normal range of motion and neck supple. No rigidity or tenderness.   Lymphadenopathy:      Cervical: No cervical adenopathy.   Skin:     General: Skin is warm and dry.   Neurological:      Mental Status: She is alert. Mental status is at baseline.   Psychiatric:         Mood and Affect: Mood normal.         Behavior: Behavior normal. Behavior is cooperative.         ASSESSMENT/PLAN    1. Acute bacterial bronchitis    Other orders  -     azithromycin (Z-KAYLEE) 250 MG tablet; Take 2 tablets by mouth on day 1; Take 1 tablet by mouth on days 2-5  Dispense: 6 tablet; Refill: 0  -     methylPREDNISolone (MEDROL DOSEPACK) 4 mg tablet; use as directed  Dispense: 1 each; Refill: 0  -     promethazine-dextromethorphan (PROMETHAZINE-DM) 6.25-15 mg/5 mL Syrp; Take 5 mLs by mouth nightly as needed (cough).  Dispense: 118 mL; Refill: 0        We currently do not have any COVID testing.  She notify us later  that she did a COVID test which was normal.    Hydrate, rest, Mucinex Expectorant as directed for thinning out the mucus; or MucinexDM if with significant cough and mucus.  Zyrtec, Xyzal, Allegra or Claritin. (Would lean towards Allegra which may be a bit more effective than claritin and will not cause sedation as Xyzal or Zyrtec may.)  Nasal saline spray such as [place] brand as needed.  Flonase or Nasonex nasal spray after the nasal saline.  Warm salt water gargles and warm liquids may help soothe a sore throat better than cool liquids.  Tylenol as directed as needed for fever, body aches, headaches.   All are OTC  Most of all, stay well-hydrated.    Most recent some lab results reviewed with patient.  Any new prescription medications gone over in detail including reason for taking the medication, most common possible side effects and possible costs, etcetera.    Chronic conditions updated. Other than changes or additions as above, cont current medications and maintain follow-up with specialists if indicated.     Steve Morin MD  A dictation device was used to produce this document. Use of such devices sometimes results in grammatical errors or replacement of words that sound similarly.

## 2024-09-05 NOTE — PATIENT INSTRUCTIONS
Hydrate, rest, Mucinex Expectorant as directed for thinning out the mucus; or MucinexDM if with significant cough and mucus.  Zyrtec, Xyzal, Allegra or Claritin. (Would lean towards Allegra which may be a bit more effective than claritin and will not cause sedation as Xyzal or Zyrtec may.)  Nasal saline spray such as [place] brand as needed.  Flonase or Nasonex nasal spray after the nasal saline.  Warm salt water gargles and warm liquids may help soothe a sore throat better than cool liquids.  Tylenol as directed as needed for fever, body aches, headaches.   All are OTC  Most of all, stay well-hydrated.

## 2024-10-03 ENCOUNTER — OFFICE VISIT (OUTPATIENT)
Dept: CARDIOLOGY | Facility: CLINIC | Age: 64
End: 2024-10-03
Payer: COMMERCIAL

## 2024-10-03 VITALS
SYSTOLIC BLOOD PRESSURE: 119 MMHG | WEIGHT: 150.38 LBS | HEART RATE: 71 BPM | DIASTOLIC BLOOD PRESSURE: 82 MMHG | BODY MASS INDEX: 27.5 KG/M2

## 2024-10-03 DIAGNOSIS — R00.1 SINUS BRADYCARDIA: ICD-10-CM

## 2024-10-03 DIAGNOSIS — R94.31 ABNORMAL EKG: ICD-10-CM

## 2024-10-03 PROCEDURE — 99204 OFFICE O/P NEW MOD 45 MIN: CPT | Mod: S$GLB,,, | Performed by: INTERNAL MEDICINE

## 2024-10-03 PROCEDURE — 1159F MED LIST DOCD IN RCRD: CPT | Mod: CPTII,S$GLB,, | Performed by: INTERNAL MEDICINE

## 2024-10-03 PROCEDURE — 3074F SYST BP LT 130 MM HG: CPT | Mod: CPTII,S$GLB,, | Performed by: INTERNAL MEDICINE

## 2024-10-03 PROCEDURE — 99999 PR PBB SHADOW E&M-EST. PATIENT-LVL IV: CPT | Mod: PBBFAC,,, | Performed by: INTERNAL MEDICINE

## 2024-10-03 PROCEDURE — 3008F BODY MASS INDEX DOCD: CPT | Mod: CPTII,S$GLB,, | Performed by: INTERNAL MEDICINE

## 2024-10-03 PROCEDURE — 3079F DIAST BP 80-89 MM HG: CPT | Mod: CPTII,S$GLB,, | Performed by: INTERNAL MEDICINE

## 2024-10-03 PROCEDURE — 3044F HG A1C LEVEL LT 7.0%: CPT | Mod: CPTII,S$GLB,, | Performed by: INTERNAL MEDICINE

## 2024-10-03 NOTE — PROGRESS NOTES
Subjective:   10/03/2024     Patient ID:  Cristal Gonzalez is a 64 y.o. female who presents for evaulation of Bradycardia      Comes in today for follow-up of bradycardia.  She had been on estradiol and progesterone.  Getting off of this seemed to help.  She has been following her heart rates in they tend to run between 70 and 80.  EKGs in chart reviewed, she is very minor nonspecific ST and T-wave abnormality, there is actually no change between 2 different EKGs.  She has no complaints of syncope or presyncope.  No palpitations, no chest pains or tightness, no PND or orthopnea.      She does not have hypertension, not on negative chronotropic agents.      Family history is negative for coronary artery disease.      She was a smoker, quit 5 years ago.      The 10-year ASCVD risk score (Mayco ROCKWELL, et al., 2019) is: 3.1%.  Cardiac risk is low:    Values used to calculate the score:      Age: 64 years      Sex: Female      Is Non- : No      Diabetic: No      Tobacco smoker: No      Systolic Blood Pressure: 119 mmHg      Is BP treated: No      HDL Cholesterol: 88 mg/dL      Total Cholesterol: 161 mg/dL          Past Medical History:   Diagnosis Date    GERD (gastroesophageal reflux disease)     Hot flash, menopausal     Melanoma 10/2022    0.9 mm - right upper arm    Shoulder fracture        Review of patient's allergies indicates:   Allergen Reactions    Sulfa (sulfonamide antibiotics) Shortness Of Breath, Anxiety and Palpitations    Macrobid [nitrofurantoin monohyd/m-cryst] Nausea Only     Abd pain and nausea         Current Outpatient Medications:     albuterol (PROVENTIL/VENTOLIN HFA) 90 mcg/actuation inhaler, Inhale 2 puffs into the lungs every 4 (four) hours as needed for Wheezing or Shortness of Breath., Disp: 18 g, Rfl: 1    estradioL (ESTRACE) 0.5 MG tablet, Take by mouth., Disp: , Rfl:     mupirocin (BACTROBAN) 2 % ointment, Apply topically 3 (three) times daily. To pustules or  pus-filled lesions. (Patient not taking: Reported on 8/12/2024), Disp: 30 g, Rfl: 1    ondansetron (ZOFRAN-ODT) 8 MG TbDL, Take 8 mg by mouth as needed. (Patient not taking: Reported on 8/19/2024), Disp: , Rfl:     ondansetron (ZOFRAN-ODT) 8 MG TbDL, Take 1 tablet (8 mg total) by mouth every 6 (six) hours as needed (nausea)., Disp: 30 tablet, Rfl: 0    pantoprazole (PROTONIX) 40 MG tablet, Take 1 tablet (40 mg total) by mouth once daily., Disp: 90 tablet, Rfl: 3    progesterone (PROMETRIUM) 100 MG capsule, Take 100 mg by mouth., Disp: , Rfl:     progesterone (PROMETRIUM) 200 MG capsule, , Disp: , Rfl:     promethazine-dextromethorphan (PROMETHAZINE-DM) 6.25-15 mg/5 mL Syrp, Take 5 mLs by mouth nightly as needed (cough)., Disp: 118 mL, Rfl: 0    triamcinolone acetonide 0.1% (KENALOG) 0.1 % cream, Apply 0.1 g topically as needed. (Patient not taking: Reported on 9/5/2024), Disp: , Rfl:     triamcinolone acetonide 0.1% (KENALOG) 0.1 % cream, Apply topically 2 (two) times daily. As needed, Disp: 80 g, Rfl: 5     Objective:   Review of Systems   Cardiovascular:  Negative for chest pain, claudication, cyanosis, dyspnea on exertion, irregular heartbeat, leg swelling, near-syncope, orthopnea, palpitations, paroxysmal nocturnal dyspnea and syncope.         Vitals:    10/03/24 1309   BP: 119/82   Pulse: 71     Wt Readings from Last 3 Encounters:   10/03/24 68.2 kg (150 lb 5.7 oz)   09/05/24 68.3 kg (150 lb 9.2 oz)   08/19/24 68.8 kg (151 lb 10.8 oz)     Temp Readings from Last 3 Encounters:   08/19/24 99.1 °F (37.3 °C)   04/16/24 97.9 °F (36.6 °C) (Tympanic)   09/25/23 98.3 °F (36.8 °C) (Oral)     BP Readings from Last 3 Encounters:   10/03/24 119/82   09/05/24 124/70   08/19/24 124/74     Pulse Readings from Last 3 Encounters:   10/03/24 71   09/05/24 63   08/19/24 89             Physical Exam  Vitals reviewed.   Constitutional:       General: She is not in acute distress.     Appearance: She is well-developed.   HENT:       Head: Normocephalic and atraumatic.      Nose: Nose normal.   Eyes:      Conjunctiva/sclera: Conjunctivae normal.      Pupils: Pupils are equal, round, and reactive to light.   Neck:      Vascular: No carotid bruit or JVD.   Cardiovascular:      Rate and Rhythm: Normal rate and regular rhythm.      Pulses: Normal pulses and intact distal pulses.      Heart sounds: Normal heart sounds. No murmur heard.     No friction rub. No gallop.   Pulmonary:      Effort: Pulmonary effort is normal. No respiratory distress.      Breath sounds: Normal breath sounds. No wheezing or rales.   Chest:      Chest wall: No tenderness.   Abdominal:      General: Bowel sounds are normal. There is no distension.      Palpations: Abdomen is soft.      Tenderness: There is no abdominal tenderness.   Musculoskeletal:         General: No tenderness or deformity. Normal range of motion.      Cervical back: Normal range of motion and neck supple.      Right lower leg: No edema.      Left lower leg: No edema.   Skin:     General: Skin is warm and dry.      Findings: No erythema or rash.   Neurological:      Mental Status: She is alert and oriented to person, place, and time.      Cranial Nerves: No cranial nerve deficit.      Motor: No abnormal muscle tone.      Coordination: Coordination normal.   Psychiatric:         Behavior: Behavior normal.         Thought Content: Thought content normal.         Judgment: Judgment normal.           Lab Results   Component Value Date    CHOL 161 08/13/2024    CHOL 160 07/28/2023    CHOL 165 06/20/2022     Lab Results   Component Value Date    HDL 88 (H) 08/13/2024    HDL 95 (H) 07/28/2023    HDL 96 (H) 06/20/2022     Lab Results   Component Value Date    LDLCALC 66.8 08/13/2024    LDLCALC 59.0 (L) 07/28/2023    LDLCALC 63.8 06/20/2022     Lab Results   Component Value Date    ALT 13 08/13/2024    AST 17 08/13/2024    AST 20 07/28/2023    AST 18 04/14/2023     Lab Results   Component Value Date    CREATININE  0.8 08/13/2024    BUN 19 08/13/2024     08/13/2024    K 4.7 08/13/2024    CO2 23 08/13/2024    CO2 22 (L) 07/28/2023    CO2 20 (L) 04/14/2023     Lab Results   Component Value Date    HGB 14.3 08/13/2024    HCT 43.7 08/13/2024    HCT 42.3 07/28/2023    HCT 41.7 04/14/2023                         Assessment and Plan:     Sinus bradycardia  Comments:  Asymptomatic, not recurrent  Orders:  -     Ambulatory referral/consult to Cardiology    Abnormal EKG  Comments:  Minor nonspecific ST abnormalities, unchanged between 2 EKGs, not of concern  Orders:  -     Ambulatory referral/consult to Cardiology       Okay to resume estradiol and progesterone.  She would let me know if you develop recurrent bradycardia, but at this point, patient is asymptomatic, no further cardiac evaluation would be required.  No follow-ups on file.          No future appointments.

## 2024-10-07 ENCOUNTER — PATIENT MESSAGE (OUTPATIENT)
Dept: PRIMARY CARE CLINIC | Facility: CLINIC | Age: 64
End: 2024-10-07
Payer: COMMERCIAL

## 2024-12-31 ENCOUNTER — TELEPHONE (OUTPATIENT)
Dept: DERMATOLOGY | Facility: CLINIC | Age: 64
End: 2024-12-31
Payer: COMMERCIAL

## 2025-01-06 ENCOUNTER — OFFICE VISIT (OUTPATIENT)
Dept: URGENT CARE | Facility: CLINIC | Age: 65
End: 2025-01-06
Payer: COMMERCIAL

## 2025-01-06 VITALS
DIASTOLIC BLOOD PRESSURE: 77 MMHG | WEIGHT: 146 LBS | HEIGHT: 62 IN | TEMPERATURE: 98 F | OXYGEN SATURATION: 96 % | BODY MASS INDEX: 26.87 KG/M2 | HEART RATE: 63 BPM | RESPIRATION RATE: 14 BRPM | SYSTOLIC BLOOD PRESSURE: 129 MMHG

## 2025-01-06 DIAGNOSIS — N30.01 ACUTE CYSTITIS WITH HEMATURIA: Primary | ICD-10-CM

## 2025-01-06 DIAGNOSIS — R30.0 DYSURIA: ICD-10-CM

## 2025-01-06 LAB
BILIRUBIN, UA POC OHS: NEGATIVE
BLOOD, UA POC OHS: ABNORMAL
CLARITY, UA POC OHS: CLEAR
COLOR, UA POC OHS: YELLOW
GLUCOSE, UA POC OHS: NEGATIVE
KETONES, UA POC OHS: NEGATIVE
LEUKOCYTES, UA POC OHS: ABNORMAL
NITRITE, UA POC OHS: NEGATIVE
PH, UA POC OHS: 7.5
PROTEIN, UA POC OHS: NEGATIVE
SPECIFIC GRAVITY, UA POC OHS: 1.02
UROBILINOGEN, UA POC OHS: 0.2

## 2025-01-06 PROCEDURE — 81003 URINALYSIS AUTO W/O SCOPE: CPT | Mod: QW,S$GLB,, | Performed by: NURSE PRACTITIONER

## 2025-01-06 PROCEDURE — 87086 URINE CULTURE/COLONY COUNT: CPT | Performed by: NURSE PRACTITIONER

## 2025-01-06 PROCEDURE — 99213 OFFICE O/P EST LOW 20 MIN: CPT | Mod: S$GLB,,, | Performed by: NURSE PRACTITIONER

## 2025-01-06 RX ORDER — PHENAZOPYRIDINE HYDROCHLORIDE 200 MG/1
200 TABLET, FILM COATED ORAL 3 TIMES DAILY PRN
Qty: 6 TABLET | Refills: 0 | Status: SHIPPED | OUTPATIENT
Start: 2025-01-06 | End: 2025-01-08

## 2025-01-06 RX ORDER — CIPROFLOXACIN 500 MG/1
500 TABLET ORAL 2 TIMES DAILY
Qty: 14 TABLET | Refills: 0 | Status: SHIPPED | OUTPATIENT
Start: 2025-01-06 | End: 2025-01-13

## 2025-01-06 NOTE — PROGRESS NOTES
"Subjective:      Patient ID: Cristal Gonzalez is a 64 y.o. female.    Vitals:  height is 5' 2" (1.575 m) and weight is 66.2 kg (146 lb). Her tympanic temperature is 97.6 °F (36.4 °C). Her blood pressure is 129/77 and her pulse is 63. Her respiration is 14 and oxygen saturation is 96%.     Chief Complaint: Urinary Tract Infection    63 y/o female c/I with possible UTI. Patient states that on the 2nd she was having a burring sensation and going frequent. Patient states she took Nitrofurantoin monn 100mg and patient states she think she made it worst.   Provider note below:  This is a 64 y.o. female who presents today with a chief complaint of urinary frequency, urgency, dysuria and hematuria started on January 2nd, patient reports low back pain also, patient reports she had the leftover Macrobid that she took, patient reports ciprofloxacin usually works better for her bladder infection and requesting ciprofloxacin, denies flank pain,  denies fever, body aches or chills, denies cough, wheezing or shortness of breath, denies nausea, vomiting, diarrhea or abdominal pain, denies chest pain or dizziness positional lightheadedness, denies sore throat or trouble swallowing, denies loss of taste or smell, or any other symptoms       Urinary Tract Infection   This is a new problem. The current episode started in the past 7 days. The problem has been gradually worsening. The patient is experiencing no pain. She is Not sexually active. There is No history of pyelonephritis. Associated symptoms include frequency and urgency. Pertinent negatives include no behavior changes, chills, discharge, flank pain, hematuria, hesitancy, nausea, possible pregnancy, sweats, vomiting, weight loss, bubble bath use, constipation, rash or withholding. She has tried antibiotics for the symptoms. There is no history of catheterization, diabetes insipidus, diabetes mellitus, genitourinary reflux, hypertension, kidney stones, recurrent UTIs, a " single kidney, STD, urinary stasis or a urological procedure.       Constitution: Negative for chills.   Gastrointestinal:  Negative for nausea, vomiting and constipation.   Genitourinary:  Positive for dysuria, frequency and urgency. Negative for flank pain and hematuria.   Musculoskeletal:  Positive for back pain.   Skin:  Negative for rash.      Past Medical History:   Diagnosis Date    GERD (gastroesophageal reflux disease)     Hot flash, menopausal     Melanoma 10/2022    0.9 mm - right upper arm    Shoulder fracture        Objective:     Physical Exam   Constitutional: She is oriented to person, place, and time. She appears well-developed.   HENT:   Head: Normocephalic and atraumatic.   Ears:   Right Ear: External ear normal.   Left Ear: External ear normal.   Nose: Nose normal. No nasal deformity. No epistaxis.   Mouth/Throat: Oropharynx is clear and moist and mucous membranes are normal.   Eyes: Lids are normal.   Neck: Trachea normal and phonation normal. Neck supple.   Cardiovascular: Normal rate, regular rhythm and normal pulses.   Pulmonary/Chest: Effort normal and breath sounds normal.   Abdominal: Normal appearance and bowel sounds are normal. She exhibits no distension. Soft. There is no abdominal tenderness. There is no left CVA tenderness and no right CVA tenderness.      Comments: No cVA tenderness   Neurological: She is alert and oriented to person, place, and time.   Skin: Skin is warm, dry and intact.   Psychiatric: Her speech is normal and behavior is normal.   Nursing note and vitals reviewed.    Results for orders placed or performed in visit on 01/06/25   POCT Urinalysis(Instrument)    Collection Time: 01/06/25  8:23 AM   Result Value Ref Range    Color, POC UA Yellow Yellow, Straw, Colorless    Clarity, POC UA Clear Clear    Glucose, POC UA Negative Negative    Bilirubin, POC UA Negative Negative    Ketones, POC UA Negative Negative    Spec Grav POC UA 1.020 1.005 - 1.030    Blood, POC UA  Trace-intact (A) Negative    pH, POC UA 7.5 5.0 - 8.0    Protein, POC UA Negative Negative    Urobilinogen, POC UA 0.2 <=1.0    Nitrite, POC UA Negative Negative    WBC, POC UA Trace (A) Negative         Patient in no acute distress.  Vitals reassuring.  Discussed results/diagnosis/plan in depth with patient in clinic. Strict precautions given to patient to monitor for worsening signs and symptoms. Advised to follow up with primary.All questions answered. Strict ER precautions given. If your symptoms worsens or fail to improve you should go to the Emergency Room. Discharge and follow-up instructions given verbally/printed. Discharge and follow-up instructions discussed with the patient who expressed understanding and willingness to comply with my recommendations.Patient voiced understanding and in agreement with current treatment plan.     Please be advised this text was dictated with [employer] software and may contain errors due to translation.   Assessment:     1. Acute cystitis with hematuria    2. Dysuria        Plan:       Acute cystitis with hematuria  -     POCT Urinalysis(Instrument)  -     Urine Culture High Risk    Dysuria    Other orders  -     ciprofloxacin HCl (CIPRO) 500 MG tablet; Take 1 tablet (500 mg total) by mouth 2 (two) times daily. for 7 days  Dispense: 14 tablet; Refill: 0  -     phenazopyridine (PYRIDIUM) 200 MG tablet; Take 1 tablet (200 mg total) by mouth 3 (three) times daily as needed for Pain.  Dispense: 6 tablet; Refill: 0          Medical Decision Making:   History:   Old Records Summarized: records from clinic visits.  Clinical Tests:   Lab Tests: Reviewed  Urgent Care Management:  Patient in no acute distress.  Vitals reassuring.  On exam, patient is nontoxic appearing and afebrile.  Lungs CTA.  No CVA tenderness.  Urinalysis as above.  Patient did took couple of doses of leftover Macrobid.  Patient requesting ciprofloxacin as Cipro works better for her.  Patient reports low back pain.   Ciprofloxacin and Pyridium prescribed with detailed education provided about urine culture.  Medication prescribed and over-the-counter medication discussed with patient at length.  Proper hydration advised.  I reiterated the importance of further evaluation if no improvement symptoms and follow-up with primary. Patient voiced understanding and in agreement with current treatment plan.             Patient Instructions   PLEASE READ YOUR DISCHARGE INSTRUCTIONS ENTIRELY AS IT CONTAINS IMPORTANT INFORMATION.      Take the antibiotics to completion.     Drink plenty of fluids, wipe front to back, take showers not baths, no scented soaps, wear breathable cotton underwear, urinate after sexual intercourse.     A urine culture was sent. You will be contacted once it results and appropriate action will be taken if needed.     Take the pyridium three times a day with meals. It will turn your urine orange. You do not need to take the whole prescription you can stop this once the pain is better and finish out the antibiotics    Please go to the ER for worsening symptoms including fever, worsening flank pain, vomiting, etc.       Please return or see your primary care doctor if you develop new or worsening symptoms.     Please arrange follow up with your primary medical clinic as soon as possible. You must understand that you've received an Urgent Care treatment only and that you may be released before all of your medical problems are known or treated. You, the patient, will arrange for follow up as instructed. If your symptoms worsen or fail to improve you should go to the Emergency Room.  WE CANNOT RULE OUT ALL POSSIBLE CAUSES OF YOUR SYMPTOMS IN THE URGENT CARE SETTING PLEASE GO TO THE ER IF YOU FEELS YOUR CONDITION IS WORSENING OR YOU WOULD LIKE EMERGENT EVALUATION.

## 2025-01-08 ENCOUNTER — TELEPHONE (OUTPATIENT)
Dept: URGENT CARE | Facility: CLINIC | Age: 65
End: 2025-01-08
Payer: COMMERCIAL

## 2025-01-08 LAB
BACTERIA UR CULT: NORMAL
BACTERIA UR CULT: NORMAL

## 2025-01-08 NOTE — TELEPHONE ENCOUNTER
S/w pt, informed her that her urine cx was inconclusive but to continue her course of antibiotic.We also need for her for make a f/u appt with her PCP or Healthcare Professional for future guidance and clarification

## 2025-01-30 ENCOUNTER — TELEPHONE (OUTPATIENT)
Dept: DERMATOLOGY | Facility: CLINIC | Age: 65
End: 2025-01-30
Payer: COMMERCIAL

## 2025-01-30 NOTE — TELEPHONE ENCOUNTER
----- Message from Zulema sent at 1/29/2025  9:31 AM CST -----  Regarding: Appt  Contact: Pt [phone]  Pt is calling to reschedule appt scheduled for 2/5/25 states she can't do appts on Wednesday's please call

## 2025-02-04 ENCOUNTER — HOSPITAL ENCOUNTER (OUTPATIENT)
Dept: RADIOLOGY | Facility: HOSPITAL | Age: 65
Discharge: HOME | End: 2025-02-04
Attending: NURSE PRACTITIONER
Payer: COMMERCIAL

## 2025-02-04 DIAGNOSIS — R92.8 ABNORMAL MAMMOGRAM OF RIGHT BREAST: ICD-10-CM

## 2025-02-04 PROCEDURE — 77062 BREAST TOMOSYNTHESIS BI: CPT | Mod: 26,,, | Performed by: RADIOLOGY

## 2025-02-04 PROCEDURE — 77066 DX MAMMO INCL CAD BI: CPT | Mod: 26,,, | Performed by: RADIOLOGY

## 2025-02-04 PROCEDURE — 77066 DX MAMMO INCL CAD BI: CPT | Mod: TC

## 2025-03-18 ENCOUNTER — OFFICE VISIT (OUTPATIENT)
Dept: PRIMARY CARE CLINIC | Facility: CLINIC | Age: 65
End: 2025-03-18
Payer: COMMERCIAL

## 2025-03-18 VITALS
BODY MASS INDEX: 29.05 KG/M2 | WEIGHT: 157.88 LBS | TEMPERATURE: 98 F | SYSTOLIC BLOOD PRESSURE: 118 MMHG | DIASTOLIC BLOOD PRESSURE: 68 MMHG | HEART RATE: 64 BPM | OXYGEN SATURATION: 98 % | HEIGHT: 62 IN

## 2025-03-18 DIAGNOSIS — J32.9 BACTERIAL SINUSITIS: Primary | ICD-10-CM

## 2025-03-18 DIAGNOSIS — R19.8: ICD-10-CM

## 2025-03-18 DIAGNOSIS — B96.89 BACTERIAL SINUSITIS: Primary | ICD-10-CM

## 2025-03-18 PROBLEM — C43.61 MELANOMA OF UPPER ARM, RIGHT: Status: RESOLVED | Noted: 2022-11-17 | Resolved: 2025-03-18

## 2025-03-18 PROCEDURE — 3078F DIAST BP <80 MM HG: CPT | Mod: CPTII,S$GLB,, | Performed by: NURSE PRACTITIONER

## 2025-03-18 PROCEDURE — 99214 OFFICE O/P EST MOD 30 MIN: CPT | Mod: S$GLB,,, | Performed by: NURSE PRACTITIONER

## 2025-03-18 PROCEDURE — 1160F RVW MEDS BY RX/DR IN RCRD: CPT | Mod: CPTII,S$GLB,, | Performed by: NURSE PRACTITIONER

## 2025-03-18 PROCEDURE — 99999 PR PBB SHADOW E&M-EST. PATIENT-LVL IV: CPT | Mod: PBBFAC,,, | Performed by: NURSE PRACTITIONER

## 2025-03-18 PROCEDURE — 1159F MED LIST DOCD IN RCRD: CPT | Mod: CPTII,S$GLB,, | Performed by: NURSE PRACTITIONER

## 2025-03-18 PROCEDURE — 3008F BODY MASS INDEX DOCD: CPT | Mod: CPTII,S$GLB,, | Performed by: NURSE PRACTITIONER

## 2025-03-18 PROCEDURE — 3074F SYST BP LT 130 MM HG: CPT | Mod: CPTII,S$GLB,, | Performed by: NURSE PRACTITIONER

## 2025-03-18 RX ORDER — AMOXICILLIN 500 MG/1
500 TABLET, FILM COATED ORAL EVERY 12 HOURS
Qty: 20 TABLET | Refills: 0 | Status: SHIPPED | OUTPATIENT
Start: 2025-03-18 | End: 2025-03-28

## 2025-03-18 RX ORDER — METHYLPREDNISOLONE 4 MG/1
TABLET ORAL
Qty: 21 EACH | Refills: 0 | Status: SHIPPED | OUTPATIENT
Start: 2025-03-18 | End: 2025-04-08

## 2025-03-18 RX ORDER — GLUCOSAMINE/CHONDRO SU A 500-400 MG
1 TABLET ORAL 3 TIMES DAILY
COMMUNITY

## 2025-03-18 RX ORDER — GUAIFENESIN 600 MG/1
1200 TABLET, EXTENDED RELEASE ORAL 2 TIMES DAILY
Qty: 40 TABLET | Refills: 0 | Status: SHIPPED | OUTPATIENT
Start: 2025-03-18 | End: 2025-03-28

## 2025-03-18 NOTE — PROGRESS NOTES
Subjective     Patient ID: Cristal Gonzalez is a 64 y.o. female.    Chief Complaint: Sinus Problem, Jaw Pain, and Nasal Congestion (Yellow mucus w small traces of blood)     The pt is presenting today with complaints of sinus pressure and gingival tenderness resulting in jaw pain. She reports both started  ~ 1 month ago.  She denies bleeding of the gums.  She reports continued maxillary and frontal sinus tenderness and swelling. Nasal mucus is now thick and yellow. She is fatigued. She denies fever, abd pain, nausea, vomiting, diarrhea. Denies myalgias or arthralgias.    Sinus Problem  This is a new problem. The current episode started 1 to 4 weeks ago. The problem has been gradually worsening since onset. There has been no fever. Her pain is at a severity of 5/10. The pain is moderate. Associated symptoms include congestion and sinus pressure. Pertinent negatives include no ear pain, headaches or neck pain. Past treatments include saline nose sprays. The treatment provided no relief.     Review of Systems   Constitutional:  Positive for fatigue. Negative for activity change, fever and unexpected weight change.   HENT:  Positive for nasal congestion, dental problem and sinus pressure/congestion. Negative for ear pain, facial swelling, hearing loss, rhinorrhea and trouble swallowing.         Painful gums/jaw   Eyes:  Negative for discharge and visual disturbance.   Respiratory:  Negative for chest tightness and wheezing.    Cardiovascular:  Negative for chest pain and palpitations.   Gastrointestinal:  Negative for blood in stool, constipation, diarrhea and vomiting.   Endocrine: Negative for polydipsia and polyuria.   Genitourinary:  Negative for difficulty urinating, dysuria, hematuria and menstrual problem.   Musculoskeletal:  Negative for arthralgias, joint swelling and neck pain.   Neurological:  Negative for weakness and headaches.   Psychiatric/Behavioral:  Negative for confusion and dysphoric mood.            Objective     Physical Exam  Vitals reviewed.   Constitutional:       Appearance: Normal appearance. She is normal weight.   HENT:      Head: Normocephalic.      Right Ear: Tympanic membrane and ear canal normal.      Left Ear: Tympanic membrane and ear canal normal.      Nose: Congestion and rhinorrhea present.      Mouth/Throat:      Mouth: Mucous membranes are moist.      Dentition: Dental tenderness and gingival swelling present.      Tongue: No lesions.      Pharynx: Oropharynx is clear. No oropharyngeal exudate or posterior oropharyngeal erythema.      Tonsils: No tonsillar exudate.   Eyes:      Pupils: Pupils are equal, round, and reactive to light.   Cardiovascular:      Rate and Rhythm: Normal rate and regular rhythm.      Pulses: Normal pulses.      Heart sounds: Normal heart sounds. No murmur heard.  Pulmonary:      Effort: Pulmonary effort is normal. No respiratory distress.      Breath sounds: Normal breath sounds. No wheezing.   Musculoskeletal:      Cervical back: Normal range of motion.   Lymphadenopathy:      Cervical: No cervical adenopathy.   Skin:     General: Skin is warm and dry.      Capillary Refill: Capillary refill takes less than 2 seconds.      Findings: No rash.   Neurological:      General: No focal deficit present.      Mental Status: She is alert and oriented to person, place, and time.   Psychiatric:         Mood and Affect: Mood normal.         Behavior: Behavior normal.         Thought Content: Thought content normal.          Assessment and Plan     1. Bacterial sinusitis  -     amoxicillin (AMOXIL) 500 MG Tab; Take 1 tablet (500 mg total) by mouth every 12 (twelve) hours. for 10 days  Dispense: 20 tablet; Refill: 0  -     methylPREDNISolone (MEDROL DOSEPACK) 4 mg tablet; use as directed  Dispense: 21 each; Refill: 0  -continue using the nasal saline spray and then flonase after the saline spray    2. Gingival symptoms  -     guaiFENesin (MUCINEX) 600 mg 12 hr tablet;  Take 2 tablets (1,200 mg total) by mouth 2 (two) times daily. for 10 days  Dispense: 40 tablet; Refill: 0  -instructed to gargle frequently with salt water, sipping also on warm soups to sooth the gums      -instructed to take ibuprofen as needed for fever or pain  -instructed to hydrate frequently

## 2025-03-18 NOTE — PROGRESS NOTES
The pt is presenting today with complaints of sinus pressure and gingival tenderness resulting in jaw pain. She reports both started  ~ 1 month ago.  She denies bleeding of the gums.  She reports continued maxillary and frontal sinus tenderness and swelling. Nasal mucus is now thick and yellow. She is fatigued. She denies fever, abd pain, nausea, vomiting, diarrhea. Denies myalgias or arthralgias.

## 2025-03-21 ENCOUNTER — PATIENT MESSAGE (OUTPATIENT)
Dept: PRIMARY CARE CLINIC | Facility: CLINIC | Age: 65
End: 2025-03-21
Payer: COMMERCIAL

## 2025-03-21 ENCOUNTER — TELEPHONE (OUTPATIENT)
Dept: PRIMARY CARE CLINIC | Facility: CLINIC | Age: 65
End: 2025-03-21
Payer: COMMERCIAL

## 2025-03-21 NOTE — TELEPHONE ENCOUNTER
----- Message from Odalys sent at 3/21/2025  9:55 AM CDT -----  Contact: [phone] .1MEDICALADVICE Patient is calling for Medical Advice regarding:her pneumonia vaccine How long has patient had these symptoms: she states she does not see it in her chart Pharmacy name and phone#:Patient wants a call back or thru [facility]:call back Comments:2023 is what she thinks is when she had this Please advise patient replies from provider may take up to 48 hours.

## 2025-03-21 NOTE — TELEPHONE ENCOUNTER
Spoke to the patient. The patient has never received her pneumonia vaccine. She is going to go to an [facility] pharmacy to receive it.

## 2025-03-21 NOTE — TELEPHONE ENCOUNTER
----- Message from Odalys sent at 3/21/2025  9:52 AM CDT -----  Contact: [phone] .1MEDICALADVICE Patient is calling for Medical Advice regarding:asking if the office can add her covid vaccine she got from [employer] in her portal How long has patient had these symptoms: Pharmacy name and phone#:Patient wants a call back or [facility]:call back Comments:She she received the covid on 10/07/2024 she states he flu is in there from [facility] in the portal but the covid is not Please advise patient replies from provider may take up to 48 hours.

## 2025-05-06 ENCOUNTER — PATIENT MESSAGE (OUTPATIENT)
Dept: DERMATOLOGY | Facility: CLINIC | Age: 65
End: 2025-05-06
Payer: COMMERCIAL

## 2025-05-22 ENCOUNTER — HOSPITAL ENCOUNTER (OUTPATIENT)
Dept: RADIOLOGY | Facility: HOSPITAL | Age: 65
Discharge: HOME | End: 2025-05-22
Attending: FAMILY MEDICINE
Payer: COMMERCIAL

## 2025-05-22 ENCOUNTER — OFFICE VISIT (OUTPATIENT)
Dept: PRIMARY CARE CLINIC | Facility: CLINIC | Age: 65
End: 2025-05-22
Payer: COMMERCIAL

## 2025-05-22 ENCOUNTER — RESULTS FOLLOW-UP (OUTPATIENT)
Dept: PRIMARY CARE CLINIC | Facility: CLINIC | Age: 65
End: 2025-05-22

## 2025-05-22 VITALS
WEIGHT: 156.5 LBS | RESPIRATION RATE: 18 BRPM | SYSTOLIC BLOOD PRESSURE: 112 MMHG | DIASTOLIC BLOOD PRESSURE: 70 MMHG | HEIGHT: 62 IN | HEART RATE: 80 BPM | BODY MASS INDEX: 28.8 KG/M2 | OXYGEN SATURATION: 99 %

## 2025-05-22 DIAGNOSIS — M25.511 CHRONIC RIGHT SHOULDER PAIN: ICD-10-CM

## 2025-05-22 DIAGNOSIS — G89.29 CHRONIC RIGHT SHOULDER PAIN: ICD-10-CM

## 2025-05-22 DIAGNOSIS — Z87.81 HISTORY OF SHOULDER FRACTURE: ICD-10-CM

## 2025-05-22 DIAGNOSIS — M79.621 PAIN IN RIGHT UPPER ARM: Primary | ICD-10-CM

## 2025-05-22 DIAGNOSIS — M79.621 PAIN IN RIGHT UPPER ARM: ICD-10-CM

## 2025-05-22 PROCEDURE — 3008F BODY MASS INDEX DOCD: CPT | Mod: CPTII,S$GLB,, | Performed by: FAMILY MEDICINE

## 2025-05-22 PROCEDURE — 3078F DIAST BP <80 MM HG: CPT | Mod: CPTII,S$GLB,, | Performed by: FAMILY MEDICINE

## 2025-05-22 PROCEDURE — 73060 X-RAY EXAM OF HUMERUS: CPT | Mod: TC,PN,RT

## 2025-05-22 PROCEDURE — 3074F SYST BP LT 130 MM HG: CPT | Mod: CPTII,S$GLB,, | Performed by: FAMILY MEDICINE

## 2025-05-22 PROCEDURE — 99999 PR PBB SHADOW E&M-EST. PATIENT-LVL V: CPT | Mod: PBBFAC,,, | Performed by: FAMILY MEDICINE

## 2025-05-22 PROCEDURE — 99214 OFFICE O/P EST MOD 30 MIN: CPT | Mod: S$GLB,,, | Performed by: FAMILY MEDICINE

## 2025-05-22 PROCEDURE — 73060 X-RAY EXAM OF HUMERUS: CPT | Mod: 26,RT,, | Performed by: RADIOLOGY

## 2025-05-22 PROCEDURE — 73030 X-RAY EXAM OF SHOULDER: CPT | Mod: TC,PN,RT

## 2025-05-22 PROCEDURE — 1160F RVW MEDS BY RX/DR IN RCRD: CPT | Mod: CPTII,S$GLB,, | Performed by: FAMILY MEDICINE

## 2025-05-22 PROCEDURE — 1159F MED LIST DOCD IN RCRD: CPT | Mod: CPTII,S$GLB,, | Performed by: FAMILY MEDICINE

## 2025-05-22 PROCEDURE — 73030 X-RAY EXAM OF SHOULDER: CPT | Mod: 26,RT,, | Performed by: RADIOLOGY

## 2025-05-22 RX ORDER — DICLOFENAC SODIUM 75 MG/1
75 TABLET, DELAYED RELEASE ORAL 2 TIMES DAILY
Qty: 20 TABLET | Refills: 0 | Status: SHIPPED | OUTPATIENT
Start: 2025-05-22

## 2025-05-26 PROBLEM — Z87.81 HISTORY OF SHOULDER FRACTURE: Status: ACTIVE | Noted: 2025-05-26

## 2025-05-26 NOTE — PROGRESS NOTES
"      /70 (BP Location: Right arm, Patient Position: Sitting)   Pulse 80   Resp 18   Ht 5' 2" (1.575 m)   Wt 71 kg (156 lb 8.4 oz)   SpO2 99%   BMI 28.63 kg/m²       ===========              Cristal Gonzalez is a 64 y.o. female           History of Present Illness    CHIEF COMPLAINT:  Ms. Gonzalez presents with persistent arm pain that moves around the upper arm area, which has been present for approximately a month and a half.    HPI:  Ms. Gonzalez reports pain in her right upper arm for about 1.5 months. The pain is constant but tolerable, with varying location, sometimes behind the site of a previous skin cancer surgery, sometimes in the back of the arm, and other times in different areas of the upper arm. She characterizes the pain as feeling like a tight spot or a knot that moves around. The pain does not extend beyond the elbow.    Ms. Gonzalez has a history of breaking her shoulder in 2022 after slipping on a child's toy, requiring only a brace for some time. She also mentions breaking her wrist last year. The current pain is described as distinct from these previous injuries and does not feel like bone pain.    She has attempted various remedies including ice, heat, and OTC pain medication. Aleve (naproxen) in the form of green liquid gels provides relief for about 8 hours before the pain returns. She typically takes 1 Aleve every 12 hours for about 2 days at a time.    The pain sometimes worsens during sleep, making it difficult for her to move her arm upon waking. The pain is not unbearable but is bothersome due to its constant nature.    She denies pain in her left arm, back pain, pain extending to the elbow, and pain in the lateral or medial epicondyle areas.               Patient queried and denies any further complaints      Problem List[1]    SURGICAL AND MEDICAL HISTORY: updated and reviewed.  Past Surgical History:   Procedure Laterality Date    ANKLE FRACTURE SURGERY      APPENDECTOMY  " 2011    BREAST BIOPSY      benign    CLOSED REDUCTION OF INJURY OF METACARPAL BONE Right 4/21/2023    Procedure: CLOSED REDUCTION, FRACTURE, METACARPAL BONE - possible CRPP 5th metacarpal base;  Surgeon: Anand Geller MD;  Location: [facility] OR;  Service: Orthopedics;  Laterality: Right;    COLONOSCOPY N/A 06/25/2021    Procedure: COLONOSCOPY;  Surgeon: Nohemy Parra MD;  Location: [facility] ENDO (4TH FLR);  Service: Endoscopy;  Laterality: N/A;  Fully vacc Covid-19 - pg    EXCISION OF MELANOMA Right     arm    LYMPH NODE BIOPSY Right 11/17/2022    Procedure: BIOPSY, LYMPH NODE with mapping;  Surgeon: Ben Stallworth MD;  Location: [facility] OR 2ND FLR;  Service: General;  Laterality: Right;    OPEN REDUCTION AND INTERNAL FIXATION (ORIF) OF FRACTURE OF DISTAL RADIUS Right 4/21/2023    Procedure: ORIF, FRACTURE, RADIUS, DISTAL - RIGHT poss bridge plate;  Surgeon: Anand Geller MD;  Location: [facility] OR;  Service: Orthopedics;  Laterality: Right;     ALLERGIES updated and reviewed.  Review of patient's allergies indicates:   Allergen Reactions    Sulfa (sulfonamide antibiotics) Shortness Of Breath, Anxiety and Palpitations    Macrobid [nitrofurantoin monohyd/m-cryst] Nausea Only     Abd pain and nausea       CURRENT OUTPATIENT MEDICATIONS updated and reviewed  Current Medications[2]    Review of Systems   Constitutional:  Negative for activity change, appetite change, chills, diaphoresis, fatigue, fever and unexpected weight change.   HENT:  Negative for congestion, ear discharge, ear pain, facial swelling, hearing loss, nosebleeds, postnasal drip, rhinorrhea, sinus pressure, sneezing, sore throat, tinnitus, trouble swallowing and voice change.    Eyes:  Negative for photophobia, pain, discharge, redness, itching and visual disturbance.   Respiratory:  Negative for cough, chest tightness, shortness of breath and wheezing.    Cardiovascular:  Negative for chest pain, palpitations and leg swelling.   Gastrointestinal:   "Negative for abdominal distention, abdominal pain, anal bleeding, blood in stool, constipation, diarrhea, nausea, rectal pain and vomiting.   Endocrine: Negative for cold intolerance, heat intolerance, polydipsia, polyphagia and polyuria.   Genitourinary:  Negative for difficulty urinating, dysuria and flank pain.   Musculoskeletal:  Negative for arthralgias, back pain, joint swelling, myalgias and neck pain.   Skin:  Negative for rash.   Neurological:  Negative for dizziness, tremors, seizures, syncope, speech difficulty, weakness, light-headedness, numbness and headaches.   Psychiatric/Behavioral:  Negative for behavioral problems, confusion, decreased concentration, dysphoric mood, sleep disturbance and suicidal ideas. The patient is not nervous/anxious and is not hyperactive.        /70 (BP Location: Right arm, Patient Position: Sitting)   Pulse 80   Resp 18   Ht 5' 2" (1.575 m)   Wt 71 kg (156 lb 8.4 oz)   SpO2 99%   BMI 28.63 kg/m²   Physical Exam  Vitals and nursing note reviewed.   Constitutional:       Appearance: Normal appearance.   Musculoskeletal:      Right shoulder: Normal range of motion.      Left shoulder: Normal range of motion.      Right upper arm: Tenderness present.      Left upper arm: Normal.      Right elbow: Normal.      Left elbow: Normal.      Right forearm: Normal.      Left forearm: Normal.   Neurological:      Mental Status: She is alert.           ASSESSMENT/PLAN    Assessment & Plan    IMPRESSION:  - Assessed right arm pain, likely related to previous shoulder fracture and potential arthritis changes.  - Conducted exam.  - Considered possibility of referred pain from shoulder to arm.  - Determined XR necessary to evaluate for potential bone changes or growth since previous fracture.    ## PAIN IN RIGHT upper ARM:  - Evaluated the patient's right arm pain, which is constant, tolerable, and has been present for 1.5 months.  - The pain is described as a knot that moves " around, with something hard palpable on deep pressure.  - Assessed the likely cause as arthritis changes pressing on nerves, with some bony prominences observed at the fracture site.  - Ordered x-rays of the right humerus and right shoulder to check for bone changes or growths.  - Will message the patient with x-ray results via [employer].  Stop naproxen.  Trial of diclofenac 75 mg b.i.d. with meal and water for pain and inflammation for 3-10 days.    ## HISTORY OF SHOULDER FRACTURE:  - Noted the patient has a history of a fractured shoulder from 2022 due to slipping on a toy.  - The injury did not require surgery but necessitated wearing a brace.    Chronic right shoulder pain.  See above.          Cristal was seen today for arm pain and shoulder pain.    Diagnoses and all orders for this visit:    Pain in right upper arm  -     X-Ray Humerus 2 View Right; Future    Chronic right shoulder pain  -     X-ray Shoulder 2 or More Views Right; Future    History of shoulder fracture    Other orders  -     diclofenac (VOLTAREN) 75 MG EC tablet; Take 1 tablet (75 mg total) by mouth 2 (two) times daily. W meal and water for pain and inflammation for 3-10 days            Most recent some lab results reviewed with patient.  Any new prescription medications gone over in detail including reason for taking the medication, most common possible side effects and possible costs, etcetera.    Chronic conditions updated. Other than changes or additions as above, cont current medications and maintain follow-up with specialists if indicated.     - Cautioned the patient against excessive use of internet searches for interpreting results before professional review.     Steve Morin MD    Disclaimer:  This clinical note was created with the assistance of [employer], an [employer]-powered documentation tool.  Portions of this note may also have been dictated with the assistance of a computer-assisted dictation program.  While the healthcare provider  has reviewed the content, AI-assisted documentation and/or dictation programs may contain inadvertent errors or omissions.  Patients are encouraged to discuss questions or clarifications regarding their care directly with the provider.                         [1]   Patient Active Problem List  Diagnosis    Closed trimalleolar fracture of right ankle    Closed nondisplaced fracture of fifth left metatarsal bone with routine healing    Dorsalgia, unspecified    Closed displaced fracture of greater tuberosity of right humerus    AK (actinic keratosis)    Closed fracture of right distal radius    Right hand pain    History of melanoma    Non-ulcer dyspepsia    Gastroesophageal reflux disease    Nausea    Dermatitis    History of shoulder fracture   [2]   Current Outpatient Medications:     albuterol (PROVENTIL/VENTOLIN HFA) 90 mcg/actuation inhaler, Inhale 2 puffs into the lungs every 4 (four) hours as needed for Wheezing or Shortness of Breath., Disp: 18 g, Rfl: 1    estradioL (ESTRACE) 0.5 MG tablet, Take by mouth., Disp: , Rfl:     glucosamine-chondroitin 500-400 mg tablet, Take 1 tablet by mouth 3 (three) times daily., Disp: , Rfl:     ondansetron (ZOFRAN-ODT) 8 MG TbDL, Take 1 tablet (8 mg total) by mouth every 6 (six) hours as needed (nausea)., Disp: 30 tablet, Rfl: 0    pantoprazole (PROTONIX) 40 MG tablet, Take 1 tablet (40 mg total) by mouth once daily., Disp: 90 tablet, Rfl: 3    progesterone (PROMETRIUM) 100 MG capsule, Take 100 mg by mouth., Disp: , Rfl:     triamcinolone acetonide 0.1% (KENALOG) 0.1 % cream, Apply 0.1 g topically as needed., Disp: , Rfl:     diclofenac (VOLTAREN) 75 MG EC tablet, Take 1 tablet (75 mg total) by mouth 2 (two) times daily. W meal and water for pain and inflammation for 3-10 days, Disp: 20 tablet, Rfl: 0    mupirocin (BACTROBAN) 2 % ointment, Apply topically 3 (three) times daily. To pustules or pus-filled lesions. (Patient not taking: Reported on 8/12/2024), Disp: 30 g, Rfl:  1

## 2025-06-10 ENCOUNTER — PATIENT MESSAGE (OUTPATIENT)
Dept: PRIMARY CARE CLINIC | Facility: CLINIC | Age: 65
End: 2025-06-10
Payer: COMMERCIAL

## 2025-06-10 DIAGNOSIS — G89.29 CHRONIC RIGHT SHOULDER PAIN: ICD-10-CM

## 2025-06-10 DIAGNOSIS — M79.621 PAIN IN RIGHT UPPER ARM: Primary | ICD-10-CM

## 2025-06-10 DIAGNOSIS — M25.511 CHRONIC RIGHT SHOULDER PAIN: ICD-10-CM

## 2025-06-10 RX ORDER — IBUPROFEN 600 MG/1
600 TABLET, FILM COATED ORAL EVERY 8 HOURS PRN
Qty: 30 TABLET | Refills: 0 | Status: SHIPPED | OUTPATIENT
Start: 2025-06-10

## 2025-06-10 NOTE — TELEPHONE ENCOUNTER
----- Message from Ginna Castellon sent at 11/25/2022  8:10 AM CST -----  Regarding: Swelling  Contact: pt @ [phone]  Pt had surgery on 11/17, having severe swelling under her armpit. Asking for an urgent call back, leaving tow today     denies pain/discomfort (Rating = 0)

## 2025-06-10 NOTE — TELEPHONE ENCOUNTER
Care Due:                  Date            Visit Type   Department     Provider  --------------------------------------------------------------------------------                                EP -                              PRIMARY      [facility]  Last Visit: 05-      CARE ([facility])   LISA Morin  Next Visit: None Scheduled  None         None Found                                                            Last  Test          Frequency    Reason                     Performed    Due Date  --------------------------------------------------------------------------------    CBC.........  12 months..  diclofenac...............  08- 08-    Cr..........  12 months..  diclofenac...............  08- 08-    [employer] Embedded Care Due Messages. Reference number: [id number].   6/10/2025 4:33:29 PM YOVANI

## 2025-06-16 ENCOUNTER — PATIENT MESSAGE (OUTPATIENT)
Dept: ADMINISTRATIVE | Facility: HOSPITAL | Age: 65
End: 2025-06-16
Payer: MEDICARE

## 2025-06-19 ENCOUNTER — PATIENT OUTREACH (OUTPATIENT)
Dept: ADMINISTRATIVE | Facility: HOSPITAL | Age: 65
End: 2025-06-19
Payer: COMMERCIAL

## 2025-06-19 DIAGNOSIS — Z78.0 MENOPAUSE: Primary | ICD-10-CM

## 2025-06-30 ENCOUNTER — APPOINTMENT (OUTPATIENT)
Dept: RADIOLOGY | Facility: CLINIC | Age: 65
End: 2025-06-30
Attending: FAMILY MEDICINE
Payer: MEDICARE

## 2025-06-30 DIAGNOSIS — Z78.0 MENOPAUSE: ICD-10-CM

## 2025-06-30 PROCEDURE — 77080 DXA BONE DENSITY AXIAL: CPT | Mod: TC,PO

## 2025-06-30 PROCEDURE — 77080 DXA BONE DENSITY AXIAL: CPT | Mod: 26,,, | Performed by: INTERNAL MEDICINE

## 2025-07-01 ENCOUNTER — RESULTS FOLLOW-UP (OUTPATIENT)
Dept: PRIMARY CARE CLINIC | Facility: CLINIC | Age: 65
End: 2025-07-01

## 2025-07-02 DIAGNOSIS — M81.0 AGE-RELATED OSTEOPOROSIS WITHOUT CURRENT PATHOLOGICAL FRACTURE: Primary | ICD-10-CM

## 2025-07-03 ENCOUNTER — PATIENT MESSAGE (OUTPATIENT)
Dept: PRIMARY CARE CLINIC | Facility: CLINIC | Age: 65
End: 2025-07-03
Payer: MEDICARE

## 2025-08-12 ENCOUNTER — PATIENT MESSAGE (OUTPATIENT)
Dept: PRIMARY CARE CLINIC | Facility: CLINIC | Age: 65
End: 2025-08-12
Payer: MEDICARE

## 2025-08-20 ENCOUNTER — PATIENT MESSAGE (OUTPATIENT)
Dept: ENDOCRINOLOGY | Facility: CLINIC | Age: 65
End: 2025-08-20
Payer: MEDICARE

## 2025-08-21 ENCOUNTER — OFFICE VISIT (OUTPATIENT)
Dept: PRIMARY CARE CLINIC | Facility: CLINIC | Age: 65
End: 2025-08-21
Payer: MEDICARE

## 2025-08-21 VITALS
RESPIRATION RATE: 18 BRPM | SYSTOLIC BLOOD PRESSURE: 124 MMHG | WEIGHT: 156.94 LBS | BODY MASS INDEX: 28.88 KG/M2 | OXYGEN SATURATION: 99 % | HEART RATE: 80 BPM | DIASTOLIC BLOOD PRESSURE: 70 MMHG | HEIGHT: 62 IN

## 2025-08-21 DIAGNOSIS — R10.30 LOWER ABDOMINAL PAIN: ICD-10-CM

## 2025-08-21 DIAGNOSIS — K92.1 HEMATOCHEZIA: Primary | ICD-10-CM

## 2025-08-21 PROCEDURE — 99214 OFFICE O/P EST MOD 30 MIN: CPT | Mod: S$GLB,,, | Performed by: FAMILY MEDICINE

## 2025-08-21 PROCEDURE — 3078F DIAST BP <80 MM HG: CPT | Mod: CPTII,S$GLB,, | Performed by: FAMILY MEDICINE

## 2025-08-21 PROCEDURE — 99999 PR PBB SHADOW E&M-EST. PATIENT-LVL IV: CPT | Mod: PBBFAC,,, | Performed by: FAMILY MEDICINE

## 2025-08-21 PROCEDURE — 1101F PT FALLS ASSESS-DOCD LE1/YR: CPT | Mod: CPTII,S$GLB,, | Performed by: FAMILY MEDICINE

## 2025-08-21 PROCEDURE — 3074F SYST BP LT 130 MM HG: CPT | Mod: CPTII,S$GLB,, | Performed by: FAMILY MEDICINE

## 2025-08-21 PROCEDURE — 3288F FALL RISK ASSESSMENT DOCD: CPT | Mod: CPTII,S$GLB,, | Performed by: FAMILY MEDICINE

## 2025-08-21 PROCEDURE — 3008F BODY MASS INDEX DOCD: CPT | Mod: CPTII,S$GLB,, | Performed by: FAMILY MEDICINE

## 2025-08-21 RX ORDER — HYDROXYZINE HYDROCHLORIDE 25 MG/1
25 TABLET, FILM COATED ORAL 3 TIMES DAILY PRN
Qty: 90 TABLET | Refills: 1 | Status: SHIPPED | OUTPATIENT
Start: 2025-08-21

## 2025-08-21 RX ORDER — HYDROCORTISONE 25 MG/G
CREAM TOPICAL 2 TIMES DAILY
Qty: 28.35 G | Refills: 5 | Status: SHIPPED | OUTPATIENT
Start: 2025-08-21

## 2025-08-21 RX ORDER — ONDANSETRON 8 MG/1
8 TABLET, ORALLY DISINTEGRATING ORAL EVERY 6 HOURS PRN
Qty: 30 TABLET | Refills: 0 | Status: SHIPPED | OUTPATIENT
Start: 2025-08-21

## 2025-08-22 ENCOUNTER — TELEPHONE (OUTPATIENT)
Dept: GASTROENTEROLOGY | Facility: CLINIC | Age: 65
End: 2025-08-22
Payer: MEDICARE

## 2025-08-22 ENCOUNTER — TELEPHONE (OUTPATIENT)
Dept: SURGERY | Facility: CLINIC | Age: 65
End: 2025-08-22
Payer: MEDICARE

## 2025-08-22 ENCOUNTER — HOSPITAL ENCOUNTER (OUTPATIENT)
Dept: RADIOLOGY | Facility: HOSPITAL | Age: 65
Discharge: HOME | End: 2025-08-22
Payer: MEDICARE

## 2025-08-22 ENCOUNTER — TELEPHONE (OUTPATIENT)
Dept: GASTROENTEROLOGY | Facility: CLINIC | Age: 65
End: 2025-08-22

## 2025-08-22 ENCOUNTER — NURSE TRIAGE (OUTPATIENT)
Dept: ADMINISTRATIVE | Facility: CLINIC | Age: 65
End: 2025-08-22
Payer: MEDICARE

## 2025-08-22 ENCOUNTER — OFFICE VISIT (OUTPATIENT)
Dept: GASTROENTEROLOGY | Facility: CLINIC | Age: 65
End: 2025-08-22
Payer: MEDICARE

## 2025-08-22 VITALS — BODY MASS INDEX: 28.72 KG/M2 | HEIGHT: 62 IN | WEIGHT: 156.06 LBS

## 2025-08-22 DIAGNOSIS — K21.9 GASTROESOPHAGEAL REFLUX DISEASE, UNSPECIFIED WHETHER ESOPHAGITIS PRESENT: ICD-10-CM

## 2025-08-22 DIAGNOSIS — R14.2 BELCHING: ICD-10-CM

## 2025-08-22 DIAGNOSIS — R10.817 GENERALIZED ABDOMINAL TENDERNESS WITHOUT REBOUND TENDERNESS: ICD-10-CM

## 2025-08-22 DIAGNOSIS — K57.90 DIVERTICULOSIS: ICD-10-CM

## 2025-08-22 DIAGNOSIS — R19.7 INTERMITTENT DIARRHEA: ICD-10-CM

## 2025-08-22 DIAGNOSIS — Z86.0100 HISTORY OF COLON POLYPS: ICD-10-CM

## 2025-08-22 DIAGNOSIS — K92.2 ACUTE GASTROINTESTINAL HEMORRHAGE: Primary | ICD-10-CM

## 2025-08-22 DIAGNOSIS — K92.2 GASTROINTESTINAL HEMORRHAGE, UNSPECIFIED GASTROINTESTINAL HEMORRHAGE TYPE: ICD-10-CM

## 2025-08-22 DIAGNOSIS — K62.5 RECTAL BLEEDING: Primary | ICD-10-CM

## 2025-08-22 DIAGNOSIS — K64.9 HEMORRHOIDS, UNSPECIFIED HEMORRHOID TYPE: ICD-10-CM

## 2025-08-22 PROCEDURE — 74177 CT ABD & PELVIS W/CONTRAST: CPT | Mod: TC,PO

## 2025-08-22 PROCEDURE — 25500020 PHARM REV CODE 255: Mod: PO

## 2025-08-22 PROCEDURE — 74177 CT ABD & PELVIS W/CONTRAST: CPT | Mod: 26,,, | Performed by: RADIOLOGY

## 2025-08-22 PROCEDURE — A9698 NON-RAD CONTRAST MATERIALNOC: HCPCS | Mod: PO

## 2025-08-22 PROCEDURE — 99999 PR PBB SHADOW E&M-EST. PATIENT-LVL IV: CPT | Mod: PBBFAC,,,

## 2025-08-22 RX ORDER — HYDROCORTISONE ACETATE 25 MG/1
25 SUPPOSITORY RECTAL 2 TIMES DAILY
Qty: 20 SUPPOSITORY | Refills: 0 | Status: SHIPPED | OUTPATIENT
Start: 2025-08-22 | End: 2025-09-01

## 2025-08-22 RX ADMIN — IOHEXOL 75 ML: 350 INJECTION, SOLUTION INTRAVENOUS at 12:08

## 2025-08-22 RX ADMIN — IOHEXOL 1000 ML: 9 SOLUTION ORAL at 12:08

## 2025-08-28 ENCOUNTER — PATIENT MESSAGE (OUTPATIENT)
Dept: PRIMARY CARE CLINIC | Facility: CLINIC | Age: 65
End: 2025-08-28

## 2025-08-28 ENCOUNTER — OFFICE VISIT (OUTPATIENT)
Dept: PRIMARY CARE CLINIC | Facility: CLINIC | Age: 65
End: 2025-08-28
Payer: MEDICARE

## 2025-08-28 ENCOUNTER — PATIENT MESSAGE (OUTPATIENT)
Dept: GASTROENTEROLOGY | Facility: CLINIC | Age: 65
End: 2025-08-28
Payer: MEDICARE

## 2025-08-28 ENCOUNTER — LAB VISIT (OUTPATIENT)
Dept: LAB | Facility: HOSPITAL | Age: 65
End: 2025-08-28
Attending: FAMILY MEDICINE
Payer: MEDICARE

## 2025-08-28 DIAGNOSIS — Z00.00 WELL ADULT EXAM: ICD-10-CM

## 2025-08-28 LAB
ALBUMIN SERPL BCP-MCNC: 3.9 G/DL (ref 3.5–5.2)
ALP SERPL-CCNC: 45 UNIT/L (ref 40–150)
ALT SERPL W/O P-5'-P-CCNC: 26 UNIT/L (ref 0–55)
ANION GAP (OHS): 8 MMOL/L (ref 8–16)
AST SERPL-CCNC: 30 UNIT/L (ref 0–50)
BILIRUB SERPL-MCNC: 0.4 MG/DL (ref 0.1–1)
BUN SERPL-MCNC: 16 MG/DL (ref 8–23)
CALCIUM SERPL-MCNC: 9.1 MG/DL (ref 8.7–10.5)
CHLORIDE SERPL-SCNC: 104 MMOL/L (ref 95–110)
CHOLEST SERPL-MCNC: 139 MG/DL (ref 120–199)
CHOLEST/HDLC SERPL: 1.8 {RATIO} (ref 2–5)
CO2 SERPL-SCNC: 24 MMOL/L (ref 23–29)
CREAT SERPL-MCNC: 0.7 MG/DL (ref 0.5–1.4)
EAG (OHS): 117 MG/DL (ref 68–131)
ERYTHROCYTE [DISTWIDTH] IN BLOOD BY AUTOMATED COUNT: 12.8 % (ref 11.5–14.5)
GFR SERPLBLD CREATININE-BSD FMLA CKD-EPI: >60 ML/MIN/1.73/M2
GLUCOSE SERPL-MCNC: 97 MG/DL (ref 70–110)
HBA1C MFR BLD: 5.7 % (ref 4–5.6)
HCT VFR BLD AUTO: 40.7 % (ref 37–48.5)
HDLC SERPL-MCNC: 79 MG/DL (ref 40–75)
HDLC SERPL: 56.8 % (ref 20–50)
HGB BLD-MCNC: 13.4 GM/DL (ref 12–16)
LDLC SERPL CALC-MCNC: 55.8 MG/DL (ref 63–159)
MCH RBC QN AUTO: 32.3 PG (ref 27–31)
MCHC RBC AUTO-ENTMCNC: 32.9 G/DL (ref 32–36)
MCV RBC AUTO: 98 FL (ref 82–98)
NONHDLC SERPL-MCNC: 60 MG/DL
PLATELET # BLD AUTO: 393 K/UL (ref 150–450)
PMV BLD AUTO: 10.3 FL (ref 9.2–12.9)
POTASSIUM SERPL-SCNC: 4.1 MMOL/L (ref 3.5–5.1)
PROT SERPL-MCNC: 7 GM/DL (ref 6–8.4)
RBC # BLD AUTO: 4.15 M/UL (ref 4–5.4)
SODIUM SERPL-SCNC: 136 MMOL/L (ref 136–145)
TRIGL SERPL-MCNC: 21 MG/DL (ref 30–150)
TSH SERPL-ACNC: 1.85 UIU/ML (ref 0.4–4)
WBC # BLD AUTO: 6.32 K/UL (ref 3.9–12.7)

## 2025-08-28 PROCEDURE — 36415 COLL VENOUS BLD VENIPUNCTURE: CPT | Mod: PN

## 2025-08-28 PROCEDURE — 84443 ASSAY THYROID STIM HORMONE: CPT

## 2025-08-28 PROCEDURE — 82040 ASSAY OF SERUM ALBUMIN: CPT

## 2025-08-28 PROCEDURE — 83036 HEMOGLOBIN GLYCOSYLATED A1C: CPT

## 2025-08-28 PROCEDURE — 85027 COMPLETE CBC AUTOMATED: CPT

## 2025-08-28 PROCEDURE — 80061 LIPID PANEL: CPT

## (undated) DEVICE — BANDAGE MATRIX HK LOOP 4IN 5YD

## (undated) DEVICE — GUIDEWIRE ORTHO .054 X 6 IN
Type: IMPLANTABLE DEVICE | Site: WRIST | Status: NON-FUNCTIONAL
Removed: 2023-04-21

## (undated) DEVICE — Device

## (undated) DEVICE — SPLINT PLASTER F.S 4INX15IN

## (undated) DEVICE — GAUZE SPONGE 4X4 12PLY

## (undated) DEVICE — DRAPE U SPLIT SHEET 54X76IN

## (undated) DEVICE — PAD CAST 2 IN X 4YDS STERILE

## (undated) DEVICE — ELECTRODE REM PLYHSV RETURN 9

## (undated) DEVICE — SUT 4-0 ETHILON 18 PS-2

## (undated) DEVICE — STOCKINETTE DBL PLY ST 4X

## (undated) DEVICE — DRAPE LAP T SHT W/ INSTR PAD

## (undated) DEVICE — SYR IRRIGATION BULB STER 60ML

## (undated) DEVICE — GLOVE PI ULTRA TOUCH G SURGEON

## (undated) DEVICE — BANDAGE ESMARK ELASTIC ST 4X9

## (undated) DEVICE — TOWEL OR DISP STRL BLUE 4/PK

## (undated) DEVICE — DRESSING GAUZE XEROFORM 5X9

## (undated) DEVICE — SLING ARM MEDIUM FOAM STRAP

## (undated) DEVICE — DRAPE STERI-DRAPE 1000 17X11IN

## (undated) DEVICE — UNDERGLOVES BIOGEL PI SIZE 8

## (undated) DEVICE — DRAPE INCISE IOBAN 2 23X17IN

## (undated) DEVICE — SUT VICRYL 3-0 27 SH

## (undated) DEVICE — PAD CAST SPECIALIST STRL 4

## (undated) DEVICE — DRESSING SPONGE 8PLY 4X4 STRL

## (undated) DEVICE — DRESSING TELFA N ADH 4X10

## (undated) DEVICE — DRESSING TRANS 2X2 TEGADERM

## (undated) DEVICE — TRAY MINOR GEN SURG OMC

## (undated) DEVICE — BANDAGE ACE ELASTIC 6"

## (undated) DEVICE — TOURNIQUET SB QC DP 18X4IN

## (undated) DEVICE — GOWN SURGICAL X-LARGE

## (undated) DEVICE — ADHESIVE MASTISOL VIAL 48/BX

## (undated) DEVICE — BANDAGE ACE NON LATEX 3IN

## (undated) DEVICE — CORD FOR BIPOLAR FORCEPS 12

## (undated) DEVICE — ADHESIVE DERMABOND ADVANCED

## (undated) DEVICE — BANDAGE MATRIX HK LOOP 2IN 5YD

## (undated) DEVICE — GOWN ECLIPSE REINF LVL4 TWL XL

## (undated) DEVICE — BANDAGE ACE NON LATEX 2IN

## (undated) DEVICE — NDL 22GA X1 1/2 REG BEVEL

## (undated) DEVICE — SUT ETHILON 3-0 PS2 18 BLK

## (undated) DEVICE — GOWN POLY REINF BRTH SLV XL

## (undated) DEVICE — DRAPE IOBAN 2 STERI

## (undated) DEVICE — DRESSING XEROFORM GAUZE 5X9

## (undated) DEVICE — SUT MCRYL PLUS 4-0 PS2 27IN

## (undated) DEVICE — BIT DRILL QUICK RELEASE 2.0MM

## (undated) DEVICE — DRILL QUICK RELEASE 2.8MM 5IN

## (undated) DEVICE — SUT 2/0 30IN SILK BLK BRAI

## (undated) DEVICE — TIP DRIVER HEX LOCK GROOVE 1.5

## (undated) DEVICE — SUT MONOCRYL 4-0 PS-2

## (undated) DEVICE — APPLICATOR CHLORAPREP ORN 26ML

## (undated) DEVICE — DRESSING XEROFORM FOIL PK 1X8

## (undated) DEVICE — PACK UNIVERSAL SPLIT II

## (undated) DEVICE — GUIDEWIRE ST SM BNE .045X6IN

## (undated) DEVICE — DRESSING XEROFORM NONADH 1X8IN

## (undated) DEVICE — DRAPE C-ARM MINI DISP

## (undated) DEVICE — DRAPE THREE-QTR REINF 53X77IN